# Patient Record
Sex: FEMALE | Race: WHITE | NOT HISPANIC OR LATINO | Employment: OTHER | URBAN - METROPOLITAN AREA
[De-identification: names, ages, dates, MRNs, and addresses within clinical notes are randomized per-mention and may not be internally consistent; named-entity substitution may affect disease eponyms.]

---

## 2017-01-16 RX ORDER — METOPROLOL TARTRATE 50 MG/1
TABLET ORAL
Qty: 90 TABLET | Refills: 12 | Status: SHIPPED | OUTPATIENT
Start: 2017-01-16 | End: 2018-04-02 | Stop reason: SDUPTHER

## 2017-01-27 ENCOUNTER — TELEPHONE (OUTPATIENT)
Dept: DERMATOLOGY | Facility: CLINIC | Age: 62
End: 2017-01-27

## 2017-01-27 NOTE — TELEPHONE ENCOUNTER
Spoke to pt and pt stated that she has a new spot on her R cheek that her dermatologist (Dr Labadie) will bx, but she also sees a new spot on her L cheek where she had Mohs previously and wants to know if Dr Roberto could bx it or if her dermatologist should, which she already has a appt scheduled.   I informed pt to take a photo of the area and send it either via my [facility] of email, so Dr Roberto can see it when she returns on Monday.

## 2017-01-27 NOTE — TELEPHONE ENCOUNTER
----- Message from Maia Goldberg sent at 1/27/2017  9:31 AM CST -----  Contact: don brothers pt-Pt is calling in ref to  her surgery from several months ago. Call pt at  [phone]. Wants to know if she wants the derm doctor she is with right now if you want her to do the biopsy herself or let her outside doctor to do.  Dr. Labadie wants Felisa to know what is goign on.Please call today.

## 2017-01-30 ENCOUNTER — TELEPHONE (OUTPATIENT)
Dept: DERMATOLOGY | Facility: CLINIC | Age: 62
End: 2017-01-30

## 2017-02-13 RX ORDER — LEVOTHYROXINE SODIUM 200 UG/1
TABLET ORAL
Qty: 90 TABLET | Refills: 12 | Status: SHIPPED | OUTPATIENT
Start: 2017-02-13 | End: 2018-05-13 | Stop reason: SDUPTHER

## 2017-02-14 ENCOUNTER — TELEPHONE (OUTPATIENT)
Dept: DERMATOLOGY | Facility: CLINIC | Age: 62
End: 2017-02-14

## 2017-02-14 NOTE — TELEPHONE ENCOUNTER
Established pt with BCC on R nasomalar. Referral from Dr. Labadie. Photo received and pt has been scheduled for mohs surgery on 2/16 at 800 am. Pt confirmed date, time and location. Advised to stop fish oil today.

## 2017-02-16 ENCOUNTER — PROCEDURE VISIT (OUTPATIENT)
Dept: DERMATOLOGY | Facility: CLINIC | Age: 62
End: 2017-02-16
Payer: COMMERCIAL

## 2017-02-16 VITALS
HEIGHT: 66 IN | BODY MASS INDEX: 26.36 KG/M2 | DIASTOLIC BLOOD PRESSURE: 80 MMHG | WEIGHT: 164 LBS | SYSTOLIC BLOOD PRESSURE: 150 MMHG | HEART RATE: 51 BPM

## 2017-02-16 DIAGNOSIS — C44.319 BASAL CELL CARCINOMA OF RIGHT CHEEK: Primary | ICD-10-CM

## 2017-02-16 PROCEDURE — 99499 UNLISTED E&M SERVICE: CPT | Mod: S$GLB,,, | Performed by: DERMATOLOGY

## 2017-02-16 PROCEDURE — 17311 MOHS 1 STAGE H/N/HF/G: CPT | Mod: S$GLB,,, | Performed by: DERMATOLOGY

## 2017-02-16 PROCEDURE — 13131 CMPLX RPR F/C/C/M/N/AX/G/H/F: CPT | Mod: 51,S$GLB,, | Performed by: DERMATOLOGY

## 2017-02-16 PROCEDURE — 17312 MOHS ADDL STAGE: CPT | Mod: S$GLB,,, | Performed by: DERMATOLOGY

## 2017-02-16 RX ORDER — TRAMADOL HYDROCHLORIDE 50 MG/1
50 TABLET ORAL 2 TIMES DAILY PRN
Qty: 30 TABLET | Refills: 0 | Status: SHIPPED | OUTPATIENT
Start: 2017-02-16 | End: 2017-10-02

## 2017-02-16 NOTE — MR AVS SNAPSHOT
[facility] - Dermatology Surgery  [address]  [facility] [address]  Phone: [phone]  Fax: [phone]                  Anushka Napier   2017 8:00 AM   Procedure visit    Description:  Female : 1955   Provider:  Angel Roberto MD   Department:  [facility] - Dermatology Surgery           Reason for Visit     Basal Cell Carcinoma           Diagnoses this Visit        Comments    Basal cell carcinoma of right cheek    -  Primary            To Do List           Future Appointments        Provider Department Dept Phone    2017 9:40 AM SSW NURSE, DERM SURG [facility] Dermatology Surgery [phone]      Goals (5 Years of Data)     None       These Medications        Disp Refills Start End    tramadol (ULTRAM) 50 mg tablet 30 tablet 0 2017     Take 1 tablet (50 mg total) by mouth 2 (two) times daily as needed for Pain. - Oral    [facility] [address] [place] [address] [place] [address] AT [facility] Ph #: [phone]         [facility] On Call     [facility] On Call Nurse Care Line -  Assistance  Registered nurses in the [facility] On Call Center provide clinical advisement, health education, appointment booking, and other advisory services.  Call for this free service at [phone].             Medications           Message regarding Medications     Verify the changes and/or additions to your medication regime listed below are the same as discussed with your clinician today.  If any of these changes or additions are incorrect, please notify your healthcare provider.        START taking these NEW medications        Refills    tramadol (ULTRAM) 50 mg tablet 0    Sig: Take 1 tablet (50 mg total) by mouth 2 (two) times daily as needed for Pain.    Class: Print    Route: Oral           Verify that the below list of medications is an accurate representation of the medications you are currently taking.  If none reported, the list may be blank. If incorrect,  "please contact your healthcare provider. Carry this list with you in case of emergency.           Current Medications     amitriptyline (ELAVIL) 10 MG tablet Take 10 mg by mouth nightly as needed for Insomnia.    aspirin 81 MG chewable tablet Take 1 tablet by mouth Daily. 1 Tablet, Chewable Oral Every day    atorvastatin (LIPITOR) 40 MG tablet Take 1 tablet (40 mg total) by mouth once daily.    blood sugar diagnostic Strp 1 strip by Misc.(Non-Drug; Combo Route) route 2 (two) times daily. Dispense glucometer of choice and lancets as well    estrogens, conjugated, (PREMARIN) 0.625 MG tablet Take 1 tablet (0.625 mg total) by mouth once daily.    fluoxetine (PROZAC) 20 MG capsule Take 1 capsule (20 mg total) by mouth once daily.    fluticasone (FLONASE) 50 mcg/actuation nasal spray 2 sprays by Nasal route Daily. 2 Aerosol, Spray Nasal Every day    levothyroxine (SYNTHROID) 200 MCG tablet TAKE 1 TABLET BY MOUTH ONCE DAILY    levothyroxine (SYNTHROID) 25 MCG tablet TAKE 1 TABLET BY MOUTH DAILY    lorazepam (ATIVAN) 0.5 MG tablet     metoprolol tartrate (LOPRESSOR) 50 MG tablet TAKE 1 TABLET BY MOUTH DAILY    multivitamin capsule Take 1 capsule by mouth once daily.    omega-3 fatty acids-vitamin E (FISH OIL) 1,000 mg Cap Take 1 capsule by mouth Twice daily. 1 Capsule Oral Twice a day     rabeprazole (ACIPHEX) 20 mg tablet TAKE 1 TABLET BY MOUTH TWICE DAILY    VESICARE 5 mg tablet Take 5 mg by mouth once daily.     tramadol (ULTRAM) 50 mg tablet Take 1 tablet (50 mg total) by mouth 2 (two) times daily as needed for Pain.           Clinical Reference Information           Your Vitals Were     BP Pulse Height Weight BMI    150/80 (BP Location: Left arm, Patient Position: Sitting, BP Method: Automatic) 51 5' 5.5" (1.664 m) 74.4 kg (164 lb) 26.88 kg/m2      Blood Pressure          Most Recent Value    BP  (!)  150/80      Allergies as of 2/16/2017     Codeine    Iodinated Contrast Media - Iv Dye    Iodine    Penicillins    " Sulfamethoxazole-trimethoprim    Epinephrine      Immunizations Administered on Date of Encounter - 2/16/2017     None      Language Assistance Services     ATTENTION: Language assistance services are available, free of charge. Please call [phone].      ATENCIÓN: Si radha dukes, tiene a lozano disposición servicios gratuitos de asistencia lingüística. Llame al [phone].     [facility] Ý: N?u b?n nói Ti?ng Vi?t, có các d?ch v? h? tr? ngôn ng? mi?n phí dành cho b?n. G?i s? [phone].         [facility] complies with applicable Federal civil rights laws and does not discriminate on the basis of race, color, national origin, age, disability, or sex.

## 2017-02-16 NOTE — PROGRESS NOTES
PROCEDURE: Mohs' Micrographic Surgery    INDICATION: Location in mask areas of face including central face, nose, eyelids, eyebrows, lips, chin, preauricular, temple, and ear. Biopsy-proven skin cancer of cosmetically and functionally important areas, including head, neck, genital, hand, foot, or areas known for having difficulty in healing, such as the lower anterior legs. Tumor with ill-defined borders.    REFERRING MD: Maria Ibanez-Labadie, M.D.    CASE NUMBER:     ANESTHETIC: 4 cc 0.5% Lidocaine with Epi 1:200,000 mixed 1:1 with 0.5% Bupivacaine    SURGICAL PREP: Hibiclens    SURGEON: Angel Roberto MD    ASSISTANTS: Jeanne Potts PA-C, Chanel Mcdaniels, Surg Tech and Magui Grace, Surg Tech    PREOPERATIVE DIAGNOSIS: basal cell carcinoma    POSTOPERATIVE DIAGNOSIS: basal cell carcinoma    PATHOLOGIC DIAGNOSIS: basal cell carcinoma- superficial    HISTOLOGY OF SPECIMENS IN FIRST STAGE:   Tumor Type: Tumor seen. Superficial basal cell carcinoma: Foci of basaloid cells with peripheral palisading and focal retraction artifact arising along the dermoepidermal junction and extending into the papillary dermis.   Depth of Invasion: epidermis, dermis and subcutaneous tissue  Perineural Invasion: No    HISTOLOGY OF SPECIMENS IN SUBSEQUENT STAGES:  · Tumor Type: No tumor seen.    STAGES OF MOHS' SURGERY PERFORMED: 2    TUMOR-FREE PLANE ACHIEVED: Yes    HEMOSTASIS: electrocoagulation     SPECIMENS: 3 (2 in stage A and 1 in stage B)    LOCATION: right nasomalar    INITIAL LESION SIZE: 0.4 x 0.5 cm    FINAL DEFECT SIZE: 0.8 x 0.9 cm    WOUND REPAIR/DISPOSITION: The patient tolerated Mohs' Micrographic Surgery for a basal cell carcinoma very well. When the tumor was completely removed, a repair of the surgical defect was undertaken.      PROCEDURE: Complex Linear Repair    INDICATION: Status post Mohs' Micrographic Surgery for basal cell carcinoma.    CASE NUMBER:     SURGEON: Angel Roberto MD    ASSISTANTS: Jeanne  "ESTELITA Potts and Chanel Mcdaniels, Surg Tech    ANESTHETIC: 2.5 cc 1% Lidocaine with Epinephrine 1:100,000, buffered    SURGICAL PREP: Hibiclens    LOCATION: right nasomalar    DEFECT SIZE: 0.8 x 0.9 cm    WOUND REPAIR/DISPOSITION:  After the patient's carcinoma had been completely removed with Mohs' Micrographic Surgery, a repair of the surgical defect was undertaken. The patient was returned to the operating suite where the area of right nasomalar cheek was prepped, draped, and anesthetized in the usual sterile fashion. The wound was widely undermined in all directions. Then, electrocoagulation was used to obtain meticulous hemostasis. 5-0 Vicryl buried vertical mattress sutures were placed into the subcutaneous and dermal plane to close the wound and jorge luis the cutaneous wound edge. Bilateral dog ears were identified and were removed by a standard Burow's triangle technique. The cutaneous wound edges were closed using interrupted 6-0 Prolene suture.    The patient tolerated the procedure well.    The area was cleaned and dressed appropriately and the patient was given wound care instructions, as well as appointment for follow-up evaluation. Pt was placed on Tramadol 50 mg BID prn postop pain.    LENGTH OF REPAIR: 2 cm    Vitals:    02/16/17 0752 02/16/17 1044   BP: (!) 159/85 (!) 150/80   BP Location: Right arm Left arm   Patient Position: Sitting Sitting   BP Method: Automatic Automatic   Pulse: (!) 54 (!) 51   Weight: 74.4 kg (164 lb)    Height: 5' 5.5" (1.664 m)          "

## 2017-02-23 ENCOUNTER — CLINICAL SUPPORT (OUTPATIENT)
Dept: DERMATOLOGY | Facility: CLINIC | Age: 62
End: 2017-02-23
Payer: COMMERCIAL

## 2017-02-23 PROCEDURE — 99999 PR PBB SHADOW E&M-EST. PATIENT-LVL III: CPT | Mod: PBBFAC,,,

## 2017-02-23 NOTE — MR AVS SNAPSHOT
[facility] - Dermatology Surgery  [address]  [facility] [address]  Phone: [phone]  Fax: [phone]                  Anushka Napier   2017 9:40 AM   Clinical Support    Description:  Female : 1955   Provider:  KEITH NURSE, DERM SURG   Department:  [facility] - Dermatology Surgery           Reason for Visit     Suture / Staple Removal                To Do List           Goals (5 Years of Data)     None      [facility] On Call     [facility] On Call Nurse Care Line -  Assistance  Registered nurses in the [facility] On Call Center provide clinical advisement, health education, appointment booking, and other advisory services.  Call for this free service at [phone].             Medications           Message regarding Medications     Verify the changes and/or additions to your medication regime listed below are the same as discussed with your clinician today.  If any of these changes or additions are incorrect, please notify your healthcare provider.             Verify that the below list of medications is an accurate representation of the medications you are currently taking.  If none reported, the list may be blank. If incorrect, please contact your healthcare provider. Carry this list with you in case of emergency.           Current Medications     amitriptyline (ELAVIL) 10 MG tablet Take 10 mg by mouth nightly as needed for Insomnia.    aspirin 81 MG chewable tablet Take 1 tablet by mouth Daily. 1 Tablet, Chewable Oral Every day    atorvastatin (LIPITOR) 40 MG tablet Take 1 tablet (40 mg total) by mouth once daily.    blood sugar diagnostic Strp 1 strip by Misc.(Non-Drug; Combo Route) route 2 (two) times daily. Dispense glucometer of choice and lancets as well    estrogens, conjugated, (PREMARIN) 0.625 MG tablet Take 1 tablet (0.625 mg total) by mouth once daily.    fluticasone (FLONASE) 50 mcg/actuation nasal spray 2 sprays by Nasal route Daily. 2 Aerosol, Spray Nasal Every day     levothyroxine (SYNTHROID) 200 MCG tablet TAKE 1 TABLET BY MOUTH ONCE DAILY    levothyroxine (SYNTHROID) 25 MCG tablet TAKE 1 TABLET BY MOUTH DAILY    lorazepam (ATIVAN) 0.5 MG tablet     metoprolol tartrate (LOPRESSOR) 50 MG tablet TAKE 1 TABLET BY MOUTH DAILY    multivitamin capsule Take 1 capsule by mouth once daily.    omega-3 fatty acids-vitamin E (FISH OIL) 1,000 mg Cap Take 1 capsule by mouth Twice daily. 1 Capsule Oral Twice a day     rabeprazole (ACIPHEX) 20 mg tablet TAKE 1 TABLET BY MOUTH TWICE DAILY    tramadol (ULTRAM) 50 mg tablet Take 1 tablet (50 mg total) by mouth 2 (two) times daily as needed for Pain.    VESICARE 5 mg tablet Take 5 mg by mouth once daily.     fluoxetine (PROZAC) 20 MG capsule Take 1 capsule (20 mg total) by mouth once daily.           Clinical Reference Information           Allergies as of 2/23/2017     Codeine    Iodinated Contrast Media - Iv Dye    Iodine    Penicillins    Sulfamethoxazole-trimethoprim    Epinephrine      Immunizations Administered on Date of Encounter - 2/23/2017     None      Language Assistance Services     ATTENTION: Language assistance services are available, free of charge. Please call [phone].      ATENCIÓN: Si markla roseline, tiene a lozano disposición servicios gratuitos de asistencia lingüística. Llame al [phone].     [facility] Ý: N?u b?n nói Ti?ng Vi?t, có các d?ch v? h? tr? ngôn ng? mi?n phí dành cho b?n. G?i s? [phone].         [facility] complies with applicable Federal civil rights laws and does not discriminate on the basis of race, color, national origin, age, disability, or sex.

## 2017-02-23 NOTE — PROGRESS NOTES
61 y.o. female patient is here for suture removal following Mohs' surgery.    Patient reports no problems.    WOUND PE:  The R nasomalar sutures intact. Wound healing well. Good skin edges. No signs or symptoms of infection.    IMPRESSION:  Healing operative site from Mohs' surgery, BCC R nasomalar s/p Mohs with CLC, postop day #7.    PLAN:  Sutures removed today. Steri-strips applied.  Continue wound care.  Keep moist with Aquaphor.    RTC:  In 3-6 months with Maria Ibanez-Labadie, M.D. for skin check or sooner if new concern arises.

## 2017-02-27 ENCOUNTER — TELEPHONE (OUTPATIENT)
Dept: FAMILY MEDICINE | Facility: CLINIC | Age: 62
End: 2017-02-27

## 2017-02-27 DIAGNOSIS — Z00.00 ROUTINE MEDICAL EXAM: Primary | ICD-10-CM

## 2017-02-27 RX ORDER — AMOXICILLIN 500 MG/1
500 CAPSULE ORAL 3 TIMES DAILY
Qty: 30 CAPSULE | Refills: 0 | Status: SHIPPED | OUTPATIENT
Start: 2017-02-27 | End: 2017-03-09

## 2017-02-27 RX ORDER — METHYLPREDNISOLONE 4 MG/1
TABLET ORAL
Qty: 1 PACKAGE | Refills: 0 | Status: SHIPPED | OUTPATIENT
Start: 2017-02-27 | End: 2017-10-02

## 2017-02-27 NOTE — TELEPHONE ENCOUNTER
Patient wants to come in this Thursday morning for labs, please schedule in a.m.        (Recently had some dental issues and dentist put her on Medrol Dosepak and amoxicillin.  She says she's taken penicillin several times without ALLERGIC reaction and so is really not ALLERGIC to it.  He does have diabetes which has not been checked in a year and I'll arrange lab work.  I encouraged her to probably not try to take the steroid pack this time.  She'll be getting in touch with her dentist after the holiday)

## 2017-03-01 ENCOUNTER — PATIENT MESSAGE (OUTPATIENT)
Dept: FAMILY MEDICINE | Facility: CLINIC | Age: 62
End: 2017-03-01

## 2017-03-02 ENCOUNTER — LAB VISIT (OUTPATIENT)
Dept: LAB | Facility: HOSPITAL | Age: 62
End: 2017-03-02
Attending: INTERNAL MEDICINE
Payer: COMMERCIAL

## 2017-03-02 DIAGNOSIS — Z00.00 ROUTINE MEDICAL EXAM: ICD-10-CM

## 2017-03-02 LAB
25(OH)D3+25(OH)D2 SERPL-MCNC: 63 NG/ML
ALBUMIN SERPL BCP-MCNC: 3.5 G/DL
ALP SERPL-CCNC: 74 U/L
ALT SERPL W/O P-5'-P-CCNC: 14 U/L
ANION GAP SERPL CALC-SCNC: 9 MMOL/L
AST SERPL-CCNC: 14 U/L
BILIRUB SERPL-MCNC: 0.3 MG/DL
BUN SERPL-MCNC: 19 MG/DL
CALCIUM SERPL-MCNC: 9.2 MG/DL
CHLORIDE SERPL-SCNC: 108 MMOL/L
CHOLEST/HDLC SERPL: 3.2 {RATIO}
CO2 SERPL-SCNC: 21 MMOL/L
CREAT SERPL-MCNC: 0.9 MG/DL
EST. GFR  (AFRICAN AMERICAN): >60 ML/MIN/1.73 M^2
EST. GFR  (NON AFRICAN AMERICAN): >60 ML/MIN/1.73 M^2
GLUCOSE SERPL-MCNC: 131 MG/DL
HDL/CHOLESTEROL RATIO: 31.5 %
HDLC SERPL-MCNC: 235 MG/DL
HDLC SERPL-MCNC: 74 MG/DL
LDLC SERPL CALC-MCNC: 139.6 MG/DL
NONHDLC SERPL-MCNC: 161 MG/DL
POTASSIUM SERPL-SCNC: 4.3 MMOL/L
PROT SERPL-MCNC: 7.3 G/DL
SODIUM SERPL-SCNC: 138 MMOL/L
TRIGL SERPL-MCNC: 107 MG/DL
TSH SERPL DL<=0.005 MIU/L-ACNC: 1.19 UIU/ML

## 2017-03-02 PROCEDURE — 36415 COLL VENOUS BLD VENIPUNCTURE: CPT | Mod: PO

## 2017-03-02 PROCEDURE — 84443 ASSAY THYROID STIM HORMONE: CPT

## 2017-03-02 PROCEDURE — 80053 COMPREHEN METABOLIC PANEL: CPT

## 2017-03-02 PROCEDURE — 80061 LIPID PANEL: CPT

## 2017-03-02 PROCEDURE — 83036 HEMOGLOBIN GLYCOSYLATED A1C: CPT

## 2017-03-02 PROCEDURE — 82306 VITAMIN D 25 HYDROXY: CPT

## 2017-03-03 ENCOUNTER — TELEPHONE (OUTPATIENT)
Dept: FAMILY MEDICINE | Facility: CLINIC | Age: 62
End: 2017-03-03

## 2017-03-03 LAB
ESTIMATED AVG GLUCOSE: 143 MG/DL
HBA1C MFR BLD HPLC: 6.6 %

## 2017-03-03 NOTE — TELEPHONE ENCOUNTER
----- Message from Jess Hernandez sent at 3/1/2017  3:02 PM CST -----  Contact: Self  Pt calling regarding the labs that is scheduled for her tomorrow. Please call [phone]

## 2017-03-08 ENCOUNTER — PATIENT MESSAGE (OUTPATIENT)
Dept: FAMILY MEDICINE | Facility: CLINIC | Age: 62
End: 2017-03-08

## 2017-03-13 RX ORDER — FLUOXETINE HYDROCHLORIDE 20 MG/1
CAPSULE ORAL
Qty: 90 CAPSULE | Refills: 90 | Status: SHIPPED | OUTPATIENT
Start: 2017-03-13 | End: 2018-06-12 | Stop reason: SDUPTHER

## 2017-04-13 DIAGNOSIS — Z11.59 NEED FOR HEPATITIS C SCREENING TEST: ICD-10-CM

## 2017-07-03 RX ORDER — RABEPRAZOLE SODIUM 20 MG/1
TABLET, DELAYED RELEASE ORAL
Qty: 60 TABLET | Refills: 0 | Status: SHIPPED | OUTPATIENT
Start: 2017-07-03 | End: 2017-10-24 | Stop reason: SDUPTHER

## 2017-08-28 ENCOUNTER — PATIENT OUTREACH (OUTPATIENT)
Dept: ADMINISTRATIVE | Facility: HOSPITAL | Age: 62
End: 2017-08-28

## 2017-08-28 NOTE — PROGRESS NOTES
Sent a letter per mail for patient to call back and schedule appointment for labs, office visit, and update health maintenance.    Called and requested last colonoscopy from [facility].    Received colonoscopy report from [facility].  Updated health maintenance and sent report to scanning.

## 2017-10-02 ENCOUNTER — OFFICE VISIT (OUTPATIENT)
Dept: OBSTETRICS AND GYNECOLOGY | Facility: CLINIC | Age: 62
End: 2017-10-02
Payer: COMMERCIAL

## 2017-10-02 VITALS
SYSTOLIC BLOOD PRESSURE: 130 MMHG | BODY MASS INDEX: 25.3 KG/M2 | DIASTOLIC BLOOD PRESSURE: 72 MMHG | WEIGHT: 157.44 LBS | TEMPERATURE: 99 F | HEIGHT: 66 IN

## 2017-10-02 DIAGNOSIS — Z01.419 WELL WOMAN EXAM WITH ROUTINE GYNECOLOGICAL EXAM: Primary | ICD-10-CM

## 2017-10-02 DIAGNOSIS — Z12.31 VISIT FOR SCREENING MAMMOGRAM: ICD-10-CM

## 2017-10-02 DIAGNOSIS — N95.1 SYMPTOMATIC MENOPAUSAL OR FEMALE CLIMACTERIC STATES: ICD-10-CM

## 2017-10-02 PROCEDURE — 99999 PR PBB SHADOW E&M-EST. PATIENT-LVL III: CPT | Mod: PBBFAC,,, | Performed by: OBSTETRICS & GYNECOLOGY

## 2017-10-02 PROCEDURE — 99396 PREV VISIT EST AGE 40-64: CPT | Mod: S$GLB,,, | Performed by: OBSTETRICS & GYNECOLOGY

## 2017-10-02 NOTE — PROGRESS NOTES
Subjective:       Patient ID: Anushka Napier is a 62 y.o. female.    Chief Complaint:  Gynecologic Exam (Last pap was 14 and last mmg was 16)      History of Present Illness  HPI  Annual Exam-Postmenopausal  Patient presents for annual exam. The patient has no complaints today. The patient is sexually active. GYN screening history: last pap: approximate date 2016 and was normal and last mammogram: approximate date 2016 and was normal. The patient is taking hormone replacement therapy. Patient denies post-menopausal vaginal bleeding. The patient wears seatbelts: yes. The patient participates in regular exercise: yes. Has the patient ever been transfused or tattooed?: no. The patient reports that there is not domestic violence in her life.    Patient is doing well on Premarin.  She would like to continue      GYN & OB HistoryNo LMP recorded. Patient has had a hysterectomy.   Date of Last Pap: 2014    OB History    Para Term  AB Living   6 3 2 1 3 3   SAB TAB Ectopic Multiple Live Births   3       3      # Outcome Date GA Lbr Amos/2nd Weight Sex Delivery Anes PTL Lv   6 Term 90 40w0d  4.309 kg (9 lb 8 oz) F Vag-Spont EPI N ALEX   5 Term 88 38w0d  3.657 kg (8 lb 1 oz) M Vag-Spont EPI N ALEX   4  10/08/82 36w0d  2.58 kg (5 lb 11 oz) M Vag-Spont EPI N ALEX   3 SAB            2 SAB            1 SAB                 Past Medical History:   Diagnosis Date    Allergy     Anxiety     Basal cell carcinoma     Depression     Diabetes mellitus     Diabetes mellitus type II, uncontrolled 2014    GERD (gastroesophageal reflux disease) 2014    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 Dr Correia, long term PPI use    Hyperlipidemia     Hypertension     Osteopenia 2014    Screening for colorectal cancer 2014    Normal     Screening for colorectal cancer     Thyroid disease     Vitamin D deficiency disease 2014       Past Surgical History:   Procedure  Laterality Date    HYSTERECTOMY  1997    complete for prolapse, Abdominal    right shoulder      SINUS SURGERY  2012       Family History   Problem Relation Age of Onset    Breast cancer Cousin 40    Miscarriages / Stillbirths Maternal Grandmother     Miscarriages / Stillbirths Mother     Diabetes Mother     Ovarian cancer Neg Hx        Social History     Social History    Marital status:      Spouse name: N/A    Number of children: N/A    Years of education: N/A     Social History Main Topics    Smoking status: Never Smoker    Smokeless tobacco: Never Used    Alcohol use No    Drug use: No    Sexual activity: Yes     Partners: Male     Birth control/ protection: Surgical     Other Topics Concern    None     Social History Narrative     since 1986    Previous  for 8 years prior    He is a     She is a homemaker       Current Outpatient Prescriptions   Medication Sig Dispense Refill    amitriptyline (ELAVIL) 10 MG tablet Take 10 mg by mouth nightly as needed for Insomnia.      aspirin 81 MG chewable tablet Take 1 tablet by mouth Daily. 1 Tablet, Chewable Oral Every day      atorvastatin (LIPITOR) 40 MG tablet Take 1 tablet (40 mg total) by mouth once daily. 90 tablet 90    blood sugar diagnostic Strp 1 strip by Misc.(Non-Drug; Combo Route) route 2 (two) times daily. Dispense glucometer of choice and lancets as well 100 strip 12    estrogens, conjugated, (PREMARIN) 0.625 MG tablet Take 1 tablet (0.625 mg total) by mouth once daily. 30 tablet 12    fluoxetine (PROZAC) 20 MG capsule TAKE 1 CAPSULE BY MOUTH DAILY 90 capsule 90    fluticasone (FLONASE) 50 mcg/actuation nasal spray 2 sprays by Nasal route Daily. 2 Aerosol, Spray Nasal Every day      levothyroxine (SYNTHROID) 200 MCG tablet TAKE 1 TABLET BY MOUTH ONCE DAILY 90 tablet 12    levothyroxine (SYNTHROID) 25 MCG tablet TAKE 1 TABLET BY MOUTH DAILY 90 tablet 12    lorazepam (ATIVAN) 0.5 MG tablet        metoprolol tartrate (LOPRESSOR) 50 MG tablet TAKE 1 TABLET BY MOUTH DAILY 90 tablet 12    multivitamin capsule Take 1 capsule by mouth once daily.      omega-3 fatty acids-vitamin E (FISH OIL) 1,000 mg Cap Take 1 capsule by mouth Twice daily. 1 Capsule Oral Twice a day       rabeprazole (ACIPHEX) 20 mg tablet TAKE 1 TABLET BY MOUTH TWICE DAILY 60 tablet 0    VESICARE 5 mg tablet Take 5 mg by mouth once daily.   5     No current facility-administered medications for this visit.        Review of patient's allergies indicates:   Allergen Reactions    Codeine      Other reaction(s): Itching  Other reaction(s): Hives    Iodinated contrast- oral and iv dye      Other reaction(s): Hives    Iodine      Other reaction(s): Unknown    Penicillins      Other reaction(s): Hives    Sulfamethoxazole-trimethoprim      Other reaction(s): Itching  Other reaction(s): Hives    Epinephrine Anxiety       Review of Systems  Review of Systems   Constitutional: Negative for activity change, appetite change, chills, fatigue, fever and unexpected weight change.   HENT: Negative for mouth sores.    Respiratory: Negative for cough, shortness of breath and wheezing.    Cardiovascular: Negative for chest pain and palpitations.   Gastrointestinal: Negative for abdominal pain, bloating, blood in stool, constipation, nausea and vomiting.   Endocrine: Negative for diabetes and hot flashes.   Genitourinary: Negative for dyspareunia, dysuria, frequency, hematuria, menorrhagia, menstrual problem, pelvic pain, urgency, vaginal bleeding, vaginal discharge, vaginal pain, dysmenorrhea, urinary incontinence, postcoital bleeding and vaginal odor.   Musculoskeletal: Negative for back pain and myalgias.   Skin:  Negative for rash.   Neurological: Negative for seizures and headaches.   Psychiatric/Behavioral: Negative for depression and sleep disturbance. The patient is not nervous/anxious.    Breast: Negative for breast mass, breast pain and nipple  discharge          Objective:    Physical Exam:   Constitutional: She appears well-developed and well-nourished. No distress.    HENT:   Head: Normocephalic and atraumatic.    Eyes: EOM are normal.    Neck: Normal range of motion.     Pulmonary/Chest: Effort normal. No respiratory distress.   Breasts: Non-tender, no engorgement, no masses, no retraction, no discharge. Negative for lymphadenopathy.         Abdominal: Soft. She exhibits no distension. There is no tenderness. There is no rebound and no guarding.     Genitourinary: Vagina normal. No vaginal discharge found.   Genitourinary Comments: Minimal atrophy.  Vulva without any obvious lesions.  Vaginal vault with good support.  Minimal discharge noted.  No obvious lesion.  Vaginal cuff is well-supported.  Cervix and Uterus are surgically absent.  Adnexa is without any masses or tenderness.           Musculoskeletal: Normal range of motion.       Neurological: She is alert.    Skin: Skin is warm and dry.    Psychiatric: She has a normal mood and affect.          Assessment:        1. Well woman exam with routine gynecological exam    2. Symptomatic menopausal or female climacteric states             Plan:          I have discussed with the patient her condition.  Monthly breast examination was instructed, discussed, and encouraged.  Patient was encouraged to consume a low-calorie, low fat diet, and to increase of physical activity.  Healthy habits encouraged.  A Pap smear was NOT performed; she no longer needs Pap.  Mammogram was ordered for 12/4/2017 because of the combination of her age and risk factors.  Gonorrhea and Chlamydia testing not performed.  HIV test not ordered.  Patient is to continue her medication, Premarin 0.625mg daily.  She will come back to see me in one year for her annual visit.  She can come back to see me sooner as necessary.  All of her questions were answered appropriately to her satisfaction.

## 2017-10-04 ENCOUNTER — LAB VISIT (OUTPATIENT)
Dept: LAB | Facility: HOSPITAL | Age: 62
End: 2017-10-04
Attending: INTERNAL MEDICINE
Payer: COMMERCIAL

## 2017-10-04 ENCOUNTER — TELEPHONE (OUTPATIENT)
Dept: OBSTETRICS AND GYNECOLOGY | Facility: CLINIC | Age: 62
End: 2017-10-04

## 2017-10-04 ENCOUNTER — OFFICE VISIT (OUTPATIENT)
Dept: FAMILY MEDICINE | Facility: CLINIC | Age: 62
End: 2017-10-04
Payer: COMMERCIAL

## 2017-10-04 VITALS
WEIGHT: 157.63 LBS | DIASTOLIC BLOOD PRESSURE: 80 MMHG | SYSTOLIC BLOOD PRESSURE: 132 MMHG | HEIGHT: 65 IN | HEART RATE: 65 BPM | TEMPERATURE: 98 F | BODY MASS INDEX: 26.26 KG/M2 | OXYGEN SATURATION: 98 %

## 2017-10-04 DIAGNOSIS — E03.9 HYPOTHYROIDISM, UNSPECIFIED TYPE: ICD-10-CM

## 2017-10-04 DIAGNOSIS — Z00.00 ROUTINE MEDICAL EXAM: ICD-10-CM

## 2017-10-04 DIAGNOSIS — E55.9 VITAMIN D DEFICIENCY DISEASE: ICD-10-CM

## 2017-10-04 DIAGNOSIS — E78.5 HYPERLIPIDEMIA, UNSPECIFIED HYPERLIPIDEMIA TYPE: ICD-10-CM

## 2017-10-04 DIAGNOSIS — M85.80 OSTEOPENIA, UNSPECIFIED LOCATION: Primary | ICD-10-CM

## 2017-10-04 DIAGNOSIS — N95.1 SYMPTOMATIC MENOPAUSAL OR FEMALE CLIMACTERIC STATES: ICD-10-CM

## 2017-10-04 DIAGNOSIS — Z11.59 NEED FOR HEPATITIS C SCREENING TEST: ICD-10-CM

## 2017-10-04 DIAGNOSIS — K21.9 GASTROESOPHAGEAL REFLUX DISEASE, ESOPHAGITIS PRESENCE NOT SPECIFIED: ICD-10-CM

## 2017-10-04 DIAGNOSIS — F39 MOOD DISORDER: ICD-10-CM

## 2017-10-04 DIAGNOSIS — Z00.00 ROUTINE MEDICAL EXAM: Primary | ICD-10-CM

## 2017-10-04 DIAGNOSIS — I10 ESSENTIAL HYPERTENSION: ICD-10-CM

## 2017-10-04 DIAGNOSIS — M85.80 OSTEOPENIA, UNSPECIFIED LOCATION: ICD-10-CM

## 2017-10-04 LAB
ALBUMIN SERPL BCP-MCNC: 3.4 G/DL
ALP SERPL-CCNC: 98 U/L
ALT SERPL W/O P-5'-P-CCNC: 11 U/L
ANION GAP SERPL CALC-SCNC: 8 MMOL/L
AST SERPL-CCNC: 14 U/L
BASOPHILS # BLD AUTO: 0.04 K/UL
BASOPHILS NFR BLD: 0.6 %
BILIRUB SERPL-MCNC: 0.6 MG/DL
BUN SERPL-MCNC: 16 MG/DL
CALCIUM SERPL-MCNC: 8.8 MG/DL
CHLORIDE SERPL-SCNC: 107 MMOL/L
CHOLEST SERPL-MCNC: 199 MG/DL
CHOLEST/HDLC SERPL: 3.3 {RATIO}
CO2 SERPL-SCNC: 25 MMOL/L
CREAT SERPL-MCNC: 0.8 MG/DL
DIFFERENTIAL METHOD: NORMAL
EOSINOPHIL # BLD AUTO: 0.2 K/UL
EOSINOPHIL NFR BLD: 2.9 %
ERYTHROCYTE [DISTWIDTH] IN BLOOD BY AUTOMATED COUNT: 13.5 %
EST. GFR  (AFRICAN AMERICAN): >60 ML/MIN/1.73 M^2
EST. GFR  (NON AFRICAN AMERICAN): >60 ML/MIN/1.73 M^2
ESTIMATED AVG GLUCOSE: 137 MG/DL
GLUCOSE SERPL-MCNC: 109 MG/DL
HBA1C MFR BLD HPLC: 6.4 %
HCT VFR BLD AUTO: 39.1 %
HCV AB SERPL QL IA: NEGATIVE
HDLC SERPL-MCNC: 61 MG/DL
HDLC SERPL: 30.7 %
HGB BLD-MCNC: 12.9 G/DL
LDLC SERPL CALC-MCNC: 109.2 MG/DL
LYMPHOCYTES # BLD AUTO: 1.8 K/UL
LYMPHOCYTES NFR BLD: 28.9 %
MCH RBC QN AUTO: 28 PG
MCHC RBC AUTO-ENTMCNC: 33 G/DL
MCV RBC AUTO: 85 FL
MONOCYTES # BLD AUTO: 0.6 K/UL
MONOCYTES NFR BLD: 9.4 %
NEUTROPHILS # BLD AUTO: 3.6 K/UL
NEUTROPHILS NFR BLD: 57.9 %
NONHDLC SERPL-MCNC: 138 MG/DL
PLATELET # BLD AUTO: 322 K/UL
PMV BLD AUTO: 9.5 FL
POTASSIUM SERPL-SCNC: 4.3 MMOL/L
PROT SERPL-MCNC: 7.2 G/DL
RBC # BLD AUTO: 4.61 M/UL
SODIUM SERPL-SCNC: 140 MMOL/L
TRIGL SERPL-MCNC: 144 MG/DL
WBC # BLD AUTO: 6.19 K/UL

## 2017-10-04 PROCEDURE — 80053 COMPREHEN METABOLIC PANEL: CPT

## 2017-10-04 PROCEDURE — 36415 COLL VENOUS BLD VENIPUNCTURE: CPT | Mod: PO

## 2017-10-04 PROCEDURE — 99396 PREV VISIT EST AGE 40-64: CPT | Mod: S$GLB,,, | Performed by: INTERNAL MEDICINE

## 2017-10-04 PROCEDURE — 86803 HEPATITIS C AB TEST: CPT

## 2017-10-04 PROCEDURE — 80061 LIPID PANEL: CPT

## 2017-10-04 PROCEDURE — 85025 COMPLETE CBC W/AUTO DIFF WBC: CPT

## 2017-10-04 PROCEDURE — 99999 PR PBB SHADOW E&M-EST. PATIENT-LVL III: CPT | Mod: PBBFAC,,, | Performed by: INTERNAL MEDICINE

## 2017-10-04 PROCEDURE — 83036 HEMOGLOBIN GLYCOSYLATED A1C: CPT

## 2017-10-04 NOTE — TELEPHONE ENCOUNTER
----- Message from Kenya Santos sent at 10/4/2017 10:40 AM CDT -----  Contact: [phone]  Pt is requesting a order for a bone density test added to her order for the mammogram

## 2017-10-04 NOTE — PROGRESS NOTES
CHIEF COMPLAINT:  Physical.                                                                                                                               HISTORY OF PRESENT ILLNESS:  A 62-year-old white female walking 1 mile per   day.  Back in 2012 She had an outside mammogram at [place] done by her GYN.        subsequent spot compression film was all normal as well.  Last mammo 12/16      Prior bone densities were done by prior GYN and had osteopenia, The last being 2012.  She was taken off EVista 5/14 and continues on Premarin.  We discussed doing an updated bone density when she is due for her mammogram later this year                                                                                Her colonoscopy was normal 4/17/08.  She had another upper endoscopy,   which was okay per outside GI.  She does have a prior history of what        sounds like near Carter's esophagus.  She has an appointment tomorrow with her GI and will discuss having the EGD and colonoscopy possibly done at this time.                                                                                                                                     Regarding her diabetes, her last A1c was 6.6.  Vit D normal. .  She reports taking her Lipitor regularly.  We discussed possible dose increased or change to Crestor She is not on any direct treatment for the diabetes but has been relatively stable.  She has continued with her exercise and diet.  She would like to avoid medication will be discussed the benefits and the low threshold to start metformin       having some situational stress with her daughter who recently had a relationship breakup.  All are doing fairly well                                                                                 ROS:   CONST: weight stable. EYES: no vision change. ENT: no sore throat. CV: no chest pain w/ exertion. RESP: no shortness of breath. GI: no nausea, vomiting, diarrhea. No  dysphagia. : no urinary issues. MUSCULOSKELETAL: no new myalgias or arthralgias. SKIN: no new changes. NEURO: no focal deficits. PSYCH: no new issues. ENDOCRINE: no polyuria. HEME: no lymph nodes. ALLERGY: no general pruritis.                                                                                                                    PAST MEDICAL HISTORY:                                                        1. Osteopenia, last bone density in 2012 - per prior Gyn                                  2. DIABETES                          3. Hypothyroidism.                                                           4. Hypertension.                                                             5. Hysterectomy - total.                                                6. Hyperlipidemia.                                                           7. GERD. EGDs with possible Davian's- - Dr Correia.                                                                   8. Anxiety and depression.   9. ENT septum surgery for sinusitis -Dr Sandoval 2012.  10. Colonoscopy 4/17/08 normal - 7-10 yrs.  11. H/O abnormal Mammo                                                                                                                               FAMILY HISTORY: Father is living with hypertension, heart disease and        diabetes.  Mom is living with a history of cervical cancer, hypertension     and diabetes.  One brother and one sister with no stated problems.                                                                                                                                                                                      ALLERGIES:  Penicillin, sulfa, codeine, iodine and shellfish.                                                                                             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  She does not smoke and never did.  She does not drink.                                                                                                             PHYSICAL EXAMINATION:                                                        VITAL SIGNS:  As above                             GENERAL:  Pleasant female.                                                   HEENT:  Conjunctivae clear.  Pupils equal and reactive.  Nose and mouth      clear and moist.  Teeth good.                                                NECK:  Supple.  Thyroid nonpalpable.                                         RESPIRATORY:  Effort is good.                                                LUNGS:  Clear.                                                               HEART:  Regular rate and rhythm without murmurs, gallops or rubs.  No        carotid bruits.  No edema.                                                   ABDOMEN:  Soft, nondistended, nontender.  No hepatosplenomegaly or masses.   SKIN:  No rashes.  Warm to touch.                                            EXTREMITIES:  Gait normal.  Digits without clubbing or cyanosis.                                                                                          Labs and x-ray reports reviewed as well as outside mammogram and breast      ultrasound report.                                                                                                                                          Anushka LAU was seen today for annual exam.    Diagnoses and all orders for this visit:    Routine medical exam, patient plans to keep follow-up with GYN later this year and she will discuss mammogram and updating bone density.  She will discuss with GI about updating the colonoscopy.  -     Hemoglobin A1c; Future  -     Comprehensive metabolic panel; Future  -     Lipid panel; Future  -     CBC auto differential; Future    Osteopenia, unspecified location, discussed all forms of medication if needed, her vitamin D is good and she continues on Premarin.   "We discussed other may be issues with Premarin coverage when she turns 67    Uncontrolled type 2 diabetes mellitus without complication, without long-term current use of insulin    Gastroesophageal reflux disease, esophagitis presence not specified, stable on PPI    Hyperlipidemia, unspecified hyperlipidemia type, reassess and adjust    Essential hypertension, chronic and stable    Hypothyroidism, unspecified type, euthyroid    Vitamin D deficiency disease, controlled    Mood disorder, stable          Based on the need to document sensitive psychiatric symptoms, access to the clinical note will not be appropriate"This note will not be shared with the patient."  "

## 2017-10-04 NOTE — TELEPHONE ENCOUNTER
Spoke to pt and informed her I will send a message to Dr. Tillman to add a bone density order. Pt voiced her understanding. kt

## 2017-10-05 ENCOUNTER — TELEPHONE (OUTPATIENT)
Dept: FAMILY MEDICINE | Facility: CLINIC | Age: 62
End: 2017-10-05

## 2017-10-06 NOTE — TELEPHONE ENCOUNTER
----- Message from Katerina Downey sent at 10/4/2017  3:15 PM CDT -----  Contact: [phone]  Pt is requesting her lab results, labs were done today 10-4-17. Pls call pt [phone]. Thanks.....Allegra

## 2017-10-25 RX ORDER — LEVOTHYROXINE SODIUM 25 UG/1
TABLET ORAL
Qty: 90 TABLET | Refills: 12 | Status: SHIPPED | OUTPATIENT
Start: 2017-10-25 | End: 2018-12-16 | Stop reason: SDUPTHER

## 2017-10-25 RX ORDER — RABEPRAZOLE SODIUM 20 MG/1
TABLET, DELAYED RELEASE ORAL
Qty: 60 TABLET | Refills: 12 | Status: SHIPPED | OUTPATIENT
Start: 2017-10-25 | End: 2018-12-01 | Stop reason: SDUPTHER

## 2017-11-01 DIAGNOSIS — N95.1 SYMPTOMATIC MENOPAUSAL OR FEMALE CLIMACTERIC STATES: ICD-10-CM

## 2017-12-21 ENCOUNTER — HOSPITAL ENCOUNTER (OUTPATIENT)
Dept: RADIOLOGY | Facility: HOSPITAL | Age: 62
Discharge: HOME | End: 2017-12-21
Attending: OBSTETRICS & GYNECOLOGY
Payer: COMMERCIAL

## 2017-12-21 VITALS — HEIGHT: 65 IN | WEIGHT: 157 LBS | BODY MASS INDEX: 26.16 KG/M2

## 2017-12-21 DIAGNOSIS — N95.1 SYMPTOMATIC MENOPAUSAL OR FEMALE CLIMACTERIC STATES: ICD-10-CM

## 2017-12-21 DIAGNOSIS — M85.80 OSTEOPENIA, UNSPECIFIED LOCATION: ICD-10-CM

## 2017-12-21 DIAGNOSIS — Z12.31 VISIT FOR SCREENING MAMMOGRAM: ICD-10-CM

## 2017-12-21 PROCEDURE — 77067 SCR MAMMO BI INCL CAD: CPT | Mod: 26,,, | Performed by: RADIOLOGY

## 2017-12-21 PROCEDURE — 77080 DXA BONE DENSITY AXIAL: CPT | Mod: 26,,, | Performed by: RADIOLOGY

## 2017-12-21 PROCEDURE — 77080 DXA BONE DENSITY AXIAL: CPT | Mod: TC

## 2017-12-21 PROCEDURE — 77063 BREAST TOMOSYNTHESIS BI: CPT | Mod: 26,,, | Performed by: RADIOLOGY

## 2017-12-21 PROCEDURE — 77067 SCR MAMMO BI INCL CAD: CPT | Mod: TC

## 2017-12-29 ENCOUNTER — PATIENT MESSAGE (OUTPATIENT)
Dept: FAMILY MEDICINE | Facility: CLINIC | Age: 62
End: 2017-12-29

## 2018-01-02 ENCOUNTER — OFFICE VISIT (OUTPATIENT)
Dept: FAMILY MEDICINE | Facility: CLINIC | Age: 63
End: 2018-01-02
Payer: COMMERCIAL

## 2018-01-02 VITALS
HEIGHT: 65 IN | SYSTOLIC BLOOD PRESSURE: 126 MMHG | BODY MASS INDEX: 26.23 KG/M2 | WEIGHT: 157.44 LBS | HEART RATE: 57 BPM | DIASTOLIC BLOOD PRESSURE: 60 MMHG | OXYGEN SATURATION: 98 % | TEMPERATURE: 98 F

## 2018-01-02 DIAGNOSIS — G51.0 BELL'S PALSY: Primary | ICD-10-CM

## 2018-01-02 PROCEDURE — 99214 OFFICE O/P EST MOD 30 MIN: CPT | Mod: S$GLB,,, | Performed by: NURSE PRACTITIONER

## 2018-01-02 PROCEDURE — 99999 PR PBB SHADOW E&M-EST. PATIENT-LVL IV: CPT | Mod: PBBFAC,,, | Performed by: NURSE PRACTITIONER

## 2018-01-02 RX ORDER — VALACYCLOVIR HYDROCHLORIDE 1 G/1
1000 TABLET, FILM COATED ORAL EVERY 12 HOURS
Qty: 10 TABLET | Refills: 0 | Status: SHIPPED | OUTPATIENT
Start: 2018-01-02 | End: 2018-07-26

## 2018-01-02 RX ORDER — METHYLPREDNISOLONE 4 MG/1
TABLET ORAL
Qty: 1 PACKAGE | Refills: 0 | Status: SHIPPED | OUTPATIENT
Start: 2018-01-02 | End: 2018-09-18 | Stop reason: ALTCHOICE

## 2018-01-02 NOTE — PROGRESS NOTES
This dictation has been generated using Dragon Dictation some phonetic errors may occur.     Anushka LAU was seen today for neck pain and tingling face and numbness.    Diagnoses and all orders for this visit:    Bell's palsy  -     valACYclovir (VALTREX) 1000 MG tablet; Take 1 tablet (1,000 mg total) by mouth every 12 (twelve) hours.  -     methylPREDNISolone (MEDROL DOSEPACK) 4 mg tablet; use as directed    Uncontrolled type 2 diabetes mellitus without complication, without long-term current use of insulin      Bell's palsy.  Diabetes  Expect blood sugar did increase with use of Medrol Dosepak.  Watch your diet.  Avoid sugar and carbs.    Return if symptoms worsen or fail to improve.      ________________________________________________________________  ________________________________________________________________        Chief Complaint   Patient presents with    Neck Pain    tingling face and numbness     History of present illness  This 62 y.o. presents today for complaint of numbness left side of face and tingling.  Symptoms started within the last 2 weeks.  She has had some neck stiffness.  She indicates the upper neck.  That symptom is improving.  She denies any headache.  She does have some ringing in the right ear.  She saw ENT previously for that issue.  Diabetes reviewed last labs.  A1c has been well controlled.  Patient denies any complications from diabetes.  Review of systems  No speech problems.  No trouble swallowing.  No dry eyes  No unilateral weakness  No polyuria or polydipsia.    Past medical and social history reviewed.  Patient is new to me.  Follows with one of my partners.    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, long term PPI use    Hyperlipidemia      Hypertension     Osteopenia 9/25/2014    Screening for colorectal cancer 9/25/2014    Normal 2009    Screening for colorectal cancer     Thyroid disease     Vitamin D deficiency disease 9/25/2014       Past Surgical History:   Procedure Laterality Date    HYSTERECTOMY  1997    complete for prolapse, Abdominal    OOPHORECTOMY      right shoulder      SINUS SURGERY  2012       Family History   Problem Relation Age of Onset    Breast cancer Cousin 40    Miscarriages / Stillbirths Maternal Grandmother     Miscarriages / Stillbirths Mother     Diabetes Mother     Ovarian cancer Neg Hx        Social History     Social History    Marital status:      Spouse name: N/A    Number of children: N/A    Years of education: N/A     Social History Main Topics    Smoking status: Never Smoker    Smokeless tobacco: Never Used    Alcohol use No    Drug use: No    Sexual activity: Yes     Partners: Male     Birth control/ protection: Surgical     Other Topics Concern    None     Social History Narrative     since 1986    Previous  for 8 years prior    He is a     She is a homemaker       Current Outpatient Prescriptions   Medication Sig Dispense Refill    amitriptyline (ELAVIL) 10 MG tablet Take 10 mg by mouth nightly as needed for Insomnia.      aspirin 81 MG chewable tablet Take 1 tablet by mouth Daily. 1 Tablet, Chewable Oral Every day      atorvastatin (LIPITOR) 40 MG tablet Take 1 tablet (40 mg total) by mouth once daily. 90 tablet 90    blood sugar diagnostic Strp 1 strip by Misc.(Non-Drug; Combo Route) route 2 (two) times daily. Dispense glucometer of choice and lancets as well 100 strip 12    estrogens, conjugated, (PREMARIN) 0.625 MG tablet Take 1 tablet (0.625 mg total) by mouth once daily. 30 tablet 3    fluoxetine (PROZAC) 20 MG capsule TAKE 1 CAPSULE BY MOUTH DAILY 90 capsule 90    fluticasone (FLONASE) 50 mcg/actuation nasal spray 2 sprays by Nasal route  Daily. 2 Aerosol, Spray Nasal Every day      levothyroxine (SYNTHROID) 200 MCG tablet TAKE 1 TABLET BY MOUTH ONCE DAILY 90 tablet 12    levothyroxine (SYNTHROID) 25 MCG tablet TAKE 1 TABLET BY MOUTH EVERY DAY 90 tablet 12    lorazepam (ATIVAN) 0.5 MG tablet       metoprolol tartrate (LOPRESSOR) 50 MG tablet TAKE 1 TABLET BY MOUTH DAILY 90 tablet 12    multivitamin capsule Take 1 capsule by mouth once daily.      omega-3 fatty acids-vitamin E (FISH OIL) 1,000 mg Cap Take 1 capsule by mouth Twice daily. 1 Capsule Oral Twice a day       RABEprazole (ACIPHEX) 20 mg tablet TAKE 1 TABLET BY MOUTH TWICE DAILY 60 tablet 12    VESICARE 5 mg tablet Take 5 mg by mouth once daily.   5    methylPREDNISolone (MEDROL DOSEPACK) 4 mg tablet use as directed 1 Package 0    valACYclovir (VALTREX) 1000 MG tablet Take 1 tablet (1,000 mg total) by mouth every 12 (twelve) hours. 10 tablet 0     No current facility-administered medications for this visit.        Review of patient's allergies indicates:   Allergen Reactions    Codeine      Other reaction(s): Itching  Other reaction(s): Hives    Iodinated contrast- oral and iv dye      Other reaction(s): Hives    Iodine      Other reaction(s): Unknown    Sulfamethoxazole-trimethoprim      Other reaction(s): Itching  Other reaction(s): Hives    Epinephrine Anxiety       Physical examination  Vitals Reviewed  Gen. Well-dressed well-nourished no apparent distress  Skin warm dry and intact.  No rashes noted.  HEENT.  TM intact bilateral with normal light reflex.  No mastoid tenderness during percussion.  Nares patent bilateral.  Pharynx is unremarkable.  No maxillary or frontal sinus tenderness when percussed.    Neck is supple without adenopathy  Chest.  Respirations are even unlabored.  Lungs are clear to auscultation.  Cardiac regular rate and rhythm.  No chest wall adenopathy noted.  Neuro. Awake alert oriented x4.  Normal judgment and cognition noted.  Tiny facial droop on the  left.  Her smile is not symmetrical.  Bilateral  strength is normal.  No slurred speech.    Extremities no clubbing cyanosis or edema noted.     Call or return to clinic prn if these symptoms worsen or fail to improve as anticipated.

## 2018-01-15 ENCOUNTER — OFFICE VISIT (OUTPATIENT)
Dept: URGENT CARE | Facility: CLINIC | Age: 63
End: 2018-01-15
Payer: COMMERCIAL

## 2018-01-15 ENCOUNTER — HOSPITAL ENCOUNTER (EMERGENCY)
Facility: HOSPITAL | Age: 63
Discharge: HOME | End: 2018-01-15
Attending: EMERGENCY MEDICINE
Payer: COMMERCIAL

## 2018-01-15 VITALS
OXYGEN SATURATION: 98 % | BODY MASS INDEX: 26.13 KG/M2 | TEMPERATURE: 98 F | SYSTOLIC BLOOD PRESSURE: 139 MMHG | HEART RATE: 84 BPM | WEIGHT: 157 LBS | DIASTOLIC BLOOD PRESSURE: 79 MMHG

## 2018-01-15 VITALS
HEIGHT: 65 IN | WEIGHT: 157 LBS | OXYGEN SATURATION: 97 % | BODY MASS INDEX: 26.16 KG/M2 | SYSTOLIC BLOOD PRESSURE: 121 MMHG | RESPIRATION RATE: 18 BRPM | HEART RATE: 79 BPM | DIASTOLIC BLOOD PRESSURE: 68 MMHG | TEMPERATURE: 99 F

## 2018-01-15 DIAGNOSIS — R29.810 FACIAL DROOP: ICD-10-CM

## 2018-01-15 DIAGNOSIS — R51.9 ACUTE NONINTRACTABLE HEADACHE, UNSPECIFIED HEADACHE TYPE: ICD-10-CM

## 2018-01-15 DIAGNOSIS — R55 NEAR SYNCOPE: Primary | ICD-10-CM

## 2018-01-15 DIAGNOSIS — R07.9 CHEST PAIN: ICD-10-CM

## 2018-01-15 DIAGNOSIS — R51.9 HEADACHE: ICD-10-CM

## 2018-01-15 DIAGNOSIS — G43.009 MIGRAINE WITHOUT AURA AND WITHOUT STATUS MIGRAINOSUS, NOT INTRACTABLE: Primary | ICD-10-CM

## 2018-01-15 LAB
ALBUMIN SERPL BCP-MCNC: 3.6 G/DL
ALP SERPL-CCNC: 83 U/L
ALT SERPL W/O P-5'-P-CCNC: 13 U/L
ANION GAP SERPL CALC-SCNC: 8 MMOL/L
APTT BLDCRRT: 24.7 SEC
AST SERPL-CCNC: 15 U/L
BASOPHILS # BLD AUTO: 0.03 K/UL
BASOPHILS NFR BLD: 0.2 %
BILIRUB SERPL-MCNC: 0.5 MG/DL
BUN SERPL-MCNC: 20 MG/DL
CALCIUM SERPL-MCNC: 9.4 MG/DL
CHLORIDE SERPL-SCNC: 107 MMOL/L
CO2 SERPL-SCNC: 25 MMOL/L
CREAT SERPL-MCNC: 0.9 MG/DL
DIFFERENTIAL METHOD: ABNORMAL
EOSINOPHIL # BLD AUTO: 0.1 K/UL
EOSINOPHIL NFR BLD: 0.4 %
ERYTHROCYTE [DISTWIDTH] IN BLOOD BY AUTOMATED COUNT: 13.5 %
EST. GFR  (AFRICAN AMERICAN): >60 ML/MIN/1.73 M^2
EST. GFR  (NON AFRICAN AMERICAN): >60 ML/MIN/1.73 M^2
GLUCOSE SERPL-MCNC: 103 MG/DL
GLUCOSE SERPL-MCNC: 127 MG/DL (ref 70–110)
HCT VFR BLD AUTO: 38.8 %
HGB BLD-MCNC: 12.8 G/DL
INR PPP: 1
LYMPHOCYTES # BLD AUTO: 1.2 K/UL
LYMPHOCYTES NFR BLD: 8 %
MCH RBC QN AUTO: 28.4 PG
MCHC RBC AUTO-ENTMCNC: 33 G/DL
MCV RBC AUTO: 86 FL
MONOCYTES # BLD AUTO: 0.9 K/UL
MONOCYTES NFR BLD: 6.3 %
NEUTROPHILS # BLD AUTO: 12.7 K/UL
NEUTROPHILS NFR BLD: 85.1 %
PLATELET # BLD AUTO: 255 K/UL
PMV BLD AUTO: 9.6 FL
POTASSIUM SERPL-SCNC: 4.1 MMOL/L
PROT SERPL-MCNC: 6.8 G/DL
PROTHROMBIN TIME: 10.4 SEC
RBC # BLD AUTO: 4.5 M/UL
SODIUM SERPL-SCNC: 140 MMOL/L
TROPONIN I SERPL DL<=0.01 NG/ML-MCNC: <0.006 NG/ML
WBC # BLD AUTO: 14.93 K/UL

## 2018-01-15 PROCEDURE — 93010 ELECTROCARDIOGRAM REPORT: CPT | Mod: ,,, | Performed by: INTERNAL MEDICINE

## 2018-01-15 PROCEDURE — 96374 THER/PROPH/DIAG INJ IV PUSH: CPT

## 2018-01-15 PROCEDURE — 80053 COMPREHEN METABOLIC PANEL: CPT

## 2018-01-15 PROCEDURE — 93005 ELECTROCARDIOGRAM TRACING: CPT

## 2018-01-15 PROCEDURE — 63600175 PHARM REV CODE 636 W HCPCS: Performed by: EMERGENCY MEDICINE

## 2018-01-15 PROCEDURE — 85730 THROMBOPLASTIN TIME PARTIAL: CPT

## 2018-01-15 PROCEDURE — 85025 COMPLETE CBC W/AUTO DIFF WBC: CPT

## 2018-01-15 PROCEDURE — 99285 EMERGENCY DEPT VISIT HI MDM: CPT | Mod: 25

## 2018-01-15 PROCEDURE — 84484 ASSAY OF TROPONIN QUANT: CPT

## 2018-01-15 PROCEDURE — 99214 OFFICE O/P EST MOD 30 MIN: CPT | Mod: S$GLB,,, | Performed by: NURSE PRACTITIONER

## 2018-01-15 PROCEDURE — 96375 TX/PRO/DX INJ NEW DRUG ADDON: CPT

## 2018-01-15 PROCEDURE — 85610 PROTHROMBIN TIME: CPT

## 2018-01-15 PROCEDURE — 82948 REAGENT STRIP/BLOOD GLUCOSE: CPT | Mod: S$GLB,,, | Performed by: NURSE PRACTITIONER

## 2018-01-15 RX ORDER — ONDANSETRON 4 MG/1
4 TABLET, FILM COATED ORAL EVERY 6 HOURS
Qty: 12 TABLET | Refills: 0 | Status: SHIPPED | OUTPATIENT
Start: 2018-01-15 | End: 2018-01-18

## 2018-01-15 RX ORDER — KETOROLAC TROMETHAMINE 30 MG/ML
30 INJECTION, SOLUTION INTRAMUSCULAR; INTRAVENOUS
Status: DISCONTINUED | OUTPATIENT
Start: 2018-01-15 | End: 2018-01-15 | Stop reason: HOSPADM

## 2018-01-15 RX ORDER — HYDROMORPHONE HYDROCHLORIDE 2 MG/ML
1 INJECTION, SOLUTION INTRAMUSCULAR; INTRAVENOUS; SUBCUTANEOUS
Status: COMPLETED | OUTPATIENT
Start: 2018-01-15 | End: 2018-01-15

## 2018-01-15 RX ORDER — PROCHLORPERAZINE EDISYLATE 5 MG/ML
10 INJECTION INTRAMUSCULAR; INTRAVENOUS ONCE
Status: COMPLETED | OUTPATIENT
Start: 2018-01-15 | End: 2018-01-15

## 2018-01-15 RX ORDER — DIPHENHYDRAMINE HYDROCHLORIDE 50 MG/ML
50 INJECTION INTRAMUSCULAR; INTRAVENOUS
Status: DISCONTINUED | OUTPATIENT
Start: 2018-01-15 | End: 2018-01-15 | Stop reason: HOSPADM

## 2018-01-15 RX ORDER — HYDROCODONE BITARTRATE AND ACETAMINOPHEN 5; 325 MG/1; MG/1
1 TABLET ORAL EVERY 6 HOURS PRN
Qty: 15 TABLET | Refills: 0 | Status: SHIPPED | OUTPATIENT
Start: 2018-01-15 | End: 2018-07-26

## 2018-01-15 RX ORDER — PROCHLORPERAZINE EDISYLATE 5 MG/ML
10 INJECTION INTRAMUSCULAR; INTRAVENOUS ONCE
Status: DISCONTINUED | OUTPATIENT
Start: 2018-01-15 | End: 2018-01-15

## 2018-01-15 RX ADMIN — PROCHLORPERAZINE EDISYLATE 10 MG: 5 INJECTION INTRAMUSCULAR; INTRAVENOUS at 01:01

## 2018-01-15 RX ADMIN — HYDROMORPHONE HYDROCHLORIDE 1 MG: 2 INJECTION INTRAMUSCULAR; INTRAVENOUS; SUBCUTANEOUS at 03:01

## 2018-01-15 NOTE — PROGRESS NOTES
Subjective:       Patient ID: Anushka Napier is a 62 y.o. female.    Vitals:  weight is 71.2 kg (157 lb). Her oral temperature is 98 °F (36.7 °C). Her blood pressure is 139/79 and her pulse is 84. Her oxygen saturation is 98%.     Chief Complaint: Headache    Pt presents from waiting room after near syncopal episode in trendelenburg in room c/o dizziness, weakness, headache, 1 episode of N/V, and back pain since 3 am.  No dysuria, hematuria, or fever.  States that her blood sugar was 140 at home this morning.  Diagnosed last week with Bell's Palsy for tingling to L side of mouth.  Did not notice a facial droop to right side of her face this morning.  No chest pain, diaphoresis, or shortness of breath.        Headache    This is a new problem. The current episode started today (3 am). The problem occurs constantly. The problem has been gradually worsening. The pain is located in the frontal region. The pain does not radiate. The quality of the pain is described as aching. The pain is at a severity of 7/10. The pain is moderate. Associated symptoms include back pain, dizziness, a loss of balance, nausea, neck pain, vomiting and weakness. Pertinent negatives include no abdominal pain, abnormal behavior, blurred vision, coughing, ear pain, eye pain, eye redness, eye watering, fever, numbness, phonophobia, photophobia, rhinorrhea, scalp tenderness, seizures, sinus pressure, sore throat, tingling or visual change. She has tried nothing for the symptoms. There is no history of recent head traumas.     Review of Systems   Constitution: Positive for weakness. Negative for chills and fever.   HENT: Negative for congestion, ear pain, rhinorrhea, sinus pressure and sore throat.    Eyes: Negative for blurred vision, pain, photophobia, redness and visual disturbance.   Cardiovascular: Negative for chest pain.   Respiratory: Negative for cough and shortness of breath.    Skin: Negative for rash.   Musculoskeletal: Positive for  back pain and neck pain. Negative for joint pain.   Gastrointestinal: Positive for nausea and vomiting. Negative for abdominal pain and diarrhea.   Genitourinary: Negative for dysuria, flank pain and hematuria.   Neurological: Positive for dizziness, headaches, light-headedness and loss of balance. Negative for aphonia, disturbances in coordination, numbness, seizures, sensory change and tingling.   Psychiatric/Behavioral: The patient is not nervous/anxious.        Objective:      Physical Exam   Constitutional: She is oriented to person, place, and time. She appears well-developed and well-nourished. She is cooperative.  Non-toxic appearance. She does not have a sickly appearance. She does not appear ill. No distress.   HENT:   Head: Normocephalic and atraumatic.   Right Ear: Hearing, tympanic membrane, external ear and ear canal normal.   Left Ear: Hearing, tympanic membrane, external ear and ear canal normal.   Nose: Nose normal. No mucosal edema, rhinorrhea or nasal deformity. No epistaxis. Right sinus exhibits no maxillary sinus tenderness and no frontal sinus tenderness. Left sinus exhibits no maxillary sinus tenderness and no frontal sinus tenderness.   Mouth/Throat: Uvula is midline, oropharynx is clear and moist and mucous membranes are normal. No trismus in the jaw. Normal dentition. No uvula swelling. No oropharyngeal exudate, posterior oropharyngeal edema or posterior oropharyngeal erythema.   Eyes: Conjunctivae, EOM and lids are normal. Pupils are equal, round, and reactive to light. Right eye exhibits no discharge. Left eye exhibits no discharge. No scleral icterus.   Sclera clear bilat   Neck: Trachea normal, normal range of motion, full passive range of motion without pain and phonation normal. Neck supple.   Cardiovascular: Normal rate, regular rhythm, normal heart sounds, intact distal pulses and normal pulses.    Pulmonary/Chest: Effort normal and breath sounds normal. No respiratory distress. She  has no wheezes.   Abdominal: Soft. Normal appearance and bowel sounds are normal. She exhibits no distension, no pulsatile midline mass and no mass. There is no tenderness. There is no rigidity, no rebound and no guarding.   Musculoskeletal: Normal range of motion. She exhibits no edema or deformity.   Lymphadenopathy:     She has no cervical adenopathy.   Neurological: She is alert and oriented to person, place, and time. She has normal strength. No sensory deficit. She exhibits normal muscle tone. Coordination and gait normal.   Slight droop to right side of mouth   Skin: Skin is warm, dry and intact. She is not diaphoretic. No pallor.   Psychiatric: She has a normal mood and affect. Her speech is normal and behavior is normal. Judgment and thought content normal. Cognition and memory are normal.   Nursing note and vitals reviewed.      EKG: normal EKG, normal sinus rhythm, unchanged from previous tracings, normal sinus rhythm.    Results for orders placed or performed in visit on 01/15/18   POCT glucose   Result Value Ref Range    POC Glucose 127 (A) 70 - 110 mg/dL     Assessment:       1. Near syncope    2. Facial droop    3. Acute nonintractable headache, unspecified headache type        Plan:         Near syncope  -     POCT EKG 12-LEAD (NOT FOR [facility] USE); Future; Expected date: 01/15/2018  -     POCT glucose  -     Refer to Emergency Dept.    Facial droop  -     Refer to Emergency Dept.    Acute nonintractable headache, unspecified headache type  -     Refer to Emergency Dept.    Pt to [facility] by [facility].

## 2018-01-15 NOTE — ED TRIAGE NOTES
Pt reports headache since 3 AM with 1 episode of emesis.  Went to urgent care and was sent here.

## 2018-01-15 NOTE — ED NOTES
Report received from JAGDISH Gannon. Pt resting on stretcher, family at bedside. Pt has lights off, face covered with towel, c/o headache. Meds ordered.

## 2018-01-15 NOTE — ED PROVIDER NOTES
Encounter Date: 1/15/2018    SCRIBE #1 NOTE: I, Jayden Aleman II, am scribing for, and in the presence of,  Salvador Mortensen MD. I have scribed the following portions of the note - Other sections scribed: HPI, ROS, PE.       History     Chief Complaint   Patient presents with    Migraine     Pt reports migraine headache x 1 day. Sent from urgent care for further evaluation.      CC: Migraine     HPI: This 62 y.o. female with HLD, HTN, thyroid disease, NIDDM, GERD, vitamin D deficiency disease, anxiety, basal cell carcinoma, and depression presents to the ED c/o acute onset, migraine with neck pain, photophobia, and nausea x8 hours. Pt reports similar episode in the past but states she has not had a migraine in several years. She reports after her sinus SHx her migraines were alleviated. She denies being placed on migraine medication. Pt reports hx of Bell's palsy on her left side but reports it may also be on her right side. No alleviating factors. Light and noise exacerbates her migraine. Pt denies vomiting, weakness, and numbness.         The history is provided by the patient. No  was used.     Review of patient's allergies indicates:   Allergen Reactions    Codeine      Other reaction(s): Itching  Other reaction(s): Hives    Iodinated contrast- oral and iv dye      Other reaction(s): Hives    Iodine      Other reaction(s): Unknown    Sulfamethoxazole-trimethoprim      Other reaction(s): Itching  Other reaction(s): Hives    Epinephrine Anxiety     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, long term PPI use    Hyperlipidemia     Hypertension     Osteopenia 9/25/2014    Screening for colorectal cancer 9/25/2014    Normal 2009    Screening for  colorectal cancer     Thyroid disease     Vitamin D deficiency disease 9/25/2014     Past Surgical History:   Procedure Laterality Date    HYSTERECTOMY  1997    complete for prolapse, Abdominal    OOPHORECTOMY      right shoulder      SINUS SURGERY  2012     Family History   Problem Relation Age of Onset    Breast cancer Cousin 40    Miscarriages / Stillbirths Maternal Grandmother     Miscarriages / Stillbirths Mother     Diabetes Mother     Ovarian cancer Neg Hx      Social History   Substance Use Topics    Smoking status: Never Smoker    Smokeless tobacco: Never Used    Alcohol use No     Review of Systems   Constitutional: Negative for fever.   HENT: Negative for sore throat.    Eyes: Positive for photophobia.   Respiratory: Negative for shortness of breath.    Cardiovascular: Negative for chest pain.   Gastrointestinal: Positive for nausea.   Genitourinary: Negative for dysuria.   Musculoskeletal: Positive for neck pain. Negative for back pain.   Skin: Negative for rash.   Neurological: Positive for headaches. Negative for weakness.   Hematological: Does not bruise/bleed easily.       Physical Exam     Initial Vitals [01/15/18 1205]   BP Pulse Resp Temp SpO2   (!) 145/70 83 18 97.9 °F (36.6 °C) 98 %      MAP       95         Physical Exam    Nursing note and vitals reviewed.  Constitutional: She appears well-developed and well-nourished. She is cooperative.  Non-toxic appearance. No distress.   HENT:   Head: Normocephalic and atraumatic.   Mouth/Throat: Oropharynx is clear and moist.   Eyes: Conjunctivae and EOM are normal. Pupils are equal, round, and reactive to light.   Neck: Normal range of motion and full passive range of motion without pain. Neck supple. No thyromegaly present. No JVD present.   Cardiovascular: Normal rate, regular rhythm and normal pulses.   Murmur heard.   Systolic (ejection) murmur is present with a grade of 2/6   Pulmonary/Chest: Effort normal and breath sounds normal. No  respiratory distress.   Abdominal: Soft. Normal appearance and bowel sounds are normal. She exhibits no distension and no mass. There is no tenderness.   Musculoskeletal: Normal range of motion.   Neurological: She is alert and oriented to person, place, and time. She has normal strength. No cranial nerve deficit or sensory deficit.   Skin: Skin is warm, dry and intact. No rash noted.   Psychiatric: She has a normal mood and affect. Her speech is normal and behavior is normal. Judgment and thought content normal.         ED Course   Procedures  Labs Reviewed   CBC W/ AUTO DIFFERENTIAL   COMPREHENSIVE METABOLIC PANEL   TROPONIN I   PROTIME-INR   APTT                 Imaging Results          X-Ray Chest 1 View (Final result)  Result time 01/15/18 13:15:10    Final result by Abhi Tyler MD (01/15/18 13:15:10)                 Impression:        Subtle right midlung airspace opacity could indicate pneumonia in the appropriate clinical setting.      Electronically signed by: ABHI TYLER MD  Date:     01/15/18  Time:    13:15              Narrative:    PORTABLE AP CHEST:      Comparison: 5/9/12, report only    Findings:     Subtle right midlung opacity. There is no pleural effusion or pneumothorax. The cardiac silhouette is normal in size.  There are no acute bony abnormalities. Linear metallic densities overlie the abdomen, partially visualized, likely external to the patient.                                      Scribe Attestation:   Scribe #1: I performed the above scribed service and the documentation accurately describes the services I performed. I attest to the accuracy of the note.    Attending Attestation:           Physician Attestation for Scribe:  Physician Attestation Statement for Scribe #1: I, Salvador Mortensen MD, reviewed documentation, as scribed by Jayden Aleman II in my presence, and it is both accurate and complete.                 ED Course      Clinical Impression:   Diagnoses of  Chest pain, Headache, and Chest pain were pertinent to this visit.    Disposition:   Disposition: Discharged  62-year-old white female with a history of migraines diabetes hypertension hypothyroidism presents to the ER with a migraine headache which is classic for her reminds her of all her past migraine headaches although she has not had one in many years.  No fevers chills no nausea vomiting no rash positive photophobia no neck stiffness.  Temp 97.9 blood pressure 145/70 respiratory vitals are normal CBC white count of 14 chemistry normal EKG no ischemic changes troponin negative chest x-ray radiologist read it as possible opacity right middle lung in the right clinical situation consider pneumonia however the patient does not have a fever she is not coughing up anything so I doubt this.  CT scan of the head is negative per radiologist.  The patient was given initially 10 mg of Compazine IV this helped her a little bit however she still had a headache and was asking for some more pain medication so I gave her 1 g of Dilaudid IV she was observed for a period time in the ER her neurological exam upon arrival and at the time of disposition were both completely normal and nonfocal she feels much better at this time no fever still no neck stiffness no rash photophobia is gone tolerating by mouth fluids we'll send her home diagnosis migraine headache given prescription for Vicodin and Zofran.  Asked her to follow up with her primary care doctor and neurologist as soon as possible.  Return to the ER for altered mental status change of vision speech strength gait sensation any fever rash photophobia neck stiffness nausea vomiting.                        Salvador Mortensen MD  01/15/18 1576

## 2018-01-18 ENCOUNTER — OFFICE VISIT (OUTPATIENT)
Dept: FAMILY MEDICINE | Facility: CLINIC | Age: 63
End: 2018-01-18
Payer: COMMERCIAL

## 2018-01-18 VITALS
HEART RATE: 62 BPM | SYSTOLIC BLOOD PRESSURE: 128 MMHG | WEIGHT: 157.44 LBS | HEIGHT: 65 IN | TEMPERATURE: 98 F | BODY MASS INDEX: 26.23 KG/M2 | OXYGEN SATURATION: 97 % | DIASTOLIC BLOOD PRESSURE: 70 MMHG

## 2018-01-18 DIAGNOSIS — R93.89 ABNORMAL CHEST X-RAY: ICD-10-CM

## 2018-01-18 DIAGNOSIS — R29.810 LOWER FACIAL WEAKNESS: Primary | ICD-10-CM

## 2018-01-18 DIAGNOSIS — G43.909 MIGRAINE SYNDROME: ICD-10-CM

## 2018-01-18 PROCEDURE — 99999 PR PBB SHADOW E&M-EST. PATIENT-LVL III: CPT | Mod: PBBFAC,,, | Performed by: INTERNAL MEDICINE

## 2018-01-18 PROCEDURE — 99214 OFFICE O/P EST MOD 30 MIN: CPT | Mod: S$GLB,,, | Performed by: INTERNAL MEDICINE

## 2018-01-18 NOTE — PROGRESS NOTES
CHIEF COMPLAINT:  Left facial weakness                                                                                                                              HISTORY OF PRESENT ILLNESS:   62-year-old white female  well-known to me and  to my first cousin.  For several weeks now she's had some tingling in the corner of the left mouth and cheek.  She did have Mohs surgery was fairly extensive in that left cheek about a year and a half ago but is had no neurological problems or symptoms or numbness since that time.  About 16 days ago she was seen with some subtle weakness in the left lower face and was felt to have a Bell's palsy.  She was treated with Valtrex and steroids.  She has noted no progressive neurological deficits.  She has noted some increased tearing and drooling on the left.  She is seeing a retina specialist for a posterior vitreous detachment issue but no obvious recent or new vision changes.  On 1/15 she awoke in the lower back with a fairly severe headache associated with nausea and vomiting.  She also had some posterior neck pain that has improved with the use of a new pillow.  She has had no recurrence of pain and no associated fevers or chills.  She has a remote history of migraine not associated with any neurological symptoms.  She did feel that her headache is like a migraine.  She was seen in urgent care then sent to the emergency room.  Her CT scan was generally unremarkable for anything acute.  She does not note any problems with her parotid glands are any facial masses noted.                                                                                                                                                             ROS:   CONST: weight stable. EYES: no vision change. ENT: no sore throat. CV: no chest pain w/ exertion. RESP: no shortness of breath. GI: no nausea, vomiting, diarrhea. No dysphagia. : no urinary issues. MUSCULOSKELETAL: no new myalgias or  arthralgias. SKIN: no new changes. NEURO: no focal deficits. PSYCH: no new issues. ENDOCRINE: no polyuria. HEME: no lymph nodes. ALLERGY: no general pruritis.                                                                                                                    PAST MEDICAL HISTORY:                                                        1. Osteopenia, last bone density in 2012 - per prior Gyn                                  2. DIABETES                          3. Hypothyroidism.                                                           4. Hypertension.                                                             5. Hysterectomy - total.                                                6. Hyperlipidemia.                                                           7. GERD. EGDs with possible Davian's- - Dr Correia.                                                                   8. Anxiety and depression.   9. ENT septum surgery for sinusitis -Dr Sandoval 2012.  10. Colonoscopy 4/17/08 normal - 7-10 yrs.  11. H/O abnormal Mammo                                                                                                                               FAMILY HISTORY: Father is living with hypertension, heart disease and        diabetes.  Mom is living with a history of cervical cancer, hypertension     and diabetes.  One brother and one sister with no stated problems.                                                                                                                                                                                      ALLERGIES:  Penicillin, sulfa, codeine, iodine and shellfish.                                                                                             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  She does not smoke and never did.  She does not drink.                                                                                                             PHYSICAL EXAMINATION:                                                        VITAL SIGNS:  As above                              Neurologic exam: She does appear to have some slight left-sided ptosis.  She does not have any weakness in the mouth upon with talking she does appear to have some reduced mobility of the left corner of the mouth.  Her ocular muscles are wrong with closing the eyes and she can prevent opening equally.  Her for it does not appear to be affected and she can raise the eyebrows and so forth normally.  Her tongue protrudes midline.  Her pupils are equal and reactive and extraocular muscles intact.  There is no parotid gland tenderness or masses.  She has a scar in the left cheek.  Her speech is normal, affect normal, gait is normal                                                                                      Interval clinic notes, emergency room records, CT scan chest x-ray and labs reviewed.    She did have a subtle opacification of the right midlung and I reviewed the images myself.  She had no clinical respiratory some at all to suggest pneumonia                                                                   Anushka LAU was seen today for hospital follow up and headache.    Diagnoses and all orders for this visit:    Lower facial weakness, patient does have some neurological symptoms in the left lower face with some weakness around the left eye and the corner of the left mouth sparing the left for head.  This does raise the possibility of a more central lesion or partial paralysis of the left facial nerve.  Consider impingement of the nerve but she has no obvious symptoms of any carotid gland involvement as of yet.  We'll obtain MRI to rule out small space-occupying lesions of the brain, strokes and so for this visit given that she has risk factors for stroke including hypertension hyperlipidemia and diabetes.  She is taking an aspirin.  She will  "report any new neurological deficits.  She has a history of migraines but is residual deficits with atypical to be associated with a migraine.  She is claustrophobic and will obtain an open MRI.  She does have Ativan it can take some prior to the MRI as well.  -     MRI Brain Without Contrast; Future    Abnormal chest x-ray, reviewed, very subtle, no respiratory symptoms, obviously repeat imaging if indeed symptoms arise    Migraine syndrome, resolved, symptoms consistent with migraine           Based on the anxiety provoking differential diagnosis above access would not be therapeutic or appropriate at this point"This note will not be shared with the patient."  "

## 2018-01-22 ENCOUNTER — HOSPITAL ENCOUNTER (OUTPATIENT)
Dept: RADIOLOGY | Facility: HOSPITAL | Age: 63
Discharge: HOME | End: 2018-01-22
Attending: INTERNAL MEDICINE
Payer: COMMERCIAL

## 2018-01-22 DIAGNOSIS — R29.810 LOWER FACIAL WEAKNESS: ICD-10-CM

## 2018-01-22 PROCEDURE — 70551 MRI BRAIN STEM W/O DYE: CPT | Mod: TC

## 2018-01-22 PROCEDURE — 70551 MRI BRAIN STEM W/O DYE: CPT | Mod: 26,,, | Performed by: RADIOLOGY

## 2018-01-23 ENCOUNTER — PATIENT MESSAGE (OUTPATIENT)
Dept: FAMILY MEDICINE | Facility: CLINIC | Age: 63
End: 2018-01-23

## 2018-01-23 DIAGNOSIS — R29.810 FACIAL WEAKNESS: Primary | ICD-10-CM

## 2018-01-24 ENCOUNTER — PATIENT MESSAGE (OUTPATIENT)
Dept: FAMILY MEDICINE | Facility: CLINIC | Age: 63
End: 2018-01-24

## 2018-01-26 RX ORDER — BLOOD SUGAR DIAGNOSTIC
STRIP MISCELLANEOUS
Qty: 100 STRIP | Refills: 12 | Status: SHIPPED | OUTPATIENT
Start: 2018-01-26 | End: 2021-02-03

## 2018-02-26 RX ORDER — ATORVASTATIN CALCIUM 40 MG/1
TABLET, FILM COATED ORAL
Qty: 90 TABLET | Refills: 11 | Status: SHIPPED | OUTPATIENT
Start: 2018-02-26 | End: 2019-04-08 | Stop reason: SDUPTHER

## 2018-03-01 ENCOUNTER — OFFICE VISIT (OUTPATIENT)
Dept: NEUROLOGY | Facility: CLINIC | Age: 63
End: 2018-03-01
Payer: COMMERCIAL

## 2018-03-01 ENCOUNTER — LAB VISIT (OUTPATIENT)
Dept: LAB | Facility: HOSPITAL | Age: 63
End: 2018-03-01
Attending: NEUROLOGICAL SURGERY
Payer: COMMERCIAL

## 2018-03-01 VITALS
BODY MASS INDEX: 26.23 KG/M2 | DIASTOLIC BLOOD PRESSURE: 78 MMHG | HEART RATE: 73 BPM | SYSTOLIC BLOOD PRESSURE: 149 MMHG | HEIGHT: 65 IN | WEIGHT: 157.44 LBS

## 2018-03-01 DIAGNOSIS — R20.0 LEFT FACIAL NUMBNESS: Primary | ICD-10-CM

## 2018-03-01 DIAGNOSIS — G43.009 MIGRAINE WITHOUT AURA AND WITHOUT STATUS MIGRAINOSUS, NOT INTRACTABLE: ICD-10-CM

## 2018-03-01 DIAGNOSIS — R20.0 LEFT FACIAL NUMBNESS: ICD-10-CM

## 2018-03-01 LAB
CRP SERPL-MCNC: 2.8 MG/L
ERYTHROCYTE [SEDIMENTATION RATE] IN BLOOD BY WESTERGREN METHOD: 16 MM/HR

## 2018-03-01 PROCEDURE — 99204 OFFICE O/P NEW MOD 45 MIN: CPT | Mod: S$GLB,,, | Performed by: NEUROLOGICAL SURGERY

## 2018-03-01 PROCEDURE — 99999 PR PBB SHADOW E&M-EST. PATIENT-LVL II: CPT | Mod: PBBFAC,,, | Performed by: NEUROLOGICAL SURGERY

## 2018-03-01 PROCEDURE — 85651 RBC SED RATE NONAUTOMATED: CPT

## 2018-03-01 PROCEDURE — 86140 C-REACTIVE PROTEIN: CPT

## 2018-03-01 PROCEDURE — 3078F DIAST BP <80 MM HG: CPT | Mod: S$GLB,,, | Performed by: NEUROLOGICAL SURGERY

## 2018-03-01 PROCEDURE — 3077F SYST BP >= 140 MM HG: CPT | Mod: S$GLB,,, | Performed by: NEUROLOGICAL SURGERY

## 2018-03-01 PROCEDURE — 36415 COLL VENOUS BLD VENIPUNCTURE: CPT

## 2018-03-01 RX ORDER — CYCLOBENZAPRINE HCL 5 MG
5 TABLET ORAL NIGHTLY
Qty: 30 TABLET | Refills: 3 | Status: SHIPPED | OUTPATIENT
Start: 2018-03-01 | End: 2018-03-11

## 2018-03-01 NOTE — LETTER
March 1, 2018      Gerardo Dobbs MD  [address] [place] [address]           [facility] [address] [facility] [address]  [place] [address]  Phone: [phone]  Fax: [phone]          Patient: Anushka Napier   MR Number: [id number]   YOB: 1955   Date of Visit: 3/1/2018       Dear Dr. Gerardo Dobbs:    Thank you for referring Anushka Napier to me for evaluation. Attached you will find relevant portions of my assessment and plan of care.    If you have questions, please do not hesitate to call me. I look forward to following Anushka Napier along with you.    Sincerely,    Marcell Eddy MD    Enclosure  CC:  No Recipients    If you would like to receive this communication electronically, please contact [email or web address] or [phone] to request more information on [facility] access.    For providers and/or their staff who would like to refer a patient to [facility], please contact us through our one-stop-shop provider referral line, [facility], at [phone].    If you feel you have received this communication in error or would no longer like to receive these types of communications, please e-mail [email or web address]

## 2018-03-01 NOTE — PROGRESS NOTES
"Chief Complaint   Patient presents with    Facial Pain        Anushka Napier is a 62 y.o. female with a history of multiple medical diagnoses as listed below that presents for evaluation of numbness and tingling in the face.  She says that for the last several months she has been having numbness, tingling, and weakness in the muscles on the left side of her face.  She is accompanied by her  who helps to provide some history.  Her  says that several months ago he began to notice that her left eyelid seemed to be drooping.  He went back to look at old photographs to compare and felt like this was a recent change.  She has also noticed that there has been instances where she has felt barbara numbness along the side of her face inner cheek that felt like she had "just come from the dentist."  Her symptoms of been overall about the same since she initially noticed him.  They are episodic in nature and seem to come and go although she tends to have them throughout much of the day.  She has also been having pain in the back of the neck and back pain as well.  She feels that the back of the head is tender to touch but is not necessarily swollen.  She has also had some difficulty with tenderness along the head.  Headaches have occasionally happen along the front of day.  She has had MRI in the past to show chronic microvascular ischemic changes, but did not show any acute strokes that could account for his symptoms.    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, long term PPI use    Hyperlipidemia     Hypertension     Osteopenia 9/25/2014    Screening for colorectal cancer 9/25/2014    Normal 2009    Screening for colorectal cancer     Thyroid disease  "    Vitamin D deficiency disease 9/25/2014       PAST SURGICAL HISTORY:  Past Surgical History:   Procedure Laterality Date    HYSTERECTOMY  1997    complete for prolapse, Abdominal    OOPHORECTOMY      right shoulder      SINUS SURGERY  2012       SOCIAL HISTORY:  Social History     Social History    Marital status:      Spouse name: N/A    Number of children: N/A    Years of education: N/A     Occupational History    Not on file.     Social History Main Topics    Smoking status: Never Smoker    Smokeless tobacco: Never Used    Alcohol use No    Drug use: No    Sexual activity: Yes     Partners: Male     Birth control/ protection: Surgical     Other Topics Concern    Not on file     Social History Narrative     since 1986    Previous  for 8 years prior    He is a     She is a homemaker       FAMILY HISTORY:  Family History   Problem Relation Age of Onset    Breast cancer Cousin 40    Miscarriages / Stillbirths Maternal Grandmother     Miscarriages / Stillbirths Mother     Diabetes Mother     Ovarian cancer Neg Hx        ALLERGIES AND MEDICATIONS: updated and reviewed.  Review of patient's allergies indicates:   Allergen Reactions    Codeine      Other reaction(s): Itching  Other reaction(s): Hives    Iodinated contrast- oral and iv dye      Other reaction(s): Hives    Iodine      Other reaction(s): Unknown    Sulfamethoxazole-trimethoprim      Other reaction(s): Itching  Other reaction(s): Hives    Epinephrine Anxiety     Current Outpatient Prescriptions   Medication Sig Dispense Refill    amitriptyline (ELAVIL) 10 MG tablet Take 10 mg by mouth nightly as needed for Insomnia.      aspirin 81 MG chewable tablet Take 1 tablet by mouth Daily. 1 Tablet, Chewable Oral Every day      atorvastatin (LIPITOR) 40 MG tablet TAKE 1 TABLET(40 MG) BY MOUTH EVERY DAY 90 tablet 11    CONTOUR NEXT STRIPS Strp USE TO TEST ONCE DAILY 100 strip 12    cyclobenzaprine (FLEXERIL)  5 MG tablet Take 1 tablet (5 mg total) by mouth nightly. 30 tablet 3    estrogens, conjugated, (PREMARIN) 0.625 MG tablet Take 1 tablet (0.625 mg total) by mouth once daily. 30 tablet 3    fluoxetine (PROZAC) 20 MG capsule TAKE 1 CAPSULE BY MOUTH DAILY 90 capsule 90    fluticasone (FLONASE) 50 mcg/actuation nasal spray 2 sprays by Nasal route Daily. 2 Aerosol, Spray Nasal Every day      hydrocodone-acetaminophen 5-325mg (NORCO) 5-325 mg per tablet Take 1 tablet by mouth every 6 (six) hours as needed for Pain. 15 tablet 0    levothyroxine (SYNTHROID) 200 MCG tablet TAKE 1 TABLET BY MOUTH ONCE DAILY 90 tablet 12    levothyroxine (SYNTHROID) 25 MCG tablet TAKE 1 TABLET BY MOUTH EVERY DAY 90 tablet 12    lorazepam (ATIVAN) 0.5 MG tablet       methylPREDNISolone (MEDROL DOSEPACK) 4 mg tablet use as directed 1 Package 0    metoprolol tartrate (LOPRESSOR) 50 MG tablet TAKE 1 TABLET BY MOUTH DAILY 90 tablet 12    multivitamin capsule Take 1 capsule by mouth once daily.      omega-3 fatty acids-vitamin E (FISH OIL) 1,000 mg Cap Take 1 capsule by mouth Twice daily. 1 Capsule Oral Twice a day       RABEprazole (ACIPHEX) 20 mg tablet TAKE 1 TABLET BY MOUTH TWICE DAILY 60 tablet 12    valACYclovir (VALTREX) 1000 MG tablet Take 1 tablet (1,000 mg total) by mouth every 12 (twelve) hours. 10 tablet 0    VESICARE 5 mg tablet Take 5 mg by mouth once daily.   5     No current facility-administered medications for this visit.        Review of Systems   Constitutional: Negative for activity change, appetite change, fever and unexpected weight change.   HENT: Negative for trouble swallowing and voice change.    Eyes: Negative for photophobia and visual disturbance.   Respiratory: Negative for apnea and shortness of breath.    Cardiovascular: Negative for chest pain and leg swelling.   Gastrointestinal: Negative for constipation and nausea.   Genitourinary: Negative for difficulty urinating.   Musculoskeletal: Positive for  neck pain. Negative for back pain and gait problem.   Skin: Negative for color change and pallor.   Neurological: Positive for weakness and numbness. Negative for dizziness, seizures and syncope.   Hematological: Negative for adenopathy.   Psychiatric/Behavioral: Negative for agitation, confusion and decreased concentration.       Neurologic Exam     Mental Status   Oriented to person, place, and time.   Registration: recalls 3 of 3 objects.   Attention: normal. Concentration: normal.   Speech: speech is normal   Level of consciousness: alert  Knowledge: good.     Cranial Nerves     CN II   Visual fields full to confrontation.   Right visual field deficit: none  Left visual field deficit: none     CN III, IV, VI   Pupils are equal, round, and reactive to light.  Extraocular motions are normal.   Right pupil: Size: 3 mm. Shape: regular. Accommodation: intact.   Left pupil: Size: 3 mm. Shape: regular. Accommodation: intact.   CN III: no CN III palsy  CN VI: no CN VI palsy  Nystagmus: none   Diplopia: none  Ophthalmoparesis: none  Upgaze: normal  Downgaze: normal  Conjugate gaze: present    CN V   Facial sensation intact.   Right facial sensation deficit: none  Left facial sensation deficit: complete    CN VII   Facial expression full, symmetric.   Right facial weakness: none  Left facial weakness: peripheral    CN VIII   CN VIII normal.     CN IX, X   CN IX normal.   CN X normal.   Palate: symmetric    CN XI   CN XI normal.   Right sternocleidomastoid strength: normal  Left sternocleidomastoid strength: normal  Right trapezius strength: normal  Left trapezius strength: normal    CN XII   CN XII normal.   Tongue deviation: none    Motor Exam   Muscle bulk: normal  Overall muscle tone: normal  Right arm tone: normal  Left arm tone: normal  Right leg tone: normal  Left leg tone: normal    Strength   Strength 5/5 throughout.     Sensory Exam   Right arm light touch: normal  Left arm light touch: normal  Right leg light  touch: normal  Left leg light touch: normal  Right arm vibration: normal  Left arm vibration: normal  Right leg vibration: normal  Left leg vibration: normal  Right arm proprioception: normal  Left arm proprioception: normal  Right leg proprioception: normal  Left leg proprioception: normal  Right arm pinprick: normal  Left arm pinprick: normal  Right leg pinprick: normal  Left leg pinprick: normal    Gait, Coordination, and Reflexes     Gait  Gait: normal    Coordination   Romberg: negative  Finger to nose coordination: normal  Heel to shin coordination: normal  Tandem walking coordination: normal    Tremor   Resting tremor: absent    Reflexes   Right brachioradialis: 2+  Left brachioradialis: 2+  Right biceps: 2+  Left biceps: 2+  Right triceps: 2+  Left triceps: 2+  Right patellar: 2+  Left patellar: 2+  Right achilles: 2+  Left achilles: 2+  Right plantar: normal  Left plantar: normal      Physical Exam   Constitutional: She is oriented to person, place, and time. She appears well-developed and well-nourished.   HENT:   Head: Normocephalic and atraumatic.   Eyes: EOM are normal. Pupils are equal, round, and reactive to light.   Neck: Normal range of motion.   Cardiovascular: Normal rate and intact distal pulses.    Pulmonary/Chest: Effort normal. No apnea. No respiratory distress.   Musculoskeletal: Normal range of motion.   Neurological: She is alert and oriented to person, place, and time. She has normal strength. She has a normal Finger-Nose-Finger Test, a normal Heel to Shin Test, a normal Romberg Test and a normal Tandem Gait Test. Gait normal.   Reflex Scores:       Tricep reflexes are 2+ on the right side and 2+ on the left side.       Bicep reflexes are 2+ on the right side and 2+ on the left side.       Brachioradialis reflexes are 2+ on the right side and 2+ on the left side.       Patellar reflexes are 2+ on the right side and 2+ on the left side.       Achilles reflexes are 2+ on the right side and  "2+ on the left side.  Skin: Skin is warm and dry.   Psychiatric: She has a normal mood and affect. Her speech is normal and behavior is normal. Thought content normal.   Vitals reviewed.      Vitals:    03/01/18 1007   BP: (!) 149/78   BP Location: Left arm   Patient Position: Sitting   BP Method: Large (Automatic)   Pulse: 73   Weight: 71.4 kg (157 lb 6.5 oz)   Height: 5' 5" (1.651 m)     MRI brain personally reviewed: Chronic microvascular ischemic changes.  Assessment & Plan:    Problem List Items Addressed This Visit     Migraine without aura and without status migrainosus, not intractable    Left facial numbness - Primary    Relevant Medications    cyclobenzaprine (FLEXERIL) 5 MG tablet    Other Relevant Orders    Sedimentation rate, manual    C-reactive protein        Facial numbness likely represents irritation within the trigeminal nerve distributions on the left as well as the ocular motor nerve.  Some intermittent involvement of the seventh nerve as well.    Follow-up: Follow-up in about 1 month (around 4/1/2018).   More than 50% of this 45 minute encounter was spent in counseling and coordinating care.        "

## 2018-04-02 DIAGNOSIS — N95.1 SYMPTOMATIC MENOPAUSAL OR FEMALE CLIMACTERIC STATES: ICD-10-CM

## 2018-04-02 RX ORDER — METOPROLOL TARTRATE 50 MG/1
TABLET ORAL
Qty: 90 TABLET | Refills: 4 | Status: SHIPPED | OUTPATIENT
Start: 2018-04-02 | End: 2019-05-21 | Stop reason: SDUPTHER

## 2018-04-02 RX ORDER — ESTROGENS, CONJUGATED 0.62 MG/1
TABLET, FILM COATED ORAL
Qty: 30 TABLET | Refills: 6 | Status: SHIPPED | OUTPATIENT
Start: 2018-04-02 | End: 2019-01-09 | Stop reason: SDUPTHER

## 2018-04-26 DIAGNOSIS — E11.9 TYPE 2 DIABETES MELLITUS WITHOUT COMPLICATION: ICD-10-CM

## 2018-05-14 RX ORDER — LEVOTHYROXINE SODIUM 200 UG/1
TABLET ORAL
Qty: 90 TABLET | Refills: 4 | Status: SHIPPED | OUTPATIENT
Start: 2018-05-14 | End: 2019-07-30 | Stop reason: SDUPTHER

## 2018-05-18 DIAGNOSIS — E11.9 TYPE 2 DIABETES MELLITUS WITHOUT COMPLICATION: ICD-10-CM

## 2018-05-22 ENCOUNTER — OFFICE VISIT (OUTPATIENT)
Dept: NEUROLOGY | Facility: CLINIC | Age: 63
End: 2018-05-22
Payer: COMMERCIAL

## 2018-05-22 VITALS
HEIGHT: 65 IN | SYSTOLIC BLOOD PRESSURE: 134 MMHG | HEART RATE: 72 BPM | WEIGHT: 157 LBS | DIASTOLIC BLOOD PRESSURE: 62 MMHG | BODY MASS INDEX: 26.16 KG/M2

## 2018-05-22 DIAGNOSIS — R20.0 LEFT FACIAL NUMBNESS: Primary | ICD-10-CM

## 2018-05-22 PROCEDURE — 3008F BODY MASS INDEX DOCD: CPT | Mod: CPTII,S$GLB,, | Performed by: NEUROLOGICAL SURGERY

## 2018-05-22 PROCEDURE — 3075F SYST BP GE 130 - 139MM HG: CPT | Mod: CPTII,S$GLB,, | Performed by: NEUROLOGICAL SURGERY

## 2018-05-22 PROCEDURE — 99214 OFFICE O/P EST MOD 30 MIN: CPT | Mod: S$GLB,,, | Performed by: NEUROLOGICAL SURGERY

## 2018-05-22 PROCEDURE — 3078F DIAST BP <80 MM HG: CPT | Mod: CPTII,S$GLB,, | Performed by: NEUROLOGICAL SURGERY

## 2018-05-22 PROCEDURE — 99999 PR PBB SHADOW E&M-EST. PATIENT-LVL III: CPT | Mod: PBBFAC,,, | Performed by: NEUROLOGICAL SURGERY

## 2018-05-22 RX ORDER — CYCLOBENZAPRINE HCL 5 MG
TABLET ORAL
COMMUNITY
Start: 2018-04-19 | End: 2018-07-26

## 2018-05-24 NOTE — PROGRESS NOTES
"Chief Complaint   Patient presents with    Follow-up     pain and stiffness in neck         Anushka Napier is a 63 y.o. female with a history of multiple medical diagnoses as listed below that presents for evaluation of numbness and tingling in the face.  She says that for the last several months she has been having numbness, tingling, and weakness in the muscles on the left side of her face.  She is accompanied by her  who helps to provide some history.  Her  says that several months ago he began to notice that her left eyelid seemed to be drooping.  He went back to look at old photographs to compare and felt like this was a recent change.  She has also noticed that there has been instances where she has felt barbara numbness along the side of her face inner cheek that felt like she had "just come from the dentist."  Her symptoms of been overall about the same since she initially noticed him.  They are episodic in nature and seem to come and go although she tends to have them throughout much of the day.  She has also been having pain in the back of the neck and back pain as well.  She feels that the back of the head is tender to touch but is not necessarily swollen.  She has also had some difficulty with tenderness along the head.  Headaches have occasionally happen along the front of day.  She has had MRI in the past to show chronic microvascular ischemic changes, but did not show any acute strokes that could account for his symptoms.    Interval history  05/22/2018  She says that she is feeling much better compared to when she was previously seen.  Due to changes in her employment her level of stress has dramatically reduced since she feels that this is commensurate with resolution of the numbness and tingling in her face.  She has not had the symptoms in the last 2 months since she changed her job..    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 Dr Correia, long term PPI use    Hyperlipidemia     Hypertension     Osteopenia 9/25/2014    Screening for colorectal cancer 9/25/2014    Normal 2009    Screening for colorectal cancer     Thyroid disease     Vitamin D deficiency disease 9/25/2014       PAST SURGICAL HISTORY:  Past Surgical History:   Procedure Laterality Date    HYSTERECTOMY  1997    complete for prolapse, Abdominal    OOPHORECTOMY      right shoulder      SINUS SURGERY  2012       SOCIAL HISTORY:  Social History     Social History    Marital status:      Spouse name: N/A    Number of children: N/A    Years of education: N/A     Occupational History    Not on file.     Social History Main Topics    Smoking status: Never Smoker    Smokeless tobacco: Never Used    Alcohol use No    Drug use: No    Sexual activity: Yes     Partners: Male     Birth control/ protection: Surgical     Other Topics Concern    Not on file     Social History Narrative     since 1986    Previous  for 8 years prior    He is a     She is a homemaker       FAMILY HISTORY:  Family History   Problem Relation Age of Onset    Breast cancer Cousin 40    Miscarriages / Stillbirths Maternal Grandmother     Miscarriages / Stillbirths Mother     Diabetes Mother     Ovarian cancer Neg Hx        ALLERGIES AND MEDICATIONS: updated and reviewed.  Review of patient's allergies indicates:   Allergen Reactions    Codeine      Other reaction(s): Itching  Other reaction(s): Hives    Iodinated contrast- oral and iv dye      Other reaction(s): Hives    Iodine      Other reaction(s): Unknown    Sulfamethoxazole-trimethoprim      Other reaction(s): Itching  Other reaction(s): Hives    Epinephrine Anxiety     Current Outpatient Prescriptions    Medication Sig Dispense Refill    amitriptyline (ELAVIL) 10 MG tablet Take 10 mg by mouth nightly as needed for Insomnia.      aspirin 81 MG chewable tablet Take 1 tablet by mouth Daily. 1 Tablet, Chewable Oral Every day      atorvastatin (LIPITOR) 40 MG tablet TAKE 1 TABLET(40 MG) BY MOUTH EVERY DAY 90 tablet 11    CONTOUR NEXT STRIPS Strp USE TO TEST ONCE DAILY 100 strip 12    cyclobenzaprine (FLEXERIL) 5 MG tablet       fluoxetine (PROZAC) 20 MG capsule TAKE 1 CAPSULE BY MOUTH DAILY 90 capsule 90    fluticasone (FLONASE) 50 mcg/actuation nasal spray 2 sprays by Nasal route Daily. 2 Aerosol, Spray Nasal Every day      hydrocodone-acetaminophen 5-325mg (NORCO) 5-325 mg per tablet Take 1 tablet by mouth every 6 (six) hours as needed for Pain. 15 tablet 0    levothyroxine (SYNTHROID) 200 MCG tablet TAKE 1 TABLET BY MOUTH EVERY DAY 90 tablet 4    levothyroxine (SYNTHROID) 25 MCG tablet TAKE 1 TABLET BY MOUTH EVERY DAY 90 tablet 12    lorazepam (ATIVAN) 0.5 MG tablet       methylPREDNISolone (MEDROL DOSEPACK) 4 mg tablet use as directed 1 Package 0    metoprolol tartrate (LOPRESSOR) 50 MG tablet TAKE 1 TABLET BY MOUTH DAILY 90 tablet 4    multivitamin capsule Take 1 capsule by mouth once daily.      omega-3 fatty acids-vitamin E (FISH OIL) 1,000 mg Cap Take 1 capsule by mouth Twice daily. 1 Capsule Oral Twice a day       polyethylene glycol (COLYTE) 240-22.72-6.72 -5.84 gram SolR       PREMARIN 0.625 mg tablet TAKE ONE TABLET BY MOUTH ONCE DAILY 30 tablet 6    RABEprazole (ACIPHEX) 20 mg tablet TAKE 1 TABLET BY MOUTH TWICE DAILY 60 tablet 12    VESICARE 5 mg tablet Take 5 mg by mouth once daily.   5    valACYclovir (VALTREX) 1000 MG tablet Take 1 tablet (1,000 mg total) by mouth every 12 (twelve) hours. 10 tablet 0     No current facility-administered medications for this visit.        Review of Systems   Constitutional: Negative for activity change, appetite change, fever and unexpected weight  change.   HENT: Negative for trouble swallowing and voice change.    Eyes: Negative for photophobia and visual disturbance.   Respiratory: Negative for apnea and shortness of breath.    Cardiovascular: Negative for chest pain and leg swelling.   Gastrointestinal: Negative for constipation and nausea.   Genitourinary: Negative for difficulty urinating.   Musculoskeletal: Positive for neck pain. Negative for back pain and gait problem.   Skin: Negative for color change and pallor.   Neurological: Positive for weakness and numbness. Negative for dizziness, seizures and syncope.   Hematological: Negative for adenopathy.   Psychiatric/Behavioral: Negative for agitation, confusion and decreased concentration.       Neurologic Exam     Mental Status   Oriented to person, place, and time.   Registration: recalls 3 of 3 objects.   Attention: normal. Concentration: normal.   Speech: speech is normal   Level of consciousness: alert  Knowledge: good.     Cranial Nerves     CN II   Visual fields full to confrontation.   Right visual field deficit: none  Left visual field deficit: none     CN III, IV, VI   Pupils are equal, round, and reactive to light.  Extraocular motions are normal.   Right pupil: Size: 3 mm. Shape: regular. Accommodation: intact.   Left pupil: Size: 3 mm. Shape: regular. Accommodation: intact.   CN III: no CN III palsy  CN VI: no CN VI palsy  Nystagmus: none   Diplopia: none  Ophthalmoparesis: none  Upgaze: normal  Downgaze: normal  Conjugate gaze: present    CN V   Facial sensation intact.   Right facial sensation deficit: none  Left facial sensation deficit: complete    CN VII   Facial expression full, symmetric.   Right facial weakness: none  Left facial weakness: peripheral    CN VIII   CN VIII normal.     CN IX, X   CN IX normal.   CN X normal.   Palate: symmetric    CN XI   CN XI normal.   Right sternocleidomastoid strength: normal  Left sternocleidomastoid strength: normal  Right trapezius strength:  normal  Left trapezius strength: normal    CN XII   CN XII normal.   Tongue deviation: none    Motor Exam   Muscle bulk: normal  Overall muscle tone: normal  Right arm tone: normal  Left arm tone: normal  Right leg tone: normal  Left leg tone: normal    Strength   Strength 5/5 throughout.     Sensory Exam   Right arm light touch: normal  Left arm light touch: normal  Right leg light touch: normal  Left leg light touch: normal  Right arm vibration: normal  Left arm vibration: normal  Right leg vibration: normal  Left leg vibration: normal  Right arm proprioception: normal  Left arm proprioception: normal  Right leg proprioception: normal  Left leg proprioception: normal  Right arm pinprick: normal  Left arm pinprick: normal  Right leg pinprick: normal  Left leg pinprick: normal    Gait, Coordination, and Reflexes     Gait  Gait: normal    Coordination   Romberg: negative  Finger to nose coordination: normal  Heel to shin coordination: normal  Tandem walking coordination: normal    Tremor   Resting tremor: absent    Reflexes   Right brachioradialis: 2+  Left brachioradialis: 2+  Right biceps: 2+  Left biceps: 2+  Right triceps: 2+  Left triceps: 2+  Right patellar: 2+  Left patellar: 2+  Right achilles: 2+  Left achilles: 2+  Right plantar: normal  Left plantar: normal      Physical Exam   Constitutional: She is oriented to person, place, and time. She appears well-developed and well-nourished.   HENT:   Head: Normocephalic and atraumatic.   Eyes: EOM are normal. Pupils are equal, round, and reactive to light.   Neck: Normal range of motion.   Cardiovascular: Normal rate and intact distal pulses.    Pulmonary/Chest: Effort normal. No apnea. No respiratory distress.   Musculoskeletal: Normal range of motion.   Neurological: She is alert and oriented to person, place, and time. She has normal strength. She has a normal Finger-Nose-Finger Test, a normal Heel to Shin Test, a normal Romberg Test and a normal Tandem Gait  "Test. Gait normal.   Reflex Scores:       Tricep reflexes are 2+ on the right side and 2+ on the left side.       Bicep reflexes are 2+ on the right side and 2+ on the left side.       Brachioradialis reflexes are 2+ on the right side and 2+ on the left side.       Patellar reflexes are 2+ on the right side and 2+ on the left side.       Achilles reflexes are 2+ on the right side and 2+ on the left side.  Skin: Skin is warm and dry.   Psychiatric: She has a normal mood and affect. Her speech is normal and behavior is normal. Thought content normal.   Vitals reviewed.      Vitals:    05/22/18 1428   BP: 134/62   Pulse: 72   Weight: 71.2 kg (157 lb)   Height: 5' 5" (1.651 m)     MRI brain personally reviewed: Chronic microvascular ischemic changes.  Assessment & Plan:    Problem List Items Addressed This Visit     None          Follow-up: No Follow-up on file.   More than 50% of this 25 minute encounter was spent in counseling and coordinating care.        "

## 2018-06-12 RX ORDER — FLUOXETINE HYDROCHLORIDE 20 MG/1
CAPSULE ORAL
Qty: 90 CAPSULE | Refills: 4 | Status: SHIPPED | OUTPATIENT
Start: 2018-06-12 | End: 2019-09-02 | Stop reason: SDUPTHER

## 2018-06-25 ENCOUNTER — TELEPHONE (OUTPATIENT)
Dept: ADMINISTRATIVE | Facility: HOSPITAL | Age: 63
End: 2018-06-25

## 2018-07-26 ENCOUNTER — HOSPITAL ENCOUNTER (EMERGENCY)
Facility: HOSPITAL | Age: 63
Discharge: HOME | End: 2018-07-26
Attending: EMERGENCY MEDICINE
Payer: COMMERCIAL

## 2018-07-26 VITALS
SYSTOLIC BLOOD PRESSURE: 150 MMHG | DIASTOLIC BLOOD PRESSURE: 65 MMHG | BODY MASS INDEX: 26.66 KG/M2 | OXYGEN SATURATION: 94 % | WEIGHT: 160 LBS | TEMPERATURE: 98 F | HEIGHT: 65 IN | RESPIRATION RATE: 16 BRPM | HEART RATE: 54 BPM

## 2018-07-26 DIAGNOSIS — M62.838 MUSCLE SPASM: ICD-10-CM

## 2018-07-26 DIAGNOSIS — M54.50 LUMBAR BACK PAIN: Primary | ICD-10-CM

## 2018-07-26 DIAGNOSIS — R11.0 NAUSEA: ICD-10-CM

## 2018-07-26 LAB
BILIRUB UR QL STRIP: NEGATIVE
CLARITY UR: CLEAR
COLOR UR: YELLOW
GLUCOSE UR QL STRIP: NEGATIVE
HGB UR QL STRIP: NEGATIVE
KETONES UR QL STRIP: NEGATIVE
LEUKOCYTE ESTERASE UR QL STRIP: ABNORMAL
MICROSCOPIC COMMENT: NORMAL
NITRITE UR QL STRIP: NEGATIVE
PH UR STRIP: 7 [PH] (ref 5–8)
POCT GLUCOSE: 128 MG/DL (ref 70–110)
PROT UR QL STRIP: NEGATIVE
SP GR UR STRIP: 1.01 (ref 1–1.03)
URN SPEC COLLECT METH UR: ABNORMAL
UROBILINOGEN UR STRIP-ACNC: NEGATIVE EU/DL
WBC #/AREA URNS HPF: 4 /HPF (ref 0–5)

## 2018-07-26 PROCEDURE — 63600175 PHARM REV CODE 636 W HCPCS: Performed by: PHYSICIAN ASSISTANT

## 2018-07-26 PROCEDURE — 99284 EMERGENCY DEPT VISIT MOD MDM: CPT | Mod: 25

## 2018-07-26 PROCEDURE — 82962 GLUCOSE BLOOD TEST: CPT

## 2018-07-26 PROCEDURE — 81000 URINALYSIS NONAUTO W/SCOPE: CPT

## 2018-07-26 PROCEDURE — 96372 THER/PROPH/DIAG INJ SC/IM: CPT

## 2018-07-26 PROCEDURE — 25000003 PHARM REV CODE 250: Performed by: PHYSICIAN ASSISTANT

## 2018-07-26 RX ORDER — LIDOCAINE 50 MG/G
1 PATCH TOPICAL DAILY
Qty: 15 PATCH | Refills: 0 | Status: SHIPPED | OUTPATIENT
Start: 2018-07-26 | End: 2018-09-18

## 2018-07-26 RX ORDER — LIDOCAINE 50 MG/G
1 PATCH TOPICAL
Status: DISCONTINUED | OUTPATIENT
Start: 2018-07-26 | End: 2018-07-26 | Stop reason: HOSPADM

## 2018-07-26 RX ORDER — ORPHENADRINE CITRATE 30 MG/ML
60 INJECTION INTRAMUSCULAR; INTRAVENOUS
Status: COMPLETED | OUTPATIENT
Start: 2018-07-26 | End: 2018-07-26

## 2018-07-26 RX ORDER — CYCLOBENZAPRINE HCL 10 MG
10 TABLET ORAL 3 TIMES DAILY PRN
Qty: 15 TABLET | Refills: 0 | Status: SHIPPED | OUTPATIENT
Start: 2018-07-26 | End: 2018-07-31

## 2018-07-26 RX ORDER — SULINDAC 200 MG/1
200 TABLET ORAL 2 TIMES DAILY
Qty: 14 TABLET | Refills: 0 | Status: SHIPPED | OUTPATIENT
Start: 2018-07-26 | End: 2018-08-02

## 2018-07-26 RX ORDER — ACETAMINOPHEN 500 MG
5000 TABLET ORAL DAILY
COMMUNITY
End: 2019-01-09 | Stop reason: SDUPTHER

## 2018-07-26 RX ORDER — KETOROLAC TROMETHAMINE 30 MG/ML
30 INJECTION, SOLUTION INTRAMUSCULAR; INTRAVENOUS
Status: COMPLETED | OUTPATIENT
Start: 2018-07-26 | End: 2018-07-26

## 2018-07-26 RX ORDER — ONDANSETRON 4 MG/1
4 TABLET, ORALLY DISINTEGRATING ORAL
Status: COMPLETED | OUTPATIENT
Start: 2018-07-26 | End: 2018-07-26

## 2018-07-26 RX ADMIN — KETOROLAC TROMETHAMINE 30 MG: 30 INJECTION, SOLUTION INTRAMUSCULAR at 07:07

## 2018-07-26 RX ADMIN — ONDANSETRON 4 MG: 4 TABLET, ORALLY DISINTEGRATING ORAL at 07:07

## 2018-07-26 RX ADMIN — LIDOCAINE 1 PATCH: 50 PATCH TOPICAL at 07:07

## 2018-07-26 RX ADMIN — ORPHENADRINE CITRATE 60 MG: 30 INJECTION INTRAMUSCULAR; INTRAVENOUS at 07:07

## 2018-07-26 NOTE — ED TRIAGE NOTES
Pt states her back pain started yesterday. Reports she was diagnosed with two bulging disc and hasn't had any problems for years. She has not tried anything for pain.

## 2018-07-26 NOTE — DISCHARGE INSTRUCTIONS
Please avoid heavy lifting and strenuous exercise for the next several days.    You can apply warm heat to the area of your back pain using a heating pad for relief of muscle tension.    You have been prescribed Clinoril for pain. Do not take additional NSAIDs (ibuprofen, naproxen, etc) while taking this medication.    You have been prescribed Flexeril for muscle pain.  This medication causes drowsiness.  Do not drink alcohol or operate motor vehicles while taking.    You can also apply Lidoderm patches to the region of pain every 12-24 hours.    Please follow-up with your primary care provider if your symptoms continue.    Return to the emergency department for any new or worsening symptoms.

## 2018-07-26 NOTE — ED PROVIDER NOTES
Encounter Date: 7/26/2018       History     Chief Complaint   Patient presents with    Back Pain     Lower back pain that began yesterday.  Pain radiates up back and down leg on right side.  Nausea with one episode of vomiting.     CC: Back Pain    HPI: 63 y.o. Female with PMHx herniated discs L4-S1, DM, HTN, HLD, osteopenia presents for consideration of back pain. Patient reports right lower back pain that radiates down the right leg.  She reports that the pain is severe and worse with movement and sitting.  She denies attempted treatment prior to arrival.  She denies saddle anesthesia, bladder or bowel incontinence, urinary symptoms, abdominal pain or further symptoms.  She denies any fall or trauma.  She reports that the pain is similar to past exacerbation of back pain, which she has not had in several years. She also reports nausea with 1 episode of non-bloody emesis this morning, which she associated with her pain. She denies abdominal pain, diarrhea or further symptoms.           Review of patient's allergies indicates:   Allergen Reactions    Codeine Hives     Other reaction(s): Itching  Other reaction(s): Hives    Iodinated contrast- oral and iv dye Hives     Other reaction(s): Hives    Iodine      Other reaction(s): Unknown    Sulfamethoxazole-trimethoprim Itching     Other reaction(s): Itching  Other reaction(s): Hives    Epinephrine Anxiety and Other (See Comments)     Almost passed out.     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, long term PPI use    Hyperlipidemia     Hypertension     Osteopenia 9/25/2014    Screening for colorectal cancer 9/25/2014    Normal 2009    Screening for colorectal cancer     Thyroid disease     Vitamin D deficiency disease  9/25/2014     Past Surgical History:   Procedure Laterality Date    HYSTERECTOMY  1997    complete for prolapse, Abdominal    OOPHORECTOMY      right shoulder      SINUS SURGERY  2012     Family History   Problem Relation Age of Onset    Breast cancer Cousin 40    Miscarriages / Stillbirths Maternal Grandmother     Miscarriages / Stillbirths Mother     Diabetes Mother     Ovarian cancer Neg Hx      Social History   Substance Use Topics    Smoking status: Never Smoker    Smokeless tobacco: Never Used    Alcohol use No     Review of Systems   Constitutional: Negative for chills and fever.   HENT: Negative for congestion, ear pain and sore throat.    Eyes: Negative for pain.   Respiratory: Negative for shortness of breath.    Cardiovascular: Negative for chest pain.   Gastrointestinal: Negative for abdominal pain, constipation, diarrhea, nausea and vomiting.   Genitourinary: Negative for dysuria, vaginal bleeding and vaginal discharge.   Musculoskeletal: Positive for back pain. Negative for neck pain.   Skin: Negative for rash.   Neurological: Negative for weakness and headaches.   Hematological: Does not bruise/bleed easily.   Psychiatric/Behavioral: Negative for confusion. The patient is not nervous/anxious.        Physical Exam     Initial Vitals [07/26/18 0719]   BP Pulse Resp Temp SpO2   (!) 152/65 72 18 97.9 °F (36.6 °C) 96 %      MAP       --         Physical Exam    Nursing note and vitals reviewed.  Constitutional: Vital signs are normal. She appears well-developed and well-nourished. She is not diaphoretic. She is cooperative.  Non-toxic appearance. She does not have a sickly appearance. She does not appear ill. No distress.   HENT:   Head: Normocephalic and atraumatic.   Right Ear: Tympanic membrane, external ear and ear canal normal.   Left Ear: Tympanic membrane, external ear and ear canal normal.   Nose: Nose normal.   Mouth/Throat: Uvula is midline, oropharynx is clear and moist and mucous  membranes are normal. No oropharyngeal exudate.   Eyes: Conjunctivae, EOM and lids are normal. Pupils are equal, round, and reactive to light.   Neck: Trachea normal, normal range of motion, full passive range of motion without pain and phonation normal. Neck supple.   Cardiovascular: Normal rate, regular rhythm, normal heart sounds and intact distal pulses. Exam reveals no gallop and no friction rub.    No murmur heard.  Pulses:       Dorsalis pedis pulses are 2+ on the right side, and 2+ on the left side.   Capillary refill less than 2 sec in bilateral lower extremities.   Pulmonary/Chest: Effort normal and breath sounds normal. No respiratory distress. She has no decreased breath sounds.   Abdominal: Soft. Normal appearance and bowel sounds are normal. She exhibits no distension and no mass. There is no tenderness. There is no rigidity, no rebound, no guarding and no CVA tenderness.   Musculoskeletal:        Cervical back: Normal.        Thoracic back: Normal.        Lumbar back: She exhibits decreased range of motion, tenderness and spasm. She exhibits no bony tenderness, no swelling, no edema, no deformity, no laceration and no pain.        Back:    There is pain elicited with range of motion of the right paraspinal musculature of the lumbar region that radiates down right buttocks and the posterior right leg. There is a muscle spasm of the right lumbar region. No midline tenderness or step-off to palpation of the C/T/L spine. No obvious deformity or sign of trauma. No laceration. Patient is ambulatory. Peripheral pulses intact. Capillary refill < 2 seconds in BLE.    Neurological: She is alert and oriented to person, place, and time. She has normal strength. No cranial nerve deficit or sensory deficit. She exhibits normal muscle tone. Coordination and gait normal.   Skin: Skin is warm and dry. Capillary refill takes less than 2 seconds. No rash noted.   Psychiatric: She has a normal mood and affect. Her speech  is normal and behavior is normal. Judgment and thought content normal. Cognition and memory are normal.         ED Course   Procedures  Labs Reviewed   URINALYSIS, REFLEX TO URINE CULTURE - Abnormal; Notable for the following:        Result Value    Leukocytes, UA 1+ (*)     All other components within normal limits    Narrative:     Preferred Collection Type->Urine, Clean Catch   POCT GLUCOSE - Abnormal; Notable for the following:     POCT Glucose 128 (*)     All other components within normal limits   URINALYSIS MICROSCOPIC    Narrative:     Preferred Collection Type->Urine, Clean Catch   POCT GLUCOSE MONITORING CONTINUOUS          Imaging Results          X-Ray Lumbar Spine Ap And Lateral (Final result)  Result time 07/26/18 08:34:07   Procedure changed from X-Ray Lumbar Spine Complete 5 View     Final result by Brennon Willis MD (07/26/18 08:34:07)                 Impression:      No fracture or acute abnormality.  Mild degenerative spine changes.      Electronically signed by: Brennon Willis MD  Date:    07/26/2018  Time:    08:34             Narrative:    EXAMINATION:  XR LUMBAR SPINE AP AND LATERAL    CLINICAL HISTORY:  Low back pain, >6wks conservative tx, persistent-progressive sx, surgical candidate;    TECHNIQUE:  AP, lateral and spot images were performed of the lumbar spine.    COMPARISON:  None    FINDINGS:  The lumbar vertebra are intact, perhaps with osteoporosis.  AP view shows mild levoscoliosis.  No compression fracture is seen.  Degenerative changes are present with small osteophytes at many levels.  L4-5 has mild disc space narrowing.  This level also shows a grade 1 anterolisthesis secondary to degenerative changes at lower facet joints.  Other disc spaces are better preserved.    Visualized abdomen shows a lot of stool throughout the colon.                                       APC / Resident Notes:   This is an evaluation of a 63 y.o. female with PMHx herniated discs L4-S1, DM, HTN,  HLD, osteopenia that presents to the Emergency Department forback pain. Patient reports right lower back pain that radiates down the right leg.  She reports that the pain is severe and worse with movement and sitting.  She denies attempted treatment prior to arrival.  She denies saddle anesthesia, bladder or bowel incontinence, urinary symptoms, abdominal pain or further symptoms.  She denies any fall or trauma.  She reports that the pain is similar to past exacerbation of back pain, which she has not had in several years. She also reports nausea with 1 episode of non-bloody emesis this morning, which she associated with her pain. She denies abdominal pain, diarrhea or further symptoms.     Physical Exam shows a non-toxic, afebrile, and well appearing female.There is pain elicited with range of motion of the right paraspinal musculature of the lumbar region that radiates down right buttocks and the posterior right leg. There is a muscle spasm of the right lumbar region. No midline tenderness or step-off to palpation of the C/T/L spine. No obvious deformity or sign of trauma. No laceration. Patient is ambulatory. Peripheral pulses intact. Capillary refill < 2 seconds in BLE. Abdomen is soft and nontender. Abdomen is non-distended. Bowel sounds appreciated. No peritoneal signs. Remainder of exam unremarkable.    Vital Signs Are Reassuring. If available, previous records reviewed.   RESULTS:   UA unrevealing for UTI.   POCT glucose 128.  Xray lumbar spine shows mild degenerative changes; no fracture or acute osseous abnormality.    My overall impression is lumbar back pain and muscle spasm.  DDx: lumbar back pain, muscle strain, UTI, fracture, other  I do not suspect emergent process at this time.    ED Course: Norflex 60 mg IM and Toradol 30 mg IM. Zofran po given. Upon reevaluation, patient reports significant relief of pain. Patient ambulatory and smiling. I feel this patient is stable for discharge. D/C Meds:  Clinoril, Flexeril, Lidoderm. I will recommend avoidance of strenuous physical activity and heating pads on the region of back pain. The diagnosis, treatment plan, instructions for follow-up and reevaluation with spine surgery, as well as ED return precautions were discussed and understanding was verbalized. All questions or concerns have been addressed. Patient was discharged home with an instructional sheet which gave not only information regarding the most likely diagnoses but also information regarding when to return to the emergency department for alarming symptoms and when to seek further care.      This case was discussed with Dr. Shi who is in agreement with my assessment and plan.     Fadia Pinto PA-C           Attending Attestation:     Physician Attestation Statement for NP/PA:   I discussed this assessment and plan of this patient with the NP/PA, but I did not personally examine the patient. The face to face encounter was performed by the NP/PA.                     Clinical Impression:   The primary encounter diagnosis was Lumbar back pain. Diagnoses of Muscle spasm and Nausea were also pertinent to this visit.      Disposition:   Disposition: Discharged  Condition: Stable                            Fadia Pinto PA-C  07/26/18 1035       Bulmaro Shi MD  07/26/18 1045

## 2018-08-16 ENCOUNTER — HOSPITAL ENCOUNTER (EMERGENCY)
Facility: HOSPITAL | Age: 63
Discharge: HOME | End: 2018-08-16
Attending: EMERGENCY MEDICINE
Payer: COMMERCIAL

## 2018-08-16 VITALS
RESPIRATION RATE: 16 BRPM | SYSTOLIC BLOOD PRESSURE: 168 MMHG | DIASTOLIC BLOOD PRESSURE: 70 MMHG | HEIGHT: 65 IN | OXYGEN SATURATION: 96 % | HEART RATE: 76 BPM | WEIGHT: 157 LBS | TEMPERATURE: 98 F | BODY MASS INDEX: 26.16 KG/M2

## 2018-08-16 DIAGNOSIS — L03.115 CELLULITIS OF RIGHT LOWER EXTREMITY: Primary | ICD-10-CM

## 2018-08-16 DIAGNOSIS — S80.212A ABRASION, LEFT KNEE, INITIAL ENCOUNTER: ICD-10-CM

## 2018-08-16 PROCEDURE — 25000003 PHARM REV CODE 250: Performed by: PHYSICIAN ASSISTANT

## 2018-08-16 PROCEDURE — 99283 EMERGENCY DEPT VISIT LOW MDM: CPT

## 2018-08-16 RX ORDER — MUPIROCIN 20 MG/G
1 OINTMENT TOPICAL
Status: COMPLETED | OUTPATIENT
Start: 2018-08-16 | End: 2018-08-16

## 2018-08-16 RX ORDER — CLINDAMYCIN HYDROCHLORIDE 150 MG/1
300 CAPSULE ORAL 3 TIMES DAILY
Qty: 42 CAPSULE | Refills: 0 | Status: SHIPPED | OUTPATIENT
Start: 2018-08-16 | End: 2018-08-23

## 2018-08-16 RX ADMIN — MUPIROCIN 22 G: 20 OINTMENT TOPICAL at 09:08

## 2018-08-17 NOTE — DISCHARGE INSTRUCTIONS
Please begin taking the clindamycin as prescribed.    Apply mupirocin ointment to the wound twice daily and keep covered.    Please return to this emergency department 24 hr for wound check.    If your symptoms worsen or if you develop a fever, return to the emergency department immediately.

## 2018-08-17 NOTE — ED PROVIDER NOTES
Encounter Date: 8/16/2018  63 y.o. female with right leg wound from 1 week ago that became red and swollen today. No fever or chills. Patient will be seen by another provider for further evaluation when an exam room is available. Morteza MEZA, 8:36 PM    SCRIBE #1 NOTE: I, Sebastian Ng, am scribing for, and in the presence of,  Fadia MEZA. I have scribed the following portions of the note - Other sections scribed: HPI, ROS.       History     Chief Complaint   Patient presents with    Abrasion     abrasion to left shin that occurred when hiking about a week ago; reports just this night the area became red, inflamed, and painful; site is green but pt denies drainage at this time     CC: Abrasion     HPI: 64 y/o female with history of basal cell carcinoma, DM, GERD, hypertension, thyroid disease and hysterectomy presents to the ED c/o abrasion on right shin and left knee due to an accidental mechanical fall while hiking 7 days ago onto a gravel/dirt/rock. She reports increased redness and swelling to the abrasions and right sided ankle swelling today. She denies the wound becoming wet after the fall. The pt initially attempted to wash the abrasion with water and EtOH wipes. Today she attempted neosporin with no relief. The pt denies fever, shortness of breath, and back pain. No other symptoms to report.       The history is provided by the patient. No  was used.     Review of patient's allergies indicates:   Allergen Reactions    Codeine Hives     Other reaction(s): Itching  Other reaction(s): Hives    Iodinated contrast- oral and iv dye Hives     Other reaction(s): Hives    Iodine      Other reaction(s): Unknown    Sulfamethoxazole-trimethoprim Itching     Other reaction(s): Itching  Other reaction(s): Hives    Epinephrine Anxiety and Other (See Comments)     Almost passed out.     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 Dr Correia, long term PPI use    Hyperlipidemia     Hypertension     Osteopenia 9/25/2014    Screening for colorectal cancer 9/25/2014    Normal 2009    Screening for colorectal cancer     Thyroid disease     Vitamin D deficiency disease 9/25/2014     Past Surgical History:   Procedure Laterality Date    HYSTERECTOMY  1997    complete for prolapse, Abdominal    OOPHORECTOMY      right shoulder      SINUS SURGERY  2012     Family History   Problem Relation Age of Onset    Breast cancer Cousin 40    Miscarriages / Stillbirths Maternal Grandmother     Miscarriages / Stillbirths Mother     Diabetes Mother     Ovarian cancer Neg Hx      Social History     Tobacco Use    Smoking status: Never Smoker    Smokeless tobacco: Never Used   Substance Use Topics    Alcohol use: No    Drug use: No     Review of Systems   Constitutional: Negative for chills and diaphoresis.   HENT: Negative for congestion, ear pain, rhinorrhea and sinus pain.    Eyes: Negative for redness.   Respiratory: Negative for cough, chest tightness and shortness of breath.    Cardiovascular: Negative for chest pain.   Gastrointestinal: Negative for abdominal pain, diarrhea, nausea and vomiting.   Genitourinary: Negative for difficulty urinating, dysuria and flank pain.   Musculoskeletal: Negative for back pain.        (+) right ankle swelling   Skin: Positive for wound (abrasions to right shin and left knee). Negative for rash.   Neurological: Negative for headaches.   Psychiatric/Behavioral: Negative for confusion.       Physical Exam     Initial Vitals [08/16/18 2036]   BP Pulse Resp Temp SpO2   (!) 171/79 80 16 98.4 °F (36.9 °C) 95 %      MAP       --         Physical Exam    Nursing note and vitals reviewed.  Constitutional: Vital signs are normal. She appears  well-developed and well-nourished. She is not diaphoretic. She is cooperative.  Non-toxic appearance. She does not have a sickly appearance. She does not appear ill. No distress.   HENT:   Head: Normocephalic and atraumatic.   Right Ear: External ear normal.   Left Ear: External ear normal.   Nose: Nose normal.   Mouth/Throat: Uvula is midline, oropharynx is clear and moist and mucous membranes are normal. No oropharyngeal exudate.   Eyes: Conjunctivae, EOM and lids are normal. Pupils are equal, round, and reactive to light.   Neck: Trachea normal, normal range of motion, full passive range of motion without pain and phonation normal. Neck supple.   Cardiovascular: Normal rate, regular rhythm, normal heart sounds and intact distal pulses. Exam reveals no gallop and no friction rub.    No murmur heard.  Pulses:       Dorsalis pedis pulses are 2+ on the right side, and 2+ on the left side.   Capillary refill less than 2 sec in bilateral lower extremities.   Pulmonary/Chest: Effort normal and breath sounds normal. No respiratory distress. She has no decreased breath sounds. She has no wheezes. She has no rhonchi. She has no rales.   Abdominal: Soft. Normal appearance and bowel sounds are normal. She exhibits no distension and no mass. There is no tenderness.   Musculoskeletal: Normal range of motion.   Patient is ambulatory and moving all extremities.   Neurological: She is alert and oriented to person, place, and time. She has normal strength.   Skin: Skin is warm and dry. Capillary refill takes less than 2 seconds. Abrasion noted. No bruising, no burn, no ecchymosis, no laceration, no lesion, no rash and no abscess noted. There is erythema.        Abrasions on the left knee and right shin.  The right chin abrasion has surrounding erythema with localized swelling to the left ankle. No streaking erythema. No abscess. No rash. No desquamation. See picture.    Psychiatric: She has a normal mood and affect. Her speech is  normal and behavior is normal. Judgment and thought content normal. Cognition and memory are normal.             ED Course   Procedures  Labs Reviewed - No data to display       Imaging Results    None                APC / Resident Notes:   This is an evaluation of a 63 y.o. Female with DM, GERD, hypertension, thyroid disease that presents to the Emergency Department for abrasion of left knee and right shin after falling while hiking 7 days ago. Denies further symptoms. Attempted tx with neosporin.     Physical Exam shows a non-toxic, afebrile, and well appearing female. Abrasions on the left knee and right shin.  The right chin abrasion has surrounding erythema with localized swelling to the left ankle. No streaking erythema. No abscess. No rash. No desquamation. See picture.  Peripheral pulses intact.  Patient is ambulatory and moving all extremities.  Sensation and strength are intact. Remainder of exam is unremarkable.    Vital Signs Are Reassuring. If available, previous records reviewed.     My overall impression is cellulitis of right lower extremity and abrasion left knee.  DDx: abrasion, cellulitis, abscess    ED Course: Mupirocin ointment and dressings applied to wounds. I feel this patient is stable for discharge. I will recommend return for wound check in 24 hours.  D/C Meds: Clindamycin, Mupirocin.  The diagnosis, treatment plan, instructions for follow-up and reevaluation with this ED in 24 hours and PCP, as well as ED return precautions were discussed and understanding was verbalized. All questions or concerns have been addressed. Patient was discharged home with an instructional sheet which gave not only information regarding the most likely diagnoses but also information regarding when to return to the emergency department for alarming symptoms and when to seek further care.      This case was discussed with Dr. Shi who is in agreement with my assessment and plan.     Fadia Michel PA-C          Scribe Attestation:   Scribe #1: I performed the above scribed service and the documentation accurately describes the services I performed. I attest to the accuracy of the note.    Attending Attestation:     Physician Attestation Statement for NP/PA:   I discussed this assessment and plan of this patient with the NP/PA, but I did not personally examine the patient. The face to face encounter was performed by the NP/PA.        Physician Attestation for Scribe:  Physician Attestation Statement for Scribe #1: I, Fadia MEZA, reviewed documentation, as scribed by Sebastian Ng in my presence, and it is both accurate and complete.                    Clinical Impression:   The primary encounter diagnosis was Cellulitis of right lower extremity. A diagnosis of Abrasion, left knee, initial encounter was also pertinent to this visit.      Disposition:   Disposition: Discharged  Condition: Stable                        Fadia Pinto PA-C  08/17/18 0146

## 2018-08-26 ENCOUNTER — NURSE TRIAGE (OUTPATIENT)
Dept: ADMINISTRATIVE | Facility: CLINIC | Age: 63
End: 2018-08-26

## 2018-08-26 ENCOUNTER — OFFICE VISIT (OUTPATIENT)
Dept: URGENT CARE | Facility: CLINIC | Age: 63
End: 2018-08-26
Payer: COMMERCIAL

## 2018-08-26 VITALS
OXYGEN SATURATION: 98 % | RESPIRATION RATE: 18 BRPM | BODY MASS INDEX: 26.16 KG/M2 | WEIGHT: 157 LBS | TEMPERATURE: 99 F | DIASTOLIC BLOOD PRESSURE: 74 MMHG | HEART RATE: 68 BPM | HEIGHT: 65 IN | SYSTOLIC BLOOD PRESSURE: 144 MMHG

## 2018-08-26 DIAGNOSIS — T78.40XA ALLERGIC REACTION, INITIAL ENCOUNTER: Primary | ICD-10-CM

## 2018-08-26 DIAGNOSIS — S81.811D LACERATION OF RIGHT LOWER LEG, SUBSEQUENT ENCOUNTER: ICD-10-CM

## 2018-08-26 PROCEDURE — 90471 IMMUNIZATION ADMIN: CPT | Mod: 59,AT,S$GLB, | Performed by: NURSE PRACTITIONER

## 2018-08-26 PROCEDURE — 90715 TDAP VACCINE 7 YRS/> IM: CPT | Mod: AT,S$GLB,, | Performed by: NURSE PRACTITIONER

## 2018-08-26 PROCEDURE — 96372 THER/PROPH/DIAG INJ SC/IM: CPT | Mod: S$GLB,,, | Performed by: NURSE PRACTITIONER

## 2018-08-26 PROCEDURE — 3078F DIAST BP <80 MM HG: CPT | Mod: CPTII,S$GLB,, | Performed by: NURSE PRACTITIONER

## 2018-08-26 PROCEDURE — 3077F SYST BP >= 140 MM HG: CPT | Mod: CPTII,S$GLB,, | Performed by: NURSE PRACTITIONER

## 2018-08-26 PROCEDURE — 3008F BODY MASS INDEX DOCD: CPT | Mod: CPTII,S$GLB,, | Performed by: NURSE PRACTITIONER

## 2018-08-26 PROCEDURE — 99214 OFFICE O/P EST MOD 30 MIN: CPT | Mod: 25,S$GLB,, | Performed by: NURSE PRACTITIONER

## 2018-08-26 RX ORDER — MUPIROCIN 20 MG/G
OINTMENT TOPICAL
Qty: 22 G | Refills: 1 | Status: SHIPPED | OUTPATIENT
Start: 2018-08-26 | End: 2018-09-18

## 2018-08-26 RX ORDER — BETAMETHASONE SODIUM PHOSPHATE AND BETAMETHASONE ACETATE 3; 3 MG/ML; MG/ML
6 INJECTION, SUSPENSION INTRA-ARTICULAR; INTRALESIONAL; INTRAMUSCULAR; SOFT TISSUE
Status: COMPLETED | OUTPATIENT
Start: 2018-08-26 | End: 2018-08-26

## 2018-08-26 RX ORDER — PREDNISONE 20 MG/1
20 TABLET ORAL DAILY
Qty: 3 TABLET | Refills: 0 | Status: SHIPPED | OUTPATIENT
Start: 2018-08-26 | End: 2018-08-29

## 2018-08-26 RX ADMIN — BETAMETHASONE SODIUM PHOSPHATE AND BETAMETHASONE ACETATE 6 MG: 3; 3 INJECTION, SUSPENSION INTRA-ARTICULAR; INTRALESIONAL; INTRAMUSCULAR; SOFT TISSUE at 10:08

## 2018-08-26 NOTE — PATIENT INSTRUCTIONS
General Allergic Reactions  An allergic reaction is a set of symptoms caused by an allergen. An allergen is something that causes a persons immune system to react. When a person comes in contact with an allergen, it causes the body to release chemicals. These include the chemical histamine. Histamine causes swelling and itching. It may affect the entire body. This is called a general allergic reaction. Often symptoms affect only 1 part of the body. This is called a local allergic reaction.  You are having an allergic reaction. Almost anything can cause one. Different people are allergic to different things. It is usually something that you ate or swallowed, came into contact with by getting or putting it on your skin or clothes, or something you breathed in the air. This can be very annoying and sometimes scary.  Most of us think of allergic reactions when we have a rash or itchy skin. Symptoms can include:  · Itching of the eyes, nose, and roof of the mouth  · Runny or stuffy nose  · Watery eyes   · Sneezing or coughing   · A blocked feeling in the ear  · Red, itchy rash called hives  · Red and purple spots  · Rash, redness, welts, blisters  · Itching, burning, stinging, pain  · Dry, flaky, cracking, scaly skin  Severe symptoms include:  · Swelling of the face, lips, or other parts of the body  · Hoarse voice  · Trouble swallowing, feeling like your throat is closing  · Trouble breathing, wheezing  · Nausea, vomiting, diarrhea, stomach cramps  · Feeling faint or lightheaded, rapid heart rate  Sometimes the cause may be obvious. But there are so many things that can cause a reaction that you may not be able to figure out. The most important things to help find your allergen are:  · Remembering when it started  · What you were doing at the time or just before that  · Any activities you were involved in  · Any new products or contacts  Below are some common causes. But remember that almost anything can cause a  reaction. You may not even be aware that you came into contact with one of these things:  · Dust, mold, pollen  · Plants (common ones are poison ivy and poison oak, but there are many others)   · Animals  · Foods such as shrimp, shellfish, peanuts, milk products, gluten, and eggs. Also food colorings, flavorings, and additives.  · Insect bites or stings such as bees, mosquitos, fleas, ticks  · Medicines such as penicillin, sulfa medicines, amoxicillin, aspirin, and ibuprofen. But any medicine can cause a reaction.  · Jewelry such as nickel or gold. This can be new, or something youve worn for a while, including zippers and buttons.  · Latex such as in gloves, clothes, toys, balloons, or some tapes. Some people allergic to latex may also have problems with foods like bananas, avocados, kiwi, papaya, or chestnuts.  · Lotions, perfumes, cosmetics, soaps, shampoos, skincare products, nail products  · Chemicals or dyes in clothing, linen, , hair dyes, soaps, iodine  Many viruses and common colds can cause a rash that is not an allergic reaction. Sometimes it is hard to tell the difference between allergies, sensitivity, or an intolerance to something. This is especially true with food. Many things can cause diarrhea, vomiting, stomach cramps, and skin irritation.  Home care    The goal of treatment is to help relieve the symptoms and get you feeling better. The rash will usually fade over several days. But it can sometimes last a couple of weeks. Over the next couple of days, there may be times when it is gets a little worse, and then better again. Here are some things to do:  · If you know what you are allergic to, stay away from it. Future reactions could be worse than this one.  · Avoid tight clothing and anything that heats up your skin (hot showers or baths, direct sunlight). Heat will make itching worse.  · An ice pack will relieve local areas of intense itching and redness. To make an ice pack, put ice  cubes in a plastic bag that seals at the top. Wrap it in a thin, clean towel. Dont put the ice directly on the skin because it can damage the skin.  · Oral diphenhydramine is an over-the-counter antihistamine sold at pharmacy and grocery stores. Unless a prescription antihistamine was given, diphenhydramine may be used to reduce itching if large areas of the skin are involved. It may make you sleepy. So be careful using it in the daytime or when going to school, working, or driving. Note: Dont use diphenhydramine if you have glaucoma or if you are a man with trouble urinating due to an enlarged prostate. There are other antihistamines that wont make you so sleepy. These are good choices for daytime use. Ask your pharmacist for suggestions.  · Dont use diphenhydramine cream on your skin. It can cause a further reaction in some people.  · To help prevent an infection, don't scratch the affected area. Scratching may worsen the reaction and damage your skin. It can also lead to an infection. Always check the affected for signs of an infection.  · Call your healthcare provider and ask what you can use to help decrease the itching.  · To decrease allergic reactions, try the following:    · Use heat-steam to clean your home  · Use high-efficiency particulate (HEPA) vacuums and filters  · Stay away from food and pet triggers  · Kill any cockroaches  · Clean your house often  Follow-up care  Follow up with your healthcare provider, or as advised. If you had a severe reaction today, or if you have had several mild to medium allergic reactions in the past, ask your provider about allergy testing. This can help you find out what you are allergic to. If your reaction included dizziness, fainting, or trouble breathing or swallowing, ask your provider about carrying auto-injectable epinephrine.  Call 911  Call 911 if any of these occur:  · Trouble breathing or swallowing, wheezing  · Cool, moist, pale skin  · Shortness of  breath  · Hoarse voice or trouble speaking  · Confused   · Very drowsy or trouble awakening  · Fainting or loss of consciousness  · Rapid heart rate  · Feeling of dizziness or weakness or a sudden drop in blood pressure  · Feeling of doom  · Feeling lightheaded  · Severe nausea or vomiting, or diarrhea  · Seizure  · Swelling in the face, eyelids, lips, mouth, throat or tongue  · Drooling  When to seek medical advice  Call your healthcare provider right away if any of these occur:  · Spreading areas of itching, redness or swelling  · Nausea or stomach cramps or abdominal pain  · Continuing or recurring symptoms  · Spreading areas of redness, swelling, or itching  · Signs of infection at the affected site:  ¨ Spreading redness  ¨ Increased pain or swelling  ¨ Fluid or colored drainage from the site  ¨ Fever of 100.4°F (38°C) or above lasting for 24 to 48 hours, or as directed by your provider  Date Last Reviewed: 3/1/2017  © 7396-5675 [employer]. [address] [place] [address], [place] [address]. All rights reserved. This information is not intended as a substitute for professional medical care. Always follow your healthcare professional's instructions.    Please drink plenty of fluids.  Please get plenty of rest.  Please return here or go to the Emergency Department for any concerns or worsening of condition.  If you were prescribed a narcotic medication, do not drive or operate heavy equipment or machinery while taking these medications.  Please take over the counter Pepcid or Zantac as directed for the next 24-72hours as needed.  If you were given a steroid shot in the clinic and have also been given a prescription for a steroid such as Prednisone or a Medrol Dose Pack, please begin taking them tomorrow.  If you have a localized reaction it is ok to apply topical Benadryl and/or Cortaid as directed to the affected area.  Please take over the counter Benadryl as directed for the next 24-72 hours as needed for  itching unless it makes you sleepy, then you can take over the counter Allegra or Claritin or Zyrtec as directed during the daytime hours as these generally do not cause drowsiness.  Please follow up with your primary care doctor or specialist as needed.    If you  smoke, please stop smoking.

## 2018-08-26 NOTE — TELEPHONE ENCOUNTER
Reason for Disposition   General information question, no triage required and triager able to answer question    Protocols used: ST INFORMATION ONLY CALL-A-AH    Pt wanted information concerning nearest [facility].

## 2018-08-26 NOTE — PROGRESS NOTES
"Subjective:       Patient ID: Anushka Napier is a 63 y.o. female.    Vitals:  height is 5' 5" (1.651 m) and weight is 71.2 kg (157 lb). Her temperature is 98.6 °F (37 °C). Her blood pressure is 144/74 (abnormal) and her pulse is 68. Her respiration is 18 and oxygen saturation is 98%.     Chief Complaint: Rash    Rash   This is a new problem. The current episode started yesterday. The problem has been gradually worsening since onset. The rash is diffuse. The rash is characterized by itchiness and redness. It is unknown if there was an exposure to a precipitant. Pertinent negatives include no fever, joint pain, shortness of breath or sore throat. Past treatments include nothing. The treatment provided no relief.     Review of Systems   Constitution: Negative for chills and fever.   HENT: Negative for sore throat.    Respiratory: Negative for shortness of breath.    Skin: Positive for itching and rash.   Musculoskeletal: Negative for joint pain.   All other systems reviewed and are negative.      Objective:      Physical Exam   Constitutional: She is oriented to person, place, and time. She appears well-developed and well-nourished.   HENT:   Head: Normocephalic and atraumatic. Head is without abrasion, without contusion and without laceration.   Right Ear: External ear normal.   Left Ear: External ear normal.   Nose: Nose normal.   Mouth/Throat: Oropharynx is clear and moist.   Eyes: Conjunctivae, EOM and lids are normal. Pupils are equal, round, and reactive to light.   Neck: Trachea normal, full passive range of motion without pain and phonation normal. Neck supple.   Cardiovascular: Normal rate, regular rhythm and normal heart sounds.   Pulmonary/Chest: Effort normal and breath sounds normal. No stridor. No respiratory distress.   Musculoskeletal: Normal range of motion.   Neurological: She is alert and oriented to person, place, and time.   Skin: Skin is warm and dry. Capillary refill takes less than 2 seconds. " Laceration and rash noted. No abrasion, no bruising, no burn, no ecchymosis and no lesion noted. Rash is urticarial. No erythema.        Psychiatric: She has a normal mood and affect. Her speech is normal and behavior is normal. Judgment and thought content normal. Cognition and memory are normal.   Nursing note and vitals reviewed.      Assessment:       1. Allergic reaction, initial encounter    2. Laceration of right lower leg, subsequent encounter        Plan:         Allergic reaction, initial encounter    Laceration of right lower leg, subsequent encounter    Other orders  -     Cancel: (In Office Administered) Tdap Vaccine  -     betamethasone acetate-betamethasone sodium phosphate injection 6 mg; Inject 1 mL (6 mg total) into the muscle one time.  -     predniSONE (DELTASONE) 20 MG tablet; Take 1 tablet (20 mg total) by mouth once daily. for 3 days  Dispense: 3 tablet; Refill: 0  -     mupirocin (BACTROBAN) 2 % ointment; Apply to affected area 3 times daily  Dispense: 22 g; Refill: 1  -     (In Office Administered) Tdap Vaccine        General Allergic Reactions  An allergic reaction is a set of symptoms caused by an allergen. An allergen is something that causes a persons immune system to react. When a person comes in contact with an allergen, it causes the body to release chemicals. These include the chemical histamine. Histamine causes swelling and itching. It may affect the entire body. This is called a general allergic reaction. Often symptoms affect only 1 part of the body. This is called a local allergic reaction.  You are having an allergic reaction. Almost anything can cause one. Different people are allergic to different things. It is usually something that you ate or swallowed, came into contact with by getting or putting it on your skin or clothes, or something you breathed in the air. This can be very annoying and sometimes scary.  Most of us think of allergic reactions when we have a rash or  itchy skin. Symptoms can include:  · Itching of the eyes, nose, and roof of the mouth  · Runny or stuffy nose  · Watery eyes   · Sneezing or coughing   · A blocked feeling in the ear  · Red, itchy rash called hives  · Red and purple spots  · Rash, redness, welts, blisters  · Itching, burning, stinging, pain  · Dry, flaky, cracking, scaly skin  Severe symptoms include:  · Swelling of the face, lips, or other parts of the body  · Hoarse voice  · Trouble swallowing, feeling like your throat is closing  · Trouble breathing, wheezing  · Nausea, vomiting, diarrhea, stomach cramps  · Feeling faint or lightheaded, rapid heart rate  Sometimes the cause may be obvious. But there are so many things that can cause a reaction that you may not be able to figure out. The most important things to help find your allergen are:  · Remembering when it started  · What you were doing at the time or just before that  · Any activities you were involved in  · Any new products or contacts  Below are some common causes. But remember that almost anything can cause a reaction. You may not even be aware that you came into contact with one of these things:  · Dust, mold, pollen  · Plants (common ones are poison ivy and poison oak, but there are many others)   · Animals  · Foods such as shrimp, shellfish, peanuts, milk products, gluten, and eggs. Also food colorings, flavorings, and additives.  · Insect bites or stings such as bees, mosquitos, fleas, ticks  · Medicines such as penicillin, sulfa medicines, amoxicillin, aspirin, and ibuprofen. But any medicine can cause a reaction.  · Jewelry such as nickel or gold. This can be new, or something youve worn for a while, including zippers and buttons.  · Latex such as in gloves, clothes, toys, balloons, or some tapes. Some people allergic to latex may also have problems with foods like bananas, avocados, kiwi, papaya, or chestnuts.  · Lotions, perfumes, cosmetics, soaps, shampoos, skincare products,  nail products  · Chemicals or dyes in clothing, linen, , hair dyes, soaps, iodine  Many viruses and common colds can cause a rash that is not an allergic reaction. Sometimes it is hard to tell the difference between allergies, sensitivity, or an intolerance to something. This is especially true with food. Many things can cause diarrhea, vomiting, stomach cramps, and skin irritation.  Home care    The goal of treatment is to help relieve the symptoms and get you feeling better. The rash will usually fade over several days. But it can sometimes last a couple of weeks. Over the next couple of days, there may be times when it is gets a little worse, and then better again. Here are some things to do:  · If you know what you are allergic to, stay away from it. Future reactions could be worse than this one.  · Avoid tight clothing and anything that heats up your skin (hot showers or baths, direct sunlight). Heat will make itching worse.  · An ice pack will relieve local areas of intense itching and redness. To make an ice pack, put ice cubes in a plastic bag that seals at the top. Wrap it in a thin, clean towel. Dont put the ice directly on the skin because it can damage the skin.  · Oral diphenhydramine is an over-the-counter antihistamine sold at pharmacy and grocery stores. Unless a prescription antihistamine was given, diphenhydramine may be used to reduce itching if large areas of the skin are involved. It may make you sleepy. So be careful using it in the daytime or when going to school, working, or driving. Note: Dont use diphenhydramine if you have glaucoma or if you are a man with trouble urinating due to an enlarged prostate. There are other antihistamines that wont make you so sleepy. These are good choices for daytime use. Ask your pharmacist for suggestions.  · Dont use diphenhydramine cream on your skin. It can cause a further reaction in some people.  · To help prevent an infection, don't scratch  the affected area. Scratching may worsen the reaction and damage your skin. It can also lead to an infection. Always check the affected for signs of an infection.  · Call your healthcare provider and ask what you can use to help decrease the itching.  · To decrease allergic reactions, try the following:    · Use heat-steam to clean your home  · Use high-efficiency particulate (HEPA) vacuums and filters  · Stay away from food and pet triggers  · Kill any cockroaches  · Clean your house often  Follow-up care  Follow up with your healthcare provider, or as advised. If you had a severe reaction today, or if you have had several mild to medium allergic reactions in the past, ask your provider about allergy testing. This can help you find out what you are allergic to. If your reaction included dizziness, fainting, or trouble breathing or swallowing, ask your provider about carrying auto-injectable epinephrine.  Call 911  Call 911 if any of these occur:  · Trouble breathing or swallowing, wheezing  · Cool, moist, pale skin  · Shortness of breath  · Hoarse voice or trouble speaking  · Confused   · Very drowsy or trouble awakening  · Fainting or loss of consciousness  · Rapid heart rate  · Feeling of dizziness or weakness or a sudden drop in blood pressure  · Feeling of doom  · Feeling lightheaded  · Severe nausea or vomiting, or diarrhea  · Seizure  · Swelling in the face, eyelids, lips, mouth, throat or tongue  · Drooling  When to seek medical advice  Call your healthcare provider right away if any of these occur:  · Spreading areas of itching, redness or swelling  · Nausea or stomach cramps or abdominal pain  · Continuing or recurring symptoms  · Spreading areas of redness, swelling, or itching  · Signs of infection at the affected site:  ¨ Spreading redness  ¨ Increased pain or swelling  ¨ Fluid or colored drainage from the site  ¨ Fever of 100.4°F (38°C) or above lasting for 24 to 48 hours, or as directed by your  provider  Date Last Reviewed: 3/1/2017  © 2579-7041 [email or web address]. [address] [place] [address], [place] [address]. All rights reserved. This information is not intended as a substitute for professional medical care. Always follow your healthcare professional's instructions.    Please drink plenty of fluids.  Please get plenty of rest.  Please return here or go to the Emergency Department for any concerns or worsening of condition.  If you were prescribed a narcotic medication, do not drive or operate heavy equipment or machinery while taking these medications.  Please take over the counter Pepcid or Zantac as directed for the next 24-72hours as needed.  If you were given a steroid shot in the clinic and have also been given a prescription for a steroid such as Prednisone or a Medrol Dose Pack, please begin taking them tomorrow.  If you have a localized reaction it is ok to apply topical Benadryl and/or Cortaid as directed to the affected area.  Please take over the counter Benadryl as directed for the next 24-72 hours as needed for itching unless it makes you sleepy, then you can take over the counter Allegra or Claritin or Zyrtec as directed during the daytime hours as these generally do not cause drowsiness.  Please follow up with your primary care doctor or specialist as needed.    If you  smoke, please stop smoking.

## 2018-09-13 RX ORDER — FLUTICASONE PROPIONATE 50 MCG
2 SPRAY, SUSPENSION (ML) NASAL DAILY
Qty: 16 G | Refills: 11 | Status: SHIPPED | OUTPATIENT
Start: 2018-09-13 | End: 2021-02-03

## 2018-09-18 ENCOUNTER — OFFICE VISIT (OUTPATIENT)
Dept: PODIATRY | Facility: CLINIC | Age: 63
End: 2018-09-18
Payer: COMMERCIAL

## 2018-09-18 VITALS
SYSTOLIC BLOOD PRESSURE: 120 MMHG | BODY MASS INDEX: 26.15 KG/M2 | WEIGHT: 156.94 LBS | DIASTOLIC BLOOD PRESSURE: 70 MMHG | HEIGHT: 65 IN

## 2018-09-18 DIAGNOSIS — E11.9 ENCOUNTER FOR COMPREHENSIVE DIABETIC FOOT EXAMINATION, TYPE 2 DIABETES MELLITUS: Primary | ICD-10-CM

## 2018-09-18 DIAGNOSIS — B35.1 ONYCHOMYCOSIS DUE TO DERMATOPHYTE: ICD-10-CM

## 2018-09-18 PROCEDURE — 3008F BODY MASS INDEX DOCD: CPT | Mod: CPTII,S$GLB,, | Performed by: PODIATRIST

## 2018-09-18 PROCEDURE — 3078F DIAST BP <80 MM HG: CPT | Mod: CPTII,S$GLB,, | Performed by: PODIATRIST

## 2018-09-18 PROCEDURE — 99203 OFFICE O/P NEW LOW 30 MIN: CPT | Mod: S$GLB,,, | Performed by: PODIATRIST

## 2018-09-18 PROCEDURE — 99999 PR PBB SHADOW E&M-EST. PATIENT-LVL III: CPT | Mod: PBBFAC,,, | Performed by: PODIATRIST

## 2018-09-18 PROCEDURE — 3044F HG A1C LEVEL LT 7.0%: CPT | Mod: CPTII,S$GLB,, | Performed by: PODIATRIST

## 2018-09-18 PROCEDURE — 3074F SYST BP LT 130 MM HG: CPT | Mod: CPTII,S$GLB,, | Performed by: PODIATRIST

## 2018-09-18 RX ORDER — CICLOPIROX 80 MG/ML
SOLUTION TOPICAL NIGHTLY
Qty: 1 BOTTLE | Refills: 3 | Status: SHIPPED | OUTPATIENT
Start: 2018-09-18 | End: 2019-01-09 | Stop reason: SDUPTHER

## 2018-09-18 NOTE — PATIENT INSTRUCTIONS
If unable to obtain penlac try Kerasal for fungal nails apply daily to all toenails.  Can be found at [facility]

## 2018-09-18 NOTE — PROGRESS NOTES
Subjective:      Patient ID: Anushka Napier is a 63 y.o. female.    Chief Complaint: Nail Problem (R, 2nd toe/ pcp Eh[place] 5-22-18)    Anushka LAU is a 63 y.o. female who presents to the clinic upon referral from Dr. Latia welch. provider found  for evaluation and treatment of diabetic feet. Anushka LAU has a past medical history of Allergy, Anxiety, Basal cell carcinoma, Depression, Diabetes mellitus, Diabetes mellitus type II, uncontrolled (9/17/2014), GERD (gastroesophageal reflux disease) (9/25/2014), Hyperlipidemia, Hypertension, Osteopenia (9/25/2014), Screening for colorectal cancer (9/25/2014), Screening for colorectal cancer, Thyroid disease, and Vitamin D deficiency disease (9/25/2014). Patient relates no major problem with feet. Only complaints today consist of nail discoloration B/L great toe. States she usually wears nail polish and when she removed her nail polish a couple of days ago began to notice nail discoloration.     PCP: Gerardo Dobbs MD    Date Last Seen by PCP: 1/18/18    Current shoe gear: Tennis shoes    Hemoglobin A1C   Date Value Ref Range Status   10/04/2017 6.4 (H) 4.0 - 5.6 % Final     Comment:     According to ADA guidelines, hemoglobin A1c <7.0% represents  optimal control in non-pregnant diabetic patients. Different  metrics may apply to specific patient populations.   Standards of Medical Care in Diabetes-2016.  For the purpose of screening for the presence of diabetes:  <5.7%     Consistent with the absence of diabetes  5.7-6.4%  Consistent with increasing risk for diabetes   (prediabetes)  >or=6.5%  Consistent with diabetes  Currently, no consensus exists for use of hemoglobin A1c  for diagnosis of diabetes for children.  This Hemoglobin A1c assay has significant interference with fetal   hemoglobin   (HbF). The results are invalid for patients with abnormal amounts of   HbF,   including those with known Hereditary Persistence   of Fetal Hemoglobin. Heterozygous hemoglobin variants  (HbAS, HbAC,   HbAD, HbAE, HbA2) do not significantly interfere with this assay;   however, presence of multiple variants in a sample may impact the %   interference.     03/02/2017 6.6 (H) 4.5 - 6.2 % Final     Comment:     According to ADA guidelines, hemoglobin A1C <7.0% represents  optimal control in non-pregnant diabetic patients.  Different  metrics may apply to specific populations.   Standards of Medical Care in Diabetes - 2016.  For the purpose of screening for the presence of diabetes:  <5.7%     Consistent with the absence of diabetes  5.7-6.4%  Consistent with increasing risk for diabetes   (prediabetes)  >or=6.5%  Consistent with diabetes  Currently no consensus exists for use of hemoglobin A1C  for diagnosis of diabetes for children.     02/13/2016 6.4 (H) 4.5 - 6.2 % Final           Review of Systems   Constitution: Negative for chills, diaphoresis, fever and weakness.   Cardiovascular: Negative for claudication, cyanosis, leg swelling and syncope.   Respiratory: Negative for cough and shortness of breath.    Skin: Positive for color change and nail changes. Negative for suspicious lesions.   Musculoskeletal: Negative for falls, joint pain, muscle cramps and muscle weakness.   Gastrointestinal: Negative for diarrhea, nausea and vomiting.   Neurological: Negative for disturbances in coordination, numbness, paresthesias, sensory change and tremors.   Psychiatric/Behavioral: Negative for altered mental status.           Objective:      Physical Exam   Constitutional: She appears well-developed. She is cooperative.   Oriented to time, place, and person.   Cardiovascular:   DP and PT pulses are palpable bilaterally. 3 sec capillary refill time and toes and feet are warm to touch proximally .  There is  hair growth on the feet and toes b/l. There is no edema b/l. No spider veins or varicosities present b/l.      Musculoskeletal:   Equinus noted b/l ankles with < 10 deg DF noted. MMT 5/5 in DF/PF/Inv/Ev  resistance with no reproduction of pain in any direction. Passive range of motion of ankle and pedal joints is painless b/l.     Feet:   Right Foot:   Protective Sensation: 10 sites tested. 10 sites sensed.   Skin Integrity: Positive for dry skin. Negative for callus.   Left Foot:   Protective Sensation: 10 sites tested. 10 sites sensed.   Skin Integrity: Positive for dry skin. Negative for callus.   Lymphadenopathy:   Negative lymphadenopathy bilateral popliteal fossa and tarsal tunnel.   Neurological: She is alert.   Light touch, proprioception, and sharp/dull sensation are all intact bilaterally. Protective threshold with the [place]-Wienstein monofilament is intact bilaterally.    Skin:   No open lesions, lacerations or wounds noted.Interdigital spaces clean, dry and intact b/l. No erythema noted to b/l foot.    Toenails 1-5 bilaterally are discolored/yellowed, dystrophic, brittle with subungual debris.       Psychiatric: She has a normal mood and affect.             Assessment:       Encounter Diagnoses   Name Primary?    Encounter for comprehensive diabetic foot examination, type 2 diabetes mellitus Yes    Onychomycosis due to dermatophyte          Plan:       Anushka LAU was seen today for nail problem.    Diagnoses and all orders for this visit:    Encounter for comprehensive diabetic foot examination, type 2 diabetes mellitus    Onychomycosis due to dermatophyte    Other orders  -     ciclopirox (PENLAC) 8 % Soln; Apply topically nightly.      I counseled the patient on her conditions, their implications and medical management.    - Shoe inspection. Diabetic Foot Education. Patient reminded of the importance of good nutrition and blood sugar control to help prevent podiatric complications of diabetes. Patient instructed on proper foot hygeine. We discussed wearing proper shoe gear, daily foot inspections, never walking without protective shoe gear, caution putting sharp instruments to feet     - Discussed DM  foot care:  Wear comfortable, proper fitting shoes. Wash feet daily. Dry well. After drying, apply moisturizer to feet (no lotion to webspaces). Inspect feet daily for skin breaks, blisters, swelling, or redness. Wear cotton socks (preferably white)  Change socks every day. Do NOT walk barefoot. Do NOT use heating pads or warm/hot water soaks     - With patient's permission right great toenail trimmed. No blood drawn.     At patient's request, I discussed different treatments for toenail fungus. We discussed oral antifungals but I did not recommend them as a first line treatment since the medication is taken internally and can have side effects such as rash, taste disturbances, and liver enzyme elevation. We discussed topical Penlac to be applied daily and removed weekly. Pt. Expresses understanding and would like to try the Penlac. Rx sent to the pharmacy.     F/u one year    Selena Conn DPM

## 2018-09-30 ENCOUNTER — PATIENT MESSAGE (OUTPATIENT)
Dept: FAMILY MEDICINE | Facility: CLINIC | Age: 63
End: 2018-09-30

## 2018-10-19 DIAGNOSIS — E11.9 TYPE 2 DIABETES MELLITUS WITHOUT COMPLICATION: ICD-10-CM

## 2018-10-20 DIAGNOSIS — N95.1 SYMPTOMATIC MENOPAUSAL OR FEMALE CLIMACTERIC STATES: ICD-10-CM

## 2018-10-20 RX ORDER — ESTROGENS, CONJUGATED 0.62 MG/1
TABLET, FILM COATED ORAL
Qty: 30 TABLET | Refills: 2 | Status: SHIPPED | OUTPATIENT
Start: 2018-10-20 | End: 2019-01-09 | Stop reason: SDUPTHER

## 2018-11-04 ENCOUNTER — HOSPITAL ENCOUNTER (EMERGENCY)
Facility: HOSPITAL | Age: 63
Discharge: HOME | End: 2018-11-04
Attending: EMERGENCY MEDICINE
Payer: COMMERCIAL

## 2018-11-04 VITALS
HEART RATE: 71 BPM | RESPIRATION RATE: 20 BRPM | HEIGHT: 65 IN | TEMPERATURE: 98 F | WEIGHT: 158 LBS | BODY MASS INDEX: 26.33 KG/M2 | SYSTOLIC BLOOD PRESSURE: 137 MMHG | OXYGEN SATURATION: 95 % | DIASTOLIC BLOOD PRESSURE: 73 MMHG

## 2018-11-04 DIAGNOSIS — M62.838 NECK MUSCLE SPASM: Primary | ICD-10-CM

## 2018-11-04 PROCEDURE — 99284 EMERGENCY DEPT VISIT MOD MDM: CPT | Mod: 25

## 2018-11-04 PROCEDURE — 96372 THER/PROPH/DIAG INJ SC/IM: CPT

## 2018-11-04 PROCEDURE — 63600175 PHARM REV CODE 636 W HCPCS: Performed by: PHYSICIAN ASSISTANT

## 2018-11-04 PROCEDURE — 25000003 PHARM REV CODE 250: Performed by: PHYSICIAN ASSISTANT

## 2018-11-04 RX ORDER — IBUPROFEN 600 MG/1
600 TABLET ORAL EVERY 6 HOURS PRN
Qty: 20 TABLET | Refills: 0 | Status: SHIPPED | OUTPATIENT
Start: 2018-11-04 | End: 2018-11-09

## 2018-11-04 RX ORDER — KETOROLAC TROMETHAMINE 30 MG/ML
15 INJECTION, SOLUTION INTRAMUSCULAR; INTRAVENOUS
Status: COMPLETED | OUTPATIENT
Start: 2018-11-04 | End: 2018-11-04

## 2018-11-04 RX ORDER — METHOCARBAMOL 500 MG/1
500 TABLET, FILM COATED ORAL
Status: COMPLETED | OUTPATIENT
Start: 2018-11-04 | End: 2018-11-04

## 2018-11-04 RX ORDER — METHOCARBAMOL 500 MG/1
500 TABLET, FILM COATED ORAL 3 TIMES DAILY
Qty: 15 TABLET | Refills: 0 | Status: SHIPPED | OUTPATIENT
Start: 2018-11-04 | End: 2018-11-09

## 2018-11-04 RX ADMIN — KETOROLAC TROMETHAMINE 15 MG: 30 INJECTION, SOLUTION INTRAMUSCULAR at 11:11

## 2018-11-04 RX ADMIN — METHOCARBAMOL TABLETS 500 MG: 500 TABLET, COATED ORAL at 11:11

## 2018-11-04 NOTE — ED TRIAGE NOTES
Pt c/o RIGHT sided neck, shoulder, back spasms that started at 0400 this AM. Pt reports Hx of muscle spasms in lower back and R leg. Pain is 6/10. States it is not spasming anymore but is sore. Denies taking pain meds PTA

## 2018-11-04 NOTE — ED PROVIDER NOTES
Encounter Date: 11/4/2018    This is a SORT/MSE of a 63 y.o. female presenting to the ED with c/o right sided neck pain/spasm that began this morning. Patient appears uncomfortable, holding right side of neck. Care will be transferred to an alternate provider when patient is roomed for a full evaluation and final disposition. LYNDSEY Mensah, SINANP-SIGIFREDO 11/4/2018 10:01 AM       History     Chief Complaint   Patient presents with    Neck Pain     reports muscle spasms to right shoulder and neck area started at 0400     Chief Complaint:  Neck pain  History of  Present Illness: History obtained from patient. This 63 y.o. female who has hyperlipidemia and diabetes presents to the ED complaining of right-sided neck pain that began upon awakening at 4:00 a.m. this morning.  She reports worsening of pain at 10:00 a.m..  Pain is 10/10 and worse with movement.  Patient denies numbness, tingling, weakness, fever or chills. No treatment prior to arrival.  No previous episodes.  She reports no history of trauma or falls.          Review of patient's allergies indicates:   Allergen Reactions    Codeine Hives     Other reaction(s): Itching  Other reaction(s): Hives    Iodinated contrast- oral and iv dye Hives     Other reaction(s): Hives    Iodine      Other reaction(s): Unknown    Sulfamethoxazole-trimethoprim Itching     Other reaction(s): Itching  Other reaction(s): Hives    Epinephrine Anxiety and Other (See Comments)     Almost passed out.     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, long term PPI use    Hyperlipidemia     Hypertension     Osteopenia 9/25/2014    Screening for colorectal cancer 9/25/2014    Normal 2009    Screening for colorectal cancer     Thyroid disease      Vitamin D deficiency disease 9/25/2014     Past Surgical History:   Procedure Laterality Date    HYSTERECTOMY  1997    complete for prolapse, Abdominal    OOPHORECTOMY      right shoulder      SINUS SURGERY  2012     Family History   Problem Relation Age of Onset    Breast cancer Cousin 40    Miscarriages / Stillbirths Maternal Grandmother     Miscarriages / Stillbirths Mother     Diabetes Mother     Ovarian cancer Neg Hx      Social History     Tobacco Use    Smoking status: Never Smoker    Smokeless tobacco: Never Used   Substance Use Topics    Alcohol use: No    Drug use: No     Review of Systems   Constitutional: Negative for chills and fever.   Eyes: Negative for visual disturbance.   Gastrointestinal: Negative for nausea and vomiting.   Musculoskeletal: Positive for neck pain. Negative for back pain.   Skin: Negative for rash.   Neurological: Negative for weakness, numbness and headaches.   All other systems reviewed and are negative.      Physical Exam     Initial Vitals [11/04/18 1001]   BP Pulse Resp Temp SpO2   (!) 148/80 99 20 98 °F (36.7 °C) 99 %      MAP       --         Physical Exam    Nursing note and vitals reviewed.  Constitutional: She appears well-developed and well-nourished. No distress.   The patient appears uncomfortable and is holding the right side of her neck   HENT:   Head: Normocephalic and atraumatic.   Eyes: EOM are normal. Pupils are equal, round, and reactive to light.   Neck: Neck supple. Muscular tenderness present. No spinous process tenderness present. No neck rigidity.   Decreased range of motion of the neck secondary to pain. No midline tenderness. There is diffuse tenderness to the right trapezius muscle.   Cardiovascular: Normal rate, regular rhythm and normal heart sounds.   Pulmonary/Chest: Breath sounds normal. No respiratory distress.   Musculoskeletal:   No C-spine, T-spine or L-spine midline tenderness.   Neurological: She is alert and oriented to person,  place, and time. She has normal strength. No cranial nerve deficit or sensory deficit.   5/5 strength and sensation   Skin: Skin is warm. Capillary refill takes less than 2 seconds.         ED Course   Procedures  Labs Reviewed - No data to display       Imaging Results    None          Medical Decision Making:   ED Management:  This is an evaluation of 63-year-old female presents the ED for atraumatic right-sided neck pain. Vital signs stable. Afebrile.  Patient is nontoxic appearing and appears uncomfortable at the bedside holding the right side of her neck.  There is no cervical midline tenderness.  There is diffuse tenderness to the right trapezius muscle.  There is decreased range of motion secondary to pain. The patient was treated with Toradol and Robaxin the ED.  On re-evaluation, patient had full passive and active range of motion of the neck.  History exam consistent with muscle strain.  Patient discharged home with NSAIDs and Robaxin.  Patient given return precautions and instructed to return the emergency department for new or worsening symptoms.  Patient verbalized understanding and agreed with plan.    I discussed the case with Dr. Medina who is in agreement with my assessment and plan.                        Clinical Impression:   The encounter diagnosis was Neck muscle spasm.                             Nader Potts PA-C  11/04/18 [id number]

## 2018-11-13 ENCOUNTER — TELEPHONE (OUTPATIENT)
Dept: FAMILY MEDICINE | Facility: CLINIC | Age: 63
End: 2018-11-13

## 2018-11-13 NOTE — TELEPHONE ENCOUNTER
----- Message from Phan Ramirez sent at 11/13/2018  1:49 PM CST -----  Contact: Self/[phone]  The patient returned the staff call.      Thank you

## 2018-11-17 ENCOUNTER — LAB VISIT (OUTPATIENT)
Dept: LAB | Facility: HOSPITAL | Age: 63
End: 2018-11-17
Attending: INTERNAL MEDICINE
Payer: COMMERCIAL

## 2018-11-17 DIAGNOSIS — E11.9 TYPE 2 DIABETES MELLITUS WITHOUT COMPLICATION: ICD-10-CM

## 2018-11-17 LAB
CHOLEST SERPL-MCNC: 178 MG/DL
CHOLEST/HDLC SERPL: 2.9 {RATIO}
ESTIMATED AVG GLUCOSE: 140 MG/DL
HBA1C MFR BLD HPLC: 6.5 %
HDLC SERPL-MCNC: 61 MG/DL
HDLC SERPL: 34.3 %
LDLC SERPL CALC-MCNC: 94.4 MG/DL
NONHDLC SERPL-MCNC: 117 MG/DL
TRIGL SERPL-MCNC: 113 MG/DL

## 2018-11-17 PROCEDURE — 83036 HEMOGLOBIN GLYCOSYLATED A1C: CPT

## 2018-11-17 PROCEDURE — 80061 LIPID PANEL: CPT

## 2018-11-17 PROCEDURE — 36415 COLL VENOUS BLD VENIPUNCTURE: CPT | Mod: PO

## 2018-11-18 ENCOUNTER — PATIENT MESSAGE (OUTPATIENT)
Dept: NEUROLOGY | Facility: CLINIC | Age: 63
End: 2018-11-18

## 2018-11-19 ENCOUNTER — PATIENT MESSAGE (OUTPATIENT)
Dept: FAMILY MEDICINE | Facility: CLINIC | Age: 63
End: 2018-11-19

## 2018-11-27 ENCOUNTER — PATIENT MESSAGE (OUTPATIENT)
Dept: FAMILY MEDICINE | Facility: CLINIC | Age: 63
End: 2018-11-27

## 2018-11-29 RX ORDER — LORAZEPAM 0.5 MG/1
0.5 TABLET ORAL EVERY 8 HOURS PRN
Qty: 90 TABLET | Refills: 3 | Status: ON HOLD | OUTPATIENT
Start: 2018-11-29 | End: 2020-04-17 | Stop reason: HOSPADM

## 2018-11-30 ENCOUNTER — PATIENT MESSAGE (OUTPATIENT)
Dept: FAMILY MEDICINE | Facility: CLINIC | Age: 63
End: 2018-11-30

## 2018-12-03 RX ORDER — RABEPRAZOLE SODIUM 20 MG/1
20 TABLET, DELAYED RELEASE ORAL 2 TIMES DAILY
Qty: 60 TABLET | Refills: 12 | Status: SHIPPED | OUTPATIENT
Start: 2018-12-03 | End: 2019-12-21

## 2018-12-03 RX ORDER — RABEPRAZOLE SODIUM 20 MG/1
TABLET, DELAYED RELEASE ORAL
Qty: 60 TABLET | Refills: 0 | Status: SHIPPED | OUTPATIENT
Start: 2018-12-03 | End: 2018-12-03

## 2018-12-12 ENCOUNTER — TELEPHONE (OUTPATIENT)
Dept: OBSTETRICS AND GYNECOLOGY | Facility: CLINIC | Age: 63
End: 2018-12-12

## 2018-12-12 DIAGNOSIS — Z12.31 SCREENING MAMMOGRAM, ENCOUNTER FOR: Primary | ICD-10-CM

## 2018-12-12 NOTE — TELEPHONE ENCOUNTER
----- Message from Katerina Downey sent at 12/12/2018  2:34 PM CST -----  Contact: [phone]  Pt is requesting a mammo order, Pls call pt [phone] to schedule. Thanks.......Allegra    Patient is aware that her mammo orders have been placed

## 2018-12-17 RX ORDER — LEVOTHYROXINE SODIUM 25 UG/1
TABLET ORAL
Qty: 90 TABLET | Refills: 12 | Status: SHIPPED | OUTPATIENT
Start: 2018-12-17 | End: 2020-01-09

## 2018-12-26 ENCOUNTER — HOSPITAL ENCOUNTER (OUTPATIENT)
Dept: RADIOLOGY | Facility: HOSPITAL | Age: 63
Discharge: HOME | End: 2018-12-26
Attending: OBSTETRICS & GYNECOLOGY
Payer: COMMERCIAL

## 2018-12-26 VITALS — BODY MASS INDEX: 26.33 KG/M2 | WEIGHT: 158 LBS | HEIGHT: 65 IN

## 2018-12-26 DIAGNOSIS — Z12.31 SCREENING MAMMOGRAM, ENCOUNTER FOR: ICD-10-CM

## 2018-12-26 PROCEDURE — 77063 BREAST TOMOSYNTHESIS BI: CPT | Mod: 26,,, | Performed by: RADIOLOGY

## 2018-12-26 PROCEDURE — 77063 BREAST TOMOSYNTHESIS BI: CPT | Mod: TC

## 2018-12-26 PROCEDURE — 77067 SCR MAMMO BI INCL CAD: CPT | Mod: 26,,, | Performed by: RADIOLOGY

## 2018-12-26 PROCEDURE — 77067 SCR MAMMO BI INCL CAD: CPT | Mod: TC

## 2019-01-09 ENCOUNTER — OFFICE VISIT (OUTPATIENT)
Dept: OBSTETRICS AND GYNECOLOGY | Facility: CLINIC | Age: 64
End: 2019-01-09
Payer: COMMERCIAL

## 2019-01-09 VITALS
SYSTOLIC BLOOD PRESSURE: 134 MMHG | BODY MASS INDEX: 24.65 KG/M2 | WEIGHT: 147.94 LBS | TEMPERATURE: 99 F | HEIGHT: 65 IN | DIASTOLIC BLOOD PRESSURE: 84 MMHG

## 2019-01-09 DIAGNOSIS — Z01.419 WELL WOMAN EXAM WITH ROUTINE GYNECOLOGICAL EXAM: Primary | ICD-10-CM

## 2019-01-09 DIAGNOSIS — N95.1 SYMPTOMATIC MENOPAUSAL OR FEMALE CLIMACTERIC STATES: ICD-10-CM

## 2019-01-09 PROCEDURE — 99999 PR PBB SHADOW E&M-EST. PATIENT-LVL III: CPT | Mod: PBBFAC,,, | Performed by: OBSTETRICS & GYNECOLOGY

## 2019-01-09 PROCEDURE — 3079F PR MOST RECENT DIASTOLIC BLOOD PRESSURE 80-89 MM HG: ICD-10-PCS | Mod: CPTII,S$GLB,, | Performed by: OBSTETRICS & GYNECOLOGY

## 2019-01-09 PROCEDURE — 3075F SYST BP GE 130 - 139MM HG: CPT | Mod: CPTII,S$GLB,, | Performed by: OBSTETRICS & GYNECOLOGY

## 2019-01-09 PROCEDURE — 99396 PR PREVENTIVE VISIT,EST,40-64: ICD-10-PCS | Mod: S$GLB,,, | Performed by: OBSTETRICS & GYNECOLOGY

## 2019-01-09 PROCEDURE — 99396 PREV VISIT EST AGE 40-64: CPT | Mod: S$GLB,,, | Performed by: OBSTETRICS & GYNECOLOGY

## 2019-01-09 PROCEDURE — 3079F DIAST BP 80-89 MM HG: CPT | Mod: CPTII,S$GLB,, | Performed by: OBSTETRICS & GYNECOLOGY

## 2019-01-09 PROCEDURE — 3075F PR MOST RECENT SYSTOLIC BLOOD PRESS GE 130-139MM HG: ICD-10-PCS | Mod: CPTII,S$GLB,, | Performed by: OBSTETRICS & GYNECOLOGY

## 2019-01-09 PROCEDURE — 99999 PR PBB SHADOW E&M-EST. PATIENT-LVL III: ICD-10-PCS | Mod: PBBFAC,,, | Performed by: OBSTETRICS & GYNECOLOGY

## 2019-01-09 NOTE — PROGRESS NOTES
Subjective:       Patient ID: Anushka Napier is a 63 y.o. female.    Chief Complaint:  Gynecologic Exam (Last pap was 14 and last mmg was 18)      History of Present Illness  HPI  Annual Exam-Postmenopausal  Patient presents for annual exam. The patient has no complaints today. The patient is sexually active. GYN screening history: last pap: approximate date 2014 and was normal and last mammogram: approximate date 2018 and was normal. The patient is taking hormone replacement therapy. Patient denies post-menopausal vaginal bleeding. The patient wears seatbelts: yes. The patient participates in regular exercise: yes. Has the patient ever been transfused or tattooed?: no. The patient reports that there is not domestic violence in her life.    Status post total abdominal hysterectomy for prolapse .   Doing well with oral Premarin.  Would like to continue.      GYN & OB History  No LMP recorded. Patient has had a hysterectomy.   Date of Last Pap: 2014    OB History    Para Term  AB Living   7 4 3 1 3 3   SAB TAB Ectopic Multiple Live Births   3       3      # Outcome Date GA Lbr Amos/2nd Weight Sex Delivery Anes PTL Lv   7 Term 90 40w0d  4.309 kg (9 lb 8 oz) F Vag-Spont EPI N ALEX   6 Term 88 38w0d  3.657 kg (8 lb 1 oz) M Vag-Spont EPI N ALEX   5  10/08/82 36w0d  2.58 kg (5 lb 11 oz) M Vag-Spont EPI N ALEX   4 Term            3 SAB            2 SAB            1 SAB                 Past Medical History:   Diagnosis Date    Allergy     Anxiety     Basal cell carcinoma     Depression     Diabetes mellitus     Diabetes mellitus type II, uncontrolled 2014    GERD (gastroesophageal reflux disease) 2014    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 Dr Correia, long term PPI use    Hyperlipidemia     Hypertension     Osteopenia 2014    Screening for colorectal cancer 2014    Normal     Screening for colorectal cancer     Thyroid disease      Vitamin D deficiency disease 9/25/2014       Past Surgical History:   Procedure Laterality Date    HYSTERECTOMY  1997    complete for prolapse, Abdominal    OOPHORECTOMY      right shoulder      SINUS SURGERY  2012       Family History   Problem Relation Age of Onset    Breast cancer Cousin 40    Miscarriages / Stillbirths Maternal Grandmother     Miscarriages / Stillbirths Mother     Diabetes Mother     Ovarian cancer Neg Hx        Social History     Socioeconomic History    Marital status:      Spouse name: None    Number of children: None    Years of education: None    Highest education level: None   Social Needs    Financial resource strain: None    Food insecurity - worry: None    Food insecurity - inability: None    Transportation needs - medical: None    Transportation needs - non-medical: None   Occupational History    None   Tobacco Use    Smoking status: Never Smoker    Smokeless tobacco: Never Used   Substance and Sexual Activity    Alcohol use: No    Drug use: No    Sexual activity: Yes     Partners: Male     Birth control/protection: Surgical   Other Topics Concern    Are you pregnant or think you may be? Not Asked    Breast-feeding Not Asked   Social History Narrative     since 1986    Previous  for 8 years prior    He is a     She is a homemaker       Current Outpatient Medications   Medication Sig Dispense Refill    AFLURIA QUAD [id number], PF, 60 mcg/0.5 mL vaccine ADM 0.5ML IM UTD  0    aspirin 81 MG chewable tablet Take 1 tablet by mouth Daily. 1 Tablet, Chewable Oral Every day      atorvastatin (LIPITOR) 40 MG tablet TAKE 1 TABLET(40 MG) BY MOUTH EVERY DAY 90 tablet 11    CONTOUR NEXT STRIPS Strp USE TO TEST ONCE DAILY 100 strip 12    estrogens, conjugated, (PREMARIN) 0.625 MG tablet Take 1 tablet (0.625 mg total) by mouth once daily. 30 tablet 12    FLUoxetine (PROZAC) 20 MG capsule TAKE 1 CAPSULE BY MOUTH DAILY 90 capsule 4     fluticasone (FLONASE) 50 mcg/actuation nasal spray 2 sprays (100 mcg total) by Each Nare route once daily. 2 Aerosol, Spray Nasal Every day 16 g 11    levothyroxine (SYNTHROID) 200 MCG tablet TAKE 1 TABLET BY MOUTH EVERY DAY 90 tablet 4    levothyroxine (SYNTHROID) 25 MCG tablet TAKE 1 TABLET BY MOUTH EVERY DAY 90 tablet 12    LORazepam (ATIVAN) 0.5 MG tablet Take 1 tablet (0.5 mg total) by mouth every 8 (eight) hours as needed for Anxiety. 90 tablet 3    metoprolol tartrate (LOPRESSOR) 50 MG tablet TAKE 1 TABLET BY MOUTH DAILY 90 tablet 4    omega-3 fatty acids-vitamin E (FISH OIL) 1,000 mg Cap Take 1 capsule by mouth Twice daily. 1 Capsule Oral Twice a day       RABEprazole (ACIPHEX) 20 mg tablet Take 1 tablet (20 mg total) by mouth 2 (two) times daily. 60 tablet 12    VESICARE 5 mg tablet Take 5 mg by mouth once daily.   5     No current facility-administered medications for this visit.        Review of patient's allergies indicates:   Allergen Reactions    Codeine Hives     Other reaction(s): Itching  Other reaction(s): Hives    Iodinated contrast- oral and iv dye Hives     Other reaction(s): Hives    Iodine      Other reaction(s): Unknown    Sulfamethoxazole-trimethoprim Itching     Other reaction(s): Itching  Other reaction(s): Hives    Epinephrine Anxiety and Other (See Comments)     Almost passed out.         Review of Systems  Review of Systems   Constitutional: Negative for activity change, appetite change, chills, fatigue, fever and unexpected weight change.   HENT: Negative for mouth sores.    Respiratory: Negative for cough, shortness of breath and wheezing.    Cardiovascular: Negative for chest pain and palpitations.   Gastrointestinal: Negative for abdominal pain, bloating, blood in stool, constipation, nausea and vomiting.   Endocrine: Negative for diabetes and hot flashes.   Genitourinary: Negative for dyspareunia, dysuria, frequency, hematuria, menorrhagia, menstrual problem, pelvic  pain, urgency, vaginal bleeding, vaginal discharge, vaginal pain, dysmenorrhea, urinary incontinence, postcoital bleeding and vaginal odor.   Musculoskeletal: Negative for back pain and myalgias.   Neurological: Negative for seizures and headaches.   Psychiatric/Behavioral: Negative for depression and sleep disturbance. The patient is not nervous/anxious.    Breast: Negative for mass, mastodynia and nipple discharge          Objective:    Physical Exam:   Constitutional: She appears well-developed and well-nourished. No distress.    HENT:   Head: Normocephalic and atraumatic.    Eyes: EOM are normal.    Neck: Normal range of motion.     Pulmonary/Chest: Effort normal. No respiratory distress.   Breasts: Non-tender, no engorgement, no masses, no retraction, no discharge. Negative for lymphadenopathy.         Abdominal: Soft. She exhibits no distension. There is no tenderness. There is no rebound and no guarding.   Pfannenstiel scar      Genitourinary: Vagina normal. No vaginal discharge found.   Genitourinary Comments: Moderate atrophy.  Vulva without any obvious lesions.  Vaginal vault with good support.  Minimal white discharge noted.  No obvious lesion.  Vaginal cuff intact and well-supported.  Cervix and Uterus are surgically absent.  Adnexa is without any masses or tenderness.           Musculoskeletal: Normal range of motion.       Neurological: She is alert.    Skin: Skin is warm and dry.    Psychiatric: She has a normal mood and affect.          Assessment:        1. Well woman exam with routine gynecological exam    2.  Menopause         Plan:          I have discussed with the patient her condition.  Monthly breast examination was instructed, discussed, and encouraged.  Patient was encouraged to consume a low-calorie, low fat diet, and to increase of physical activity.  Healthy habits encouraged.  A Pap smear was NOT performed; she no longer needs Pap.  Mammogram was not ordered because it was done  recently.  Gonorrhea and Chlamydia testing not performed.  HIV test not ordered.  Patient is to continue her medications as prescribed.  She will come back to see me in one year for her annual visit.  She can come back to see me sooner as necessary.  All of her questions were answered appropriately to her satisfaction.

## 2019-01-26 ENCOUNTER — OFFICE VISIT (OUTPATIENT)
Dept: URGENT CARE | Facility: CLINIC | Age: 64
End: 2019-01-26
Payer: COMMERCIAL

## 2019-01-26 VITALS
HEART RATE: 81 BPM | TEMPERATURE: 99 F | SYSTOLIC BLOOD PRESSURE: 131 MMHG | BODY MASS INDEX: 24.32 KG/M2 | DIASTOLIC BLOOD PRESSURE: 74 MMHG | OXYGEN SATURATION: 98 % | WEIGHT: 146 LBS | HEIGHT: 65 IN

## 2019-01-26 DIAGNOSIS — M54.41 ACUTE BILATERAL LOW BACK PAIN WITH RIGHT-SIDED SCIATICA: Primary | ICD-10-CM

## 2019-01-26 PROCEDURE — 3078F DIAST BP <80 MM HG: CPT | Mod: CPTII,S$GLB,, | Performed by: FAMILY MEDICINE

## 2019-01-26 PROCEDURE — 99214 PR OFFICE/OUTPT VISIT, EST, LEVL IV, 30-39 MIN: ICD-10-PCS | Mod: 25,S$GLB,, | Performed by: FAMILY MEDICINE

## 2019-01-26 PROCEDURE — 3008F PR BODY MASS INDEX (BMI) DOCUMENTED: ICD-10-PCS | Mod: CPTII,S$GLB,, | Performed by: FAMILY MEDICINE

## 2019-01-26 PROCEDURE — 3075F PR MOST RECENT SYSTOLIC BLOOD PRESS GE 130-139MM HG: ICD-10-PCS | Mod: CPTII,S$GLB,, | Performed by: FAMILY MEDICINE

## 2019-01-26 PROCEDURE — 3008F BODY MASS INDEX DOCD: CPT | Mod: CPTII,S$GLB,, | Performed by: FAMILY MEDICINE

## 2019-01-26 PROCEDURE — 96372 PR INJECTION,THERAP/PROPH/DIAG2ST, IM OR SUBCUT: ICD-10-PCS | Mod: S$GLB,,, | Performed by: FAMILY MEDICINE

## 2019-01-26 PROCEDURE — 99214 OFFICE O/P EST MOD 30 MIN: CPT | Mod: 25,S$GLB,, | Performed by: FAMILY MEDICINE

## 2019-01-26 PROCEDURE — 3075F SYST BP GE 130 - 139MM HG: CPT | Mod: CPTII,S$GLB,, | Performed by: FAMILY MEDICINE

## 2019-01-26 PROCEDURE — 96372 THER/PROPH/DIAG INJ SC/IM: CPT | Mod: S$GLB,,, | Performed by: FAMILY MEDICINE

## 2019-01-26 PROCEDURE — 3078F PR MOST RECENT DIASTOLIC BLOOD PRESSURE < 80 MM HG: ICD-10-PCS | Mod: CPTII,S$GLB,, | Performed by: FAMILY MEDICINE

## 2019-01-26 RX ORDER — TRAMADOL HYDROCHLORIDE 50 MG/1
50 TABLET ORAL EVERY 12 HOURS PRN
Qty: 15 TABLET | Refills: 0 | Status: SHIPPED | OUTPATIENT
Start: 2019-01-26 | End: 2021-02-03

## 2019-01-26 RX ORDER — BETAMETHASONE SODIUM PHOSPHATE AND BETAMETHASONE ACETATE 3; 3 MG/ML; MG/ML
6 INJECTION, SUSPENSION INTRA-ARTICULAR; INTRALESIONAL; INTRAMUSCULAR; SOFT TISSUE
Status: COMPLETED | OUTPATIENT
Start: 2019-01-26 | End: 2019-01-26

## 2019-01-26 RX ORDER — TIZANIDINE 4 MG/1
4 TABLET ORAL 2 TIMES DAILY
Qty: 30 TABLET | Refills: 1 | Status: SHIPPED | OUTPATIENT
Start: 2019-01-26 | End: 2019-01-28

## 2019-01-26 RX ADMIN — BETAMETHASONE SODIUM PHOSPHATE AND BETAMETHASONE ACETATE 6 MG: 3; 3 INJECTION, SUSPENSION INTRA-ARTICULAR; INTRALESIONAL; INTRAMUSCULAR; SOFT TISSUE at 03:01

## 2019-01-26 NOTE — PROGRESS NOTES
"Subjective:       Patient ID: Anushka Napier is a 63 y.o. female.    Vitals:  height is 5' 5" (1.651 m) and weight is 66.2 kg (146 lb). Her temperature is 98.8 °F (37.1 °C). Her blood pressure is 131/74 and her pulse is 81. Her oxygen saturation is 98%.     Chief Complaint: Back Pain    Pt denies any trauma      Back Pain   This is a new problem. The current episode started yesterday. The problem occurs constantly. The problem has been gradually worsening since onset. The pain is present in the lumbar spine. The quality of the pain is described as shooting. The pain radiates to the left thigh. The pain is at a severity of 7/10. The pain is moderate. The pain is the same all the time. The symptoms are aggravated by bending, position, lying down, sitting, standing and twisting. Stiffness is present all day. Pertinent negatives include no abdominal pain, bladder incontinence, bowel incontinence, dysuria or numbness. She has tried heat for the symptoms. The treatment provided no relief.       Constitution: Negative for fatigue.   Gastrointestinal: Negative for abdominal pain and bowel incontinence.   Genitourinary: Negative for dysuria, urgency, bladder incontinence and hematuria.   Musculoskeletal: Positive for back pain and muscle ache. Negative for muscle cramps and history of spine disorder.   Skin: Negative for rash.   Neurological: Negative for coordination disturbances, numbness and tingling.       Objective:      Physical Exam   Constitutional: She is oriented to person, place, and time. Vital signs are normal. She appears well-developed and well-nourished. She is active and cooperative. No distress.   HENT:   Head: Normocephalic and atraumatic.   Nose: Nose normal.   Mouth/Throat: Oropharynx is clear and moist and mucous membranes are normal.   Eyes: Conjunctivae and lids are normal.   Neck: Trachea normal, normal range of motion, full passive range of motion without pain and phonation normal. Neck supple. "   Cardiovascular: Normal rate, regular rhythm, normal heart sounds, intact distal pulses and normal pulses.   Pulmonary/Chest: Effort normal and breath sounds normal.   Abdominal: Soft. Normal appearance and bowel sounds are normal. She exhibits no abdominal bruit, no pulsatile midline mass and no mass.   Musculoskeletal: She exhibits no edema or deformity.        Arms:  Neurological: She is alert and oriented to person, place, and time. She has normal strength and normal reflexes. No sensory deficit.   Skin: Skin is warm, dry and intact. She is not diaphoretic.   Psychiatric: She has a normal mood and affect. Her speech is normal and behavior is normal. Judgment and thought content normal. Cognition and memory are normal.   Nursing note and vitals reviewed.      Assessment:       1. Acute bilateral low back pain with right-sided sciatica        Plan:         Acute bilateral low back pain with right-sided sciatica    Other orders  -     betamethasone acetate-betamethasone sodium phosphate injection 6 mg  -     tiZANidine (ZANAFLEX) 4 MG tablet; Take 1 tablet (4 mg total) by mouth 2 (two) times daily. for 15 days  Dispense: 30 tablet; Refill: 1  -     traMADol (ULTRAM) 50 mg tablet; Take 1 tablet (50 mg total) by mouth every 12 (twelve) hours as needed for Pain.  Dispense: 15 tablet; Refill: 0

## 2019-01-26 NOTE — PATIENT INSTRUCTIONS
Back Pain (Acute or Chronic)    Back pain is one of the most common problems. The good news is that most people feel better in 1 to 2 weeks, and most of the rest in 1 to 2 months. Most people can remain active.  People experience and describe pain differently; not everyone is the same.  · The pain can be sharp, stabbing, shooting, aching, cramping or burning.  · Movement, standing, bending, lifting, sitting, or walking may worsen pain.  · It can be localized to one spot or area, or it can be more generalized.  · It can spread or radiate upwards, to the front, or go down your arms or legs (sciatica).  · It can cause muscle spasm.  Most of the time, mechanical problems with the muscles or spine cause the pain. Mechanical problems are usually caused by an injury to the muscles or ligaments. While illness can cause back pain, it is usually not caused by a serious illness. Mechanical problems include:   · Physical activity such as sports, exercise, work, or normal activity  · Overexertion, lifting, pushing, pulling incorrectly or too aggressively  · Sudden twisting, bending, or stretching from an accident, or accidental movement  · Poor posture  · Stretching or moving wrong, without noticing pain at the time  · Poor coordination, lack of regular exercise (check with your doctor about this)  · Spinal disc disease or arthritis  · Stress  Pain can also be related to pregnancy, or illness like appendicitis, bladder or kidney infections, pelvic infections, and many other things.  Acute back pain usually gets better in 1 to 2 weeks. Back pain related to disk disease, arthritis in the spinal joints or spinal stenosis (narrowing of the spinal canal) can become chronic and last for months or years.  Unless you had a physical injury (for example, a car accident or fall) X-rays are usually not needed for the initial evaluation of back pain. If pain continues and does not respond to medical treatment, X-rays and other tests may be  needed.  Home care  Try these home care recommendations:  · When in bed, try to find a position of comfort. A firm mattress is best. Try lying flat on your back with pillows under your knees. You can also try lying on your side with your knees bent up towards your chest and a pillow between your knees.  · At first, do not try to stretch out the sore spots. If there is a strain, it is not like the good soreness you get after exercising without an injury. In this case, stretching may make it worse.  · Avoid prolong sitting, long car rides, or travel. This puts more stress on the lower back than standing or walking.  · During the first 24 to 72 hours after an acute injury or flare up of chronic back pain, apply an ice pack to the painful area for 20 minutes and then remove it for 20 minutes. Do this over a period of 60 to 90 minutes or several times a day. This will reduce swelling and pain. Wrap the ice pack in a thin towel or plastic to protect your skin.  · You can start with ice, then switch to heat. Heat (hot shower, hot bath, or heating pad) reduces pain and works well for muscle spasms. Heat can be applied to the painful area for 20 minutes then remove it for 20 minutes. Do this over a period of 60 to 90 minutes or several times a day. Do not sleep on a heating pad. It can lead to skin burns or tissue damage.  · You can alternate ice and heat therapy. Talk with your doctor about the best treatment for your back pain.  · Therapeutic massage can help relax the back muscles without stretching them.  · Be aware of safe lifting methods and do not lift anything without stretching first.  Medicines  Talk to your doctor before using medicine, especially if you have other medical problems or are taking other medicines.  · You may use over-the-counter medicine as directed on the bottle to control pain, unless another pain medicine was prescribed. If you have chronic conditions like diabetes, liver or kidney disease,  stomach ulcers, or gastrointestinal bleeding, or are taking blood thinners, talk to your doctor before taking any medicine.  · Be careful if you are given a prescription medicines, narcotics, or medicine for muscle spasms. They can cause drowsiness, affect your coordination, reflexes, and judgement. Do not drive or operate heavy machinery.  Follow-up care  Follow up with your healthcare provider, or as advised.   A radiologist will review any X-rays that were taken. Your provide will notify you of any new findings that may affect your care.  Call 911  Call emergency services if any of the following occur:  · Trouble breathing  · Confusion  · Very drowsy or trouble awakening  · Fainting or loss of consciousness  · Rapid or very slow heart rate  · Loss of bowel or bladder control  When to seek medical advice  Call your healthcare provider right away if any of these occur:   · Pain becomes worse or spreads to your legs  · Weakness or numbness in one or both legs  · Numbness in the groin or genital area  Date Last Reviewed: 7/1/2016  © 0833-1713 [employer]. [address] [place] [address], [place] [address]. All rights reserved. This information is not intended as a substitute for professional medical care. Always follow your healthcare professional's instructions.

## 2019-01-28 ENCOUNTER — TELEPHONE (OUTPATIENT)
Dept: FAMILY MEDICINE | Facility: CLINIC | Age: 64
End: 2019-01-28

## 2019-01-28 ENCOUNTER — TELEPHONE (OUTPATIENT)
Dept: URGENT CARE | Facility: CLINIC | Age: 64
End: 2019-01-28

## 2019-01-28 RX ORDER — METHOCARBAMOL 500 MG/1
500 TABLET, FILM COATED ORAL 3 TIMES DAILY PRN
Qty: 40 TABLET | Refills: 3 | Status: SHIPPED | OUTPATIENT
Start: 2019-01-28 | End: 2019-02-07

## 2019-01-28 NOTE — TELEPHONE ENCOUNTER
Seen at  on Saturday for back spasms and bulging disk.  Would like a referral to [facility]  Muscle relaxer is causing dry mouth on first day and on the second day of taking it (today) her tongue began to swell. Pt immediately took a benadryl. Informed pt to not take medication again. Requesting something else be called in. Allergy added. Please advise.

## 2019-01-28 NOTE — TELEPHONE ENCOUNTER
----- Message from Shruti Peres sent at 1/28/2019 10:27 AM CST -----  Contact: NASRE[facility] [phone]  The patient is requesting a call back from the staff. She is having an allergic reaction to medication that was given to her at the urgent care for her back spasms. She was given tizanidine. She would like something else called in to the pharmacy for her back spasms. Please call at your earliest convenience.

## 2019-01-28 NOTE — TELEPHONE ENCOUNTER
Pt called, states she's having some tingling as a possible reaction to the muscle relaxant prescribed on 1/26.  She was advised to either follow up with the UC/PCP or the ER, depending on the severity of her reaction.  She states the symptoms have decreased since taking a Benadryl.

## 2019-01-29 ENCOUNTER — PATIENT MESSAGE (OUTPATIENT)
Dept: FAMILY MEDICINE | Facility: CLINIC | Age: 64
End: 2019-01-29

## 2019-03-19 ENCOUNTER — HOSPITAL ENCOUNTER (EMERGENCY)
Facility: HOSPITAL | Age: 64
Discharge: HOME | End: 2019-03-19
Attending: EMERGENCY MEDICINE
Payer: COMMERCIAL

## 2019-03-19 VITALS
HEIGHT: 65 IN | OXYGEN SATURATION: 98 % | SYSTOLIC BLOOD PRESSURE: 130 MMHG | HEART RATE: 88 BPM | WEIGHT: 150 LBS | DIASTOLIC BLOOD PRESSURE: 63 MMHG | RESPIRATION RATE: 20 BRPM | TEMPERATURE: 98 F | BODY MASS INDEX: 24.99 KG/M2

## 2019-03-19 DIAGNOSIS — R19.7 DIARRHEA, UNSPECIFIED TYPE: ICD-10-CM

## 2019-03-19 DIAGNOSIS — R10.9 ABDOMINAL PAIN, UNSPECIFIED ABDOMINAL LOCATION: Primary | ICD-10-CM

## 2019-03-19 LAB
ALBUMIN SERPL BCP-MCNC: 3.5 G/DL
ALP SERPL-CCNC: 98 U/L
ALT SERPL W/O P-5'-P-CCNC: 18 U/L
ANION GAP SERPL CALC-SCNC: 11 MMOL/L
AST SERPL-CCNC: 19 U/L
BASOPHILS # BLD AUTO: 0.01 K/UL
BASOPHILS NFR BLD: 0.1 %
BILIRUB SERPL-MCNC: 0.7 MG/DL
BILIRUB UR QL STRIP: NEGATIVE
BUN SERPL-MCNC: 21 MG/DL
CALCIUM SERPL-MCNC: 9 MG/DL
CHLORIDE SERPL-SCNC: 104 MMOL/L
CLARITY UR: CLEAR
CO2 SERPL-SCNC: 23 MMOL/L
COLOR UR: YELLOW
CREAT SERPL-MCNC: 1 MG/DL
DIFFERENTIAL METHOD: ABNORMAL
EOSINOPHIL # BLD AUTO: 0 K/UL
EOSINOPHIL NFR BLD: 0.4 %
ERYTHROCYTE [DISTWIDTH] IN BLOOD BY AUTOMATED COUNT: 13.9 %
EST. GFR  (AFRICAN AMERICAN): >60 ML/MIN/1.73 M^2
EST. GFR  (NON AFRICAN AMERICAN): >60 ML/MIN/1.73 M^2
GLUCOSE SERPL-MCNC: 125 MG/DL
GLUCOSE UR QL STRIP: NEGATIVE
HCT VFR BLD AUTO: 39.6 %
HGB BLD-MCNC: 13 G/DL
HGB UR QL STRIP: NEGATIVE
KETONES UR QL STRIP: NEGATIVE
LACTATE SERPL-SCNC: 1.1 MMOL/L
LACTATE SERPL-SCNC: 2.2 MMOL/L
LEUKOCYTE ESTERASE UR QL STRIP: NEGATIVE
LIPASE SERPL-CCNC: 21 U/L
LIPASE SERPL-CCNC: 21 U/L
LYMPHOCYTES # BLD AUTO: 0.4 K/UL
LYMPHOCYTES NFR BLD: 4.9 %
MCH RBC QN AUTO: 28 PG
MCHC RBC AUTO-ENTMCNC: 32.8 G/DL
MCV RBC AUTO: 85 FL
MONOCYTES # BLD AUTO: 0.4 K/UL
MONOCYTES NFR BLD: 5.2 %
NEUTROPHILS # BLD AUTO: 7.1 K/UL
NEUTROPHILS NFR BLD: 89.4 %
NITRITE UR QL STRIP: NEGATIVE
PH UR STRIP: 7 [PH] (ref 5–8)
PLATELET # BLD AUTO: 283 K/UL
PMV BLD AUTO: 9.2 FL
POTASSIUM SERPL-SCNC: 3.8 MMOL/L
PROT SERPL-MCNC: 7.3 G/DL
PROT UR QL STRIP: NEGATIVE
RBC # BLD AUTO: 4.65 M/UL
SODIUM SERPL-SCNC: 138 MMOL/L
SP GR UR STRIP: 1.03 (ref 1–1.03)
URN SPEC COLLECT METH UR: NORMAL
UROBILINOGEN UR STRIP-ACNC: NEGATIVE EU/DL
WBC # BLD AUTO: 7.9 K/UL

## 2019-03-19 PROCEDURE — 81003 URINALYSIS AUTO W/O SCOPE: CPT

## 2019-03-19 PROCEDURE — 96375 TX/PRO/DX INJ NEW DRUG ADDON: CPT

## 2019-03-19 PROCEDURE — 85025 COMPLETE CBC W/AUTO DIFF WBC: CPT

## 2019-03-19 PROCEDURE — 96374 THER/PROPH/DIAG INJ IV PUSH: CPT

## 2019-03-19 PROCEDURE — 63600175 PHARM REV CODE 636 W HCPCS: Performed by: NURSE PRACTITIONER

## 2019-03-19 PROCEDURE — 83690 ASSAY OF LIPASE: CPT

## 2019-03-19 PROCEDURE — 63600175 PHARM REV CODE 636 W HCPCS: Performed by: EMERGENCY MEDICINE

## 2019-03-19 PROCEDURE — 25000003 PHARM REV CODE 250: Performed by: EMERGENCY MEDICINE

## 2019-03-19 PROCEDURE — 99284 EMERGENCY DEPT VISIT MOD MDM: CPT | Mod: 25

## 2019-03-19 PROCEDURE — 63600175 PHARM REV CODE 636 W HCPCS: Performed by: PHYSICIAN ASSISTANT

## 2019-03-19 PROCEDURE — 80053 COMPREHEN METABOLIC PANEL: CPT

## 2019-03-19 PROCEDURE — 83605 ASSAY OF LACTIC ACID: CPT

## 2019-03-19 PROCEDURE — 96361 HYDRATE IV INFUSION ADD-ON: CPT

## 2019-03-19 RX ORDER — KETOROLAC TROMETHAMINE 30 MG/ML
30 INJECTION, SOLUTION INTRAMUSCULAR; INTRAVENOUS
Status: COMPLETED | OUTPATIENT
Start: 2019-03-19 | End: 2019-03-19

## 2019-03-19 RX ORDER — ADHESIVE BANDAGE
30 BANDAGE TOPICAL
Status: COMPLETED | OUTPATIENT
Start: 2019-03-19 | End: 2019-03-19

## 2019-03-19 RX ORDER — ONDANSETRON 2 MG/ML
4 INJECTION INTRAMUSCULAR; INTRAVENOUS
Status: COMPLETED | OUTPATIENT
Start: 2019-03-19 | End: 2019-03-19

## 2019-03-19 RX ORDER — DICYCLOMINE HYDROCHLORIDE 10 MG/ML
20 INJECTION INTRAMUSCULAR
Status: COMPLETED | OUTPATIENT
Start: 2019-03-19 | End: 2019-03-19

## 2019-03-19 RX ADMIN — MAGNESIUM HYDROXIDE 2400 MG: 400 SUSPENSION ORAL at 06:03

## 2019-03-19 RX ADMIN — SODIUM CHLORIDE 1000 ML: 0.9 INJECTION, SOLUTION INTRAVENOUS at 04:03

## 2019-03-19 RX ADMIN — KETOROLAC TROMETHAMINE 30 MG: 30 INJECTION, SOLUTION INTRAMUSCULAR at 04:03

## 2019-03-19 RX ADMIN — ONDANSETRON 4 MG: 2 INJECTION INTRAMUSCULAR; INTRAVENOUS at 04:03

## 2019-03-19 RX ADMIN — DICYCLOMINE HYDROCHLORIDE 20 MG: 20 INJECTION, SOLUTION INTRAMUSCULAR at 04:03

## 2019-03-19 RX ADMIN — SODIUM CHLORIDE 1000 ML: 0.9 INJECTION, SOLUTION INTRAVENOUS at 05:03

## 2019-03-19 NOTE — ED NOTES
Patient tolerated water PO; MD made aware; pt states she feels less nauseous than when she came in

## 2019-03-19 NOTE — ED TRIAGE NOTES
Patient reports n/v/d that began this morning at approx 0200. Also reports severe leg cramps. States she ate pizza yesterday after being on a keto diet for 5 months and thinks that's what caused it. Reports taking Zofran for the symptoms, last taken at approx 1200 today. Denies fever.

## 2019-03-19 NOTE — ED PROVIDER NOTES
Encounter Date: 3/19/2019  Patient presents to ER with left lower quadrant pain. States she has been vomiting all night.  Patient states she had pizza for the 1st time in 5 months.  Patient is lactose intolerant.  States that the pain to her left lower quadrant is a cramping type pain. Patient has history of GERD.  Patient denies any chest pain, shortness of breath, dizziness.  Patient ambulated into ER.  States she took some Zofran at home that she had not house around 12:00 p.m. today Patient alert, oriented, and in mild distress at this time.  Patient triage by midlevel and awaiting bed assignment  SCRIBE #1 NOTE: I, Cuca More, am scribing for, and in the presence of,  Rodger King MD. I have scribed the following portions of the note - Other sections scribed: ROS, HPI, and PE.       History     Chief Complaint   Patient presents with    Vomiting     Pt reports she ate a couple pieces of pizza after being on a keto diet for 5 months. Pt started vomiting about 3 am and diarrhea started. Pt reports leg cramps as well.     Diarrhea     CC: Emesis    HPI: This 64 y.o. female with a past medical history of Allergy, Anxiety, Basal cell carcinoma, Depression, Diabetes mellitus type II, uncontrolled (9/17/2014), GERD (9/25/2014), Hyperlipidemia, Hypertension, Osteopenia (9/25/2014), Screening for colorectal cancer (9/25/2014), Screening for colorectal cancer, Thyroid disease, and Vitamin D deficiency disease (9/25/2014), presents to the ED complaining of an acute onset of abdominal cramps, nausea, emesis, and diarrhea after eating pizza with red sauce last night. She has been on a keto diet for last 5 months. She was eating pre packaged meat 8 oz daily and vegetables including broccoli, Spinach, and cauliflower. This is her first time eating pizza in 5 months. She has lost 10 lbs in 5 months. Denies hemoptysis and hematochezia. Currently dry heaving. No reported medical intervention. Denies any other complaints.        The history is provided by the patient. No  was used.     Review of patient's allergies indicates:   Allergen Reactions    Tizanidine Swelling     Tongue swelling     Codeine Hives     Other reaction(s): Itching  Other reaction(s): Hives    Iodinated contrast- oral and iv dye Hives     Other reaction(s): Hives    Iodine      Other reaction(s): Unknown    Sulfamethoxazole-trimethoprim Itching     Other reaction(s): Itching  Other reaction(s): Hives    Epinephrine Anxiety and Other (See Comments)     Almost passed out.     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, long term PPI use    Hyperlipidemia     Hypertension     Osteopenia 9/25/2014    Screening for colorectal cancer 9/25/2014    Normal 2009    Screening for colorectal cancer     Thyroid disease     Vitamin D deficiency disease 9/25/2014     Past Surgical History:   Procedure Laterality Date    HYSTERECTOMY  1997    complete for prolapse, Abdominal    OOPHORECTOMY      right shoulder      SINUS SURGERY  2012     Family History   Problem Relation Age of Onset    Breast cancer Cousin 40    Miscarriages / Stillbirths Maternal Grandmother     Miscarriages / Stillbirths Mother     Diabetes Mother     Ovarian cancer Neg Hx      Social History     Tobacco Use    Smoking status: Never Smoker    Smokeless tobacco: Never Used   Substance Use Topics    Alcohol use: No    Drug use: No     Review of Systems   Constitutional: Negative for chills and fever.   HENT: Negative for congestion, ear pain, rhinorrhea and sore throat.    Eyes: Negative for pain and visual disturbance.   Respiratory: Negative for cough and shortness of breath.    Cardiovascular: Negative for chest pain.    Gastrointestinal: Positive for abdominal pain (cramps), diarrhea, nausea and vomiting. Negative for blood in stool.        (-) hemoptysis    Genitourinary: Negative for dysuria.   Musculoskeletal: Negative for back pain and neck pain.   Skin: Negative for rash.   Neurological: Negative for headaches.   All other systems reviewed and are negative.      Physical Exam     Initial Vitals [03/19/19 1543]   BP Pulse Resp Temp SpO2   119/78 96 20 97.6 °F (36.4 °C) 97 %      MAP       --         Physical Exam    Nursing note and vitals reviewed.  Constitutional: She appears well-developed and well-nourished. She is not diaphoretic. No distress.   HENT:   Head: Normocephalic and atraumatic.   Eyes: EOM are normal.   Neck: Neck supple.   Cardiovascular: Normal rate and regular rhythm.   Pulmonary/Chest: Breath sounds normal. No respiratory distress. She has no wheezes. She has no rhonchi. She has no rales. She exhibits no tenderness.   Abdominal: Soft. Normal appearance and bowel sounds are normal. She exhibits no distension. There is no tenderness. There is no rebound and no guarding.   Musculoskeletal: Normal range of motion. She exhibits no edema.   Neurological: She is alert and oriented to person, place, and time.   Skin: Skin is warm and dry.   Psychiatric: She has a normal mood and affect.         ED Course   Procedures  Labs Reviewed   CBC W/ AUTO DIFFERENTIAL - Abnormal; Notable for the following components:       Result Value    Lymph # 0.4 (*)     Gran% 89.4 (*)     Lymph% 4.9 (*)     All other components within normal limits   COMPREHENSIVE METABOLIC PANEL - Abnormal; Notable for the following components:    Glucose 125 (*)     All other components within normal limits   LIPASE   URINALYSIS   LIPASE   LACTIC ACID, PLASMA   LACTIC ACID, PLASMA    Narrative:     Draw after second liter in...          Imaging Results          X-Ray Abdomen Flat And Erect (Final result)  Result time 03/19/19 17:24:02    Final result  by Kaitlyn Sanchez MD (03/19/19 17:24:02)                 Impression:      No dilated bowel loops to suggest obstruction.  Copious stool throughout the colon.      Electronically signed by: Kaitlyn Sanchez MD  Date:    03/19/2019  Time:    17:24             Narrative:    EXAMINATION:  XR ABDOMEN FLAT AND ERECT    CLINICAL HISTORY:  Abdominal Pain;    TECHNIQUE:  Flat and erect AP views of the abdomen were performed.    COMPARISON:  None    FINDINGS:  There are no dilated bowel loops to indicate obstruction.  There is copious stool throughout the colon.  There is no free subdiaphragmatic air on upright view. No abnormal calcifications are seen.    No osseous destructive lesion is seen.                                            Scribe Attestation:   Scribe #1: I performed the above scribed service and the documentation accurately describes the services I performed. I attest to the accuracy of the note.    Attending Attestation:           Physician Attestation for Scribe:  Physician Attestation Statement for Scribe #1: I, Rodger King MD, reviewed documentation, as scribed by Cuca More in my presence, and it is both accurate and complete.          Medical decision making:  Patient with dehydration and elevated lactate resolved with fluid normal labs abdominal x-ray shows constipation and she has been on a strict ketogenic diet for the last several months  And last night she ate carbohydrates lwhich precipitated her symptoms, she has no evidence of an acute abdominal , no infectious etiology is discharged home in stable improved in resolved condition.           Clinical Impression:       ICD-10-CM ICD-9-CM   1. Abdominal pain, unspecified abdominal location R10.9 789.00   2. Diarrhea, unspecified type R19.7 787.91             I, Dr. Rodger King, personally performed the services described in this documentation.   All medical record entries made by the scribe were at my direction and in my presence.   I  have reviewed the chart and agree that the record is accurate and complete.   Rodger King MD.  8:36 PM 03/19/2019         .bb           Rodger King MD  03/19/19 2037

## 2019-04-08 RX ORDER — ATORVASTATIN CALCIUM 40 MG/1
TABLET, FILM COATED ORAL
Qty: 90 TABLET | Refills: 12 | Status: SHIPPED | OUTPATIENT
Start: 2019-04-08 | End: 2020-05-25

## 2019-05-22 RX ORDER — METOPROLOL TARTRATE 50 MG/1
TABLET ORAL
Qty: 60 TABLET | Refills: 0 | Status: SHIPPED | OUTPATIENT
Start: 2019-05-22 | End: 2019-07-30 | Stop reason: SDUPTHER

## 2019-07-29 ENCOUNTER — PATIENT MESSAGE (OUTPATIENT)
Dept: FAMILY MEDICINE | Facility: CLINIC | Age: 64
End: 2019-07-29

## 2019-07-29 DIAGNOSIS — M25.529 ELBOW PAIN, UNSPECIFIED LATERALITY: Primary | ICD-10-CM

## 2019-07-30 ENCOUNTER — PATIENT OUTREACH (OUTPATIENT)
Dept: ADMINISTRATIVE | Facility: OTHER | Age: 64
End: 2019-07-30

## 2019-07-30 DIAGNOSIS — E11.9 TYPE 2 DIABETES MELLITUS WITHOUT COMPLICATION, UNSPECIFIED WHETHER LONG TERM INSULIN USE: Primary | ICD-10-CM

## 2019-07-31 RX ORDER — LEVOTHYROXINE SODIUM 200 UG/1
TABLET ORAL
Qty: 90 TABLET | Refills: 12 | Status: SHIPPED | OUTPATIENT
Start: 2019-07-31 | End: 2020-09-23

## 2019-07-31 RX ORDER — METOPROLOL TARTRATE 50 MG/1
TABLET ORAL
Qty: 60 TABLET | Refills: 0 | Status: SHIPPED | OUTPATIENT
Start: 2019-07-31 | End: 2019-10-10 | Stop reason: SDUPTHER

## 2019-08-01 ENCOUNTER — OFFICE VISIT (OUTPATIENT)
Dept: ORTHOPEDICS | Facility: CLINIC | Age: 64
End: 2019-08-01
Payer: COMMERCIAL

## 2019-08-01 VITALS
WEIGHT: 152.56 LBS | DIASTOLIC BLOOD PRESSURE: 79 MMHG | RESPIRATION RATE: 17 BRPM | OXYGEN SATURATION: 97 % | SYSTOLIC BLOOD PRESSURE: 126 MMHG | HEART RATE: 83 BPM | BODY MASS INDEX: 25.42 KG/M2 | HEIGHT: 65 IN | TEMPERATURE: 98 F

## 2019-08-01 DIAGNOSIS — M77.11 RIGHT LATERAL EPICONDYLITIS: ICD-10-CM

## 2019-08-01 PROCEDURE — 3074F SYST BP LT 130 MM HG: CPT | Mod: CPTII,S$GLB,, | Performed by: ORTHOPAEDIC SURGERY

## 2019-08-01 PROCEDURE — 3074F PR MOST RECENT SYSTOLIC BLOOD PRESSURE < 130 MM HG: ICD-10-PCS | Mod: CPTII,S$GLB,, | Performed by: ORTHOPAEDIC SURGERY

## 2019-08-01 PROCEDURE — 3008F PR BODY MASS INDEX (BMI) DOCUMENTED: ICD-10-PCS | Mod: CPTII,S$GLB,, | Performed by: ORTHOPAEDIC SURGERY

## 2019-08-01 PROCEDURE — 3078F PR MOST RECENT DIASTOLIC BLOOD PRESSURE < 80 MM HG: ICD-10-PCS | Mod: CPTII,S$GLB,, | Performed by: ORTHOPAEDIC SURGERY

## 2019-08-01 PROCEDURE — 99204 PR OFFICE/OUTPT VISIT, NEW, LEVL IV, 45-59 MIN: ICD-10-PCS | Mod: S$GLB,,, | Performed by: ORTHOPAEDIC SURGERY

## 2019-08-01 PROCEDURE — 3008F BODY MASS INDEX DOCD: CPT | Mod: CPTII,S$GLB,, | Performed by: ORTHOPAEDIC SURGERY

## 2019-08-01 PROCEDURE — 99999 PR PBB SHADOW E&M-EST. PATIENT-LVL III: CPT | Mod: PBBFAC,,, | Performed by: ORTHOPAEDIC SURGERY

## 2019-08-01 PROCEDURE — 99999 PR PBB SHADOW E&M-EST. PATIENT-LVL III: ICD-10-PCS | Mod: PBBFAC,,, | Performed by: ORTHOPAEDIC SURGERY

## 2019-08-01 PROCEDURE — 99204 OFFICE O/P NEW MOD 45 MIN: CPT | Mod: S$GLB,,, | Performed by: ORTHOPAEDIC SURGERY

## 2019-08-01 PROCEDURE — 3078F DIAST BP <80 MM HG: CPT | Mod: CPTII,S$GLB,, | Performed by: ORTHOPAEDIC SURGERY

## 2019-08-01 RX ORDER — MELOXICAM 7.5 MG/1
7.5 TABLET ORAL DAILY
Qty: 30 TABLET | Refills: 0 | Status: ON HOLD | OUTPATIENT
Start: 2019-08-01 | End: 2020-04-17 | Stop reason: HOSPADM

## 2019-08-01 NOTE — PROGRESS NOTES
Chief Complaint   Patient presents with    Right Elbow - Swelling, Pain      HPI: Anushka Napier is a 64 y.o. female who presents today complaining of right elbow pain      Duration of symptoms:  6 days  Trauma or new activity: no  Pain is constant  Has had similar pain in the past which has resolved with use of a counter force brace   Aggravating factors: lifting, certain motions   Relieving factors: rest   Radicular symptoms: no neck pain, numbness, paresthesias   Associated symptoms:  swelling.    Prior treatment:  OTC NSAIDs  with some improvement in pain.   No relief with counterforce brace   Pain does interfere with activities of daily living .    This is the extent of the patient's complaints at this time.     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    HENT: Negative.    Eyes: Negative.    Respiratory: Negative.    Cardiovascular: Negative.    Gastrointestinal: Negative.    Genitourinary: Negative.    Skin: Negative.    Neurological: Negative.    Endo/Heme/Allergies: Negative.    Psychiatric/Behavioral: Negative.          Review of patient's allergies indicates:   Allergen Reactions    Tizanidine Swelling     Tongue swelling     Codeine Hives     Other reaction(s): Itching  Other reaction(s): Hives    Iodinated contrast- oral and iv dye Hives     Other reaction(s): Hives    Iodine      Other reaction(s): Unknown    Sulfamethoxazole-trimethoprim Itching     Other reaction(s): Itching  Other reaction(s): Hives    Epinephrine Anxiety and Other (See Comments)     Almost passed out.         Current Outpatient Medications:     AFLURIA QUAD [id number], PF, 60 mcg/0.5 mL vaccine, ADM 0.5ML IM UTD, Disp: , Rfl: 0    aspirin 81 MG chewable tablet, Take 1 tablet by mouth Daily. 1 Tablet, Chewable Oral Every day, Disp: , Rfl:     atorvastatin (LIPITOR) 40 MG tablet, TAKE 1 TABLET(40 MG) BY MOUTH EVERY DAY, Disp: 90 tablet, Rfl: 12    CONTOUR  NEXT STRIPS Strp, USE TO TEST ONCE DAILY, Disp: 100 strip, Rfl: 12    estrogens, conjugated, (PREMARIN) 0.625 MG tablet, Take 1 tablet (0.625 mg total) by mouth once daily., Disp: 30 tablet, Rfl: 12    FLUoxetine (PROZAC) 20 MG capsule, TAKE 1 CAPSULE BY MOUTH DAILY, Disp: 90 capsule, Rfl: 4    fluticasone (FLONASE) 50 mcg/actuation nasal spray, 2 sprays (100 mcg total) by Each Nare route once daily. 2 Aerosol, Spray Nasal Every day, Disp: 16 g, Rfl: 11    levothyroxine (SYNTHROID) 200 MCG tablet, TAKE 1 TABLET BY MOUTH EVERY DAY, Disp: 90 tablet, Rfl: 12    levothyroxine (SYNTHROID) 25 MCG tablet, TAKE 1 TABLET BY MOUTH EVERY DAY, Disp: 90 tablet, Rfl: 12    LORazepam (ATIVAN) 0.5 MG tablet, Take 1 tablet (0.5 mg total) by mouth every 8 (eight) hours as needed for Anxiety., Disp: 90 tablet, Rfl: 3    metoprolol tartrate (LOPRESSOR) 50 MG tablet, TAKE 1 TABLET BY MOUTH DAILY, Disp: 60 tablet, Rfl: 0    omega-3 fatty acids-vitamin E (FISH OIL) 1,000 mg Cap, Take 1 capsule by mouth Twice daily. 1 Capsule Oral Twice a day , Disp: , Rfl:     RABEprazole (ACIPHEX) 20 mg tablet, Take 1 tablet (20 mg total) by mouth 2 (two) times daily., Disp: 60 tablet, Rfl: 12    VESICARE 5 mg tablet, Take 5 mg by mouth once daily. 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, long term PPI use    Hyperlipidemia     Hypertension     Osteopenia 9/25/2014    Screening for colorectal cancer 9/25/2014    Normal 2009    Screening for colorectal cancer     Thyroid disease     Vitamin D deficiency disease 9/25/2014       Patient  Active Problem List   Diagnosis    Thyroid disease    Menopause    Hypothyroidism    Diabetes mellitus type II, uncontrolled    Hyperlipidemia    Hypertension    GERD (gastroesophageal reflux disease)    Screening for colorectal cancer    Vitamin D deficiency disease    Osteopenia    Migraine without aura and without status migrainosus, not intractable    Left facial numbness       Past Surgical History:   Procedure Laterality Date    HYSTERECTOMY  1997    complete for prolapse, Abdominal    OOPHORECTOMY      right shoulder      SINUS SURGERY  2012       Social History     Tobacco Use    Smoking status: Never Smoker    Smokeless tobacco: Never Used   Substance Use Topics    Alcohol use: No    Drug use: No       Family History   Problem Relation Age of Onset    Breast cancer Cousin 40    Miscarriages / Stillbirths Maternal Grandmother     Miscarriages / Stillbirths Mother     Diabetes Mother     Ovarian cancer Neg Hx        Physical Exam:     Vitals:    08/01/19 1421   BP: 126/79   Pulse: 83   Resp: 17   Temp: 97.6 °F (36.4 °C)           General: Weight: 69.2 kg (152 lb 8.9 oz) Body mass index is 25.39 kg/m².  Patient is alert, awake and oriented to time, place and person. Mood and affect are appropriate.  Patient does not appear to be in any distress, denies any constitutional symptoms and appears stated age.   HEENT: Pupils are equal and round, sclera are not injected. External examination of ears and nose reveals no abnormalities. Cranial nerves II-X are grossly intact  Skin: no rashes, abrasions or open wounds on the affected extremity   Resp: No respiratory distress or audible wheezing   CV: 2+  pulses, all extremities warm and well perfused   Right Elbow   Tender over ECRB insertion on the lateral up  Mild associated swelling over the lateral epicondyle  Pain with resisted wrist and finger extension  Full range of motion of the elbow without pain  LTSI m/u/r  2+ RP  + EPL, IO, FDS,  FDP       Imaging:  Three views of the left elbow were negative for degenerative change or fracture     Assessment: 64 y.o. female with right lateral epicondylitis    I explained my diagnostic impression and the reasoning behind it in detail, using layman's terms.  Models and/or pictures were used to help in the explanation.    Plan:   - she was given a wrist brace today in clinic  - Mobic 7.5 mg PO QD x 2 weeks then PRN. The patient was advised that NSAID-type medications have important potential side effects: gastrointestinal irritation, GI bleeding, cardiac effects and renal injuries. Take the medication with food and to stop and call the office for any GI upset, vomiting, abdominal pain or black/bloody stools. The patient expresses understanding of these issues and questions were answered.  - ortho info handout for lateral epicondylitis exercises  - Return to clinic in 8 weeks. Return sooner if symptoms worsen or fail to improve.    All questions were answered in detail. The patient is in full agreement with the treatment plan and will proceed accordingly.    This note was created by combination of typed  and M-Modal dictation. Transcription and phonetic errors may be present.  If there are any questions, please contact me.

## 2019-08-02 ENCOUNTER — TELEPHONE (OUTPATIENT)
Dept: FAMILY MEDICINE | Facility: CLINIC | Age: 64
End: 2019-08-02

## 2019-09-03 RX ORDER — FLUOXETINE HYDROCHLORIDE 20 MG/1
CAPSULE ORAL
Qty: 90 CAPSULE | Refills: 12 | Status: SHIPPED | OUTPATIENT
Start: 2019-09-03 | End: 2020-11-17 | Stop reason: SDUPTHER

## 2019-09-13 ENCOUNTER — INITIAL CONSULT (OUTPATIENT)
Dept: DERMATOLOGY | Facility: CLINIC | Age: 64
End: 2019-09-13
Payer: COMMERCIAL

## 2019-09-13 VITALS — WEIGHT: 152 LBS | BODY MASS INDEX: 25.29 KG/M2

## 2019-09-13 DIAGNOSIS — Z85.828 HISTORY OF SKIN CANCER: ICD-10-CM

## 2019-09-13 DIAGNOSIS — D48.5 NEOPLASM OF UNCERTAIN BEHAVIOR OF SKIN: Primary | ICD-10-CM

## 2019-09-13 DIAGNOSIS — L81.4 LENTIGINES: ICD-10-CM

## 2019-09-13 PROCEDURE — 11102 TANGNTL BX SKIN SINGLE LES: CPT | Mod: S$GLB,,, | Performed by: DERMATOLOGY

## 2019-09-13 PROCEDURE — 88305 TISSUE SPECIMEN TO PATHOLOGY, DERMATOLOGY: ICD-10-PCS | Mod: 26,,, | Performed by: PATHOLOGY

## 2019-09-13 PROCEDURE — 99999 PR PBB SHADOW E&M-EST. PATIENT-LVL II: CPT | Mod: PBBFAC,,, | Performed by: DERMATOLOGY

## 2019-09-13 PROCEDURE — 88305 TISSUE EXAM BY PATHOLOGIST: CPT | Performed by: PATHOLOGY

## 2019-09-13 PROCEDURE — 3008F PR BODY MASS INDEX (BMI) DOCUMENTED: ICD-10-PCS | Mod: CPTII,S$GLB,, | Performed by: DERMATOLOGY

## 2019-09-13 PROCEDURE — 99999 PR PBB SHADOW E&M-EST. PATIENT-LVL II: ICD-10-PCS | Mod: PBBFAC,,, | Performed by: DERMATOLOGY

## 2019-09-13 PROCEDURE — 11102 PR TANGENTIAL BIOPSY, SKIN, SINGLE LESION: ICD-10-PCS | Mod: S$GLB,,, | Performed by: DERMATOLOGY

## 2019-09-13 PROCEDURE — 88342 TISSUE SPECIMEN TO PATHOLOGY, DERMATOLOGY: ICD-10-PCS | Mod: 26,,, | Performed by: PATHOLOGY

## 2019-09-13 PROCEDURE — 3008F BODY MASS INDEX DOCD: CPT | Mod: CPTII,S$GLB,, | Performed by: DERMATOLOGY

## 2019-09-13 PROCEDURE — 88342 IMHCHEM/IMCYTCHM 1ST ANTB: CPT | Mod: 26,,, | Performed by: PATHOLOGY

## 2019-09-13 PROCEDURE — 99214 OFFICE O/P EST MOD 30 MIN: CPT | Mod: 25,S$GLB,, | Performed by: DERMATOLOGY

## 2019-09-13 PROCEDURE — 99214 PR OFFICE/OUTPT VISIT, EST, LEVL IV, 30-39 MIN: ICD-10-PCS | Mod: 25,S$GLB,, | Performed by: DERMATOLOGY

## 2019-09-13 NOTE — PROGRESS NOTES
Subjective:       Patient ID:  Anushka Napier is a 64 y.o. female who presents for   Chief Complaint   Patient presents with    Lesion     nose     This is a high risk patient here to check for the development of new lesions.      Lesion  - Initial  Affected locations: face  Signs / symptoms: asymptomatic  Aggravated by: nothing  Relieving factors/Treatments tried: nothing        Review of Systems   Constitutional: Negative for fever, chills, weight loss, weight gain, fatigue, night sweats and malaise.   Skin: Positive for daily sunscreen use, activity-related sunscreen use and wears hat.   Hematologic/Lymphatic: Bruises/bleeds easily.        Objective:    Physical Exam   Constitutional: She appears well-developed and well-nourished. No distress.   Neurological: She is alert and oriented to person, place, and time. She is not disoriented.   Psychiatric: She has a normal mood and affect.   Skin:   Areas Examined (abnormalities noted in diagram):   Head / Face Inspection Performed  Neck Inspection Performed  Chest / Axilla Inspection Performed  Back Inspection Performed  RUE Inspected  LUE Inspection Performed                   Diagram Legend     Erythematous scaling macule/papule c/w actinic keratosis       Vascular papule c/w angioma      Pigmented verrucoid papule/plaque c/w seborrheic keratosis      Yellow umbilicated papule c/w sebaceous hyperplasia      Irregularly shaped tan macule c/w lentigo     1-2 mm smooth white papules consistent with Milia      Movable subcutaneous cyst with punctum c/w epidermal inclusion cyst      Subcutaneous movable cyst c/w pilar cyst      Firm pink to brown papule c/w dermatofibroma      Pedunculated fleshy papule(s) c/w skin tag(s)      Evenly pigmented macule c/w junctional nevus     Mildly variegated pigmented, slightly irregular-bordered macule c/w mildly atypical nevus      Flesh colored to evenly pigmented papule c/w intradermal nevus       Pink pearly papule/plaque c/w  "basal cell carcinoma      Erythematous hyperkeratotic cursted plaque c/w SCC      Surgical scar with no sign of skin cancer recurrence      Open and closed comedones      Inflammatory papules and pustules      Verrucoid papule consistent consistent with wart     Erythematous eczematous patches and plaques     Dystrophic onycholytic nail with subungual debris c/w onychomycosis     Umbilicated papule    Erythematous-base heme-crusted tan verrucoid plaque consistent with inflamed seborrheic keratosis     Erythematous Silvery Scaling Plaque c/w Psoriasis     See annotation      Assessment / Plan:      Pathology Orders:     Normal Orders This Visit    Tissue Specimen To Pathology, Dermatology     Questions:    Directional Terms:  Other(comment)    Clinical Information:  r/o bcc    Specific Site:  nasal bridge        Neoplasm of uncertain behavior of skin  -     Tissue Specimen To Pathology, Dermatology  Shave biopsy performed after verbal consent including risk of infection, scar, recurrence, need for additional treatment of site. Area prepped with alcohol, anesthetized with 1% lidocaine with epinephrine. . Hemostasis achieved with monsels. No complications. Dressing applied. Wound care explained. Will need further rx if + ca        History of skin cancer  Comments:  bcc both cheeks removed mohs surgery    Lentigines  The "ABCD" rules to observe pigmented lesions were reviewed.               Follow up in about 6 months (around 3/13/2020).  "

## 2019-09-19 ENCOUNTER — TELEPHONE (OUTPATIENT)
Dept: DERMATOLOGY | Facility: CLINIC | Age: 64
End: 2019-09-19

## 2019-09-19 NOTE — TELEPHONE ENCOUNTER
----- Message from Estephanie Salazar sent at 9/19/2019  2:17 PM CDT -----  Contact: Anushka  tel:     [phone]  Caller is looking for her biopsy report.     Pls call w/ this.

## 2019-09-22 ENCOUNTER — PATIENT MESSAGE (OUTPATIENT)
Dept: DERMATOLOGY | Facility: CLINIC | Age: 64
End: 2019-09-22

## 2019-09-23 ENCOUNTER — TELEPHONE (OUTPATIENT)
Dept: DERMATOLOGY | Facility: CLINIC | Age: 64
End: 2019-09-23

## 2019-10-08 ENCOUNTER — PATIENT OUTREACH (OUTPATIENT)
Dept: ADMINISTRATIVE | Facility: OTHER | Age: 64
End: 2019-10-08

## 2019-10-10 ENCOUNTER — TELEPHONE (OUTPATIENT)
Dept: DERMATOLOGY | Facility: CLINIC | Age: 64
End: 2019-10-10

## 2019-10-10 ENCOUNTER — PROCEDURE VISIT (OUTPATIENT)
Dept: DERMATOLOGY | Facility: CLINIC | Age: 64
End: 2019-10-10
Payer: COMMERCIAL

## 2019-10-10 VITALS
HEIGHT: 65 IN | HEART RATE: 50 BPM | WEIGHT: 152 LBS | SYSTOLIC BLOOD PRESSURE: 161 MMHG | DIASTOLIC BLOOD PRESSURE: 81 MMHG | BODY MASS INDEX: 25.33 KG/M2

## 2019-10-10 DIAGNOSIS — D04.39 SQUAMOUS CELL CARCINOMA IN SITU OF SKIN OF NOSE: Primary | ICD-10-CM

## 2019-10-10 PROCEDURE — 99499 UNLISTED E&M SERVICE: CPT | Mod: S$GLB,,, | Performed by: DERMATOLOGY

## 2019-10-10 PROCEDURE — 99499 NO LOS: ICD-10-PCS | Mod: S$GLB,,, | Performed by: DERMATOLOGY

## 2019-10-10 PROCEDURE — 13151 PR RECMPL WND LID,NOS,EAR 1.1-2.5 CM: ICD-10-PCS | Mod: 59,S$GLB,, | Performed by: DERMATOLOGY

## 2019-10-10 PROCEDURE — 17311: ICD-10-PCS | Mod: S$GLB,,, | Performed by: DERMATOLOGY

## 2019-10-10 PROCEDURE — 13151 CMPLX RPR E/N/E/L 1.1-2.5 CM: CPT | Mod: 59,S$GLB,, | Performed by: DERMATOLOGY

## 2019-10-10 PROCEDURE — 17311 MOHS 1 STAGE H/N/HF/G: CPT | Mod: S$GLB,,, | Performed by: DERMATOLOGY

## 2019-10-10 NOTE — TELEPHONE ENCOUNTER
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 Pt called to scheduled a different appt time for suture removal on 10/17/19. Pt was given different times but decided to keep her original appt time slot at 3:50. Pt appreciated the call back.

## 2019-10-10 NOTE — PROGRESS NOTES
PROCEDURE: Mohs' Micrographic Surgery    INDICATION: Location in mask areas of face including central face, nose, eyelids, eyebrows, lips, chin, preauricular, temple, and ear. Biopsy-proven skin cancer of cosmetically and functionally important areas, including head, neck, genital, hand, foot, or areas known for having difficulty in healing, such as the lower anterior legs. Tumor with ill-defined borders.    REFERRING MD: Sue Argueta M.D.    CASE NUMBER:     ANESTHETIC: 1 cc 1% Lidocaine, plain and 1.5 cc 0.5% Lidocaine with Epi 1:200,000 mixed 1:1 with 0.5% Bupivacaine    SURGICAL PREP: Hibiclens    SURGEON: Angel Roberto MD    ASSISTANTS: Chanel Mcdaniels, Surg Tech    PREOPERATIVE DIAGNOSIS: squamous cell carcinoma in situ    POSTOPERATIVE DIAGNOSIS: squamous cell carcinoma in situ    PATHOLOGIC DIAGNOSIS: squamous cell carcinoma in situ    HISTOLOGY OF SPECIMENS IN FIRST STAGE:   Tumor Type: No tumor seen.    STAGES OF MOHS' SURGERY PERFORMED: 1    TUMOR-FREE PLANE ACHIEVED: Yes    HEMOSTASIS: electrocoagulation     SPECIMENS: 2    LOCATION: nasal bridge. Patient verified location with hand held mirror and verified with photo patient took herself.     INITIAL LESION SIZE: 0.4 x 0.4 cm    FINAL DEFECT SIZE: 0.6 x 0.7 cm    WOUND REPAIR/DISPOSITION: The patient tolerated Mohs' Micrographic Surgery for a squamous cell carcinoma in situ very well. When the tumor was completely removed, a repair of the surgical defect was undertaken.      PROCEDURE: Complex Linear Repair    INDICATION: Status post Mohs' Micrographic Surgery for squamous cell carcinoma in situ.    CASE NUMBER:     SURGEON: Angel Roberto MD    ASSISTANTS: Jeanne Potts PA-C    ANESTHETIC: 1 cc 1% Lidocaine with Epinephrine 1:100,000    SURGICAL PREP: Hibiclens, prepped by Kati Meyer MA    LOCATION: nasal bridge    DEFECT SIZE: 0.6 x 0.7 cm    WOUND REPAIR/DISPOSITION:  After the patient's carcinoma had been completely removed with  "Mohs' Micrographic Surgery, a repair of the surgical defect was undertaken. The patient was returned to the operating suite where the area of nasal bridge was prepped, draped, and anesthetized in the usual sterile fashion. The wound was widely undermined in all directions. Then, electrocoagulation was used to obtain meticulous hemostasis. 5-0 Vicryl buried vertical mattress sutures were placed into the subcutaneous and dermal plane to close the wound and jorge luis the cutaneous wound edge. Bilateral dog ears were identified and were removed by a standard Burow's triangle technique. The cutaneous wound edges were closed using interrupted 6-0 Prolene suture.    The patient tolerated the procedure well.    The area was cleaned and dressed appropriately and the patient was given wound care instructions, as well as appointment for follow-up evaluation. Patient already has pain medication at home.    LENGTH OF REPAIR: 1.4 cm    Vitals:    10/10/19 0718 10/10/19 1007   BP: (!) 143/83 (!) 161/81   BP Location: Right arm Left arm   Patient Position: Sitting Sitting   BP Method: Small (Automatic) Small (Automatic)   Pulse: (!) 53 (!) 50   Weight: 68.9 kg (152 lb)    Height: 5' 5" (1.651 m)                "

## 2019-10-11 RX ORDER — METOPROLOL TARTRATE 50 MG/1
TABLET ORAL
Qty: 60 TABLET | Refills: 0 | Status: SHIPPED | OUTPATIENT
Start: 2019-10-11 | End: 2019-12-21

## 2019-10-17 ENCOUNTER — OFFICE VISIT (OUTPATIENT)
Dept: DERMATOLOGY | Facility: CLINIC | Age: 64
End: 2019-10-17
Payer: COMMERCIAL

## 2019-10-17 DIAGNOSIS — Z09 POSTOP CHECK: Primary | ICD-10-CM

## 2019-10-17 PROCEDURE — 99024 POSTOP FOLLOW-UP VISIT: CPT | Mod: S$GLB,,, | Performed by: DERMATOLOGY

## 2019-10-17 PROCEDURE — 99999 PR PBB SHADOW E&M-EST. PATIENT-LVL III: CPT | Mod: PBBFAC,,, | Performed by: DERMATOLOGY

## 2019-10-17 PROCEDURE — 99999 PR PBB SHADOW E&M-EST. PATIENT-LVL III: ICD-10-PCS | Mod: PBBFAC,,, | Performed by: DERMATOLOGY

## 2019-10-17 PROCEDURE — 99024 PR POST-OP FOLLOW-UP VISIT: ICD-10-PCS | Mod: S$GLB,,, | Performed by: DERMATOLOGY

## 2019-10-17 NOTE — PROGRESS NOTES
64 y.o. female patient is here for suture removal following Mohs' surgery.    Patient reports no problems.    WOUND PE:  The nasal bridge sutures intact. Wound healing well. Good skin edges. No signs or symptoms of infection.    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    PLAN:  Sutures removed today. Steri-strips applied.  Continue wound care.  Keep moist with Aquaphor.  Offered 1 month check - pt prefers to call if needed.     RTC:  In 3-6 months with Sue Argueta M.D. for skin check or sooner if new concern arises.

## 2019-10-22 ENCOUNTER — TELEPHONE (OUTPATIENT)
Dept: DERMATOLOGY | Facility: CLINIC | Age: 64
End: 2019-10-22

## 2019-10-22 NOTE — TELEPHONE ENCOUNTER
Contacted MsPatricia Quyen let her know that her sweater was still here she stated that she works near by and would pick it up when possible. Sweater is in drawer at nurses station.

## 2019-10-22 NOTE — TELEPHONE ENCOUNTER
----- Message from Shaina Stanley sent at 10/22/2019  8:32 AM CDT -----  Contact: patient  [phone]  Please call above patient at number in message,said she had surgery on 10/10/2019,said she left a Black sweater lite weight,waiting on a call from the nurse thanks.

## 2019-10-30 ENCOUNTER — TELEPHONE (OUTPATIENT)
Dept: OBSTETRICS AND GYNECOLOGY | Facility: CLINIC | Age: 64
End: 2019-10-30

## 2019-10-30 ENCOUNTER — PATIENT MESSAGE (OUTPATIENT)
Dept: CARDIOLOGY | Facility: CLINIC | Age: 64
End: 2019-10-30

## 2019-10-30 DIAGNOSIS — Z12.31 SCREENING MAMMOGRAM, ENCOUNTER FOR: Primary | ICD-10-CM

## 2019-10-30 NOTE — TELEPHONE ENCOUNTER
Called Patient and no answer, left message and will portal message letting her know that her orders are in for her Mammogram.

## 2019-10-30 NOTE — TELEPHONE ENCOUNTER
----- Message from Lydia Noriega sent at 10/30/2019  8:50 AM CDT -----  Contact: pt  Type: Patient Call Back    Who called:pt    What is the request in detail:pt is requesting to get mammo orders. Call pt    Can the clinic reply by [facility]?    Would the patient rather a call back or a response via [facility]? Call    Best call back number:[phone]    Additional Information:

## 2019-11-19 ENCOUNTER — TELEPHONE (OUTPATIENT)
Dept: FAMILY MEDICINE | Facility: CLINIC | Age: 64
End: 2019-11-19

## 2019-11-19 DIAGNOSIS — Z00.00 ROUTINE MEDICAL EXAM: Primary | ICD-10-CM

## 2019-11-19 NOTE — TELEPHONE ENCOUNTER
----- Message from Lisy Wei sent at 11/19/2019  7:44 AM CST -----  Contact: NASREEN JONES [[id number]]  Name of Who is Calling : NASREEN JONES [[id number]]    What is the request in detail :    Patient is requesting a call from staff in regards to getting orders for blood work she would like to complete her labs before her scheduled appointment .....Please contact to further discuss and advise.    Can the clinic reply by [facility] :   Yes     What Number to Call Back : [phone]

## 2019-11-23 ENCOUNTER — PATIENT MESSAGE (OUTPATIENT)
Dept: FAMILY MEDICINE | Facility: CLINIC | Age: 64
End: 2019-11-23

## 2019-12-21 RX ORDER — METOPROLOL TARTRATE 50 MG/1
TABLET ORAL
Qty: 60 TABLET | Refills: 12 | Status: SHIPPED | OUTPATIENT
Start: 2019-12-21 | End: 2021-01-18

## 2019-12-21 RX ORDER — RABEPRAZOLE SODIUM 20 MG/1
TABLET, DELAYED RELEASE ORAL
Qty: 60 TABLET | Refills: 12 | Status: SHIPPED | OUTPATIENT
Start: 2019-12-21 | End: 2021-02-18

## 2020-01-03 ENCOUNTER — HOSPITAL ENCOUNTER (OUTPATIENT)
Dept: RADIOLOGY | Facility: HOSPITAL | Age: 65
Discharge: HOME | End: 2020-01-03
Attending: OBSTETRICS & GYNECOLOGY
Payer: COMMERCIAL

## 2020-01-03 VITALS — HEIGHT: 65 IN | BODY MASS INDEX: 25.33 KG/M2 | WEIGHT: 152 LBS

## 2020-01-03 DIAGNOSIS — Z12.31 SCREENING MAMMOGRAM, ENCOUNTER FOR: ICD-10-CM

## 2020-01-03 PROCEDURE — 77063 MAMMO DIGITAL SCREENING BILAT WITH TOMOSYNTHESIS_CAD: ICD-10-PCS | Mod: 26,,, | Performed by: RADIOLOGY

## 2020-01-03 PROCEDURE — 77067 MAMMO DIGITAL SCREENING BILAT WITH TOMOSYNTHESIS_CAD: ICD-10-PCS | Mod: 26,,, | Performed by: RADIOLOGY

## 2020-01-03 PROCEDURE — 77067 SCR MAMMO BI INCL CAD: CPT | Mod: 26,,, | Performed by: RADIOLOGY

## 2020-01-03 PROCEDURE — 77063 BREAST TOMOSYNTHESIS BI: CPT | Mod: 26,,, | Performed by: RADIOLOGY

## 2020-01-03 PROCEDURE — 77067 SCR MAMMO BI INCL CAD: CPT | Mod: TC

## 2020-01-09 RX ORDER — LEVOTHYROXINE SODIUM 25 UG/1
TABLET ORAL
Qty: 90 TABLET | Refills: 12 | Status: SHIPPED | OUTPATIENT
Start: 2020-01-09 | End: 2020-09-23

## 2020-01-14 ENCOUNTER — OFFICE VISIT (OUTPATIENT)
Dept: URGENT CARE | Facility: CLINIC | Age: 65
End: 2020-01-14
Payer: COMMERCIAL

## 2020-01-14 VITALS
SYSTOLIC BLOOD PRESSURE: 140 MMHG | DIASTOLIC BLOOD PRESSURE: 86 MMHG | WEIGHT: 152 LBS | OXYGEN SATURATION: 96 % | TEMPERATURE: 98 F | RESPIRATION RATE: 18 BRPM | HEIGHT: 65 IN | BODY MASS INDEX: 25.33 KG/M2 | HEART RATE: 75 BPM

## 2020-01-14 DIAGNOSIS — J06.9 UPPER RESPIRATORY VIRUS: Primary | ICD-10-CM

## 2020-01-14 DIAGNOSIS — J02.9 SORE THROAT: ICD-10-CM

## 2020-01-14 DIAGNOSIS — J04.0 LARYNGITIS: ICD-10-CM

## 2020-01-14 LAB
CTP QC/QA: YES
LEFT EYE DM RETINOPATHY: POSITIVE
RIGHT EYE DM RETINOPATHY: POSITIVE
S PYO RRNA THROAT QL PROBE: NEGATIVE

## 2020-01-14 PROCEDURE — 99213 OFFICE O/P EST LOW 20 MIN: CPT | Mod: 25,S$GLB,, | Performed by: PHYSICIAN ASSISTANT

## 2020-01-14 PROCEDURE — 87880 STREP A ASSAY W/OPTIC: CPT | Mod: QW,S$GLB,, | Performed by: PHYSICIAN ASSISTANT

## 2020-01-14 PROCEDURE — 87880 POCT RAPID STREP A: ICD-10-PCS | Mod: QW,S$GLB,, | Performed by: PHYSICIAN ASSISTANT

## 2020-01-14 PROCEDURE — 99213 PR OFFICE/OUTPT VISIT, EST, LEVL III, 20-29 MIN: ICD-10-PCS | Mod: 25,S$GLB,, | Performed by: PHYSICIAN ASSISTANT

## 2020-01-14 RX ORDER — PREDNISONE 20 MG/1
20 TABLET ORAL DAILY
Qty: 3 TABLET | Refills: 0 | Status: SHIPPED | OUTPATIENT
Start: 2020-01-14 | End: 2020-01-17

## 2020-01-14 RX ORDER — BENZONATATE 200 MG/1
200 CAPSULE ORAL 3 TIMES DAILY PRN
Qty: 30 CAPSULE | Refills: 0 | Status: SHIPPED | OUTPATIENT
Start: 2020-01-14 | End: 2020-01-24

## 2020-01-15 ENCOUNTER — PATIENT OUTREACH (OUTPATIENT)
Dept: ADMINISTRATIVE | Facility: HOSPITAL | Age: 65
End: 2020-01-15

## 2020-01-15 RX ORDER — SOLIFENACIN SUCCINATE 5 MG/1
5 TABLET, FILM COATED ORAL
COMMUNITY
Start: 2019-03-11 | End: 2024-02-28

## 2020-01-15 NOTE — PROGRESS NOTES
"Subjective:       Patient ID: Anushka Napier is a 64 y.o. female.    Vitals:  height is 5' 5" (1.651 m) and weight is 68.9 kg (152 lb). Her temperature is 97.8 °F (36.6 °C). Her blood pressure is 140/86 (abnormal) and her pulse is 75. Her respiration is 18 and oxygen saturation is 96%.     Chief Complaint: Sore Throat    Ms. Napier presents for evaluation sore throat, postnasal drip, rhinorrhea, cough.  She has had the symptoms since yesterday.  She denies any body aches, fever, chills, nausea or vomiting.  She has not taken anything for symptoms.    Sore Throat    This is a new problem. The current episode started today. The problem has been gradually worsening. There has been no fever. The pain is at a severity of 7/10. The pain is moderate. Associated symptoms include coughing. Pertinent negatives include no congestion, ear pain, shortness of breath, stridor, trouble swallowing or vomiting. She has tried nothing for the symptoms. The treatment provided no relief.       Constitution: Negative for chills, sweating, fatigue and fever.   HENT: Positive for postnasal drip and sore throat. Negative for ear pain, congestion, sinus pain, sinus pressure, trouble swallowing and voice change.    Neck: Negative for painful lymph nodes.   Eyes: Negative for eye redness.   Respiratory: Positive for cough. Negative for chest tightness, sputum production, bloody sputum, COPD, shortness of breath, stridor, wheezing and asthma.    Gastrointestinal: Negative for nausea and vomiting.   Musculoskeletal: Negative for muscle ache.   Skin: Negative for rash.   Allergic/Immunologic: Negative for seasonal allergies and asthma.   Hematologic/Lymphatic: Negative for swollen lymph nodes.       Objective:      Physical Exam   Constitutional: She is oriented to person, place, and time. She appears well-developed and well-nourished. She is cooperative.  Non-toxic appearance. She does not have a sickly appearance. She does not appear ill. No " distress.   HENT:   Head: Normocephalic and atraumatic.   Right Ear: Hearing, tympanic membrane, external ear and ear canal normal.   Left Ear: Hearing, tympanic membrane, external ear and ear canal normal.   Nose: Rhinorrhea present. No mucosal edema or nasal deformity. No epistaxis. Right sinus exhibits no maxillary sinus tenderness and no frontal sinus tenderness. Left sinus exhibits no maxillary sinus tenderness and no frontal sinus tenderness.   Mouth/Throat: Uvula is midline and mucous membranes are normal. No trismus in the jaw. Normal dentition. No uvula swelling. Posterior oropharyngeal erythema present. No oropharyngeal exudate or posterior oropharyngeal edema. Tonsils are 1+ on the right. Tonsils are 1+ on the left. No tonsillar exudate.   Eyes: Conjunctivae and lids are normal. No scleral icterus.   Neck: Trachea normal, full passive range of motion without pain and phonation normal. Neck supple. No neck rigidity. No edema and no erythema present.   Cardiovascular: Normal rate, regular rhythm, normal heart sounds, intact distal pulses and normal pulses.   Pulmonary/Chest: Effort normal and breath sounds normal. No stridor. No respiratory distress. She has no decreased breath sounds. She has no wheezes. She has no rhonchi. She has no rales.   Abdominal: Normal appearance.   Musculoskeletal: Normal range of motion. She exhibits no edema or deformity.   Lymphadenopathy:     She has no cervical adenopathy.   Neurological: She is alert and oriented to person, place, and time. She exhibits normal muscle tone. Coordination normal.   Skin: Skin is warm, dry, intact, not diaphoretic and not pale.   Psychiatric: She has a normal mood and affect. Her speech is normal and behavior is normal. Judgment and thought content normal. Cognition and memory are normal.   Nursing note and vitals reviewed.        Results for orders placed or performed in visit on 01/14/20   POCT rapid strep A   Result Value Ref Range    Rapid  Strep A Screen Negative Negative     Acceptable Yes        Assessment:       1. Upper respiratory virus    2. Sore throat    3. Laryngitis        Plan:         Upper respiratory virus    Sore throat  -     POCT rapid strep A    Laryngitis    Other orders  -     (Magic mouthwash) 1:1:1 Benadryl 12.5mg/5ml liq, aluminum & magnesium hydroxide-simehticone (Maalox), lidocaine viscous 2%; Swish and spit 10 mLs every 4 (four) hours as needed. for mouth sores  Dispense: 360 mL; Refill: 0  -     predniSONE (DELTASONE) 20 MG tablet; Take 1 tablet (20 mg total) by mouth once daily. for 3 days  Dispense: 3 tablet; Refill: 0  -     benzonatate (TESSALON) 200 MG capsule; Take 1 capsule (200 mg total) by mouth 3 (three) times daily as needed for Cough (cough).  Dispense: 30 capsule; Refill: 0     Discussed risk of steroids with patient, understanding was verbalized.    Patient Instructions   PLEASE READ YOUR DISCHARGE INSTRUCTIONS ENTIRELY AS IT CONTAINS IMPORTANT INFORMATION.  - Rest.    - Drink plenty of fluids.    - Tylenol or Ibuprofen as directed as needed for fever/pain.    - If you were prescribed antibiotics, please take them to completion.  - If you are female and on birth control pills - please use additional methods of contraception to prevent pregnancy while on antibiotics and for one cycle after.   - If you were prescribed a narcotic medication or muscle relaxer, do not drive or operate heavy equipment or machinery while taking these medications, as they can cause drowsiness.   - If you smoke, please stop smoking.  -You must understand that you've received an Urgent Care treatment only and that you may be released before all your medical problems are known or treated. You, the patient, will    arrange for follow up care as instructed. Please arrange follow up with your primary medical clinic as soon as possible.   - Follow up with your PCP or specialty clinic as directed in the next 1-2 weeks if not  improved or as needed.  You can call [phone] to schedule an appointment with the appropriate provider.    - Please return to Urgent Care or to the Emergency Department if your symptoms worsen.    Patient aware and verbalized understanding.    Viral Upper Respiratory Illness (Adult)  You have a viral upper respiratory illness (URI), which is another term for the common cold. This illness is contagious during the first few days. It is spread through the air by coughing and sneezing. It may also be spread by direct contact (touching the sick person and then touching your own eyes, nose, or mouth). Frequent handwashing will decrease risk of spread. Most viral illnesses go away within 7 to 10 days with rest and simple home remedies. Sometimes the illness may last for several weeks. Antibiotics will not kill a virus, and they are generally not prescribed for this condition.    Home care  · If symptoms are severe, rest at home for the first 2 to 3 days. When you resume activity, don't let yourself get too tired.  · Avoid being exposed to cigarette smoke (yours or others).  · You may use acetaminophen or ibuprofen to control pain and fever, unless another medicine was prescribed. (Note: If you have chronic liver or kidney disease, have ever had a stomach ulcer or gastrointestinal bleeding, or are taking blood-thinning medicines, talk with your healthcare provider before using these medicines.) Aspirin should never be given to anyone under 18 years of age who is ill with a viral infection or fever. It may cause severe liver or brain damage.  · Your appetite may be poor, so a light diet is fine. Avoid dehydration by drinking 6 to 8 glasses of fluids per day (water, soft drinks, juices, tea, or soup). Extra fluids will help loosen secretions in the nose and lungs.  · Over-the-counter cold medicines will not shorten the length of time youre sick, but they may be helpful for the following symptoms: cough, sore throat,  and nasal and sinus congestion. (Note: Do not use decongestants if you have high blood pressure.)  Follow-up care  Follow up with your healthcare provider, or as advised.  When to seek medical advice  Call your healthcare provider right away if any of these occur:  · Cough with lots of colored sputum (mucus)  · Severe headache; face, neck, or ear pain  · Difficulty swallowing due to throat pain  · Fever of 100.4°F (38°C)  Call 911, or get immediate medical care  Call emergency services right away if any of these occur:  · Chest pain, shortness of breath, wheezing, or difficulty breathing  · Coughing up blood  · Inability to swallow due to throat pain  Date Last Reviewed: 9/13/2015 © 2000-2017 [employer]. [address] [place] [address], [place] [address]. All rights reserved. This information is not intended as a substitute for professional medical care. Always follow your healthcare professional's instructions.        Laryngitis    Laryngitis is a swelling of the tissues around the vocal cords. Symptoms include a hoarse (scratchy) voice. The voice may be lost completely. It may be caused by a viral illness, such as a head or chest cold. It may also be due to overuse and strain of the voice. Smoking, drinking alcohol, acid reflux, allergies, or inhaling harsh chemicals may also lead to symptoms. This condition will usually resolve in 1-2 weeks.  Home care  · Rest your voice until it recovers. Talk as little as possible. If your symptoms are severe, rest at home for a day or so.  · Breathing cool steam from a humidifier/vaporizer or in a steamy shower may be helpful.  · Drink plenty of fluids to stay well hydrated.  · Do not smoke  Follow-up care  Follow up with your healthcare provider or this facility if you have not improved after one week.  When to seek medical advice  Contact your healthcare provider for any of the following:  · Severe pain with swallowing  · Trouble opening mouth  · Neck swelling,  neck pain, or trouble moving neck  · Noisy breathing or trouble breathing  · Fever of 100.4°F (38.ºC) or higher, or as directed by your healthcare provider  · Drooling  · Symptoms do not resolve in 2 weeks  Date Last Reviewed: 4/26/2015  © 3576-3465 [employer]. [address] [place] [address], [place] [address]. All rights reserved. This information is not intended as a substitute for professional medical care. Always follow your healthcare professional's instructions.

## 2020-01-15 NOTE — PATIENT INSTRUCTIONS
PLEASE READ YOUR DISCHARGE INSTRUCTIONS ENTIRELY AS IT CONTAINS IMPORTANT INFORMATION.  - Rest.    - Drink plenty of fluids.    - Tylenol or Ibuprofen as directed as needed for fever/pain.    - If you were prescribed antibiotics, please take them to completion.  - If you are female and on birth control pills - please use additional methods of contraception to prevent pregnancy while on antibiotics and for one cycle after.   - If you were prescribed a narcotic medication or muscle relaxer, do not drive or operate heavy equipment or machinery while taking these medications, as they can cause drowsiness.   - If you smoke, please stop smoking.  -You must understand that you've received an Urgent Care treatment only and that you may be released before all your medical problems are known or treated. You, the patient, will    arrange for follow up care as instructed. Please arrange follow up with your primary medical clinic as soon as possible.   - Follow up with your PCP or specialty clinic as directed in the next 1-2 weeks if not improved or as needed.  You can call [phone] to schedule an appointment with the appropriate provider.    - Please return to Urgent Care or to the Emergency Department if your symptoms worsen.    Patient aware and verbalized understanding.    Viral Upper Respiratory Illness (Adult)  You have a viral upper respiratory illness (URI), which is another term for the common cold. This illness is contagious during the first few days. It is spread through the air by coughing and sneezing. It may also be spread by direct contact (touching the sick person and then touching your own eyes, nose, or mouth). Frequent handwashing will decrease risk of spread. Most viral illnesses go away within 7 to 10 days with rest and simple home remedies. Sometimes the illness may last for several weeks. Antibiotics will not kill a virus, and they are generally not prescribed for this condition.    Home care  · If  symptoms are severe, rest at home for the first 2 to 3 days. When you resume activity, don't let yourself get too tired.  · Avoid being exposed to cigarette smoke (yours or others).  · You may use acetaminophen or ibuprofen to control pain and fever, unless another medicine was prescribed. (Note: If you have chronic liver or kidney disease, have ever had a stomach ulcer or gastrointestinal bleeding, or are taking blood-thinning medicines, talk with your healthcare provider before using these medicines.) Aspirin should never be given to anyone under 18 years of age who is ill with a viral infection or fever. It may cause severe liver or brain damage.  · Your appetite may be poor, so a light diet is fine. Avoid dehydration by drinking 6 to 8 glasses of fluids per day (water, soft drinks, juices, tea, or soup). Extra fluids will help loosen secretions in the nose and lungs.  · Over-the-counter cold medicines will not shorten the length of time youre sick, but they may be helpful for the following symptoms: cough, sore throat, and nasal and sinus congestion. (Note: Do not use decongestants if you have high blood pressure.)  Follow-up care  Follow up with your healthcare provider, or as advised.  When to seek medical advice  Call your healthcare provider right away if any of these occur:  · Cough with lots of colored sputum (mucus)  · Severe headache; face, neck, or ear pain  · Difficulty swallowing due to throat pain  · Fever of 100.4°F (38°C)  Call 911, or get immediate medical care  Call emergency services right away if any of these occur:  · Chest pain, shortness of breath, wheezing, or difficulty breathing  · Coughing up blood  · Inability to swallow due to throat pain  Date Last Reviewed: 9/13/2015  © 1306-1972 [employer]. [address] [place] [address], [place] [address]. All rights reserved. This information is not intended as a substitute for professional medical care. Always follow your healthcare  professional's instructions.        Laryngitis    Laryngitis is a swelling of the tissues around the vocal cords. Symptoms include a hoarse (scratchy) voice. The voice may be lost completely. It may be caused by a viral illness, such as a head or chest cold. It may also be due to overuse and strain of the voice. Smoking, drinking alcohol, acid reflux, allergies, or inhaling harsh chemicals may also lead to symptoms. This condition will usually resolve in 1-2 weeks.  Home care  · Rest your voice until it recovers. Talk as little as possible. If your symptoms are severe, rest at home for a day or so.  · Breathing cool steam from a humidifier/vaporizer or in a steamy shower may be helpful.  · Drink plenty of fluids to stay well hydrated.  · Do not smoke  Follow-up care  Follow up with your healthcare provider or this facility if you have not improved after one week.  When to seek medical advice  Contact your healthcare provider for any of the following:  · Severe pain with swallowing  · Trouble opening mouth  · Neck swelling, neck pain, or trouble moving neck  · Noisy breathing or trouble breathing  · Fever of 100.4°F (38.ºC) or higher, or as directed by your healthcare provider  · Drooling  · Symptoms do not resolve in 2 weeks  Date Last Reviewed: 4/26/2015  © 6766-6911 [employer]. [address] [place] [address], [place] [address]. All rights reserved. This information is not intended as a substitute for professional medical care. Always follow your healthcare professional's instructions.

## 2020-01-18 ENCOUNTER — LAB VISIT (OUTPATIENT)
Dept: LAB | Facility: HOSPITAL | Age: 65
End: 2020-01-18
Attending: INTERNAL MEDICINE
Payer: COMMERCIAL

## 2020-01-18 DIAGNOSIS — Z00.00 ROUTINE MEDICAL EXAM: ICD-10-CM

## 2020-01-18 LAB
25(OH)D3+25(OH)D2 SERPL-MCNC: 68 NG/ML (ref 30–96)
ALBUMIN SERPL BCP-MCNC: 3.3 G/DL (ref 3.5–5.2)
ALP SERPL-CCNC: 125 U/L (ref 55–135)
ALT SERPL W/O P-5'-P-CCNC: 10 U/L (ref 10–44)
ANION GAP SERPL CALC-SCNC: 10 MMOL/L (ref 8–16)
AST SERPL-CCNC: 15 U/L (ref 10–40)
BILIRUB SERPL-MCNC: 0.3 MG/DL (ref 0.1–1)
BUN SERPL-MCNC: 15 MG/DL (ref 8–23)
CALCIUM SERPL-MCNC: 8.9 MG/DL (ref 8.7–10.5)
CHLORIDE SERPL-SCNC: 104 MMOL/L (ref 95–110)
CHOLEST SERPL-MCNC: 191 MG/DL (ref 120–199)
CHOLEST/HDLC SERPL: 2.7 {RATIO} (ref 2–5)
CO2 SERPL-SCNC: 25 MMOL/L (ref 23–29)
CREAT SERPL-MCNC: 1 MG/DL (ref 0.5–1.4)
EST. GFR  (AFRICAN AMERICAN): >60 ML/MIN/1.73 M^2
EST. GFR  (NON AFRICAN AMERICAN): >60 ML/MIN/1.73 M^2
ESTIMATED AVG GLUCOSE: 148 MG/DL (ref 68–131)
GLUCOSE SERPL-MCNC: 112 MG/DL (ref 70–110)
HBA1C MFR BLD HPLC: 6.8 % (ref 4–5.6)
HDLC SERPL-MCNC: 71 MG/DL (ref 40–75)
HDLC SERPL: 37.2 % (ref 20–50)
LDLC SERPL CALC-MCNC: 97.4 MG/DL (ref 63–159)
NONHDLC SERPL-MCNC: 120 MG/DL
POTASSIUM SERPL-SCNC: 4.3 MMOL/L (ref 3.5–5.1)
PROT SERPL-MCNC: 7.2 G/DL (ref 6–8.4)
SODIUM SERPL-SCNC: 139 MMOL/L (ref 136–145)
TRIGL SERPL-MCNC: 113 MG/DL (ref 30–150)
TSH SERPL DL<=0.005 MIU/L-ACNC: 3.64 UIU/ML (ref 0.4–4)

## 2020-01-18 PROCEDURE — 36415 COLL VENOUS BLD VENIPUNCTURE: CPT | Mod: PO

## 2020-01-18 PROCEDURE — 80061 LIPID PANEL: CPT

## 2020-01-18 PROCEDURE — 82306 VITAMIN D 25 HYDROXY: CPT

## 2020-01-18 PROCEDURE — 84443 ASSAY THYROID STIM HORMONE: CPT

## 2020-01-18 PROCEDURE — 83036 HEMOGLOBIN GLYCOSYLATED A1C: CPT

## 2020-01-18 PROCEDURE — 80053 COMPREHEN METABOLIC PANEL: CPT

## 2020-01-19 ENCOUNTER — PATIENT MESSAGE (OUTPATIENT)
Dept: FAMILY MEDICINE | Facility: CLINIC | Age: 65
End: 2020-01-19

## 2020-01-24 ENCOUNTER — OFFICE VISIT (OUTPATIENT)
Dept: FAMILY MEDICINE | Facility: CLINIC | Age: 65
End: 2020-01-24
Payer: COMMERCIAL

## 2020-01-24 VITALS
DIASTOLIC BLOOD PRESSURE: 74 MMHG | OXYGEN SATURATION: 96 % | BODY MASS INDEX: 26.74 KG/M2 | WEIGHT: 160.5 LBS | HEART RATE: 70 BPM | SYSTOLIC BLOOD PRESSURE: 126 MMHG | TEMPERATURE: 98 F | HEIGHT: 65 IN

## 2020-01-24 DIAGNOSIS — Z00.00 ROUTINE MEDICAL EXAM: Primary | ICD-10-CM

## 2020-01-24 DIAGNOSIS — Z86.010 HISTORY OF COLONIC POLYPS: ICD-10-CM

## 2020-01-24 DIAGNOSIS — K21.9 GASTROESOPHAGEAL REFLUX DISEASE, ESOPHAGITIS PRESENCE NOT SPECIFIED: ICD-10-CM

## 2020-01-24 DIAGNOSIS — E78.5 HYPERLIPIDEMIA, UNSPECIFIED HYPERLIPIDEMIA TYPE: ICD-10-CM

## 2020-01-24 DIAGNOSIS — M85.80 OSTEOPENIA, UNSPECIFIED LOCATION: ICD-10-CM

## 2020-01-24 DIAGNOSIS — E55.9 VITAMIN D DEFICIENCY DISEASE: ICD-10-CM

## 2020-01-24 DIAGNOSIS — E03.9 HYPOTHYROIDISM, UNSPECIFIED TYPE: ICD-10-CM

## 2020-01-24 DIAGNOSIS — I10 ESSENTIAL HYPERTENSION: ICD-10-CM

## 2020-01-24 DIAGNOSIS — E11.65 UNCONTROLLED TYPE 2 DIABETES MELLITUS WITH HYPERGLYCEMIA: ICD-10-CM

## 2020-01-24 PROBLEM — Z86.0100 HISTORY OF COLONIC POLYPS: Status: ACTIVE | Noted: 2020-01-24

## 2020-01-24 PROCEDURE — 3078F DIAST BP <80 MM HG: CPT | Mod: CPTII,S$GLB,, | Performed by: INTERNAL MEDICINE

## 2020-01-24 PROCEDURE — 3074F SYST BP LT 130 MM HG: CPT | Mod: CPTII,S$GLB,, | Performed by: INTERNAL MEDICINE

## 2020-01-24 PROCEDURE — 3044F HG A1C LEVEL LT 7.0%: CPT | Mod: CPTII,S$GLB,, | Performed by: INTERNAL MEDICINE

## 2020-01-24 PROCEDURE — 99999 PR PBB SHADOW E&M-EST. PATIENT-LVL IV: CPT | Mod: PBBFAC,,, | Performed by: INTERNAL MEDICINE

## 2020-01-24 PROCEDURE — 99999 PR PBB SHADOW E&M-EST. PATIENT-LVL IV: ICD-10-PCS | Mod: PBBFAC,,, | Performed by: INTERNAL MEDICINE

## 2020-01-24 PROCEDURE — 3044F PR MOST RECENT HEMOGLOBIN A1C LEVEL <7.0%: ICD-10-PCS | Mod: CPTII,S$GLB,, | Performed by: INTERNAL MEDICINE

## 2020-01-24 PROCEDURE — 3074F PR MOST RECENT SYSTOLIC BLOOD PRESSURE < 130 MM HG: ICD-10-PCS | Mod: CPTII,S$GLB,, | Performed by: INTERNAL MEDICINE

## 2020-01-24 PROCEDURE — 99396 PREV VISIT EST AGE 40-64: CPT | Mod: S$GLB,,, | Performed by: INTERNAL MEDICINE

## 2020-01-24 PROCEDURE — 99396 PR PREVENTIVE VISIT,EST,40-64: ICD-10-PCS | Mod: S$GLB,,, | Performed by: INTERNAL MEDICINE

## 2020-01-24 PROCEDURE — 3078F PR MOST RECENT DIASTOLIC BLOOD PRESSURE < 80 MM HG: ICD-10-PCS | Mod: CPTII,S$GLB,, | Performed by: INTERNAL MEDICINE

## 2020-01-24 RX ORDER — METFORMIN HYDROCHLORIDE 500 MG/1
500 TABLET, EXTENDED RELEASE ORAL DAILY
Qty: 90 TABLET | Refills: 3 | Status: SHIPPED | OUTPATIENT
Start: 2020-01-24 | End: 2021-02-08

## 2020-01-24 RX ORDER — CHOLECALCIFEROL (VITAMIN D3) 25 MCG
1000 TABLET ORAL DAILY
COMMUNITY

## 2020-01-24 RX ORDER — BUTYROSPERMUM PARKII(SHEA BUTTER), SIMMONDSIA CHINENSIS (JOJOBA) SEED OIL, ALOE BARBADENSIS LEAF EXTRACT .01; 1; 3.5 G/100G; G/100G; G/100G
250 LIQUID TOPICAL 2 TIMES DAILY
COMMUNITY
End: 2021-02-03

## 2020-01-24 NOTE — PROGRESS NOTES
CHIEF COMPLAINT:  Physical.                                                                                                                               HISTORY OF PRESENT ILLNESS:  A 64-year-old white female who is a 1st cousin of mine by marriage.  We reviewed all her labs.  Her A1c is slightly up at 6.8 and it was this high about 10 years ago.  We discussed benefits of adding just one metformin pill and she is willing.  She will probably get her bone density updated on the same machine by her gyn and that appointment is upcoming.      Back in 2012 She had an outside mammogram at [facility] done by her GYN.        subsequent spot compression film was all normal as well.  Last mammo 1/20      Prior bone densities were done by prior GYN and had osteopenia, that being 2012.  She was taken off EVista 5/14 and continues on Premarin.    11/17 BMD  Impression       Normal mineralization of the lumbar spine and right hip.  Osteopenia of the left hip                                                                                     Her colonoscopy was normal 4/17/08. 1 polyp 3/18 -5 yrs  She had another upper endoscopy,   which was okay per outside GI.  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  She has an appointment tomorrow with her GI and will discuss having the EGD and colonoscopy possibly done at this time.                                                                                                                                     Lipids well controlled, vitamin-D level excellent       having some situational stress .  All are doing fairly well                                                                                 ROS:   CONST: weight stable. EYES: no vision change. ENT: no sore throat. CV: no chest pain w/ exertion. RESP: no shortness of breath. GI: no nausea, vomiting, diarrhea. No dysphagia. : no urinary issues. MUSCULOSKELETAL: no new myalgias or  arthralgias. SKIN: no new changes. NEURO: no focal deficits. PSYCH: no new issues. ENDOCRINE: no polyuria. HEME: no lymph nodes. ALLERGY: no general pruritis.                                                                                                                    PAST MEDICAL HISTORY:                                                        1. Osteopenia, last bone density in 11/17                                2. DIABETES                          3. Hypothyroidism.                                                           4. Hypertension.                                                             5. Hysterectomy - total.                                                6. Hyperlipidemia.                                                           7. GERD. EGDs with possible Davian's- - Dr Correia.                                                                   8. Anxiety and depression.   9. ENT septum surgery for sinusitis -Dr Sandoval 2012.  10. Colonoscopy 4/17/08 normal - 1 polyp 3/18 -5 yrs   11. H/O abnormal Mammo                                                                                                                               FAMILY HISTORY: Father is living with hypertension, heart disease and        diabetes.  Mom is living with a history of cervical cancer, hypertension     and diabetes.  One brother and one sister with no stated problems.                                                                                                                                                                                      ALLERGIES:  Penicillin, sulfa, codeine, iodine and shellfish.                                                                                             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  She does not smoke and never did.  She does not drink.                                                                                                             PHYSICAL EXAMINATION:                                                        VITAL SIGNS:  As above                             GENERAL:  Pleasant female.                                                   HEENT:  Conjunctivae clear.  Pupils equal and reactive.  Nose and mouth      clear and moist.  Teeth good.                                                NECK:  Supple.  Thyroid nonpalpable.                                         RESPIRATORY:  Effort is good.                                                LUNGS:  Clear.                                                               HEART:  Regular rate and rhythm without murmurs, gallops or rubs.  No        carotid bruits.  No edema.                                                   ABDOMEN:  Soft, nondistended, nontender.  No hepatosplenomegaly or masses.   SKIN:  No rashes.  Warm to touch.                                            EXTREMITIES:  Gait normal.  Digits without clubbing or cyanosis.                                                                                          Labs and x-ray reports reviewed as well as outside mammogram and breast      ultrasound report.                                                                                                                                        Anushka was seen today for annual exam.    Diagnoses and all orders for this visit:    Routine medical exam, up-to-date on age-appropriate cancer screening    Essential hypertension, chronic and stable    Hyperlipidemia, unspecified hyperlipidemia type, excellent control    Uncontrolled type 2 diabetes mellitus with hyperglycemia, add metformin 500 with the discussion of potential side effects    Gastroesophageal reflux disease, esophagitis presence not specified, continues on PPI, vitamin-D level normal    Hypothyroidism, unspecified type, euthyroid    Vitamin D deficiency disease, continue supplement    Osteopenia, unspecified  "location, due for two year bone density per gyn    History of colonic polyps    Other orders  -     Cancel: Influenza - Quadrivalent (PF)  -     metFORMIN (GLUCOPHAGE-XR) 500 MG 24 hr tablet; Take 1 tablet (500 mg total) by mouth once daily.         Based on the need to document sensitive psychiatric symptoms, access to the clinical note will not be appropriate"------"This note will not be shared with the patient."  "

## 2020-01-30 ENCOUNTER — OFFICE VISIT (OUTPATIENT)
Dept: OBSTETRICS AND GYNECOLOGY | Facility: CLINIC | Age: 65
End: 2020-01-30
Payer: COMMERCIAL

## 2020-01-30 VITALS
DIASTOLIC BLOOD PRESSURE: 84 MMHG | SYSTOLIC BLOOD PRESSURE: 124 MMHG | BODY MASS INDEX: 26.82 KG/M2 | WEIGHT: 161.19 LBS

## 2020-01-30 DIAGNOSIS — Z01.419 WELL WOMAN EXAM WITH ROUTINE GYNECOLOGICAL EXAM: Primary | ICD-10-CM

## 2020-01-30 DIAGNOSIS — N95.1 SYMPTOMATIC MENOPAUSAL OR FEMALE CLIMACTERIC STATES: ICD-10-CM

## 2020-01-30 PROCEDURE — 3079F DIAST BP 80-89 MM HG: CPT | Mod: CPTII,S$GLB,, | Performed by: OBSTETRICS & GYNECOLOGY

## 2020-01-30 PROCEDURE — 3074F SYST BP LT 130 MM HG: CPT | Mod: CPTII,S$GLB,, | Performed by: OBSTETRICS & GYNECOLOGY

## 2020-01-30 PROCEDURE — 3079F PR MOST RECENT DIASTOLIC BLOOD PRESSURE 80-89 MM HG: ICD-10-PCS | Mod: CPTII,S$GLB,, | Performed by: OBSTETRICS & GYNECOLOGY

## 2020-01-30 PROCEDURE — 99999 PR PBB SHADOW E&M-EST. PATIENT-LVL II: CPT | Mod: PBBFAC,,, | Performed by: OBSTETRICS & GYNECOLOGY

## 2020-01-30 PROCEDURE — 99396 PR PREVENTIVE VISIT,EST,40-64: ICD-10-PCS | Mod: S$GLB,,, | Performed by: OBSTETRICS & GYNECOLOGY

## 2020-01-30 PROCEDURE — 99999 PR PBB SHADOW E&M-EST. PATIENT-LVL II: ICD-10-PCS | Mod: PBBFAC,,, | Performed by: OBSTETRICS & GYNECOLOGY

## 2020-01-30 PROCEDURE — 99396 PREV VISIT EST AGE 40-64: CPT | Mod: S$GLB,,, | Performed by: OBSTETRICS & GYNECOLOGY

## 2020-01-30 PROCEDURE — 3074F PR MOST RECENT SYSTOLIC BLOOD PRESSURE < 130 MM HG: ICD-10-PCS | Mod: CPTII,S$GLB,, | Performed by: OBSTETRICS & GYNECOLOGY

## 2020-01-30 NOTE — PROGRESS NOTES
Subjective:       Patient ID: Anushka Napier is a 64 y.o. female.    Chief Complaint:  Annual Exam (Last mmg was 2020. SP: Brian)      History of Present Illness  HPI  Annual Exam-Postmenopausal  Patient presents for annual exam. The patient has no complaints today. The patient is sexually active. GYN screening history: last pap: approximate date 2014 and was normal and last mammogram: approximate date 1/3/2020 and was normal. The patient is taking hormone replacement therapy. Patient denies post-menopausal vaginal bleeding. The patient wears seatbelts: yes. The patient participates in regular exercise: yes. Has the patient ever been transfused or tattooed?: no. The patient reports that there is not domestic violence in her life.    Status post  Total abdominal hysterectomy for prolapse.      GYN & OB History  No LMP recorded. Patient has had a hysterectomy.   Date of Last Pap: 2014    OB History    Para Term  AB Living   7 4 3 1 3 3   SAB TAB Ectopic Multiple Live Births   3       3      # Outcome Date GA Lbr Amos/2nd Weight Sex Delivery Anes PTL Lv   7 Term 90 40w0d  4.309 kg (9 lb 8 oz) F Vag-Spont EPI N ALEX   6 Term 88 38w0d  3.657 kg (8 lb 1 oz) M Vag-Spont EPI N ALEX   5  10/08/82 36w0d  2.58 kg (5 lb 11 oz) M Vag-Spont EPI N ALEX   4 Term            3 SAB            2 SAB            1 SAB              Past Medical History:   Diagnosis Date    Allergy     Anxiety     Basal cell carcinoma     to face    Depression     Diabetes mellitus     Diabetes mellitus type II, uncontrolled 2014    GERD (gastroesophageal reflux disease) 2014    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 Dr Correia, long term PPI use    History of colonic polyps 2020    Hyperlipidemia     Hypertension     Osteopenia 2014    Screening for colorectal cancer 2014    Normal     Screening for colorectal cancer     Thyroid disease     Vitamin D deficiency disease 2014        Past Surgical History:   Procedure Laterality Date    HYSTERECTOMY  1997    complete for prolapse, Abdominal    OOPHORECTOMY      right shoulder      SINUS SURGERY  2012       Family History   Problem Relation Age of Onset    Breast cancer Cousin 40    Miscarriages / Stillbirths Maternal Grandmother     Miscarriages / Stillbirths Mother     Diabetes Mother     Ovarian cancer Neg Hx        Social History     Socioeconomic History    Marital status:      Spouse name: Not on file    Number of children: Not on file    Years of education: Not on file    Highest education level: Not on file   Occupational History    Not on file   Social Needs    Financial resource strain: Not on file    Food insecurity:     Worry: Not on file     Inability: Not on file    Transportation needs:     Medical: Not on file     Non-medical: Not on file   Tobacco Use    Smoking status: Never Smoker    Smokeless tobacco: Never Used   Substance and Sexual Activity    Alcohol use: No    Drug use: No    Sexual activity: Yes     Partners: Male     Birth control/protection: Surgical   Lifestyle    Physical activity:     Days per week: Not on file     Minutes per session: Not on file    Stress: Not on file   Relationships    Social connections:     Talks on phone: Not on file     Gets together: Not on file     Attends Rastafari service: Not on file     Active member of club or organization: Not on file     Attends meetings of clubs or organizations: Not on file     Relationship status: Not on file   Other Topics Concern    Are you pregnant or think you may be? Not Asked    Breast-feeding Not Asked   Social History Narrative     since 1986    Previous  for 8 years prior    He is a     She is a homemaker       Current Outpatient Medications   Medication Sig Dispense Refill    aspirin 81 MG chewable tablet Take 1 tablet by mouth Daily. 1 Tablet, Chewable Oral Every day      atorvastatin  (LIPITOR) 40 MG tablet TAKE 1 TABLET(40 MG) BY MOUTH EVERY DAY 90 tablet 12    CONTOUR NEXT STRIPS Strp USE TO TEST ONCE DAILY 100 strip 12    estrogens, conjugated, (PREMARIN) 0.625 MG tablet Take 1 tablet (0.625 mg total) by mouth once daily. 30 tablet 12    FLUoxetine 20 MG capsule TAKE 1 CAPSULE BY MOUTH DAILY 90 capsule 12    fluticasone (FLONASE) 50 mcg/actuation nasal spray 2 sprays (100 mcg total) by Each Nare route once daily. 2 Aerosol, Spray Nasal Every day 16 g 11    levothyroxine (SYNTHROID) 200 MCG tablet TAKE 1 TABLET BY MOUTH EVERY DAY 90 tablet 12    levothyroxine (SYNTHROID) 25 MCG tablet TAKE 1 TABLET BY MOUTH EVERY DAY 90 tablet 12    LORazepam (ATIVAN) 0.5 MG tablet Take 1 tablet (0.5 mg total) by mouth every 8 (eight) hours as needed for Anxiety. 90 tablet 3    meloxicam (MOBIC) 7.5 MG tablet Take 1 tablet (7.5 mg total) by mouth once daily. Take once a day for two weeks then as needed. Take with food 30 tablet 0    metFORMIN (GLUCOPHAGE-XR) 500 MG 24 hr tablet Take 1 tablet (500 mg total) by mouth once daily. 90 tablet 3    metoprolol tartrate (LOPRESSOR) 50 MG tablet TAKE 1 TABLET BY MOUTH DAILY 60 tablet 12    omega-3 fatty acids-vitamin E (FISH OIL) 1,000 mg Cap Take 1 capsule by mouth Twice daily. 1 Capsule Oral Twice a day       RABEprazole (ACIPHEX) 20 mg tablet TAKE 1 TABLET(20 MG) BY MOUTH TWICE DAILY 60 tablet 12    Saccharomyces boulardii (FLORASTOR) 250 mg capsule Take 250 mg by mouth 2 (two) times daily.      solifenacin (VESICARE) 5 MG tablet Take 5 mg by mouth.      traMADol (ULTRAM) 50 mg tablet Take 1 tablet (50 mg total) by mouth every 12 (twelve) hours as needed for Pain. 15 tablet 0    VESICARE 5 mg tablet Take 5 mg by mouth once daily.   5    vitamin D (VITAMIN D3) 1000 units Tab Take 1,000 Units by mouth once daily. 5 tabs daily       No current facility-administered medications for this visit.        Review of patient's allergies indicates:   Allergen  Reactions    Tizanidine Swelling     Tongue swelling     Codeine Hives     Other reaction(s): Itching  Other reaction(s): Hives    Iodinated contrast media Hives     Other reaction(s): Hives    Iodine      Other reaction(s): Unknown    Sulfamethoxazole-trimethoprim Itching     Other reaction(s): Itching  Other reaction(s): Hives    Epinephrine Anxiety and Other (See Comments)     Almost passed out.         Review of Systems  Review of Systems   Constitutional: Negative for activity change, appetite change, chills, fatigue, fever and unexpected weight change.   HENT: Negative for mouth sores.    Respiratory: Negative for cough, shortness of breath and wheezing.    Cardiovascular: Negative for chest pain and palpitations.   Gastrointestinal: Negative for abdominal pain, bloating, blood in stool, constipation, nausea and vomiting.   Endocrine: Negative for diabetes and hot flashes.   Genitourinary: Negative for dysmenorrhea, dyspareunia, dysuria, frequency, hematuria, menorrhagia, menstrual problem, pelvic pain, urgency, vaginal bleeding, vaginal discharge, vaginal pain, urinary incontinence, postcoital bleeding and vaginal odor.   Musculoskeletal: Negative for back pain and myalgias.   Integumentary:  Negative for rash, breast mass and nipple discharge.   Neurological: Negative for seizures and headaches.   Psychiatric/Behavioral: Negative for depression and sleep disturbance. The patient is not nervous/anxious.    Breast: Negative for mass, mastodynia and nipple discharge          Objective:    Physical Exam:   Constitutional: She appears well-developed and well-nourished. No distress.    HENT:   Head: Normocephalic and atraumatic.    Eyes: EOM are normal.    Neck: Normal range of motion.     Pulmonary/Chest: Effort normal. No respiratory distress.   Breasts: Non-tender, no engorgement, no masses, no retraction, no discharge. Negative for lymphadenopathy.         Abdominal: Soft. She exhibits no distension.  There is no tenderness. There is no rebound and no guarding.   Pfannenstiel scar      Genitourinary: Vagina normal. No vaginal discharge found.   Genitourinary Comments: Moderate atrophy.  Vulva without any obvious lesions.  Urethral meatus normal size and location without any lesion.  Urethra is non-tender without stricture or discharge.  Bladder is non-tender.  Vaginal vault with good support.  Minimal white discharge noted.  No obvious lesion.  Normal rugation.  Cervix and Uterus are surgically absent.  Adnexa is without any masses or tenderness.  Perineum without obvious lesion.             Musculoskeletal: Normal range of motion.       Neurological: She is alert.    Skin: Skin is warm and dry.    Psychiatric: She has a normal mood and affect.          Assessment:        1. Well woman exam with routine gynecological exam    2.  Menopause         Plan:          I have discussed with the patient her condition.  Monthly breast examination was instructed, discussed, and encouraged.  Patient was encouraged to consume a low-calorie, low fat diet, and to increase of physical activity.  Healthy habits encouraged.  A Pap smear was NOT performed according to the USPSTF recommendations; she no longer needs Pap.  Mammogram was not ordered because she has one earlier this month.  Gonorrhea and Chlamydia testing not performed;  HIV test not ordered, again according to guidelines.  Colonoscopy discussed according to ACS guideline.  We also discussed her obstetric history and CV risks.   Patient is to continue her medications as prescribed.  She will come back to see me in one year for her annual visit.  She can come back to see me sooner as necessary.  All of her questions were answered appropriately to her satisfaction.

## 2020-02-03 ENCOUNTER — PATIENT MESSAGE (OUTPATIENT)
Dept: OBSTETRICS AND GYNECOLOGY | Facility: CLINIC | Age: 65
End: 2020-02-03

## 2020-02-03 DIAGNOSIS — N95.1 SYMPTOMATIC MENOPAUSAL OR FEMALE CLIMACTERIC STATES: Primary | ICD-10-CM

## 2020-02-05 ENCOUNTER — HOSPITAL ENCOUNTER (OUTPATIENT)
Dept: RADIOLOGY | Facility: HOSPITAL | Age: 65
Discharge: HOME | End: 2020-02-05
Attending: OBSTETRICS & GYNECOLOGY
Payer: COMMERCIAL

## 2020-02-05 ENCOUNTER — PATIENT MESSAGE (OUTPATIENT)
Dept: FAMILY MEDICINE | Facility: CLINIC | Age: 65
End: 2020-02-05

## 2020-02-05 DIAGNOSIS — N95.1 SYMPTOMATIC MENOPAUSAL OR FEMALE CLIMACTERIC STATES: ICD-10-CM

## 2020-02-05 PROCEDURE — 77080 DXA BONE DENSITY AXIAL: CPT | Mod: 26,,, | Performed by: RADIOLOGY

## 2020-02-05 PROCEDURE — 77080 DEXA BONE DENSITY SPINE HIP: ICD-10-PCS | Mod: 26,,, | Performed by: RADIOLOGY

## 2020-02-05 PROCEDURE — 77080 DXA BONE DENSITY AXIAL: CPT | Mod: TC

## 2020-02-12 DIAGNOSIS — N95.1 SYMPTOMATIC MENOPAUSAL OR FEMALE CLIMACTERIC STATES: ICD-10-CM

## 2020-02-12 RX ORDER — ESTROGENS, CONJUGATED 0.62 MG/1
TABLET, FILM COATED ORAL
Qty: 30 TABLET | Refills: 12 | Status: SHIPPED | OUTPATIENT
Start: 2020-02-12 | End: 2021-02-23 | Stop reason: SDUPTHER

## 2020-04-15 ENCOUNTER — HOSPITAL ENCOUNTER (INPATIENT)
Facility: HOSPITAL | Age: 65
LOS: 2 days | Discharge: HOME | DRG: 872 | End: 2020-04-17
Attending: EMERGENCY MEDICINE | Admitting: HOSPITALIST
Payer: COMMERCIAL

## 2020-04-15 ENCOUNTER — NURSE TRIAGE (OUTPATIENT)
Dept: ADMINISTRATIVE | Facility: CLINIC | Age: 65
End: 2020-04-15

## 2020-04-15 DIAGNOSIS — A41.9 SEVERE SEPSIS: Primary | ICD-10-CM

## 2020-04-15 DIAGNOSIS — E11.65 UNCONTROLLED TYPE 2 DIABETES MELLITUS WITH HYPERGLYCEMIA: ICD-10-CM

## 2020-04-15 DIAGNOSIS — R65.20 SEVERE SEPSIS: Primary | ICD-10-CM

## 2020-04-15 DIAGNOSIS — R06.02 SOB (SHORTNESS OF BREATH): ICD-10-CM

## 2020-04-15 DIAGNOSIS — R42 DIZZINESS: ICD-10-CM

## 2020-04-15 LAB
ALBUMIN SERPL-MCNC: 3.6 G/DL (ref 3.3–5.5)
ALLENS TEST: ABNORMAL
ALP SERPL-CCNC: 88 U/L (ref 42–141)
BILIRUB SERPL-MCNC: 0.6 MG/DL (ref 0.2–1.6)
BILIRUBIN, POC UA: NEGATIVE
BLOOD, POC UA: NEGATIVE
BUN SERPL-MCNC: 18 MG/DL (ref 7–22)
CALCIUM SERPL-MCNC: 8.9 MG/DL (ref 8–10.3)
CHLORIDE SERPL-SCNC: 108 MMOL/L (ref 98–108)
CK SERPL-CCNC: 85 U/L (ref 20–180)
CLARITY, POC UA: CLEAR
COLOR, POC UA: YELLOW
CREAT SERPL-MCNC: 0.9 MG/DL (ref 0.6–1.2)
CRP SERPL-MCNC: 4.3 MG/L (ref 0–8.2)
FERRITIN SERPL-MCNC: 12 NG/ML (ref 20–300)
GLUCOSE SERPL-MCNC: 119 MG/DL (ref 73–118)
GLUCOSE, POC UA: NEGATIVE
HCO3 UR-SCNC: 20.7 MMOL/L (ref 24–28)
KETONES, POC UA: NEGATIVE
LDH SERPL L TO P-CCNC: 153 U/L (ref 110–260)
LDH SERPL L TO P-CCNC: 2.22 MMOL/L (ref 0.5–2.2)
LEUKOCYTE EST, POC UA: NEGATIVE
NITRITE, POC UA: NEGATIVE
PCO2 BLDA: 30.6 MMHG (ref 35–45)
PH SMN: 7.44 [PH] (ref 7.35–7.45)
PH UR STRIP: 5.5 [PH]
PO2 BLDA: 54 MMHG (ref 40–60)
POC ALT (SGPT): 17 U/L (ref 10–47)
POC AST (SGOT): 24 U/L (ref 11–38)
POC B-TYPE NATRIURETIC PEPTIDE: 138 PG/ML (ref 0–100)
POC BE: -3 MMOL/L
POC CARDIAC TROPONIN I: 0 NG/ML
POC SATURATED O2: 89 % (ref 95–100)
POC TCO2: 22 MMOL/L (ref 24–29)
POC TCO2: 24 MMOL/L (ref 18–33)
POCT GLUCOSE: 108 MG/DL (ref 70–110)
POCT GLUCOSE: 143 MG/DL (ref 70–110)
POTASSIUM BLD-SCNC: 4.1 MMOL/L (ref 3.6–5.1)
PROTEIN, POC UA: NEGATIVE
PROTEIN, POC: 7.1 G/DL (ref 6.4–8.1)
SAMPLE: ABNORMAL
SAMPLE: NORMAL
SARS-COV-2 RDRP RESP QL NAA+PROBE: NEGATIVE
SITE: ABNORMAL
SODIUM BLD-SCNC: 137 MMOL/L (ref 128–145)
SPECIFIC GRAVITY, POC UA: 1.02
UROBILINOGEN, POC UA: 0.2 E.U./DL

## 2020-04-15 PROCEDURE — 84484 ASSAY OF TROPONIN QUANT: CPT | Mod: ER

## 2020-04-15 PROCEDURE — 83615 LACTATE (LD) (LDH) ENZYME: CPT

## 2020-04-15 PROCEDURE — 81003 URINALYSIS AUTO W/O SCOPE: CPT | Mod: ER

## 2020-04-15 PROCEDURE — 96374 THER/PROPH/DIAG INJ IV PUSH: CPT | Mod: ER

## 2020-04-15 PROCEDURE — 83880 ASSAY OF NATRIURETIC PEPTIDE: CPT | Mod: ER

## 2020-04-15 PROCEDURE — 63600175 PHARM REV CODE 636 W HCPCS: Performed by: NURSE PRACTITIONER

## 2020-04-15 PROCEDURE — 86140 C-REACTIVE PROTEIN: CPT

## 2020-04-15 PROCEDURE — 25000003 PHARM REV CODE 250: Performed by: NURSE PRACTITIONER

## 2020-04-15 PROCEDURE — 85025 COMPLETE CBC W/AUTO DIFF WBC: CPT | Mod: ER

## 2020-04-15 PROCEDURE — 93010 ELECTROCARDIOGRAM REPORT: CPT | Mod: ,,, | Performed by: INTERNAL MEDICINE

## 2020-04-15 PROCEDURE — 82728 ASSAY OF FERRITIN: CPT

## 2020-04-15 PROCEDURE — 25000003 PHARM REV CODE 250: Mod: ER | Performed by: EMERGENCY MEDICINE

## 2020-04-15 PROCEDURE — 63600175 PHARM REV CODE 636 W HCPCS: Mod: ER | Performed by: EMERGENCY MEDICINE

## 2020-04-15 PROCEDURE — 93010 EKG 12-LEAD: ICD-10-PCS | Mod: ,,, | Performed by: INTERNAL MEDICINE

## 2020-04-15 PROCEDURE — 93005 ELECTROCARDIOGRAM TRACING: CPT | Mod: ER

## 2020-04-15 PROCEDURE — U0002 COVID-19 LAB TEST NON-CDC: HCPCS

## 2020-04-15 PROCEDURE — 80053 COMPREHEN METABOLIC PANEL: CPT | Mod: ER

## 2020-04-15 PROCEDURE — 87502 INFLUENZA DNA AMP PROBE: CPT

## 2020-04-15 PROCEDURE — 99291 CRITICAL CARE FIRST HOUR: CPT | Mod: 25,ER

## 2020-04-15 PROCEDURE — 82550 ASSAY OF CK (CPK): CPT

## 2020-04-15 PROCEDURE — 87040 BLOOD CULTURE FOR BACTERIA: CPT

## 2020-04-15 PROCEDURE — 82803 BLOOD GASES ANY COMBINATION: CPT | Mod: ER

## 2020-04-15 PROCEDURE — 21400001 HC TELEMETRY ROOM

## 2020-04-15 RX ORDER — LEVOTHYROXINE SODIUM 25 UG/1
25 TABLET ORAL DAILY
Status: DISCONTINUED | OUTPATIENT
Start: 2020-04-16 | End: 2020-04-17 | Stop reason: HOSPADM

## 2020-04-15 RX ORDER — NAPROXEN SODIUM 220 MG/1
81 TABLET, FILM COATED ORAL DAILY
Status: DISCONTINUED | OUTPATIENT
Start: 2020-04-16 | End: 2020-04-17 | Stop reason: HOSPADM

## 2020-04-15 RX ORDER — IBUPROFEN 200 MG
24 TABLET ORAL
Status: DISCONTINUED | OUTPATIENT
Start: 2020-04-15 | End: 2020-04-17 | Stop reason: HOSPADM

## 2020-04-15 RX ORDER — SODIUM CHLORIDE 9 MG/ML
500 INJECTION, SOLUTION INTRAVENOUS
Status: COMPLETED | OUTPATIENT
Start: 2020-04-15 | End: 2020-04-15

## 2020-04-15 RX ORDER — TALC
6 POWDER (GRAM) TOPICAL NIGHTLY PRN
Status: DISCONTINUED | OUTPATIENT
Start: 2020-04-15 | End: 2020-04-17 | Stop reason: HOSPADM

## 2020-04-15 RX ORDER — ONDANSETRON 2 MG/ML
4 INJECTION INTRAMUSCULAR; INTRAVENOUS EVERY 8 HOURS PRN
Status: DISCONTINUED | OUTPATIENT
Start: 2020-04-15 | End: 2020-04-17 | Stop reason: HOSPADM

## 2020-04-15 RX ORDER — SODIUM CHLORIDE 9 MG/ML
1000 INJECTION, SOLUTION INTRAVENOUS
Status: DISCONTINUED | OUTPATIENT
Start: 2020-04-15 | End: 2020-04-15

## 2020-04-15 RX ORDER — LEVOTHYROXINE SODIUM 100 UG/1
200 TABLET ORAL DAILY
Status: DISCONTINUED | OUTPATIENT
Start: 2020-04-16 | End: 2020-04-17 | Stop reason: HOSPADM

## 2020-04-15 RX ORDER — INSULIN ASPART 100 [IU]/ML
1-10 INJECTION, SOLUTION INTRAVENOUS; SUBCUTANEOUS
Status: DISCONTINUED | OUTPATIENT
Start: 2020-04-15 | End: 2020-04-17 | Stop reason: HOSPADM

## 2020-04-15 RX ORDER — ATORVASTATIN CALCIUM 40 MG/1
40 TABLET, FILM COATED ORAL NIGHTLY
Status: DISCONTINUED | OUTPATIENT
Start: 2020-04-15 | End: 2020-04-17 | Stop reason: HOSPADM

## 2020-04-15 RX ORDER — PANTOPRAZOLE SODIUM 40 MG/1
40 TABLET, DELAYED RELEASE ORAL DAILY
Status: DISCONTINUED | OUTPATIENT
Start: 2020-04-16 | End: 2020-04-17 | Stop reason: HOSPADM

## 2020-04-15 RX ORDER — ACETAMINOPHEN 325 MG/1
650 TABLET ORAL EVERY 6 HOURS PRN
Status: DISCONTINUED | OUTPATIENT
Start: 2020-04-15 | End: 2020-04-17 | Stop reason: HOSPADM

## 2020-04-15 RX ORDER — CEFTRIAXONE 1 G/1
1 INJECTION, POWDER, FOR SOLUTION INTRAMUSCULAR; INTRAVENOUS
Status: COMPLETED | OUTPATIENT
Start: 2020-04-15 | End: 2020-04-15

## 2020-04-15 RX ORDER — GLUCAGON 1 MG
1 KIT INJECTION
Status: DISCONTINUED | OUTPATIENT
Start: 2020-04-15 | End: 2020-04-17 | Stop reason: HOSPADM

## 2020-04-15 RX ORDER — IBUPROFEN 200 MG
16 TABLET ORAL
Status: DISCONTINUED | OUTPATIENT
Start: 2020-04-15 | End: 2020-04-17 | Stop reason: HOSPADM

## 2020-04-15 RX ORDER — ACETAMINOPHEN 500 MG
1000 TABLET ORAL
Status: COMPLETED | OUTPATIENT
Start: 2020-04-15 | End: 2020-04-15

## 2020-04-15 RX ORDER — AMOXICILLIN 250 MG
1 CAPSULE ORAL DAILY PRN
Status: DISCONTINUED | OUTPATIENT
Start: 2020-04-15 | End: 2020-04-17 | Stop reason: HOSPADM

## 2020-04-15 RX ORDER — SODIUM CHLORIDE 0.9 % (FLUSH) 0.9 %
10 SYRINGE (ML) INJECTION
Status: DISCONTINUED | OUTPATIENT
Start: 2020-04-15 | End: 2020-04-17 | Stop reason: HOSPADM

## 2020-04-15 RX ORDER — PROCHLORPERAZINE EDISYLATE 5 MG/ML
5 INJECTION INTRAMUSCULAR; INTRAVENOUS
Status: COMPLETED | OUTPATIENT
Start: 2020-04-15 | End: 2020-04-15

## 2020-04-15 RX ADMIN — ACETAMINOPHEN 1000 MG: 500 TABLET ORAL at 07:04

## 2020-04-15 RX ADMIN — ACETAMINOPHEN 650 MG: 325 TABLET ORAL at 07:04

## 2020-04-15 RX ADMIN — CEFTRIAXONE 1 G: 1 INJECTION, POWDER, FOR SOLUTION INTRAMUSCULAR; INTRAVENOUS at 09:04

## 2020-04-15 RX ADMIN — ATORVASTATIN CALCIUM 40 MG: 40 TABLET, FILM COATED ORAL at 08:04

## 2020-04-15 RX ADMIN — PIPERACILLIN AND TAZOBACTAM 4.5 G: 4; .5 INJECTION, POWDER, LYOPHILIZED, FOR SOLUTION INTRAVENOUS; PARENTERAL at 10:04

## 2020-04-15 RX ADMIN — SODIUM CHLORIDE 500 ML: 0.9 INJECTION, SOLUTION INTRAVENOUS at 09:04

## 2020-04-15 RX ADMIN — PROCHLORPERAZINE EDISYLATE 5 MG: 5 INJECTION INTRAMUSCULAR; INTRAVENOUS at 10:04

## 2020-04-15 RX ADMIN — PIPERACILLIN AND TAZOBACTAM 4.5 G: 4; .5 INJECTION, POWDER, LYOPHILIZED, FOR SOLUTION INTRAVENOUS; PARENTERAL at 06:04

## 2020-04-15 NOTE — PLAN OF CARE
"Discharge planning assessment completed with assistance from patient's spouse via telephone.  Patient from home and independent.  NO DME and no outpatient services.  Patient does have assigned POA however it my not be on file.  Spouse can produce document if needed. Case management will continue to assess and assist with discharge planning.       04/15/20 1500   Discharge Assessment   Assessment Type Discharge Planning Assessment   Confirmed/corrected address and phone number on facesheet? Yes   Assessment information obtained from? Caregiver  (Patient's spouse.)   Expected Length of Stay (days) 2   Communicated expected length of stay with patient/caregiver no   Prior to hospitilization cognitive status: Alert/Oriented   Prior to hospitalization functional status: Independent   Current cognitive status: Alert/Oriented   Current Functional Status: Independent   Facility Arrived From: home   Lives With spouse   Able to Return to Prior Arrangements yes   Is patient able to care for self after discharge? Yes   Who are your caregiver(s) and their phone number(s)? Emergency Contact:  Jose Coppola" Johndle/spouse/[phone]   Patient's perception of discharge disposition home or selfcare   Readmission Within the Last 30 Days no previous admission in last 30 days   Patient currently being followed by outpatient case management? No   Patient currently receives any other outside agency services? No   Equipment Currently Used at Home none   Part D Coverage n/a   Do you have any problems affording any of your prescribed medications? No   Is the patient taking medications as prescribed? yes   Does the patient have transportation home? Yes   Transportation Anticipated family or friend will provide   Dialysis Name and Scheduled days n/a   Does the patient receive services at the [facility]? No   Discharge Plan A Home   Discharge Plan B Home   DME Needed Upon Discharge  none   Patient/Family in Agreement with Plan yes "   Tiki Rosario [facility], [facility]  4/15/20

## 2020-04-15 NOTE — ED NOTES
Pt tolerating tx well.  Pt was medicated for a Ha and is resting on her right side at this time.  IV site reinforced with coban d/t moist skin from fever breaking.

## 2020-04-15 NOTE — HPI
Anushka Napier is a 65 y.o female with hypertension, hyperlipidemia, hypothyroidism, diabetes mellitus, and GERD who presents to the hospital with complaints of dizziness, chill, nausea, and severe cramping bilateral lower extremities since waking up this morning. Associated symptom is headache. Patient states she has been feeling fine until this morning. Patient reports bilateral LE intermittent cramping 9/10. No exacerbating or alleviating factors. Did not take anything to relieve symptoms. Denies any chest pain, neck pain, SOB, cough, congestion, fever, abdominal pain, dysuria, vomit/diarrhea, edema or weakness. No sick contact or recent travel. Lives with  and he doesn't have any symptoms.    In ED, patient was found with fever 103, mild tachycardia, tachypnea and hypotension. Chest xray reveals no acute changes from prior exam on 1/15/2018 with small amount of opacity in R lung suggesting scarring. Covid 19 negative. EKG no ischemic.     Labs: CBC with WBC 12, elevated lactate; elevated ; unremarkable CMP; negative troponin; normal CPK, LD, CRP; Low Ferritin 12; BC pending. UA no infection    During interview, patient exam is normal. Patient reports all symptoms resolved in ED after IVF and antibiotics.    Patient is admitted with hospital medicine for further evaluation and treatment for sepsis.

## 2020-04-15 NOTE — TELEPHONE ENCOUNTER
Chills, nausea, headache, leg cramping and very dry mouth. Pt stated dry mouth is causing her to not to be able to talk. She has been trying to drink but it makes her nauseated.  Pt instructed to go to er as per care advice. Pt verbalized understanding.     Reason for Disposition   Patient sounds very sick or weak to the triager    Additional Information   Negative: Looks like a broken bone or dislocated joint (e.g., crooked or deformed)   Negative: Sounds like a life-threatening emergency to the triager   Negative: Chest pain   Negative: Difficulty breathing   Negative: Entire foot is cool or blue in comparison to other side   Negative: Unable to walk   Negative: [1] Red area or streak AND [2] fever   Negative: [1] Swollen joint AND [2] fever   Negative: [1] Cast on leg or ankle AND [2] now increased pain    Protocols used: LEG PAIN-A-AH

## 2020-04-15 NOTE — PROGRESS NOTES
Attempt to contact patient's spouse via telephone for assistance with discharge planning assessment unsuccessful.  Call [phone]  went to voice messaging but no message was left as it was a business phone.  Tiki Rosario LMSW, CCM  4/15/20

## 2020-04-15 NOTE — ED PROVIDER NOTES
Encounter Date: 4/15/2020       History     Chief Complaint   Patient presents with    Dizziness     pt c/o chills, dizziness and nausea starting this morning. Denies fever and vomiting. Denies any SOB or CP at present. RR unlabored Temp 103.4 in ED      This is a 65-year-old female with history of hypertension, diabetes, hypothyroidism who comes in complaining of several hours of dizziness, fevers, chills, nausea.  Patient reports that she has been feeling fine until this morning.  She woke up this morning and felt dizzy with severe body aches and cramping in her lower extremities.  She feels dizzy and lightheaded.  She denies any chest pain or shortness of breath.  She denies any cough.  No abdominal pain.  She denies any vomiting but reports mild nausea.  No constipation or diarrhea.  She denies any lower extremity edema.  She denies any known sick contacts or exposure to COVID-19.  She denies any exacerbating or alleviating factors.  She has not taken any medications.        Review of patient's allergies indicates:   Allergen Reactions    Tizanidine Swelling     Tongue swelling     Codeine Hives     Other reaction(s): Itching  Other reaction(s): Hives    Iodinated contrast media Hives     Other reaction(s): Hives    Iodine      Other reaction(s): Unknown    Sulfamethoxazole-trimethoprim Itching     Other reaction(s): Itching  Other reaction(s): Hives    Epinephrine Anxiety and Other (See Comments)     Almost passed out.     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, long term PPI use    History of colonic polyps 1/24/2020    Hyperlipidemia     Hypertension     Osteopenia 9/25/2014    Screening for colorectal cancer 9/25/2014    Normal 2009    Screening for  colorectal cancer     Thyroid disease     Vitamin D deficiency disease 9/25/2014     Past Surgical History:   Procedure Laterality Date    HYSTERECTOMY  1997    complete for prolapse, Abdominal    OOPHORECTOMY      right shoulder      SINUS SURGERY  2012     Family History   Problem Relation Age of Onset    Breast cancer Cousin 40    Miscarriages / Stillbirths Maternal Grandmother     Miscarriages / Stillbirths Mother     Diabetes Mother     Ovarian cancer Neg Hx      Social History     Tobacco Use    Smoking status: Never Smoker    Smokeless tobacco: Never Used   Substance Use Topics    Alcohol use: No    Drug use: No     Review of Systems   Constitutional: Positive for chills and fever.   HENT: Negative for congestion, sore throat and trouble swallowing.    Respiratory: Negative for cough and shortness of breath.    Cardiovascular: Negative for chest pain and palpitations.   Gastrointestinal: Negative for abdominal pain, diarrhea, nausea and vomiting.   Genitourinary: Negative for dysuria and flank pain.   Musculoskeletal: Positive for myalgias. Negative for back pain and neck pain.   Neurological: Positive for dizziness. Negative for weakness, numbness and headaches.   All other systems reviewed and are negative.      Physical Exam     Initial Vitals [04/15/20 0732]   BP Pulse Resp Temp SpO2   (!) 86/63 97 18 (!) 103.4 °F (39.7 °C) 95 %      MAP       --         Physical Exam    Nursing note and vitals reviewed.  Constitutional: Vital signs are normal. She appears well-developed and well-nourished.  Non-toxic appearance. No distress.   HENT:   Head: Normocephalic and atraumatic.   Mouth/Throat: Oropharynx is clear and moist.   Eyes: Conjunctivae and EOM are normal. Pupils are equal, round, and reactive to light.   Neck: Normal range of motion. Neck supple. No muscular tenderness present. No neck rigidity.   Cardiovascular: Normal rate, regular rhythm and intact distal pulses.   Pulmonary/Chest:  Breath sounds normal. She has no wheezes.   Abdominal: Soft. Normal appearance and bowel sounds are normal. There is no tenderness.   Musculoskeletal: Normal range of motion. She exhibits no edema or tenderness.   Lymphadenopathy:     She has no cervical adenopathy.     She has no axillary adenopathy.   Neurological: She is alert and oriented to person, place, and time. She has normal strength. No cranial nerve deficit or sensory deficit. Gait normal.   Skin: Skin is warm, dry and intact.   Psychiatric: She has a normal mood and affect. Her behavior is normal.         ED Course   Critical Care  Date/Time: 4/15/2020 9:37 AM  Performed by: Shana Ireland MD  Authorized by: Shana Ireland MD   Direct patient critical care time: 15 minutes  Additional history critical care time: 10 minutes  Ordering / reviewing critical care time: 10 minutes  Documentation critical care time: 5 minutes  Consulting other physicians critical care time: 5 minutes  Total critical care time (exclusive of procedural time) : 45 minutes  Critical care was necessary to treat or prevent imminent or life-threatening deterioration of the following conditions: sepsis and shock.  Critical care was time spent personally by me on the following activities: development of treatment plan with patient or surrogate, discussions with consultants, interpretation of cardiac output measurements, evaluation of patient's response to treatment, examination of patient, obtaining history from patient or surrogate, ordering and performing treatments and interventions, ordering and review of laboratory studies, ordering and review of radiographic studies, re-evaluation of patient's condition and review of old charts.  Subsequent provider of critical care: I assumed direction of critical care for this patient from another provider of my specialty.        Labs Reviewed   CULTURE, BLOOD   CULTURE, BLOOD   C-REACTIVE PROTEIN   FERRITIN   LACTATE DEHYDROGENASE   CK    SARS-COV-2 RNA AMPLIFICATION, QUAL   POCT CBC   POCT CMP   POCT TROPONIN   POCT B-TYPE NATRIURETIC PEPTIDE (BNP)     EKG Readings: (Independently Interpreted)   Time: 4/15/2020 8:14  Rate: 100 bpm  Normal sinus rhythm. No ST or T wave abnormalities.   Unchanged from prior.       Imaging Results          X-Ray Chest AP Portable (In process)                  Medical Decision Making:   Initial Assessment:   This is a 65-year-old female with history of diabetes, hypertension, hypothyroidism who comes in complaining of body aches, fevers and chills.  Patient on evaluation is febrile and tachycardic.  She was initially hypotensive in triage.  She is orthostatic.  On physical exam lungs are clear.  She has a regular rate and rhythm.  Her exam is normal otherwise.  Orders included EKG, CBC, CMP, troponin, BNP, lactic acid, blood cultures, rapid COVID-19 testing was sent, CK, ferritin, LDH were also ordered.  Chest x-ray was ordered.  UA was ordered.  She was given a 500 cc normal saline bolus.  She was given Tylenol.  Differential Diagnosis:   Sepsis, septic shock, COVID-19 infection, viral syndrome, dehydration, rhabdo, ERINN, electrolyte abnormality.  Independently Interpreted Test(s):   I have ordered and independently interpreted X-rays - see summary below.       <> Summary of X-Ray Reading(s): Chest x-ray was independently reviewed by me and appeared unchanged from prior.  Clinical Tests:   Lab Tests: Ordered and Reviewed  The following lab test(s) were unremarkable: CBC, CMP, Troponin, BNP, Urinalysis and Lactate       <> Summary of Lab: Labs were reviewed were significant for mild white count elevation.  Lactic acid was elevated at 2.2.  His CO2 was 20.  UA was unremarkable.  LDH was mildly elevated.  Rapid COVID-19 testing was negative.  ED Management:  Patient's workup is concerning for severe sepsis.  She was initially hypotensive.  She was a given a fluid bolus with normalization of her blood pressure.  She was  also given Tylenol with improvement in her fever.  She was given broad-spectrum IV antibiotics in light of concern for severe sepsis in the setting of SIRS criteria with a lactic acid greater than 2.  Patient's source is unclear.  COVID-19 remains in the differential in light of the sensitivity of 65-70% of rapid testing especially early in the course of the disease.  Any further fluids were held (please see comment below).  On repeated re-evaluation her blood pressure was stable.  Vitals were improved.  In light of concern for severe sepsis despite improvement in vitals after resuscitation patient will be admitted for further inpatient workup and management.  Case was discussed with admitting physician and Dr. Kumar at [facility].  Patient was consented for transfer and was hemodynamically stable at the time of transfer.  Patient presented during the COVID-19 virus pandemic and has a confusing clinical picture that could be consistent with COVID-19 source for sepsis. Thus patient was treated for suspected viral sepsis rather than bacterial sepsis.  Given the potential for ARDS and present suggestions of early data from COVID treatment in other areas, fluids were given judiciously and the bacterial sepsis guidelines were deviated from.  For consideration of other sources, blood cultures, antibiotics and lactic acid were ordered.  Patient's blood pressure was closely monitored and fluid resuscitation will be adjusted accordingly.                                 Clinical Impression:   1. Severe Sepsis      Disposition:   Disposition: Discharged  Condition: Stable                        Shana Ireland MD  04/15/20 0936

## 2020-04-15 NOTE — ED NOTES
Pt reports, leg cramps that toes up to my back and dizzy this morning when I woke up.  I've been having chills and my mouth is dry.'  Pt with temp 103.2.  Provider at bedside.

## 2020-04-15 NOTE — SUBJECTIVE & OBJECTIVE
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, long term PPI use    History of colonic polyps 1/24/2020    Hyperlipidemia     Hypertension     Osteopenia 9/25/2014    Screening for colorectal cancer 9/25/2014    Normal 2009    Screening for colorectal cancer     Thyroid disease     Vitamin D deficiency disease 9/25/2014       Past Surgical History:   Procedure Laterality Date    HYSTERECTOMY  1997    complete for prolapse, Abdominal    OOPHORECTOMY      right shoulder      SINUS SURGERY  2012       Review of patient's allergies indicates:   Allergen Reactions    Tizanidine Swelling     Tongue swelling     Codeine Hives     Other reaction(s): Itching  Other reaction(s): Hives    Iodinated contrast media Hives     Other reaction(s): Hives    Iodine      Other reaction(s): Unknown    Sulfamethoxazole-trimethoprim Itching     Other reaction(s): Itching  Other reaction(s): Hives    Epinephrine Anxiety and Other (See Comments)     Almost passed out.       No current facility-administered medications on file prior to encounter.      Current Outpatient Medications on File Prior to Encounter   Medication Sig    aspirin 81 MG chewable tablet Take 1 tablet by mouth Daily. 1 Tablet, Chewable Oral Every day    atorvastatin (LIPITOR) 40 MG tablet TAKE 1 TABLET(40 MG) BY MOUTH EVERY DAY    FLUoxetine 20 MG capsule TAKE 1 CAPSULE BY MOUTH DAILY    levothyroxine (SYNTHROID) 200 MCG tablet TAKE 1 TABLET BY MOUTH EVERY DAY    levothyroxine (SYNTHROID) 25 MCG tablet TAKE 1 TABLET BY MOUTH EVERY DAY    metoprolol tartrate (LOPRESSOR) 50 MG tablet TAKE 1 TABLET BY MOUTH DAILY    PREMARIN 0.625 mg tablet TAKE 1 TABLET BY MOUTH ONCE DAILY    RABEprazole (ACIPHEX) 20 mg  tablet TAKE 1 TABLET(20 MG) BY MOUTH TWICE DAILY    vitamin D (VITAMIN D3) 1000 units Tab Take 1,000 Units by mouth once daily. 5 tabs daily    CONTOUR NEXT STRIPS Strp USE TO TEST ONCE DAILY    fluticasone (FLONASE) 50 mcg/actuation nasal spray 2 sprays (100 mcg total) by Each Nare route once daily. 2 Aerosol, Spray Nasal Every day    LORazepam (ATIVAN) 0.5 MG tablet Take 1 tablet (0.5 mg total) by mouth every 8 (eight) hours as needed for Anxiety.    meloxicam (MOBIC) 7.5 MG tablet Take 1 tablet (7.5 mg total) by mouth once daily. Take once a day for two weeks then as needed. Take with food    metFORMIN (GLUCOPHAGE-XR) 500 MG 24 hr tablet Take 1 tablet (500 mg total) by mouth once daily.    omega-3 fatty acids-vitamin E (FISH OIL) 1,000 mg Cap Take 1 capsule by mouth Twice daily. 1 Capsule Oral Twice a day     Saccharomyces boulardii (FLORASTOR) 250 mg capsule Take 250 mg by mouth 2 (two) times daily.    solifenacin (VESICARE) 5 MG tablet Take 5 mg by mouth.    traMADol (ULTRAM) 50 mg tablet Take 1 tablet (50 mg total) by mouth every 12 (twelve) hours as needed for Pain.    VESICARE 5 mg tablet Take 5 mg by mouth once daily.      Family History     Problem Relation (Age of Onset)    Breast cancer Cousin (40)    Cancer Mother    Diabetes Mother    Heart disease Father    Hypertension Mother, Father    Miscarriages / Stillbirths Maternal Grandmother, Mother        Tobacco Use    Smoking status: Never Smoker    Smokeless tobacco: Never Used   Substance and Sexual Activity    Alcohol use: No    Drug use: No    Sexual activity: Yes     Partners: Male     Birth control/protection: Surgical     Review of Systems   Constitutional: Positive for chills. Negative for activity change, appetite change, diaphoresis, fatigue and fever.   HENT: Negative for congestion, rhinorrhea and sore throat.    Eyes: Negative for photophobia and visual disturbance.   Respiratory: Negative for apnea, cough, chest tightness,  shortness of breath and wheezing.    Cardiovascular: Negative for chest pain, palpitations and leg swelling.   Gastrointestinal: Positive for nausea. Negative for abdominal distention, abdominal pain, blood in stool, diarrhea and vomiting.   Genitourinary: Negative for dysuria, flank pain and hematuria.   Musculoskeletal: Positive for myalgias (Bilateral LE cramping. Resolved). Negative for arthralgias, joint swelling, neck pain and neck stiffness.   Skin: Negative.    Neurological: Positive for dizziness and headaches. Negative for tremors, syncope, weakness, light-headedness and numbness.   Hematological: Does not bruise/bleed easily.     Objective:     Vital Signs (Most Recent):  Temp: 98.5 °F (36.9 °C) (04/15/20 1545)  Pulse: 78 (04/15/20 1545)  Resp: (!) 22 (04/15/20 1545)  BP: 124/61 (04/15/20 1545)  SpO2: 98 % (04/15/20 1545) Vital Signs (24h Range):  Temp:  [98.5 °F (36.9 °C)-103.4 °F (39.7 °C)] 98.5 °F (36.9 °C)  Pulse:  [] 78  Resp:  [18-31] 22  SpO2:  [94 %-98 %] 98 %  BP: ()/(54-83) 124/61     Weight: 71.7 kg (158 lb)  Body mass index is 26.29 kg/m².    Physical Exam   Constitutional: She is oriented to person, place, and time. She appears well-developed and well-nourished. No distress.   HENT:   Head: Normocephalic and atraumatic.   Eyes: Pupils are equal, round, and reactive to light. Conjunctivae and EOM are normal.   Neck: Normal range of motion. Neck supple. No JVD present.   Cardiovascular: Normal rate, regular rhythm and normal heart sounds.   Pulmonary/Chest: Effort normal and breath sounds normal. No respiratory distress. She has no wheezes. She has no rales. She exhibits no tenderness.   Abdominal: Soft. Bowel sounds are normal. She exhibits no distension. There is no tenderness. There is no guarding.   Musculoskeletal: Normal range of motion. She exhibits no edema or tenderness.   Neurological: She is alert and oriented to person, place, and time. She exhibits normal muscle tone.  Coordination normal.   Skin: Skin is warm and dry. Capillary refill takes less than 2 seconds. No rash noted. She is not diaphoretic. No erythema.   Psychiatric: She has a normal mood and affect.         CRANIAL NERVES     CN III, IV, VI   Pupils are equal, round, and reactive to light.  Extraocular motions are normal.        Significant Labs: CBC: No results for input(s): WBC, HGB, HCT, PLT in the last 48 hours.  CMP: No results for input(s): NA, K, CL, CO2, GLU, BUN, CREATININE, CALCIUM, PROT, ALBUMIN, BILITOT, ALKPHOS, AST, ALT, ANIONGAP, EGFRNONAA in the last 48 hours.    Invalid input(s): ESTGFAFRICA  Cardiac Markers: No results for input(s): CKMB, MYOGLOBIN, BNP, TROPISTAT in the last 48 hours.  Lactic Acid: No results for input(s): LACTATE in the last 48 hours.  Troponin: No results for input(s): TROPONINI in the last 48 hours.    Significant Imaging: I have reviewed and interpreted all pertinent imaging results/findings within the past 24 hours.

## 2020-04-16 LAB
BASOPHILS # BLD AUTO: 0.04 K/UL (ref 0–0.2)
BASOPHILS NFR BLD: 0.3 % (ref 0–1.9)
DIFFERENTIAL METHOD: ABNORMAL
EOSINOPHIL # BLD AUTO: 0.1 K/UL (ref 0–0.5)
EOSINOPHIL NFR BLD: 0.8 % (ref 0–8)
ERYTHROCYTE [DISTWIDTH] IN BLOOD BY AUTOMATED COUNT: 14.3 % (ref 11.5–14.5)
HCT VFR BLD AUTO: 33.3 % (ref 37–48.5)
HGB BLD-MCNC: 10.3 G/DL (ref 12–16)
IMM GRANULOCYTES # BLD AUTO: 0.06 K/UL (ref 0–0.04)
IMM GRANULOCYTES NFR BLD AUTO: 0.5 % (ref 0–0.5)
INFLUENZA A, MOLECULAR: NEGATIVE
INFLUENZA B, MOLECULAR: NEGATIVE
LACTATE SERPL-SCNC: 2 MMOL/L (ref 0.5–2.2)
LYMPHOCYTES # BLD AUTO: 1.2 K/UL (ref 1–4.8)
LYMPHOCYTES NFR BLD: 9.1 % (ref 18–48)
MCH RBC QN AUTO: 25.9 PG (ref 27–31)
MCHC RBC AUTO-ENTMCNC: 30.9 G/DL (ref 32–36)
MCV RBC AUTO: 84 FL (ref 82–98)
MONOCYTES # BLD AUTO: 0.8 K/UL (ref 0.3–1)
MONOCYTES NFR BLD: 6.4 % (ref 4–15)
NEUTROPHILS # BLD AUTO: 10.9 K/UL (ref 1.8–7.7)
NEUTROPHILS NFR BLD: 82.9 % (ref 38–73)
NRBC BLD-RTO: 0 /100 WBC
PLATELET # BLD AUTO: 257 K/UL (ref 150–350)
PMV BLD AUTO: 9.3 FL (ref 9.2–12.9)
POCT GLUCOSE: 111 MG/DL (ref 70–110)
POCT GLUCOSE: 116 MG/DL (ref 70–110)
POCT GLUCOSE: 121 MG/DL (ref 70–110)
POCT GLUCOSE: 129 MG/DL (ref 70–110)
POCT GLUCOSE: 130 MG/DL (ref 70–110)
RBC # BLD AUTO: 3.97 M/UL (ref 4–5.4)
SPECIMEN SOURCE: NORMAL
WBC # BLD AUTO: 13.11 K/UL (ref 3.9–12.7)

## 2020-04-16 PROCEDURE — 83605 ASSAY OF LACTIC ACID: CPT

## 2020-04-16 PROCEDURE — 85025 COMPLETE CBC W/AUTO DIFF WBC: CPT

## 2020-04-16 PROCEDURE — 25000003 PHARM REV CODE 250: Performed by: NURSE PRACTITIONER

## 2020-04-16 PROCEDURE — 36415 COLL VENOUS BLD VENIPUNCTURE: CPT

## 2020-04-16 PROCEDURE — U0002 COVID-19 LAB TEST NON-CDC: HCPCS

## 2020-04-16 PROCEDURE — 63600175 PHARM REV CODE 636 W HCPCS: Performed by: NURSE PRACTITIONER

## 2020-04-16 PROCEDURE — 21400001 HC TELEMETRY ROOM

## 2020-04-16 RX ADMIN — LEVOTHYROXINE SODIUM 25 MCG: 25 TABLET ORAL at 09:04

## 2020-04-16 RX ADMIN — PANTOPRAZOLE SODIUM 40 MG: 40 TABLET, DELAYED RELEASE ORAL at 08:04

## 2020-04-16 RX ADMIN — ACETAMINOPHEN 650 MG: 325 TABLET ORAL at 03:04

## 2020-04-16 RX ADMIN — ATORVASTATIN CALCIUM 40 MG: 40 TABLET, FILM COATED ORAL at 08:04

## 2020-04-16 RX ADMIN — LEVOTHYROXINE SODIUM 200 MCG: 0.1 TABLET ORAL at 08:04

## 2020-04-16 RX ADMIN — ASPIRIN 81 MG 81 MG: 81 TABLET ORAL at 08:04

## 2020-04-16 RX ADMIN — ACETAMINOPHEN 650 MG: 325 TABLET ORAL at 04:04

## 2020-04-16 RX ADMIN — PIPERACILLIN AND TAZOBACTAM 4.5 G: 4; .5 INJECTION, POWDER, LYOPHILIZED, FOR SOLUTION INTRAVENOUS; PARENTERAL at 03:04

## 2020-04-16 NOTE — ASSESSMENT & PLAN NOTE
At presentation with fever 103.4 F, tachypnea, tachycardia, hypotension, elevated lactate. All symptoms resolved in ED after IVF and antibiotics. Unknown infection source. COVID 19 negative. UA no infection. CXR without obvious PNA.   Improving. No further issues since ED. Mild HA but otherwise no symptoms.  Received Ceftriaxone and Zosyn.  Hold further abx for now and f/u blood cultures. Very non-specific hx so will go ahead and recheck COVID in case false negative.

## 2020-04-16 NOTE — ASSESSMENT & PLAN NOTE
At presentation with fever 103.4, tachypnea, tachycardia, hypotension, elevated lactate. All symptoms resolved in ED after IVF and antibiotics. Unknown infection source. Covid19 negative. UA no infection. BC pending.  - 500ml NaCl given in ED in setting of sepsis.  - Ceftriaxone and Zosyn x1 given in ED  - Stop IVF in anticipation of possible Covid19 related sepsis.  - Continue Zosyn  - Follow Blood culture

## 2020-04-16 NOTE — H&P
[facility] Hospital Medicine  History & Physical    Patient Name: Anushka Napier  MRN: [id number]  Admission Date: 4/15/2020  Attending Physician: Sonia Gaitan MD   Primary Care Provider: Gerardo Dobbs MD         Patient information was obtained from patient and ER records.     Subjective:     Principal Problem:Severe sepsis    Chief Complaint:   Chief Complaint   Patient presents with    Dizziness     pt c/o chills, dizziness and nausea starting this morning. Denies fever and vomiting. Denies any SOB or CP at present. RR unlabored Temp 103.4 in ED         HPI: Anushka Napier is a 65 y.o female with hypertension, hyperlipidemia, hypothyroidism, diabetes mellitus, and GERD who presents to the hospital with complaints of dizziness, chill, nausea, and severe cramping bilateral lower extremities since waking up this morning. Associated symptom is headache. Patient states she has been feeling fine until this morning. Patient reports bilateral LE intermittent cramping 9/10. No exacerbating or alleviating factors. Did not take anything to relieve symptoms. Denies any chest pain, neck pain, SOB, cough, congestion, fever, abdominal pain, dysuria, vomit/diarrhea, edema or weakness. No sick contact or recent travel. Lives with  and he doesn't have any symptoms.    In ED, patient was found with fever 103, mild tachycardia, tachypnea and hypotension. Chest xray reveals no acute changes from prior exam on 1/15/2018 with small amount of opacity in R lung suggesting scarring. Covid 19 negative. EKG no ischemic.     Labs: CBC with WBC 12, elevated lactate; elevated ; unremarkable CMP; negative troponin; normal CPK, LD, CRP; Low Ferritin 12; BC pending. UA no infection    During interview, patient exam is normal. Patient reports all symptoms resolved in ED after IVF and antibiotics.    Patient is admitted with hospital medicine for further evaluation and treatment for sepsis.      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 Dr Correia, long term PPI use    History of colonic polyps 1/24/2020    Hyperlipidemia     Hypertension     Osteopenia 9/25/2014    Screening for colorectal cancer 9/25/2014    Normal 2009    Screening for colorectal cancer     Thyroid disease     Vitamin D deficiency disease 9/25/2014       Past Surgical History:   Procedure Laterality Date    HYSTERECTOMY  1997    complete for prolapse, Abdominal    OOPHORECTOMY      right shoulder      SINUS SURGERY  2012       Review of patient's allergies indicates:   Allergen Reactions    Tizanidine Swelling     Tongue swelling     Codeine Hives     Other reaction(s): Itching  Other reaction(s): Hives    Iodinated contrast media Hives     Other reaction(s): Hives    Iodine      Other reaction(s): Unknown    Sulfamethoxazole-trimethoprim Itching     Other reaction(s): Itching  Other reaction(s): Hives    Epinephrine Anxiety and Other (See Comments)     Almost passed out.       No current facility-administered medications on file prior to encounter.      Current Outpatient Medications on File Prior to Encounter   Medication Sig    aspirin 81 MG chewable tablet Take 1 tablet by mouth Daily. 1 Tablet, Chewable Oral Every day    atorvastatin (LIPITOR) 40 MG tablet TAKE 1 TABLET(40 MG) BY MOUTH EVERY DAY    FLUoxetine 20 MG capsule TAKE 1 CAPSULE BY MOUTH DAILY    levothyroxine (SYNTHROID) 200 MCG tablet TAKE 1 TABLET BY MOUTH EVERY DAY    levothyroxine (SYNTHROID) 25 MCG tablet TAKE 1 TABLET BY MOUTH EVERY DAY    metoprolol tartrate (LOPRESSOR) 50 MG tablet TAKE 1 TABLET BY MOUTH DAILY    PREMARIN 0.625 mg tablet TAKE 1 TABLET BY MOUTH ONCE DAILY    RABEprazole (ACIPHEX) 20 mg tablet TAKE 1  TABLET(20 MG) BY MOUTH TWICE DAILY    vitamin D (VITAMIN D3) 1000 units Tab Take 1,000 Units by mouth once daily. 5 tabs daily    CONTOUR NEXT STRIPS Strp USE TO TEST ONCE DAILY    fluticasone (FLONASE) 50 mcg/actuation nasal spray 2 sprays (100 mcg total) by Each Nare route once daily. 2 Aerosol, Spray Nasal Every day    LORazepam (ATIVAN) 0.5 MG tablet Take 1 tablet (0.5 mg total) by mouth every 8 (eight) hours as needed for Anxiety.    meloxicam (MOBIC) 7.5 MG tablet Take 1 tablet (7.5 mg total) by mouth once daily. Take once a day for two weeks then as needed. Take with food    metFORMIN (GLUCOPHAGE-XR) 500 MG 24 hr tablet Take 1 tablet (500 mg total) by mouth once daily.    omega-3 fatty acids-vitamin E (FISH OIL) 1,000 mg Cap Take 1 capsule by mouth Twice daily. 1 Capsule Oral Twice a day     Saccharomyces boulardii (FLORASTOR) 250 mg capsule Take 250 mg by mouth 2 (two) times daily.    solifenacin (VESICARE) 5 MG tablet Take 5 mg by mouth.    traMADol (ULTRAM) 50 mg tablet Take 1 tablet (50 mg total) by mouth every 12 (twelve) hours as needed for Pain.    VESICARE 5 mg tablet Take 5 mg by mouth once daily.      Family History     Problem Relation (Age of Onset)    Breast cancer Cousin (40)    Cancer Mother    Diabetes Mother    Heart disease Father    Hypertension Mother, Father    Miscarriages / Stillbirths Maternal Grandmother, Mother        Tobacco Use    Smoking status: Never Smoker    Smokeless tobacco: Never Used   Substance and Sexual Activity    Alcohol use: No    Drug use: No    Sexual activity: Yes     Partners: Male     Birth control/protection: Surgical     Review of Systems   Constitutional: Positive for chills. Negative for activity change, appetite change, diaphoresis, fatigue and fever.   HENT: Negative for congestion, rhinorrhea and sore throat.    Eyes: Negative for photophobia and visual disturbance.   Respiratory: Negative for apnea, cough, chest tightness, shortness of  breath and wheezing.    Cardiovascular: Negative for chest pain, palpitations and leg swelling.   Gastrointestinal: Positive for nausea. Negative for abdominal distention, abdominal pain, blood in stool, diarrhea and vomiting.   Genitourinary: Negative for dysuria, flank pain and hematuria.   Musculoskeletal: Positive for myalgias (Bilateral LE cramping. Resolved). Negative for arthralgias, joint swelling, neck pain and neck stiffness.   Skin: Negative.    Neurological: Positive for dizziness and headaches. Negative for tremors, syncope, weakness, light-headedness and numbness.   Hematological: Does not bruise/bleed easily.     Objective:     Vital Signs (Most Recent):  Temp: 98.5 °F (36.9 °C) (04/15/20 1545)  Pulse: 78 (04/15/20 1545)  Resp: (!) 22 (04/15/20 1545)  BP: 124/61 (04/15/20 1545)  SpO2: 98 % (04/15/20 1545) Vital Signs (24h Range):  Temp:  [98.5 °F (36.9 °C)-103.4 °F (39.7 °C)] 98.5 °F (36.9 °C)  Pulse:  [] 78  Resp:  [18-31] 22  SpO2:  [94 %-98 %] 98 %  BP: ()/(54-83) 124/61     Weight: 71.7 kg (158 lb)  Body mass index is 26.29 kg/m².    Physical Exam   Constitutional: She is oriented to person, place, and time. She appears well-developed and well-nourished. No distress.   HENT:   Head: Normocephalic and atraumatic.   Eyes: Pupils are equal, round, and reactive to light. Conjunctivae and EOM are normal.   Neck: Normal range of motion. Neck supple. No JVD present.   Cardiovascular: Normal rate, regular rhythm and normal heart sounds.   Pulmonary/Chest: Effort normal and breath sounds normal. No respiratory distress. She has no wheezes. She has no rales. She exhibits no tenderness.   Abdominal: Soft. Bowel sounds are normal. She exhibits no distension. There is no tenderness. There is no guarding.   Musculoskeletal: Normal range of motion. She exhibits no edema or tenderness.   Neurological: She is alert and oriented to person, place, and time. She exhibits normal muscle tone. Coordination  normal.   Skin: Skin is warm and dry. Capillary refill takes less than 2 seconds. No rash noted. She is not diaphoretic. No erythema.   Psychiatric: She has a normal mood and affect.         CRANIAL NERVES     CN III, IV, VI   Pupils are equal, round, and reactive to light.  Extraocular motions are normal.        Significant Labs: CBC: No results for input(s): WBC, HGB, HCT, PLT in the last 48 hours.  CMP: No results for input(s): NA, K, CL, CO2, GLU, BUN, CREATININE, CALCIUM, PROT, ALBUMIN, BILITOT, ALKPHOS, AST, ALT, ANIONGAP, EGFRNONAA in the last 48 hours.    Invalid input(s): ESTGFAFRICA  Cardiac Markers: No results for input(s): CKMB, MYOGLOBIN, BNP, TROPISTAT in the last 48 hours.  Lactic Acid: No results for input(s): LACTATE in the last 48 hours.  Troponin: No results for input(s): TROPONINI in the last 48 hours.    Significant Imaging: I have reviewed and interpreted all pertinent imaging results/findings within the past 24 hours.    Assessment/Plan:     * Severe sepsis  At presentation with fever 103.4, tachypnea, tachycardia, hypotension, elevated lactate. All symptoms resolved in ED after IVF and antibiotics. Unknown infection source. Covid19 negative. UA no infection. BC pending.  - 500ml NaCl given in ED in setting of sepsis.  - Ceftriaxone and Zosyn x1 given in ED  - Stop IVF in anticipation of possible Covid19 related sepsis.  - Continue Zosyn  - Follow Blood culture      Hypertension  Hypotensive at the presentation. Resolved after IVF  - Hold home Lopressor for now due to Sepsis.  - Continue to monitor and adjust as needed      Hypothyroidism  TSH 3.639 (1/18/2020).  - Continue home regimen with Synthroid 225 mcg QD      GERD (gastroesophageal reflux disease)  On Rabeprazole at home.   - Protonix while inpatient      Hyperlipidemia  Lab Results   Component Value Date    LDLCALC 97.4 01/18/2020       Continue home Atorvastatin    Diabetes mellitus type II, uncontrolled  Lab Results   Component  Value Date    HGBA1C 6.8 (H) 01/18/2020       Hold home Metformin while inpatient  - PRN sliding scale insulin with meals  - ACHS glucose monitoring  - ADA diet      VTE Risk Mitigation (From admission, onward)         Ordered     IP VTE LOW RISK PATIENT  Once      04/15/20 1555     Place DEVON hose  Until discontinued      04/15/20 1555                   Eri Potts NP  Department of Hospital Medicine   [facility]

## 2020-04-16 NOTE — ASSESSMENT & PLAN NOTE
Continue home atorvastatin.   Dr. Mohsin Khan at bedside. Discussed risks of refusing needed diagnostic procedures to determine cause of patient's chest pain. Patient verbalizes her understanding. MD educated patient on appropriate actions to take in the event patient experiences episodes of chest pain after she leaves inpatient unit. Patient verbalizes understanding. Patient provided signature to AMA documentation. Dr. Fitch notified of events by previous RNZULAY. Will continue to monitor.

## 2020-04-16 NOTE — ASSESSMENT & PLAN NOTE
Lab Results   Component Value Date    HGBA1C 6.8 (H) 01/18/2020       Hold home Metformin while inpatient  - PRN sliding scale insulin with meals  - ACHS glucose monitoring  - ADA diet

## 2020-04-16 NOTE — HOSPITAL COURSE
65/F admitted 4/15/20 for fever of 103 F with bp 86/63. Non-specific 1 day hx of prominently HA with nausea. Associated leg cramping. Otherwise had felt no symptoms prior and has no sick contacts. CXR, UA and labs unrevealing with negative COVID screen on admission. No further symptoms or issues throughout admission prior to d/c on 4/17/20. Repeat COVID screen pending at that time, which can be followed up outpatient. Blood cultures negative x2 days.

## 2020-04-16 NOTE — SUBJECTIVE & OBJECTIVE
Interval History:   Patient without significant issues since initial admission. No fever. Never had cough or sob. Mild HA now but improved. No further nausea and eating well this am. Blood cultures negative thus far. No obvious source of infection to cause 103 fever on presentation.    Review of Systems   Constitutional: Negative for appetite change, chills, diaphoresis, fatigue and fever.   Respiratory: Negative for cough, chest tightness and shortness of breath.    Cardiovascular: Negative for chest pain, palpitations and leg swelling.   Gastrointestinal: Negative for abdominal distention, abdominal pain, constipation, diarrhea, nausea and vomiting.   Genitourinary: Negative for difficulty urinating and dysuria.   Musculoskeletal: Negative for arthralgias.   Skin: Negative for rash.   Neurological: Negative for dizziness.   Psychiatric/Behavioral: The patient is not nervous/anxious.      Objective:     Vital Signs (Most Recent):  Temp: 98.4 °F (36.9 °C) (04/16/20 0730)  Pulse: 86 (04/16/20 0730)  Resp: 18 (04/16/20 0730)  BP: 138/65 (04/16/20 0730)  SpO2: 96 % (04/16/20 0730) Vital Signs (24h Range):  Temp:  [97 °F (36.1 °C)-99 °F (37.2 °C)] 98.4 °F (36.9 °C)  Pulse:  [71-96] 86  Resp:  [18-31] 18  SpO2:  [94 %-98 %] 96 %  BP: (106-138)/(54-75) 138/65     Weight: 72.9 kg (160 lb 11.5 oz)  Body mass index is 26.74 kg/m².    Intake/Output Summary (Last 24 hours) at 4/16/2020 1019  Last data filed at 4/16/2020 0400  Gross per 24 hour   Intake 600 ml   Output --   Net 600 ml      Physical Exam   Constitutional: She is oriented to person, place, and time. She appears well-developed and well-nourished. No distress.   HENT:   Head: Atraumatic.   Cardiovascular: Normal rate, regular rhythm and normal heart sounds.   Pulmonary/Chest: Effort normal and breath sounds normal. No respiratory distress.   Abdominal: Soft. She exhibits no distension. There is no tenderness.   Musculoskeletal: Normal range of motion. She exhibits  no edema or tenderness.   Neurological: She is alert and oriented to person, place, and time. Coordination normal.   Skin: No rash noted.   Psychiatric: She has a normal mood and affect. Her behavior is normal. Judgment and thought content normal.       Significant Labs:   Recent Results (from the past 24 hour(s))   POCT glucose    Collection Time: 04/15/20  4:05 PM   Result Value Ref Range    POCT Glucose 108 70 - 110 mg/dL   POCT glucose    Collection Time: 04/15/20  8:45 PM   Result Value Ref Range    POCT Glucose 143 (H) 70 - 110 mg/dL   Influenza A & B by Molecular    Collection Time: 04/15/20 11:45 PM   Result Value Ref Range    Influenza A, Molecular Negative Negative    Influenza B, Molecular Negative Negative    Flu A & B Source NP    Lactic acid, plasma    Collection Time: 04/16/20  4:54 AM   Result Value Ref Range    Lactate (Lactic Acid) 2.0 0.5 - 2.2 mmol/L   CBC auto differential    Collection Time: 04/16/20  4:54 AM   Result Value Ref Range    WBC 13.11 (H) 3.90 - 12.70 K/uL    RBC 3.97 (L) 4.00 - 5.40 M/uL    Hemoglobin 10.3 (L) 12.0 - 16.0 g/dL    Hematocrit 33.3 (L) 37.0 - 48.5 %    Mean Corpuscular Volume 84 82 - 98 fL    Mean Corpuscular Hemoglobin 25.9 (L) 27.0 - 31.0 pg    Mean Corpuscular Hemoglobin Conc 30.9 (L) 32.0 - 36.0 g/dL    RDW 14.3 11.5 - 14.5 %    Platelets 257 150 - 350 K/uL    MPV 9.3 9.2 - 12.9 fL    Immature Granulocytes 0.5 0.0 - 0.5 %    Gran # (ANC) 10.9 (H) 1.8 - 7.7 K/uL    Immature Grans (Abs) 0.06 (H) 0.00 - 0.04 K/uL    Lymph # 1.2 1.0 - 4.8 K/uL    Mono # 0.8 0.3 - 1.0 K/uL    Eos # 0.1 0.0 - 0.5 K/uL    Baso # 0.04 0.00 - 0.20 K/uL    nRBC 0 0 /100 WBC    Gran% 82.9 (H) 38.0 - 73.0 %    Lymph% 9.1 (L) 18.0 - 48.0 %    Mono% 6.4 4.0 - 15.0 %    Eosinophil% 0.8 0.0 - 8.0 %    Basophil% 0.3 0.0 - 1.9 %    Differential Method Automated    POCT glucose    Collection Time: 04/16/20  7:41 AM   Result Value Ref Range    POCT Glucose 121 (H) 70 - 110 mg/dL       Significant  Imaging:  Imaging Results          X-Ray Chest AP Portable (Final result)  Result time 04/15/20 07:59:28    Final result by Ac Santos MD (04/15/20 07:59:28)                 Impression:      Small amount of opacity in the right middle and right lower lung zone similar to prior exam from 01/15/2018 suggesting scarring.  Pneumonia is considered less likely.      Electronically signed by: Ac Santos MD  Date:    04/15/2020  Time:    07:59             Narrative:    EXAMINATION:  XR CHEST AP PORTABLE    CLINICAL HISTORY:  Shortness of breath    TECHNIQUE:  Single frontal view of the chest was performed.    COMPARISON:  Chest radiograph from 01/15/2018    FINDINGS:  Mild cardiomegaly.  Atherosclerosis of the aortic arch.  Normal pulmonary vasculature.  Small amount of opacity in the right middle and right lower lung zone, similar to prior exam which may represent scarring.  Less likely consideration includes pneumonia.  Remaining portion of the lungs are clear.  No pneumothorax.  No pleural effusion.  Osseous structures are unremarkable.

## 2020-04-16 NOTE — PROGRESS NOTES
[facility] Hospital Medicine  Progress Note    Patient Name: Anushka Napier  MRN: [id number]  Patient Class: IP- Inpatient   Admission Date: 4/15/2020  Length of Stay: 1 days  Attending Physician: Sonia Gaitan MD  Primary Care Provider: Gerardo Dobbs MD      Subjective:     Principal Problem:Severe sepsis    HPI:  Anushka Napier is a 65 y.o female with hypertension, hyperlipidemia, hypothyroidism, diabetes mellitus, and GERD who presents to the hospital with complaints of dizziness, chill, nausea, and severe cramping bilateral lower extremities since waking up this morning. Associated symptom is headache. Patient states she has been feeling fine until this morning. Patient reports bilateral LE intermittent cramping 9/10. No exacerbating or alleviating factors. Did not take anything to relieve symptoms. Denies any chest pain, neck pain, SOB, cough, congestion, fever, abdominal pain, dysuria, vomit/diarrhea, edema or weakness. No sick contact or recent travel. Lives with  and he doesn't have any symptoms.    In ED, patient was found with fever 103, mild tachycardia, tachypnea and hypotension. Chest xray reveals no acute changes from prior exam on 1/15/2018 with small amount of opacity in R lung suggesting scarring. Covid 19 negative. EKG no ischemic.     Labs: CBC with WBC 12, elevated lactate; elevated ; unremarkable CMP; negative troponin; normal CPK, LD, CRP; Low Ferritin 12; BC pending. UA no infection    During interview, patient exam is normal. Patient reports all symptoms resolved in ED after IVF and antibiotics.    Patient is admitted with hospital medicine for further evaluation and treatment for sepsis.      Overview/Hospital Course:  65/F admitted 4/15/20 for fever of 103 F with bp 86/63. Non-specific 1 day hx of prominently HA with nausea. Associated leg cramping. Otherwise had felt no symptoms prior and has no sick contacts. CXR, UA and labs unrevealing with negative  COVID screen on admission.    Interval History:   Patient without significant issues since initial admission. No fever. Never had cough or sob. Mild HA now but improved. No further nausea and eating well this am. Blood cultures negative thus far. No obvious source of infection to cause 103 fever on presentation.    Review of Systems   Constitutional: Negative for appetite change, chills, diaphoresis, fatigue and fever.   Respiratory: Negative for cough, chest tightness and shortness of breath.    Cardiovascular: Negative for chest pain, palpitations and leg swelling.   Gastrointestinal: Negative for abdominal distention, abdominal pain, constipation, diarrhea, nausea and vomiting.   Genitourinary: Negative for difficulty urinating and dysuria.   Musculoskeletal: Negative for arthralgias.   Skin: Negative for rash.   Neurological: Negative for dizziness.   Psychiatric/Behavioral: The patient is not nervous/anxious.      Objective:     Vital Signs (Most Recent):  Temp: 98.4 °F (36.9 °C) (04/16/20 0730)  Pulse: 86 (04/16/20 0730)  Resp: 18 (04/16/20 0730)  BP: 138/65 (04/16/20 0730)  SpO2: 96 % (04/16/20 0730) Vital Signs (24h Range):  Temp:  [97 °F (36.1 °C)-99 °F (37.2 °C)] 98.4 °F (36.9 °C)  Pulse:  [71-96] 86  Resp:  [18-31] 18  SpO2:  [94 %-98 %] 96 %  BP: (106-138)/(54-75) 138/65     Weight: 72.9 kg (160 lb 11.5 oz)  Body mass index is 26.74 kg/m².    Intake/Output Summary (Last 24 hours) at 4/16/2020 1019  Last data filed at 4/16/2020 0400  Gross per 24 hour   Intake 600 ml   Output --   Net 600 ml      Physical Exam   Constitutional: She is oriented to person, place, and time. She appears well-developed and well-nourished. No distress.   HENT:   Head: Atraumatic.   Cardiovascular: Normal rate, regular rhythm and normal heart sounds.   Pulmonary/Chest: Effort normal and breath sounds normal. No respiratory distress.   Abdominal: Soft. She exhibits no distension. There is no tenderness.   Musculoskeletal: Normal  range of motion. She exhibits no edema or tenderness.   Neurological: She is alert and oriented to person, place, and time. Coordination normal.   Skin: No rash noted.   Psychiatric: She has a normal mood and affect. Her behavior is normal. Judgment and thought content normal.       Significant Labs:   Recent Results (from the past 24 hour(s))   POCT glucose    Collection Time: 04/15/20  4:05 PM   Result Value Ref Range    POCT Glucose 108 70 - 110 mg/dL   POCT glucose    Collection Time: 04/15/20  8:45 PM   Result Value Ref Range    POCT Glucose 143 (H) 70 - 110 mg/dL   Influenza A & B by Molecular    Collection Time: 04/15/20 11:45 PM   Result Value Ref Range    Influenza A, Molecular Negative Negative    Influenza B, Molecular Negative Negative    Flu A & B Source NP    Lactic acid, plasma    Collection Time: 04/16/20  4:54 AM   Result Value Ref Range    Lactate (Lactic Acid) 2.0 0.5 - 2.2 mmol/L   CBC auto differential    Collection Time: 04/16/20  4:54 AM   Result Value Ref Range    WBC 13.11 (H) 3.90 - 12.70 K/uL    RBC 3.97 (L) 4.00 - 5.40 M/uL    Hemoglobin 10.3 (L) 12.0 - 16.0 g/dL    Hematocrit 33.3 (L) 37.0 - 48.5 %    Mean Corpuscular Volume 84 82 - 98 fL    Mean Corpuscular Hemoglobin 25.9 (L) 27.0 - 31.0 pg    Mean Corpuscular Hemoglobin Conc 30.9 (L) 32.0 - 36.0 g/dL    RDW 14.3 11.5 - 14.5 %    Platelets 257 150 - 350 K/uL    MPV 9.3 9.2 - 12.9 fL    Immature Granulocytes 0.5 0.0 - 0.5 %    Gran # (ANC) 10.9 (H) 1.8 - 7.7 K/uL    Immature Grans (Abs) 0.06 (H) 0.00 - 0.04 K/uL    Lymph # 1.2 1.0 - 4.8 K/uL    Mono # 0.8 0.3 - 1.0 K/uL    Eos # 0.1 0.0 - 0.5 K/uL    Baso # 0.04 0.00 - 0.20 K/uL    nRBC 0 0 /100 WBC    Gran% 82.9 (H) 38.0 - 73.0 %    Lymph% 9.1 (L) 18.0 - 48.0 %    Mono% 6.4 4.0 - 15.0 %    Eosinophil% 0.8 0.0 - 8.0 %    Basophil% 0.3 0.0 - 1.9 %    Differential Method Automated    POCT glucose    Collection Time: 04/16/20  7:41 AM   Result Value Ref Range    POCT Glucose 121 (H) 70 -  110 mg/dL       Significant Imaging:  Imaging Results          X-Ray Chest AP Portable (Final result)  Result time 04/15/20 07:59:28    Final result by Ac Santos MD (04/15/20 07:59:28)                 Impression:      Small amount of opacity in the right middle and right lower lung zone similar to prior exam from 01/15/2018 suggesting scarring.  Pneumonia is considered less likely.      Electronically signed by: Ac Santos MD  Date:    04/15/2020  Time:    07:59             Narrative:    EXAMINATION:  XR CHEST AP PORTABLE    CLINICAL HISTORY:  Shortness of breath    TECHNIQUE:  Single frontal view of the chest was performed.    COMPARISON:  Chest radiograph from 01/15/2018    FINDINGS:  Mild cardiomegaly.  Atherosclerosis of the aortic arch.  Normal pulmonary vasculature.  Small amount of opacity in the right middle and right lower lung zone, similar to prior exam which may represent scarring.  Less likely consideration includes pneumonia.  Remaining portion of the lungs are clear.  No pneumothorax.  No pleural effusion.  Osseous structures are unremarkable.                                Assessment/Plan:      * Severe sepsis  At presentation with fever 103.4 F, tachypnea, tachycardia, hypotension, elevated lactate. All symptoms resolved in ED after IVF and antibiotics. Unknown infection source. COVID 19 negative. UA no infection. CXR without obvious PNA.   Improving. No further issues since ED. Mild HA but otherwise no symptoms.  Received Ceftriaxone and Zosyn.  Hold further abx for now and f/u blood cultures. Very non-specific hx so will go ahead and recheck COVID in case false negative.    GERD (gastroesophageal reflux disease)  Continue daily PPI.    Hypertension  Hypotensive at the presentation. Resolved after IVF.  Continue to monitor and adjust mgmt as needed.    Hyperlipidemia  Continue home atorvastatin.    Diabetes mellitus type II, uncontrolled  Controlled. Hold home metformin. Correction  insulin for now and adjust regimen as needed.    Hypothyroidism  Continue home levothyroxine.        VTE Risk Mitigation (From admission, onward)         Ordered     IP VTE LOW RISK PATIENT  Once      04/15/20 1555     Place DEOVN hose  Until discontinued      04/15/20 1555                      Giorgi Hobbs MD  Department of Hospital Medicine   [facility]

## 2020-04-16 NOTE — ASSESSMENT & PLAN NOTE
Hypotensive at the presentation. Resolved after IVF  - Hold home Lopressor for now due to Sepsis.  - Continue to monitor and adjust as needed

## 2020-04-16 NOTE — PLAN OF CARE
Problem: Fall Injury Risk  Goal: Absence of Fall and Fall-Related Injury  Outcome: Ongoing, Progressing     Problem: Fall Injury Risk  Goal: Absence of Fall and Fall-Related Injury  Intervention: Identify and Manage Contributors to Fall Injury Risk  Flowsheets (Taken 4/16/2020 1806)  Self-Care Promotion: BADL personal objects within reach  Medication Review/Management: medications reviewed; high risk medications identified     Problem: Fall Injury Risk  Goal: Absence of Fall and Fall-Related Injury  Intervention: Promote Injury-Free Environment  Flowsheets (Taken 4/16/2020 1806)  Safety Promotion/Fall Prevention: room near unit station; lighting adjusted; medications reviewed; nonskid shoes/socks when out of bed  Environmental Safety Modification: clutter free environment maintained; assistive device/personal items within reach

## 2020-04-16 NOTE — ASSESSMENT & PLAN NOTE
Hypotensive at the presentation. Resolved after IVF.  Continue to monitor and adjust mgmt as needed.

## 2020-04-17 VITALS
DIASTOLIC BLOOD PRESSURE: 72 MMHG | HEIGHT: 65 IN | SYSTOLIC BLOOD PRESSURE: 141 MMHG | BODY MASS INDEX: 26.77 KG/M2 | HEART RATE: 92 BPM | TEMPERATURE: 98 F | RESPIRATION RATE: 16 BRPM | OXYGEN SATURATION: 95 % | WEIGHT: 160.69 LBS

## 2020-04-17 PROBLEM — A41.9 SEVERE SEPSIS: Status: RESOLVED | Noted: 2020-04-15 | Resolved: 2020-04-17

## 2020-04-17 PROBLEM — R65.20 SEVERE SEPSIS: Status: RESOLVED | Noted: 2020-04-15 | Resolved: 2020-04-17

## 2020-04-17 LAB
ANION GAP SERPL CALC-SCNC: 7 MMOL/L (ref 8–16)
BASOPHILS # BLD AUTO: 0.04 K/UL (ref 0–0.2)
BASOPHILS NFR BLD: 0.4 % (ref 0–1.9)
BUN SERPL-MCNC: 11 MG/DL (ref 8–23)
CALCIUM SERPL-MCNC: 8.5 MG/DL (ref 8.7–10.5)
CHLORIDE SERPL-SCNC: 109 MMOL/L (ref 95–110)
CO2 SERPL-SCNC: 24 MMOL/L (ref 23–29)
CREAT SERPL-MCNC: 0.8 MG/DL (ref 0.5–1.4)
DIFFERENTIAL METHOD: ABNORMAL
EOSINOPHIL # BLD AUTO: 0.2 K/UL (ref 0–0.5)
EOSINOPHIL NFR BLD: 1.7 % (ref 0–8)
ERYTHROCYTE [DISTWIDTH] IN BLOOD BY AUTOMATED COUNT: 14.6 % (ref 11.5–14.5)
EST. GFR  (AFRICAN AMERICAN): >60 ML/MIN/1.73 M^2
EST. GFR  (NON AFRICAN AMERICAN): >60 ML/MIN/1.73 M^2
GLUCOSE SERPL-MCNC: 109 MG/DL (ref 70–110)
HCT VFR BLD AUTO: 35.3 % (ref 37–48.5)
HGB BLD-MCNC: 10.7 G/DL (ref 12–16)
IMM GRANULOCYTES # BLD AUTO: 0.03 K/UL (ref 0–0.04)
IMM GRANULOCYTES NFR BLD AUTO: 0.3 % (ref 0–0.5)
LYMPHOCYTES # BLD AUTO: 1.7 K/UL (ref 1–4.8)
LYMPHOCYTES NFR BLD: 18.5 % (ref 18–48)
MCH RBC QN AUTO: 25.7 PG (ref 27–31)
MCHC RBC AUTO-ENTMCNC: 30.3 G/DL (ref 32–36)
MCV RBC AUTO: 85 FL (ref 82–98)
MONOCYTES # BLD AUTO: 0.8 K/UL (ref 0.3–1)
MONOCYTES NFR BLD: 9 % (ref 4–15)
NEUTROPHILS # BLD AUTO: 6.6 K/UL (ref 1.8–7.7)
NEUTROPHILS NFR BLD: 70.1 % (ref 38–73)
NRBC BLD-RTO: 0 /100 WBC
PLATELET # BLD AUTO: 294 K/UL (ref 150–350)
PMV BLD AUTO: 9.3 FL (ref 9.2–12.9)
POCT GLUCOSE: 107 MG/DL (ref 70–110)
POTASSIUM SERPL-SCNC: 4 MMOL/L (ref 3.5–5.1)
RBC # BLD AUTO: 4.17 M/UL (ref 4–5.4)
SARS-COV-2 RNA RESP QL NAA+PROBE: NOT DETECTED
SODIUM SERPL-SCNC: 140 MMOL/L (ref 136–145)
WBC # BLD AUTO: 9.35 K/UL (ref 3.9–12.7)

## 2020-04-17 PROCEDURE — 36415 COLL VENOUS BLD VENIPUNCTURE: CPT

## 2020-04-17 PROCEDURE — 80048 BASIC METABOLIC PNL TOTAL CA: CPT

## 2020-04-17 PROCEDURE — 25000003 PHARM REV CODE 250: Performed by: NURSE PRACTITIONER

## 2020-04-17 PROCEDURE — 85025 COMPLETE CBC W/AUTO DIFF WBC: CPT

## 2020-04-17 RX ADMIN — LEVOTHYROXINE SODIUM 200 MCG: 0.1 TABLET ORAL at 10:04

## 2020-04-17 RX ADMIN — ASPIRIN 81 MG 81 MG: 81 TABLET ORAL at 10:04

## 2020-04-17 RX ADMIN — PANTOPRAZOLE SODIUM 40 MG: 40 TABLET, DELAYED RELEASE ORAL at 10:04

## 2020-04-17 RX ADMIN — LEVOTHYROXINE SODIUM 25 MCG: 25 TABLET ORAL at 10:04

## 2020-04-17 NOTE — PLAN OF CARE
EDUCATION:  Things You are responsible For To Manage Your Care At Home:  1.    Getting your prescriptions filled   2.    Taking your medications as directed, DO NOT MISS ANY DOSES!  3.    Going to your follow-up doctor appointment. This is important because it  allow the doctor to monitor your progress and determine if  any changes need to made to your treatment plan.  Call [facility] number for new or repeated problems / symptoms   Call 911 for CP and / or SOB    Kathie ROCHA notified that all CM needs are met     04/17/20 1019   Final Note   Assessment Type Final Discharge Note   Anticipated Discharge Disposition Home   Hospital Follow Up  Appt(s) scheduled? Yes   Discharge plans and expectations educations in teach back method with documentation complete? Yes   Right Care Referral Info   Post Acute Recommendation No Care   Post-Acute Status   Post-Acute Authorization Other   Other Status No Post-Acute Service Needs   Discharge Delays None known at this time

## 2020-04-17 NOTE — DISCHARGE SUMMARY
[facility] Hospital Medicine  Discharge Summary      Patient Name: Anushka Napier  MRN: [id number]  Admission Date: 4/15/2020  Hospital Length of Stay: 2 days  Discharge Date and Time:  04/17/2020 9:54 AM  Attending Physician: Sonia Gaitan MD   Discharging Provider: Giorgi Hobbs MD  Primary Care Provider: Gerardo Dobbs MD      HPI:   Anushka Napier is a 65 y.o female with hypertension, hyperlipidemia, hypothyroidism, diabetes mellitus, and GERD who presents to the hospital with complaints of dizziness, chill, nausea, and severe cramping bilateral lower extremities since waking up this morning. Associated symptom is headache. Patient states she has been feeling fine until this morning. Patient reports bilateral LE intermittent cramping 9/10. No exacerbating or alleviating factors. Did not take anything to relieve symptoms. Denies any chest pain, neck pain, SOB, cough, congestion, fever, abdominal pain, dysuria, vomit/diarrhea, edema or weakness. No sick contact or recent travel. Lives with  and he doesn't have any symptoms.    In ED, patient was found with fever 103, mild tachycardia, tachypnea and hypotension. Chest xray reveals no acute changes from prior exam on 1/15/2018 with small amount of opacity in R lung suggesting scarring. Covid 19 negative. EKG no ischemic.     Labs: CBC with WBC 12, elevated lactate; elevated ; unremarkable CMP; negative troponin; normal CPK, LD, CRP; Low Ferritin 12; BC pending. UA no infection    During interview, patient exam is normal. Patient reports all symptoms resolved in ED after IVF and antibiotics.    Patient is admitted with hospital medicine for further evaluation and treatment for sepsis.       Hospital Course:   65/F admitted 4/15/20 for fever of 103 F with bp 86/63. Non-specific 1 day hx of prominently HA with nausea. Associated leg cramping. Otherwise had felt no symptoms prior and has no sick contacts. CXR, UA and labs  unrevealing with negative COVID screen on admission. No further symptoms or issues throughout admission prior to d/c on 4/17/20. Repeat COVID screen pending at that time, which can be followed up outpatient. Blood cultures negative x2 days.       Severe sepsis  Admitted on 4/15/20 for fever 103.4 F, tachypnea, tachycardia, hypotension, elevated lactate. All symptoms resolved in ED after IVF and antibiotics. Unknown infection source. COVID 19 negative, UA unremarkable, CXR without obvious PNA, blood cultures negative x2 days.  Resolved. Very non-specific hx, potentially viral etiology. Repeat COVID testing is pending. Clinically no indication for inpatient at this time. Okay for d/c. Results of COVID testing and blood cultures can be followed up outpatient.    GERD (gastroesophageal reflux disease)  Continue daily PPI.    Hypertension  Hypotensive at the presentation. Resolved after IVF.  Continue home antihypertensive regimen.    Hyperlipidemia  Continue home atorvastatin.    Diabetes mellitus type II, uncontrolled  Controlled. Restart home metformin.    Hypothyroidism  Continue home levothyroxine.        Final Active Diagnoses:    Diagnosis Date Noted POA    Hyperlipidemia [E78.5] 09/25/2014 Yes    Hypertension [I10] 09/25/2014 Yes    GERD (gastroesophageal reflux disease) [K21.9] 09/25/2014 Yes    Hypothyroidism [E03.9] 09/17/2014 Yes    Diabetes mellitus type II, uncontrolled [E11.65] 09/17/2014 Yes      Problems Resolved During this Admission:    Diagnosis Date Noted Date Resolved POA    PRINCIPAL PROBLEM:  Severe sepsis [A41.9, R65.20] 04/15/2020 04/17/2020 Yes       Discharged Condition: good    Disposition: Home or Self Care    Follow Up:  Follow-up Information     Gerardo Dobbs MD.    Specialty:  Internal Medicine  Contact information:  [address] [place] [address]  Adriano CURRAN [id number] [phone]                 Patient Instructions:      Diet diabetic     Notify your health care provider if you experience  any of the following:  temperature >100.4     Notify your health care provider if you experience any of the following:  persistent nausea and vomiting or diarrhea     Notify your health care provider if you experience any of the following:  severe uncontrolled pain     Notify your health care provider if you experience any of the following:  difficulty breathing or increased cough     Notify your health care provider if you experience any of the following:  increased confusion or weakness     Activity as tolerated       Significant Diagnostic Studies:   Recent Results (from the past 48 hour(s))   POCT glucose    Collection Time: 04/15/20  4:05 PM   Result Value Ref Range    POCT Glucose 108 70 - 110 mg/dL   POCT glucose    Collection Time: 04/15/20  8:45 PM   Result Value Ref Range    POCT Glucose 143 (H) 70 - 110 mg/dL   Influenza A & B by Molecular    Collection Time: 04/15/20 11:45 PM   Result Value Ref Range    Influenza A, Molecular Negative Negative    Influenza B, Molecular Negative Negative    Flu A & B Source NP    Lactic acid, plasma    Collection Time: 04/16/20  4:54 AM   Result Value Ref Range    Lactate (Lactic Acid) 2.0 0.5 - 2.2 mmol/L   CBC auto differential    Collection Time: 04/16/20  4:54 AM   Result Value Ref Range    WBC 13.11 (H) 3.90 - 12.70 K/uL    RBC 3.97 (L) 4.00 - 5.40 M/uL    Hemoglobin 10.3 (L) 12.0 - 16.0 g/dL    Hematocrit 33.3 (L) 37.0 - 48.5 %    Mean Corpuscular Volume 84 82 - 98 fL    Mean Corpuscular Hemoglobin 25.9 (L) 27.0 - 31.0 pg    Mean Corpuscular Hemoglobin Conc 30.9 (L) 32.0 - 36.0 g/dL    RDW 14.3 11.5 - 14.5 %    Platelets 257 150 - 350 K/uL    MPV 9.3 9.2 - 12.9 fL    Immature Granulocytes 0.5 0.0 - 0.5 %    Gran # (ANC) 10.9 (H) 1.8 - 7.7 K/uL    Immature Grans (Abs) 0.06 (H) 0.00 - 0.04 K/uL    Lymph # 1.2 1.0 - 4.8 K/uL    Mono # 0.8 0.3 - 1.0 K/uL    Eos # 0.1 0.0 - 0.5 K/uL    Baso # 0.04 0.00 - 0.20 K/uL    nRBC 0 0 /100 WBC    Gran% 82.9 (H) 38.0 - 73.0 %     Lymph% 9.1 (L) 18.0 - 48.0 %    Mono% 6.4 4.0 - 15.0 %    Eosinophil% 0.8 0.0 - 8.0 %    Basophil% 0.3 0.0 - 1.9 %    Differential Method Automated    POCT glucose    Collection Time: 04/16/20  7:41 AM   Result Value Ref Range    POCT Glucose 121 (H) 70 - 110 mg/dL   POCT glucose    Collection Time: 04/16/20 11:40 AM   Result Value Ref Range    POCT Glucose 116 (H) 70 - 110 mg/dL   POCT glucose    Collection Time: 04/16/20  4:02 PM   Result Value Ref Range    POCT Glucose 130 (H) 70 - 110 mg/dL   POCT glucose    Collection Time: 04/16/20  8:00 PM   Result Value Ref Range    POCT Glucose 129 (H) 70 - 110 mg/dL   POCT glucose    Collection Time: 04/16/20 10:59 PM   Result Value Ref Range    POCT Glucose 111 (H) 70 - 110 mg/dL   CBC auto differential    Collection Time: 04/17/20  4:00 AM   Result Value Ref Range    WBC 9.35 3.90 - 12.70 K/uL    RBC 4.17 4.00 - 5.40 M/uL    Hemoglobin 10.7 (L) 12.0 - 16.0 g/dL    Hematocrit 35.3 (L) 37.0 - 48.5 %    Mean Corpuscular Volume 85 82 - 98 fL    Mean Corpuscular Hemoglobin 25.7 (L) 27.0 - 31.0 pg    Mean Corpuscular Hemoglobin Conc 30.3 (L) 32.0 - 36.0 g/dL    RDW 14.6 (H) 11.5 - 14.5 %    Platelets 294 150 - 350 K/uL    MPV 9.3 9.2 - 12.9 fL    Immature Granulocytes 0.3 0.0 - 0.5 %    Gran # (ANC) 6.6 1.8 - 7.7 K/uL    Immature Grans (Abs) 0.03 0.00 - 0.04 K/uL    Lymph # 1.7 1.0 - 4.8 K/uL    Mono # 0.8 0.3 - 1.0 K/uL    Eos # 0.2 0.0 - 0.5 K/uL    Baso # 0.04 0.00 - 0.20 K/uL    nRBC 0 0 /100 WBC    Gran% 70.1 38.0 - 73.0 %    Lymph% 18.5 18.0 - 48.0 %    Mono% 9.0 4.0 - 15.0 %    Eosinophil% 1.7 0.0 - 8.0 %    Basophil% 0.4 0.0 - 1.9 %    Differential Method Automated    Basic metabolic panel    Collection Time: 04/17/20  4:00 AM   Result Value Ref Range    Sodium 140 136 - 145 mmol/L    Potassium 4.0 3.5 - 5.1 mmol/L    Chloride 109 95 - 110 mmol/L    CO2 24 23 - 29 mmol/L    Glucose 109 70 - 110 mg/dL    BUN, Bld 11 8 - 23 mg/dL    Creatinine 0.8 0.5 - 1.4 mg/dL     Calcium 8.5 (L) 8.7 - 10.5 mg/dL    Anion Gap 7 (L) 8 - 16 mmol/L    eGFR if African American >60 >60 mL/min/1.73 m^2    eGFR if non African American >60 >60 mL/min/1.73 m^2   POCT glucose    Collection Time: 04/17/20  4:41 AM   Result Value Ref Range    POCT Glucose 107 70 - 110 mg/dL       Pending Diagnostic Studies:     Procedure Component Value Units Date/Time    COVID-19 Routine Screening [[id number]] Collected:  04/16/20 1421    Order Status:  Sent Lab Status:  In process Updated:  04/17/20 0732    Specimen:  Nasopharyngeal          Imaging Results          X-Ray Chest AP Portable (Final result)  Result time 04/15/20 07:59:28    Final result by Ac Santos MD (04/15/20 07:59:28)                 Impression:      Small amount of opacity in the right middle and right lower lung zone similar to prior exam from 01/15/2018 suggesting scarring.  Pneumonia is considered less likely.      Electronically signed by: Ac Santos MD  Date:    04/15/2020  Time:    07:59             Narrative:    EXAMINATION:  XR CHEST AP PORTABLE    CLINICAL HISTORY:  Shortness of breath    TECHNIQUE:  Single frontal view of the chest was performed.    COMPARISON:  Chest radiograph from 01/15/2018    FINDINGS:  Mild cardiomegaly.  Atherosclerosis of the aortic arch.  Normal pulmonary vasculature.  Small amount of opacity in the right middle and right lower lung zone, similar to prior exam which may represent scarring.  Less likely consideration includes pneumonia.  Remaining portion of the lungs are clear.  No pneumothorax.  No pleural effusion.  Osseous structures are unremarkable.                              Medications:  Reconciled Home Medications:      Medication List      CHANGE how you take these medications    VESICARE 5 MG tablet  Generic drug:  solifenacin  Take 5 mg by mouth.  What changed:  Another medication with the same name was removed. Continue taking this medication, and follow the directions you see here.         CONTINUE taking these medications    aspirin 81 MG Chew  Take 1 tablet by mouth Daily. 1 Tablet, Chewable Oral Every day     atorvastatin 40 MG tablet  Commonly known as:  LIPITOR  TAKE 1 TABLET(40 MG) BY MOUTH EVERY DAY     FISH OIL 1,000 mg Cap  Generic drug:  omega-3 fatty acids-vitamin E  Take 1 capsule by mouth Twice daily. 1 Capsule Oral Twice a day     * levothyroxine 200 MCG tablet  Commonly known as:  SYNTHROID  TAKE 1 TABLET BY MOUTH EVERY DAY     * levothyroxine 25 MCG tablet  Commonly known as:  SYNTHROID  TAKE 1 TABLET BY MOUTH EVERY DAY     metFORMIN 500 MG XR 24hr tablet  Commonly known as:  GLUCOPHAGE-XR  Take 1 tablet (500 mg total) by mouth once daily.     metoprolol tartrate 50 MG tablet  Commonly known as:  LOPRESSOR  TAKE 1 TABLET BY MOUTH DAILY     PREMARIN 0.625 MG tablet  Generic drug:  estrogens (conjugated)  TAKE 1 TABLET BY MOUTH ONCE DAILY     RABEprazole 20 mg tablet  Commonly known as:  ACIPHEX  TAKE 1 TABLET(20 MG) BY MOUTH TWICE DAILY     Saccharomyces boulardii 250 mg capsule  Commonly known as:  FLORASTOR  Take 250 mg by mouth 2 (two) times daily.     traMADoL 50 mg tablet  Commonly known as:  ULTRAM  Take 1 tablet (50 mg total) by mouth every 12 (twelve) hours as needed for Pain.     vitamin D 1000 units Tab  Commonly known as:  VITAMIN D3  Take 1,000 Units by mouth once daily. 5 tabs daily         * This list has 2 medication(s) that are the same as other medications prescribed for you. Read the directions carefully, and ask your doctor or other care provider to review them with you.            STOP taking these medications    LORazepam 0.5 MG tablet  Commonly known as:  ATIVAN     meloxicam 7.5 MG tablet  Commonly known as:  MOBIC        ASK your doctor about these medications    CONTOUR NEXT TEST STRIPS Strp  Generic drug:  blood sugar diagnostic  USE TO TEST ONCE DAILY     FLUoxetine 20 MG capsule  TAKE 1 CAPSULE BY MOUTH DAILY     fluticasone propionate 50 mcg/actuation  nasal spray  Commonly known as:  FLONASE  2 sprays (100 mcg total) by Each Nare route once daily. 2 Aerosol, Spray Nasal Every day            Indwelling Lines/Drains at time of discharge:   Lines/Drains/Airways     None                 Time spent on the discharge of patient: 35 minutes  Patient was seen and examined on the date of discharge and determined to be suitable for discharge.         Giorgi Hobbs MD  Department of Hospital Medicine  [facility]

## 2020-04-17 NOTE — ASSESSMENT & PLAN NOTE
Admitted on 4/15/20 for fever 103.4 F, tachypnea, tachycardia, hypotension, elevated lactate. All symptoms resolved in ED after IVF and antibiotics. Unknown infection source. COVID 19 negative, UA unremarkable, CXR without obvious PNA, blood cultures negative x2 days.  Resolved. Very non-specific hx so repeat COVID testing is pending. Clinically no indication for inpatient at this time. Okay for d/c. Results of COVID testing and blood cultures can be followed up outpatient.

## 2020-04-18 ENCOUNTER — TELEPHONE (OUTPATIENT)
Dept: EMERGENCY MEDICINE | Facility: HOSPITAL | Age: 65
End: 2020-04-18

## 2020-04-18 ENCOUNTER — TELEPHONE (OUTPATIENT)
Dept: HEPATOLOGY | Facility: HOSPITAL | Age: 65
End: 2020-04-18

## 2020-04-18 NOTE — TELEPHONE ENCOUNTER
Called to inform patient that repeat COVID test was negative. No answer on home or cell number. Left message on cell letting patient know that she is COVID negative x2.     Sonia Gaiatn MD  04/18/2020 11:54 AM

## 2020-04-19 LAB
BACTERIA BLD CULT: NORMAL
BACTERIA BLD CULT: NORMAL

## 2020-04-19 NOTE — TELEPHONE ENCOUNTER
This patient was called to notify of the following:    Your test was NEGATIVE for COVID-19 (coronavirus).  If you still have symptoms, treat with rest, fluids, and over-the-counter medications.  Continue to stay home and practice proper handwashing.     If your symptoms worsen or if you have any other concerns, please contact [facility] at [phone].     Sincerely,    Toussaint Battley III, FNP    There was no answer, therefore, certified letter was mailed to the patient.

## 2020-04-20 ENCOUNTER — OFFICE VISIT (OUTPATIENT)
Dept: FAMILY MEDICINE | Facility: CLINIC | Age: 65
End: 2020-04-20
Payer: COMMERCIAL

## 2020-04-20 ENCOUNTER — TELEPHONE (OUTPATIENT)
Dept: FAMILY MEDICINE | Facility: CLINIC | Age: 65
End: 2020-04-20

## 2020-04-20 DIAGNOSIS — R50.9 FEVER, UNSPECIFIED FEVER CAUSE: Primary | ICD-10-CM

## 2020-04-20 DIAGNOSIS — R93.89 ABNORMAL CHEST X-RAY: ICD-10-CM

## 2020-04-20 DIAGNOSIS — I70.0 ATHEROSCLEROSIS OF AORTA: ICD-10-CM

## 2020-04-20 PROCEDURE — 99214 PR OFFICE/OUTPT VISIT, EST, LEVL IV, 30-39 MIN: ICD-10-PCS | Mod: 95,,, | Performed by: INTERNAL MEDICINE

## 2020-04-20 PROCEDURE — 99214 OFFICE O/P EST MOD 30 MIN: CPT | Mod: 95,,, | Performed by: INTERNAL MEDICINE

## 2020-04-20 NOTE — PHYSICIAN QUERY
PT Name: Anushka Napier  MR #: [id number]     Physician Query Form - Documentation Clarification      CDS: Elvie Escobar RN, CCDS         Contact information :ext [phone] [email or web address]       This form is a permanent document in the medical record.     Query Date: April 20, 2020    By submitting this query, we are merely seeking further clarification of documentation. Please utilize your independent clinical judgment when addressing the question(s) below.    The Medical Record reflects the following:    Clinical Findings Location in Medical Record     Principal Problem:Severe sepsis  presents to the hospital with complaints of dizziness, chill, nausea, and severe cramping bilateral lower extremities since waking up this morning. Associated symptom is headache.    T 103.4, HR 97, RR 27  WBC 13.11  Lactate 2.0    Admitted on 4/15/20 for fever 103.4 F, tachypnea, tachycardia, hypotension, elevated lactate. All symptoms resolved in ED after IVF and antibiotics. Unknown infection source. COVID 19 negative, UA unremarkable, CXR without obvious PNA, blood cultures negative x2 days.  Resolved. Very non-specific hx, potentially viral etiology.     Hypotensive at the presentation. Resolved after IVF  - Hold home Lopressor for now due to Sepsis.  - Continue to monitor and adjust as needed   H&P 4/15/20            VS 4/15/20  Lab 4/16/20      Discharge summary 4/17/20                                                                                  Please clarify the diagnosis of Sepsis          Provider Use Only  [  x  ] Sepsis ruled in and additional clinical support/ decision making indicators for the diagnosis include :            [    ] With Organ dysfunction, please specify, _______               [    ] Without Organ dysfunction             Source:         [  x  ] Unspecified viral infection         [    ] Other, _____         [    ] Unknown  Additional clinical support/ decision making indicators  (specify):_________________________________           [     ]  Sepsis has been ruled out      [    ] Sepsis has been ruled out, other diagnosis ruled in (specify):___________      [    ] Other clarification (specify): ______________      [  ] Clinically undetermined

## 2020-04-20 NOTE — TELEPHONE ENCOUNTER
----- Message from Gerardo Dobbs MD sent at 4/20/2020  8:09 AM CDT -----  Change to audio if having log in issues and I will call her-- she is my cousin by marriage

## 2020-04-20 NOTE — PROGRESS NOTES
CHIEF COMPLAINT:    Follow-up from the hospital   - seen using AUDIO AND VIDEO -face time                                                                                                                          HISTORY OF PRESENT ILLNESS:  A 65-year-old white female who is a 1st cousin of mine by marriage.  Reviewed hospital records and have been in touch with patient during her admission on the phone.  She had a fever and some hypotension that responded to fluids.  She received some antibiotics.  Fever resolved and all symptoms have resolved.  She has been doing well.  Her corona virus testing was negative two days in a row.  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  She will self I sleep for 14 days and then were mask thereafter.  She had an abnormal chest x-ray but it was the same scarring on the right as compared to about two years ago she was reassured about that.  She also had noted some atherosclerosis of the aorta which I explained will be treated by continuing her statin, treating her lipids and diabetes.  She is a nonsmoker.  She did have some anemia we will eventually do some lab work to follow-up on that.  She is up-to-date on her colon screening.  Counseled today on the phone.  She is well-known to me.  She knows the limitations of the virtual appointments.Total time over 25 minutes with over 50% counseling.        HPI:   Anushka Napier is a 65 y.o female with hypertension, hyperlipidemia, hypothyroidism, diabetes mellitus, and GERD who presents to the hospital with complaints of dizziness, chill, nausea, and severe cramping bilateral lower extremities since waking up this morning. Associated symptom is headache. Patient states she has been feeling fine until this morning. Patient reports bilateral LE intermittent cramping 9/10. No exacerbating or alleviating factors. Did not take anything to relieve symptoms. Denies any chest pain,  neck pain, SOB, cough, congestion, fever, abdominal pain, dysuria, vomit/diarrhea, edema or weakness. No sick contact or recent travel. Lives with  and he doesn't have any symptoms.     In ED, patient was found with fever 103, mild tachycardia, tachypnea and hypotension. Chest xray reveals no acute changes from prior exam on 1/15/2018 with small amount of opacity in R lung suggesting scarring. Covid 19 negative. EKG no ischemic.      Labs: CBC with WBC 12, elevated lactate; elevated ; unremarkable CMP; negative troponin; normal CPK, LD, CRP; Low Ferritin 12; BC pending. UA no infection     During interview, patient exam is normal. Patient reports all symptoms resolved in ED after IVF and antibiotics.     Patient is admitted with hospital medicine for further evaluation and treatment for sepsis.        Hospital Course:   65/F admitted 4/15/20 for fever of 103 F with bp 86/63. Non-specific 1 day hx of prominently HA with nausea. Associated leg cramping. Otherwise had felt no symptoms prior and has no sick contacts. CXR, UA and labs unrevealing with negative COVID screen on admission. No further symptoms or issues throughout admission prior to d/c on 4/17/20. Repeat COVID screen pending at that time, which can be followed up outpatient. Blood cultures negative x2 days.         Severe sepsis  Admitted on 4/15/20 for fever 103.4 F, tachypnea, tachycardia, hypotension, elevated lactate. All symptoms resolved in ED after IVF and antibiotics. Unknown infection source. COVID 19 negative, UA unremarkable, CXR without obvious PNA, blood cultures negative x2 days.  Resolved. Very non-specific hx, potentially viral etiology. Repeat COVID testing is pending. Clinically no indication for inpatient at this time. Okay for d/c. Results of COVID testing and blood cultures can be followed up outpatient.    COMPARISON:  Chest radiograph from 01/15/2018    FINDINGS:  Mild cardiomegaly.  Atherosclerosis of the aortic arch.   Normal pulmonary vasculature.  Small amount of opacity in the right middle and right lower lung zone, similar to prior exam which may represent scarring.  Less likely consideration includes pneumonia.  Remaining portion of the lungs are clear.  No pneumothorax.  No pleural effusion.  Osseous structures are unremarkable.      Impression       Small amount of opacity in the right middle and right lower lung zone similar to prior exam from 01/15/2018 suggesting scarring.  Pneumonia is considered less likely.           Back in 2012 She had an outside mammogram at [facility] done by her GYN.        subsequent spot compression film was all normal as well.  Last mammo 1/20      Prior bone densities were done by prior GYN and had osteopenia, that being 2012.  She was taken off EVista 5/14 and continues on Premarin.    11/17 BMD  Impression       Normal mineralization of the lumbar spine and right hip.  Osteopenia of the left hip                                                                                     Her colonoscopy was normal 4/17/08. 1 polyp 3/18 -5 yrs  She had another upper endoscopy,   which was okay per outside GI.  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  She has an appointment tomorrow with her GI and will discuss having the EGD and colonoscopy possibly done at this time.                                                                                                                                     Lipids well controlled, vitamin-D level excellent       having some situational stress .  All are doing fairly well                                                                                 ROS:   CONST: weight stable. EYES: no vision change. ENT: no sore throat. CV: no chest pain w/ exertion. RESP: no shortness of breath. GI: no nausea, vomiting, diarrhea. No dysphagia. : no urinary issues. MUSCULOSKELETAL: no new myalgias or arthralgias. SKIN: no new  changes. NEURO: no focal deficits. PSYCH: no new issues. ENDOCRINE: no polyuria. HEME: no lymph nodes. ALLERGY: no general pruritis.                                                                                                                    PAST MEDICAL HISTORY:                                                        1. Osteopenia, last bone density in 11/17                                2. DIABETES                          3. Hypothyroidism.                                                           4. Hypertension.                                                             5. Hysterectomy - total.                                                6. Hyperlipidemia.                                                           7. GERD. EGDs with possible Davian's- - Dr Correia.                                                                   8. Anxiety and depression.   9. ENT septum surgery for sinusitis -Dr Sandoval 2012.  10. Colonoscopy 4/17/08 normal - 1 polyp 3/18 -5 yrs   11. H/O abnormal Mammo                                                                                                                               FAMILY HISTORY: Father is living with hypertension, heart disease and        diabetes.  Mom is living with a history of cervical cancer, hypertension     and diabetes.  One brother and one sister with no stated problems.                                                                                                                                                                                      ALLERGIES:  Penicillin, sulfa, codeine, iodine and shellfish.                                                                                             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  She does not smoke and never did.  She does not drink.                                                                                                         "                                                                                             Labs and x-ray reports reviewed       Diagnoses and all orders for this visit:    Fever, unspecified fever cause, possibly viral since it resolved, workup negative for any particular cause.  Always consider corona virus even with 2-test since there is a 30% false negative rate.  She will self isolate for 14 days and then wear mask thereafter since it is impossible to know how long continued presence of the virus occur.    Abnormal chest x-ray, discussed    Atherosclerosis of aorta, discussed              Based on the need to document sensitive psychiatric symptoms, access to the clinical note will not be appropriate"=="This note will not be shared with the patient."  "

## 2020-05-25 RX ORDER — ATORVASTATIN CALCIUM 40 MG/1
TABLET, FILM COATED ORAL
Qty: 90 TABLET | Refills: 12 | Status: SHIPPED | OUTPATIENT
Start: 2020-05-25 | End: 2021-06-21

## 2020-06-27 NOTE — TELEPHONE ENCOUNTER
----- Message from Ruchi Pawan sent at 11/1/2017  8:10 AM CDT -----  Contact: self  Refill request for--- estrogens, conjugated, (PREMARIN) 0.625 MG tablet--- [facility] in [place] [facility].    Pt call back   [phone].  --------------------------------------------------------------  11/1/17 @ 1038 ([facility])  SPOKE WITH MS JONES, INFORMED HER THAT WE WILL SEND DR CASIANO A MESSAGE TO REFILL HER PREMARIN FOR HER , PT STATED HER UNDERSTANDING , SHE WAS LAST SEEN BY DR CASIANO ON 10/4/17     Data: Blood pressures within parameters after single dose of IV Apresoline for sustained severe range BP's, other VSS. Magnesium Sulfate infusing at 2gm/hr. Patient denies HA, visual disturbances, or RUQ pain.  Reflexes +1 with absent clonus bilaterally. Fetal assessment Appropriate for Gestational Age.  Interventions: Continuous uterine/fetal assessment. Patient transferred to room 479 for IOL, ambulatory, with all belongings sent to receiving unit. Accompanied by Registered Nurse. Oriented patient to surroundings. Call light within reach. Report given to Shelia Bedolla RN.  Response: Patient tolerated transfer and is stable.  Plan: IOL, continue expectant management.  Observe for and notify care provider of indicators of worsening pre-eclampsia or signs of fetal/maternal compromise.

## 2020-07-03 ENCOUNTER — HOSPITAL ENCOUNTER (EMERGENCY)
Facility: HOSPITAL | Age: 65
Discharge: HOME | End: 2020-07-03
Attending: EMERGENCY MEDICINE
Payer: COMMERCIAL

## 2020-07-03 VITALS
OXYGEN SATURATION: 98 % | RESPIRATION RATE: 14 BRPM | HEIGHT: 65 IN | WEIGHT: 155 LBS | TEMPERATURE: 98 F | DIASTOLIC BLOOD PRESSURE: 78 MMHG | SYSTOLIC BLOOD PRESSURE: 179 MMHG | HEART RATE: 70 BPM | BODY MASS INDEX: 25.83 KG/M2

## 2020-07-03 DIAGNOSIS — R00.2 PALPITATIONS: ICD-10-CM

## 2020-07-03 DIAGNOSIS — R07.9 CHEST PAIN: ICD-10-CM

## 2020-07-03 LAB
ALBUMIN SERPL-MCNC: 3.4 G/DL (ref 3.3–5.5)
ALP SERPL-CCNC: 96 U/L (ref 42–141)
BILIRUB SERPL-MCNC: 0.4 MG/DL (ref 0.2–1.6)
BUN SERPL-MCNC: 20 MG/DL (ref 7–22)
CALCIUM SERPL-MCNC: 8.9 MG/DL (ref 8–10.3)
CHLORIDE SERPL-SCNC: 101 MMOL/L (ref 98–108)
CREAT SERPL-MCNC: 0.9 MG/DL (ref 0.6–1.2)
GLUCOSE SERPL-MCNC: 111 MG/DL (ref 73–118)
POC ALT (SGPT): 17 U/L (ref 10–47)
POC AST (SGOT): 24 U/L (ref 11–38)
POC CARDIAC TROPONIN I: 0 NG/ML
POC CARDIAC TROPONIN I: 0.01 NG/ML
POC PTINR: 1 (ref 0.9–1.2)
POC PTWBT: 12 SEC (ref 9.7–14.3)
POC TCO2: 27 MMOL/L (ref 18–33)
POCT GLUCOSE: 125 MG/DL (ref 70–110)
POTASSIUM BLD-SCNC: 4 MMOL/L (ref 3.6–5.1)
PROTEIN, POC: 7.1 G/DL (ref 6.4–8.1)
SAMPLE: NORMAL
SODIUM BLD-SCNC: 140 MMOL/L (ref 128–145)

## 2020-07-03 PROCEDURE — 93005 ELECTROCARDIOGRAM TRACING: CPT | Mod: ER

## 2020-07-03 PROCEDURE — 93010 ELECTROCARDIOGRAM REPORT: CPT | Mod: ,,, | Performed by: INTERNAL MEDICINE

## 2020-07-03 PROCEDURE — 85610 PROTHROMBIN TIME: CPT | Mod: ER

## 2020-07-03 PROCEDURE — 82962 GLUCOSE BLOOD TEST: CPT | Mod: ER

## 2020-07-03 PROCEDURE — 99285 EMERGENCY DEPT VISIT HI MDM: CPT | Mod: 25,ER

## 2020-07-03 PROCEDURE — 93010 EKG 12-LEAD: ICD-10-PCS | Mod: ,,, | Performed by: INTERNAL MEDICINE

## 2020-07-03 PROCEDURE — 85025 COMPLETE CBC W/AUTO DIFF WBC: CPT | Mod: ER

## 2020-07-03 PROCEDURE — 25000003 PHARM REV CODE 250: Mod: ER | Performed by: EMERGENCY MEDICINE

## 2020-07-03 PROCEDURE — 80053 COMPREHEN METABOLIC PANEL: CPT | Mod: ER

## 2020-07-03 PROCEDURE — 84484 ASSAY OF TROPONIN QUANT: CPT | Mod: ER

## 2020-07-03 RX ORDER — ASPIRIN 325 MG
325 TABLET ORAL
Status: COMPLETED | OUTPATIENT
Start: 2020-07-03 | End: 2020-07-03

## 2020-07-03 RX ADMIN — ASPIRIN 325 MG ORAL TABLET 325 MG: 325 PILL ORAL at 06:07

## 2020-07-03 NOTE — PROVIDER PROGRESS NOTES - EMERGENCY DEPT.
Emergency Department TeleTRIAGE Encounter Note      CHIEF COMPLAINT    Chief Complaint   Patient presents with    Palpitations     Intermittent palpitations x 2 weeks, increased today, Denies CP/SOB       VITAL SIGNS   Initial Vitals   BP Pulse Resp Temp SpO2   07/03/20 1559 07/03/20 1558 07/03/20 1558 07/03/20 1558 07/03/20 1558   (!) 153/66 64 17 98 °F (36.7 °C) 96 %      MAP       --                   ALLERGIES    Review of patient's allergies indicates:   Allergen Reactions    Tizanidine Swelling     Tongue swelling     Codeine Hives     Other reaction(s): Itching  Other reaction(s): Hives    Iodinated contrast media Hives     Other reaction(s): Hives    Iodine      Other reaction(s): Unknown    Sulfamethoxazole-trimethoprim Itching     Other reaction(s): Itching  Other reaction(s): Hives    Epinephrine Anxiety and Other (See Comments)     Almost passed out.       PROVIDER TRIAGE NOTE  This is a teletriage evaluation of a 65 y.o. female presenting to the ED with c/o intermittent palpitations x2 weeks.  She also reports recent indigestion and right sided back pain.  In addition to this she, she also explains 4 days ago she had hazy vision out of her right eye, and a brief episode of double vision that resolved the same day.  She currently denies headache or focal weakness.  Slightly hypertensive, otherwise vital signs are within normal limits.. Initial orders will be placed and care will be transferred to an alternate provider when patient is roomed for a full evaluation. Any additional orders and the final disposition will be determined by that provider.         ORDERS  Labs Reviewed   POCT GLUCOSE, HAND-HELD DEVICE   POCT CBC   POCT CMP   POCT PROTIME-INR   POCT TROPONIN       ED Orders (720h ago, onward)    Start Ordered     Status Ordering Provider    07/03/20 1611 07/03/20 1612  Cardiac Monitoring - Adult  Continuous     Comments: Notify Physician If:    Ordered SAM SEARS    07/03/20 1611  07/03/20 1612  Pulse Oximetry Continuous  Continuous      Ordered SEARSSAM LAU    07/03/20 1611 07/03/20 1612  Diet NPO  Diet effective now      Ordered SEARSSAM LAU    07/03/20 1611 07/03/20 1612  Saline lock IV  Once      Ordered SEARSSAM LAU    07/03/20 1611 07/03/20 1612  EKG 12-lead  Once     Comments: Do not perform if previously done during this visit/ triage.    Ordered SEARSSAM LAU    07/03/20 1611 07/03/20 1612  POCT CBC  Once      Ordered SEARSSAM LAU    07/03/20 1611 07/03/20 1612  POCT CMP  Once      Ordered SEARSSAM LAU    07/03/20 1611 07/03/20 1612  POCT Protime-INR  Once      Ordered SAM SEARS    07/03/20 1611 07/03/20 1612  POCT Troponin  Once      Ordered SAM SEARS    07/03/20 1611 07/03/20 1612  X-Ray Chest PA And Lateral  1 time imaging      Ordered SAM SEARS    07/03/20 1601 07/03/20 1600  POCT Glucose, Hand-Held Device  Once      Ordered JENNIFER KIMBALL    07/03/20 1601 07/03/20 1600  EKG 12-lead  Once  Completed    Completed by TRACEY DOS SANTOS on 7/3/2020 at  4:07 PM JENNIFER KIMBALL            Virtual Visit Note: The provider triage portion of this emergency department evaluation and documentation was performed via [employer], a HIPAA-compliant telemedicine application, in concert with a tele-presenter in the room. A face to face patient evaluation with one of my colleagues will occur once the patient is placed in an emergency department room.      DISCLAIMER: This note was prepared with [employer] voice recognition transcription software. Garbled syntax, mangled pronouns, and other bizarre constructions may be attributed to that software system.

## 2020-07-03 NOTE — ED PROVIDER NOTES
Encounter Date: 7/3/2020    SCRIBE #1 NOTE: I, Pamella Blount, am scribing for, and in the presence of,  Dr. Silva. I have scribed the following portions of the note - the EKG reading. Other sections scribed: HPI, ROS, PE.       History     Chief Complaint   Patient presents with    Palpitations     Intermittent palpitations x 2 weeks, increased today, Denies CP/SOB     Anushka Napier is a 65 y.o. female who presents to the ED complaining of intermittent palpitations x 2 weeks. Describes as a fluttering in chest. Reports she felt an indigestion pain in center of her chest that radiated to right side of back last night. States she took her GERD medications with no relief. Patient thinks her pain resolved around midnight and now only has chest discomfort. Rates current pain 0/10. Rates pain last night 7/10. Reports occasional leg cramping, but nothing right now. Doesn't smoke, drink alcohol or use drugs. PCP is Dr. Dobbs. Has seen cardiologist for mitral valve prolapse.     The history is provided by the patient. No  was used.     Review of patient's allergies indicates:   Allergen Reactions    Tizanidine Swelling     Tongue swelling     Codeine Hives     Other reaction(s): Itching  Other reaction(s): Hives    Iodinated contrast media Hives     Other reaction(s): Hives    Iodine      Other reaction(s): Unknown    Sulfamethoxazole-trimethoprim Itching     Other reaction(s): Itching  Other reaction(s): Hives    Epinephrine Anxiety and Other (See Comments)     Almost passed out.     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, long term PPI use    History of colonic polyps 1/24/2020    Hyperlipidemia     Hypertension     Osteopenia 9/25/2014     Screening for colorectal cancer 9/25/2014    Normal 2009    Screening for colorectal cancer     Thyroid disease     Vitamin D deficiency disease 9/25/2014     Past Surgical History:   Procedure Laterality Date    HYSTERECTOMY  1997    complete for prolapse, Abdominal    OOPHORECTOMY      right shoulder      SINUS SURGERY  2012     Family History   Problem Relation Age of Onset    Breast cancer Cousin 40    Miscarriages / Stillbirths Maternal Grandmother     Miscarriages / Stillbirths Mother     Diabetes Mother     Hypertension Mother     Cancer Mother         cervical    Heart disease Father     Hypertension Father     Ovarian cancer Neg Hx      Social History     Tobacco Use    Smoking status: Never Smoker    Smokeless tobacco: Never Used   Substance Use Topics    Alcohol use: No    Drug use: No     Review of Systems   Constitutional: Negative for chills and fever.   HENT: Negative for congestion and sore throat.    Respiratory: Negative for cough and shortness of breath.    Cardiovascular: Positive for chest pain (discomfort) and palpitations. Negative for leg swelling.   Gastrointestinal: Negative for diarrhea, nausea and vomiting.   Genitourinary: Negative for dysuria.   Skin: Negative for rash.   Neurological: Negative for weakness and headaches.   All other systems reviewed and are negative.      Physical Exam     Initial Vitals   BP Pulse Resp Temp SpO2   07/03/20 1559 07/03/20 1558 07/03/20 1558 07/03/20 1558 07/03/20 1558   (!) 153/66 64 17 98 °F (36.7 °C) 96 %      MAP       --                Patient gave consent to have physical exam performed.    Physical Exam    Nursing note and vitals reviewed.  Constitutional: She appears well-developed and well-nourished.   HENT:   Head: Normocephalic and atraumatic.   Right Ear: External ear normal.   Left Ear: External ear normal.   Nose: Nose normal.   Mouth/Throat: Oropharynx is clear and moist.   Eyes: Conjunctivae and EOM are normal.  Pupils are equal, round, and reactive to light.   Neck: Normal range of motion and phonation normal. Neck supple.   Cardiovascular: Normal rate, regular rhythm, normal heart sounds and intact distal pulses. Exam reveals no gallop and no friction rub.    No murmur heard.  Pulmonary/Chest: Effort normal and breath sounds normal. No stridor. No respiratory distress. She has no wheezes. She has no rhonchi. She has no rales. She exhibits no tenderness.   Abdominal: Soft. Bowel sounds are normal. She exhibits no distension. There is no abdominal tenderness. There is no rigidity, no rebound and no guarding.   Musculoskeletal: Normal range of motion. No tenderness or edema.   Neurological: She is alert and oriented to person, place, and time. She has normal strength. No cranial nerve deficit or sensory deficit. GCS score is 15. GCS eye subscore is 4. GCS verbal subscore is 5. GCS motor subscore is 6.   Skin: Skin is warm and dry. Capillary refill takes less than 2 seconds. No rash noted.   Psychiatric: She has a normal mood and affect. Her behavior is normal.         ED Course   Procedures  Labs Reviewed   POCT GLUCOSE - Abnormal; Notable for the following components:       Result Value    POCT Glucose 125 (*)     All other components within normal limits   TROPONIN ISTAT   TROPONIN ISTAT   POCT CBC   POCT CMP   POCT PROTIME-INR   POCT TROPONIN   ISTAT PROCEDURE   POCT CMP   POCT TROPONIN     EKG Readings: (Independently Interpreted)   Initial Reading: No STEMI. Rhythm: Normal Sinus Rhythm. Heart Rate: 66. Axis: Normal.   Normal EKG. QTc normal at 423. When compared to prior EKG dated 4/15/2020 rate decreased by 34 bpm.        Imaging Results          X-Ray Chest PA And Lateral (Final result)  Result time 07/03/20 16:55:21    Final result by Mary Ross MD (07/03/20 16:55:21)                 Impression:      No significant abnormality seen      Electronically signed by: Mary Ross  MD  Date:    07/03/2020  Time:    16:55             Narrative:    EXAMINATION:  XR CHEST PA AND LATERAL    CLINICAL HISTORY:  Chest Pain;    TECHNIQUE:  PA and lateral views of the chest were performed.    COMPARISON:  05/14/2020    FINDINGS:  The cardiac silhouette is normal in size, midline.  The pulmonary vascularity is normal.  The lungs are normally inflated and clear.  No pleural effusion.  No pneumothorax.  The osseous structures appear within normal limits.                                 Medical Decision Making:   History:   Old Medical Records: I decided to obtain old medical records.  Initial Assessment:   Anushka Napier is a 65 y.o. female who presents to the ED complaining of intermittent palpitations x 2 weeks. Describes as a fluttering in chest. Reports she felt an indigestion pain in center of her chest that radiated to right side of back last night. States she took her GERD medications with no relief. Patient thinks her pain resolved around midnight and now only has chest discomfort. Rates current pain 0/10. Rates pain last night 7/10. Reports occasional leg cramping, but nothing right now. Doesn't smoke, drink alcohol or use drugs. PCP is Dr. Dobbs. Has seen cardiologist for mitral valve prolapse.     Independently Interpreted Test(s):   I have ordered and independently interpreted EKG Reading(s) - see prior notes  Clinical Tests:   Lab Tests: Ordered and Reviewed  Radiological Study: Ordered and Reviewed  Medical Tests: Ordered and Reviewed  ED Management:  Care was transferred from Dr. ortega to myself (Dr. Ruano) at 7:00 p.m. with 2 hr troponin.  Troponin was negative and patient was advised to follow up with her primary care physician within the next week for re-evaluation/return to the emergency department if condition worsens.  Instructions for noncardiac chest pain/palpitations were given prior to discharge.  Chief complaint: palpitations  Differential diagnosis: STEMI, NSTEMI, cardiac  arrhythmia, hyperglycemia, hypoglycemia, acid reflux    Treatment in the ED: PE, aspirin  Patient reports feeling better after treatment in the ER.      Discussed treatment, prescriptions, labs, and imaging results.    Fill and take prescriptions as directed.  Return to the ED if symptoms worsen or do not resolve.   Answered questions and discussed discharge plan.    Patient feels better and is ready for discharge.  Follow up with PCP/specialist in 1 day.          Scribe Attestation:   Scribe #1: I performed the above scribed service and the documentation accurately describes the services I performed. I attest to the accuracy of the note.    This document was produced by a scribe under my direction and in my presence. I agree with the content of the note and have made any necessary edits.     Dr. Ruano    07/04/2020 3:23 AM                        Clinical Impression:     1. Palpitations    2. Chest pain            Disposition:   Disposition: Discharged  Condition: Stable     ED Disposition Condition    Discharge Stable        ED Prescriptions     None        Follow-up Information     Follow up With Specialties Details Why Contact Info    Gerardo Dobbs MD Internal Medicine Schedule an appointment as soon as possible for a visit in 1 week For reevaluation [address] [facility] [place] [address]  [phone]                                       Joni Ruano MD  07/04/20 0325

## 2020-07-06 ENCOUNTER — OFFICE VISIT (OUTPATIENT)
Dept: CARDIOLOGY | Facility: CLINIC | Age: 65
End: 2020-07-06
Payer: COMMERCIAL

## 2020-07-06 VITALS
TEMPERATURE: 96 F | OXYGEN SATURATION: 98 % | SYSTOLIC BLOOD PRESSURE: 122 MMHG | HEIGHT: 65 IN | BODY MASS INDEX: 26.77 KG/M2 | DIASTOLIC BLOOD PRESSURE: 78 MMHG | WEIGHT: 160.69 LBS | HEART RATE: 77 BPM

## 2020-07-06 DIAGNOSIS — R00.2 PALPITATIONS: Primary | ICD-10-CM

## 2020-07-06 DIAGNOSIS — I10 ESSENTIAL HYPERTENSION: ICD-10-CM

## 2020-07-06 DIAGNOSIS — E11.65 UNCONTROLLED TYPE 2 DIABETES MELLITUS WITH HYPERGLYCEMIA: ICD-10-CM

## 2020-07-06 DIAGNOSIS — R07.9 CHEST PAIN, UNSPECIFIED TYPE: ICD-10-CM

## 2020-07-06 DIAGNOSIS — E07.9 THYROID DISEASE: ICD-10-CM

## 2020-07-06 DIAGNOSIS — E78.5 HYPERLIPIDEMIA LDL GOAL <100: ICD-10-CM

## 2020-07-06 DIAGNOSIS — E55.9 VITAMIN D DEFICIENCY DISEASE: ICD-10-CM

## 2020-07-06 PROCEDURE — 99999 PR PBB SHADOW E&M-EST. PATIENT-LVL V: ICD-10-PCS | Mod: PBBFAC,,, | Performed by: INTERNAL MEDICINE

## 2020-07-06 PROCEDURE — 99999 PR PBB SHADOW E&M-EST. PATIENT-LVL V: CPT | Mod: PBBFAC,,, | Performed by: INTERNAL MEDICINE

## 2020-07-06 PROCEDURE — 99203 OFFICE O/P NEW LOW 30 MIN: CPT | Mod: S$GLB,,, | Performed by: INTERNAL MEDICINE

## 2020-07-06 PROCEDURE — 99203 PR OFFICE/OUTPT VISIT, NEW, LEVL III, 30-44 MIN: ICD-10-PCS | Mod: S$GLB,,, | Performed by: INTERNAL MEDICINE

## 2020-07-06 NOTE — PROGRESS NOTES
CARDIOLOGY CONSULTATION    REASON FOR CONSULT:   Anushka Napier is a 65 y.o. female who presents for evaluation of palpitations.      HISTORY OF PRESENT ILLNESS:     Anushka Napier presents for evaluation of palpitations.  Last week she had noted some double vision in her right eye.  Vision was blurry.  Spontaneous resolution.  A day or two after she noted palpitations.  She states that she has had a history what she calls her heart fluttering.  However in this episode was more intense. Lasted a few hours.  Afterwards she had some chest discomfort, describes it as if she had worked out. Pressure sensation across her chest.  No history of coronary artery disease. CV RF HTN, HLP, DM. Nl tsh from 1/20.  EKG from 7/3/20 - NSR.       CARDIOVASCULAR HISTORY:     None    PAST MEDICAL HISTORY:     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, long term PPI use    History of colonic polyps 1/24/2020    Hyperlipidemia     Hypertension     Osteopenia 9/25/2014    Screening for colorectal cancer 9/25/2014    Normal 2009    Screening for colorectal cancer     Thyroid disease     Vitamin D deficiency disease 9/25/2014       PAST SURGICAL HISTORY:     Past Surgical History:   Procedure Laterality Date    HYSTERECTOMY  1997    complete for prolapse, Abdominal    OOPHORECTOMY      right shoulder      SINUS SURGERY  2012       ALLERGIES AND MEDICATION:     Review of patient's allergies indicates:   Allergen Reactions    Tizanidine Swelling     Tongue swelling     Codeine Hives     Other reaction(s): Itching  Other reaction(s): Hives    Iodinated contrast media Hives     Other reaction(s): Hives    Iodine      Other reaction(s): Unknown    Sulfamethoxazole-trimethoprim Itching      Other reaction(s): Itching  Other reaction(s): Hives    Epinephrine Anxiety and Other (See Comments)     Almost passed out.        Medication List          Accurate as of July 6, 2020  4:44 PM. If you have any questions, ask your nurse or doctor.            CONTINUE taking these medications    aspirin 81 MG Chew     atorvastatin 40 MG tablet  Commonly known as: LIPITOR  TAKE 1 TABLET(40 MG) BY MOUTH EVERY DAY     CONTOUR NEXT TEST STRIPS Strp  Generic drug: blood sugar diagnostic  USE TO TEST ONCE DAILY     FISH OIL 1,000 mg Cap  Generic drug: omega-3 fatty acids-vitamin E     FLUoxetine 20 MG capsule  TAKE 1 CAPSULE BY MOUTH DAILY     fluticasone propionate 50 mcg/actuation nasal spray  Commonly known as: FLONASE  2 sprays (100 mcg total) by Each Nare route once daily. 2 Aerosol, Spray Nasal Every day     * levothyroxine 200 MCG tablet  Commonly known as: SYNTHROID  TAKE 1 TABLET BY MOUTH EVERY DAY     * levothyroxine 25 MCG tablet  Commonly known as: SYNTHROID  TAKE 1 TABLET BY MOUTH EVERY DAY     metFORMIN 500 MG XR 24hr tablet  Commonly known as: GLUCOPHAGE-XR  Take 1 tablet (500 mg total) by mouth once daily.     metoprolol tartrate 50 MG tablet  Commonly known as: LOPRESSOR  TAKE 1 TABLET BY MOUTH DAILY     PREMARIN 0.625 MG tablet  Generic drug: estrogens (conjugated)  TAKE 1 TABLET BY MOUTH ONCE DAILY     RABEprazole 20 mg tablet  Commonly known as: ACIPHEX  TAKE 1 TABLET(20 MG) BY MOUTH TWICE DAILY     Saccharomyces boulardii 250 mg capsule  Commonly known as: FLORASTOR     traMADoL 50 mg tablet  Commonly known as: ULTRAM  Take 1 tablet (50 mg total) by mouth every 12 (twelve) hours as needed for Pain.     VESICARE 5 MG tablet  Generic drug: solifenacin     vitamin D 1000 units Tab  Commonly known as: VITAMIN D3         * This list has 2 medication(s) that are the same as other medications prescribed for you. Read the directions carefully, and ask your doctor or other care provider to review them with  you.                SOCIAL HISTORY:     Social History     Socioeconomic History    Marital status:      Spouse name: Not on file    Number of children: Not on file    Years of education: Not on file    Highest education level: Not on file   Occupational History    Not on file   Social Needs    Financial resource strain: Not on file    Food insecurity     Worry: Not on file     Inability: Not on file    Transportation needs     Medical: Not on file     Non-medical: Not on file   Tobacco Use    Smoking status: Never Smoker    Smokeless tobacco: Never Used   Substance and Sexual Activity    Alcohol use: No    Drug use: No    Sexual activity: Yes     Partners: Male     Birth control/protection: Surgical   Lifestyle    Physical activity     Days per week: Not on file     Minutes per session: Not on file    Stress: Not on file   Relationships    Social connections     Talks on phone: Not on file     Gets together: Not on file     Attends Christian service: Not on file     Active member of club or organization: Not on file     Attends meetings of clubs or organizations: Not on file     Relationship status: Not on file   Other Topics Concern    Are you pregnant or think you may be? Not Asked    Breast-feeding Not Asked   Social History Narrative     since 1986    Previous  for 8 years prior    He is a     She is a homemaker       FAMILY HISTORY:     Family History   Problem Relation Age of Onset    Breast cancer Cousin 40    Miscarriages / Stillbirths Maternal Grandmother     Miscarriages / Stillbirths Mother     Diabetes Mother     Hypertension Mother     Cancer Mother         cervical    Heart disease Father     Hypertension Father     Ovarian cancer Neg Hx        REVIEW OF SYSTEMS:   Review of Systems   Eyes: Positive for blurred vision and double vision. Negative for photophobia, pain and discharge.   Respiratory: Negative for cough, hemoptysis, sputum  "production, shortness of breath and wheezing.    Cardiovascular: Positive for chest pain and palpitations. Negative for orthopnea, claudication, leg swelling and PND.   Gastrointestinal: Negative for abdominal pain, blood in stool, heartburn, melena, nausea and vomiting.   Musculoskeletal: Positive for back pain.   Neurological: Negative for dizziness, tingling, tremors, sensory change, speech change, focal weakness, seizures, loss of consciousness, weakness and headaches.       PHYSICAL EXAM:     Vitals:    07/06/20 1630   BP: 122/78   Pulse: 77   Temp: 96 °F (35.6 °C)    Body mass index is 26.74 kg/m².  Weight: 72.9 kg (160 lb 11.5 oz)   Height: 5' 5" (165.1 cm)     Physical Exam  Vitals signs reviewed.   Constitutional:       General: She is not in acute distress.     Appearance: She is well-developed. She is not diaphoretic.   HENT:      Head: Normocephalic.   Neck:      Vascular: No carotid bruit or JVD.   Cardiovascular:      Rate and Rhythm: Normal rate and regular rhythm.      Pulses: Normal pulses.      Heart sounds: Murmur present. Systolic murmur present with a grade of 3/6.   Pulmonary:      Effort: Pulmonary effort is normal.      Breath sounds: Normal breath sounds.   Abdominal:      General: Bowel sounds are normal.      Palpations: Abdomen is soft.      Tenderness: There is no abdominal tenderness.   Skin:     General: Skin is warm and dry.   Neurological:      Mental Status: She is alert and oriented to person, place, and time.   Psychiatric:         Speech: Speech normal.         Behavior: Behavior normal.         Thought Content: Thought content normal.         DATA:   EKG: (personally reviewed tracing)  7/3/20 - NSR  Laboratory:  CBC:  Recent Labs   Lab 03/19/19  1612 04/16/20  0454 04/17/20  0400   WBC 7.90 13.11 H 9.35   Hemoglobin 13.0 10.3 L 10.7 L   Hematocrit 39.6 33.3 L 35.3 L   Platelets 283 257 294       CHEMISTRIES:  Recent Labs   Lab 03/19/19  1612 01/18/20  0803 04/17/20  0400 "   Glucose 125 H 112 H 109   Sodium 138 139 140   Potassium 3.8 4.3 4.0   BUN, Bld 21 15 11   Creatinine 1.0 1.0 0.8   eGFR if African American >60 >60.0 >60   eGFR if non African American >60 >60.0 >60   Calcium 9.0 8.9 8.5 L       CARDIAC BIOMARKERS:  Recent Labs   Lab 01/15/18  1320 04/15/20  0823   CPK  --  85   Troponin I <0.006  --        COAGS:  Recent Labs   Lab 01/15/18  1320   INR 1.0       LIPIDS/LFTS:  Recent Labs   Lab 10/04/17  0851 01/15/18  1320 11/17/18  0849 03/19/19  1612 01/18/20  0803   Cholesterol 199  --  178  --  191   Triglycerides 144  --  113  --  113   HDL 61  --  61  --  71   LDL Cholesterol 109.2  --  94.4  --  97.4   Non-HDL Cholesterol 138  --  117  --  120   AST 14 15  --  19 15   ALT 11 13  --  18 10           ASSESSMENT:     1. Palpitations  2. Chest pain  3. HTN  4. HLP: LDL 97  5. DM  6. Anemia: ferritin 12    PLAN:     1. VIJAY  2. 48 hr holter  3. RTC one month.        Sreedhar Richmond MD, MPH, FACC, [facility]

## 2020-07-13 ENCOUNTER — PATIENT MESSAGE (OUTPATIENT)
Dept: FAMILY MEDICINE | Facility: CLINIC | Age: 65
End: 2020-07-13

## 2020-07-13 ENCOUNTER — HOSPITAL ENCOUNTER (OUTPATIENT)
Dept: CARDIOLOGY | Facility: HOSPITAL | Age: 65
Discharge: HOME | End: 2020-07-13
Attending: INTERNAL MEDICINE
Payer: COMMERCIAL

## 2020-07-13 DIAGNOSIS — R00.2 PALPITATIONS: ICD-10-CM

## 2020-07-13 DIAGNOSIS — R07.9 CHEST PAIN, UNSPECIFIED TYPE: ICD-10-CM

## 2020-07-13 DIAGNOSIS — E11.65 UNCONTROLLED TYPE 2 DIABETES MELLITUS WITH HYPERGLYCEMIA: Primary | ICD-10-CM

## 2020-07-13 LAB
AORTIC ROOT ANNULUS: 2.69 CM
AORTIC VALVE CUSP SEPERATION: 1.99 CM
ASCENDING AORTA: 2.61 CM
AV INDEX (PROSTH): 0.78
AV MEAN GRADIENT: 7 MMHG
AV PEAK GRADIENT: 15 MMHG
AV VALVE AREA: 2.87 CM2
AV VELOCITY RATIO: 0.74
CV ECHO LV RWT: 0.6 CM
CV STRESS BASE HR: 83 BPM
DIASTOLIC BLOOD PRESSURE: 82 MMHG
DOP CALC AO PEAK VEL: 1.91 M/S
DOP CALC AO VTI: 37.53 CM
DOP CALC LVOT AREA: 3.7 CM2
DOP CALC LVOT DIAMETER: 2.17 CM
DOP CALC LVOT PEAK VEL: 1.42 M/S
DOP CALC LVOT STROKE VOLUME: 107.6 CM3
DOP CALCLVOT PEAK VEL VTI: 29.11 CM
E WAVE DECELERATION TIME: 269.1 MSEC
E/A RATIO: 1
ECHO LV POSTERIOR WALL: 0.96 CM (ref 0.6–1.1)
FRACTIONAL SHORTENING: 25 % (ref 28–44)
INTERVENTRICULAR SEPTUM: 0.96 CM (ref 0.6–1.1)
IVRT: 96.89 MSEC
LA MAJOR: 4.81 CM
LA MINOR: 4.73 CM
LA WIDTH: 3.09 CM
LEFT ATRIUM SIZE: 3.71 CM
LEFT ATRIUM VOLUME: 46.48 CM3
LEFT INTERNAL DIMENSION IN SYSTOLE: 2.4 CM (ref 2.1–4)
LEFT VENTRICLE DIASTOLIC VOLUME: 41.22 ML
LEFT VENTRICLE SYSTOLIC VOLUME: 20.14 ML
LEFT VENTRICULAR INTERNAL DIMENSION IN DIASTOLE: 3.21 CM (ref 3.5–6)
LEFT VENTRICULAR MASS: 85.41 G
MV PEAK A VEL: 0.73 M/S
MV PEAK E VEL: 0.73 M/S
MV STENOSIS PRESSURE HALF TIME: 77.82 MS
MV VALVE AREA P 1/2 METHOD: 2.83 CM2
OHS CV CPX 1 MINUTE RECOVERY HEART RATE: 137 BPM
OHS CV CPX 85 PERCENT MAX PREDICTED HEART RATE MALE: 128
OHS CV CPX ESTIMATED METS: 10
OHS CV CPX MAX PREDICTED HEART RATE: 151
OHS CV CPX PATIENT IS FEMALE: 1
OHS CV CPX PATIENT IS MALE: 0
OHS CV CPX PEAK DIASTOLIC BLOOD PRESSURE: 84 MMHG
OHS CV CPX PEAK HEAR RATE: 150 BPM
OHS CV CPX PEAK RATE PRESSURE PRODUCT: ABNORMAL
OHS CV CPX PEAK SYSTOLIC BLOOD PRESSURE: 199 MMHG
OHS CV CPX PERCENT MAX PREDICTED HEART RATE ACHIEVED: 100
OHS CV CPX RATE PRESSURE PRODUCT PRESENTING: ABNORMAL
PISA TR MAX VEL: 2.35 M/S
PULM VEIN S/D RATIO: 1.5
PV PEAK D VEL: 0.38 M/S
PV PEAK S VEL: 0.57 M/S
PV PEAK VELOCITY: 1.1 CM/S
RA MAJOR: 4.06 CM
RA PRESSURE: 3 MMHG
RA WIDTH: 3.53 CM
RIGHT VENTRICULAR END-DIASTOLIC DIMENSION: 2.93 CM
RV TISSUE DOPPLER FREE WALL SYSTOLIC VELOCITY 1 (APICAL 4 CHAMBER VIEW): 11.15 CM/S
SINUS: 2.75 CM
STJ: 2.59 CM
STRESS ANGINA INDEX: 0
STRESS ECHO POST EXERCISE DUR MIN: 9 MINUTES
STRESS ECHO POST EXERCISE DUR SEC: 4 SECONDS
SYSTOLIC BLOOD PRESSURE: 151 MMHG
TR MAX PG: 22 MMHG
TRICUSPID ANNULAR PLANE SYSTOLIC EXCURSION: 1.8 CM
TV REST PULMONARY ARTERY PRESSURE: 25 MMHG

## 2020-07-13 PROCEDURE — 93351 STRESS ECHO (CUPID ONLY): ICD-10-PCS | Mod: 26,,, | Performed by: INTERNAL MEDICINE

## 2020-07-13 PROCEDURE — 93227 HOLTER MONITOR - 48 HOUR (CUPID ONLY): ICD-10-PCS | Mod: ,,, | Performed by: INTERNAL MEDICINE

## 2020-07-13 PROCEDURE — 93325 DOPPLER ECHO COLOR FLOW MAPG: CPT

## 2020-07-13 PROCEDURE — 93320 STRESS ECHO (CUPID ONLY): ICD-10-PCS | Mod: 26,,, | Performed by: INTERNAL MEDICINE

## 2020-07-13 PROCEDURE — 93325 STRESS ECHO (CUPID ONLY): ICD-10-PCS | Mod: 26,,, | Performed by: INTERNAL MEDICINE

## 2020-07-13 PROCEDURE — 93320 DOPPLER ECHO COMPLETE: CPT | Mod: 26,,, | Performed by: INTERNAL MEDICINE

## 2020-07-13 PROCEDURE — 93325 DOPPLER ECHO COLOR FLOW MAPG: CPT | Mod: 26,,, | Performed by: INTERNAL MEDICINE

## 2020-07-13 PROCEDURE — 93226 XTRNL ECG REC<48 HR SCAN A/R: CPT

## 2020-07-13 PROCEDURE — 93351 STRESS TTE COMPLETE: CPT | Mod: 26,,, | Performed by: INTERNAL MEDICINE

## 2020-07-13 PROCEDURE — 93227 XTRNL ECG REC<48 HR R&I: CPT | Mod: ,,, | Performed by: INTERNAL MEDICINE

## 2020-07-14 ENCOUNTER — PATIENT MESSAGE (OUTPATIENT)
Dept: FAMILY MEDICINE | Facility: CLINIC | Age: 65
End: 2020-07-14

## 2020-07-16 LAB
OHS CV EVENT MONITOR DAY: 0
OHS CV HOLTER LENGTH DECIMAL HOURS: 47.98
OHS CV HOLTER LENGTH HOURS: 47
OHS CV HOLTER LENGTH MINUTES: 59

## 2020-07-18 ENCOUNTER — LAB VISIT (OUTPATIENT)
Dept: LAB | Facility: HOSPITAL | Age: 65
End: 2020-07-18
Attending: INTERNAL MEDICINE
Payer: COMMERCIAL

## 2020-07-18 DIAGNOSIS — E11.65 UNCONTROLLED TYPE 2 DIABETES MELLITUS WITH HYPERGLYCEMIA: ICD-10-CM

## 2020-07-18 LAB
BASOPHILS # BLD AUTO: 0.05 K/UL (ref 0–0.2)
BASOPHILS NFR BLD: 0.8 % (ref 0–1.9)
DIFFERENTIAL METHOD: ABNORMAL
EOSINOPHIL # BLD AUTO: 0.2 K/UL (ref 0–0.5)
EOSINOPHIL NFR BLD: 3 % (ref 0–8)
ERYTHROCYTE [DISTWIDTH] IN BLOOD BY AUTOMATED COUNT: 14.6 % (ref 11.5–14.5)
ESTIMATED AVG GLUCOSE: 146 MG/DL (ref 68–131)
FERRITIN SERPL-MCNC: 11 NG/ML (ref 20–300)
HBA1C MFR BLD HPLC: 6.7 % (ref 4–5.6)
HCT VFR BLD AUTO: 35.9 % (ref 37–48.5)
HGB BLD-MCNC: 10.6 G/DL (ref 12–16)
IMM GRANULOCYTES # BLD AUTO: 0.01 K/UL (ref 0–0.04)
IMM GRANULOCYTES NFR BLD AUTO: 0.2 % (ref 0–0.5)
IRON SERPL-MCNC: 44 UG/DL (ref 30–160)
LYMPHOCYTES # BLD AUTO: 1.9 K/UL (ref 1–4.8)
LYMPHOCYTES NFR BLD: 30.2 % (ref 18–48)
MCH RBC QN AUTO: 25.5 PG (ref 27–31)
MCHC RBC AUTO-ENTMCNC: 29.5 G/DL (ref 32–36)
MCV RBC AUTO: 87 FL (ref 82–98)
MONOCYTES # BLD AUTO: 0.6 K/UL (ref 0.3–1)
MONOCYTES NFR BLD: 9.2 % (ref 4–15)
NEUTROPHILS # BLD AUTO: 3.6 K/UL (ref 1.8–7.7)
NEUTROPHILS NFR BLD: 56.6 % (ref 38–73)
NRBC BLD-RTO: 0 /100 WBC
PLATELET # BLD AUTO: 352 K/UL (ref 150–350)
PMV BLD AUTO: 10.2 FL (ref 9.2–12.9)
RBC # BLD AUTO: 4.15 M/UL (ref 4–5.4)
SATURATED IRON: 9 % (ref 20–50)
T4 FREE SERPL-MCNC: 1.14 NG/DL (ref 0.71–1.51)
TOTAL IRON BINDING CAPACITY: 491 UG/DL (ref 250–450)
TRANSFERRIN SERPL-MCNC: 332 MG/DL (ref 200–375)
TSH SERPL DL<=0.005 MIU/L-ACNC: 0.24 UIU/ML (ref 0.4–4)
WBC # BLD AUTO: 6.32 K/UL (ref 3.9–12.7)

## 2020-07-18 PROCEDURE — 82728 ASSAY OF FERRITIN: CPT

## 2020-07-18 PROCEDURE — 84439 ASSAY OF FREE THYROXINE: CPT

## 2020-07-18 PROCEDURE — 36415 COLL VENOUS BLD VENIPUNCTURE: CPT | Mod: PO

## 2020-07-18 PROCEDURE — 84443 ASSAY THYROID STIM HORMONE: CPT

## 2020-07-18 PROCEDURE — 83540 ASSAY OF IRON: CPT

## 2020-07-18 PROCEDURE — 85025 COMPLETE CBC W/AUTO DIFF WBC: CPT

## 2020-07-18 PROCEDURE — 83036 HEMOGLOBIN GLYCOSYLATED A1C: CPT

## 2020-07-22 ENCOUNTER — PATIENT MESSAGE (OUTPATIENT)
Dept: FAMILY MEDICINE | Facility: CLINIC | Age: 65
End: 2020-07-22

## 2020-07-22 DIAGNOSIS — E03.9 HYPOTHYROIDISM, UNSPECIFIED TYPE: ICD-10-CM

## 2020-07-22 DIAGNOSIS — D64.9 ANEMIA, UNSPECIFIED TYPE: ICD-10-CM

## 2020-07-22 DIAGNOSIS — E11.65 UNCONTROLLED TYPE 2 DIABETES MELLITUS WITH HYPERGLYCEMIA: Primary | ICD-10-CM

## 2020-08-20 LAB
LEFT EYE DM RETINOPATHY: POSITIVE
RIGHT EYE DM RETINOPATHY: POSITIVE

## 2020-09-19 ENCOUNTER — LAB VISIT (OUTPATIENT)
Dept: LAB | Facility: HOSPITAL | Age: 65
End: 2020-09-19
Attending: INTERNAL MEDICINE
Payer: COMMERCIAL

## 2020-09-19 DIAGNOSIS — E03.9 HYPOTHYROIDISM, UNSPECIFIED TYPE: ICD-10-CM

## 2020-09-19 DIAGNOSIS — E11.65 UNCONTROLLED TYPE 2 DIABETES MELLITUS WITH HYPERGLYCEMIA: ICD-10-CM

## 2020-09-19 DIAGNOSIS — D64.9 ANEMIA, UNSPECIFIED TYPE: ICD-10-CM

## 2020-09-19 LAB
BASOPHILS # BLD AUTO: 0.06 K/UL (ref 0–0.2)
BASOPHILS NFR BLD: 1 % (ref 0–1.9)
DIFFERENTIAL METHOD: ABNORMAL
EOSINOPHIL # BLD AUTO: 0.2 K/UL (ref 0–0.5)
EOSINOPHIL NFR BLD: 3.6 % (ref 0–8)
ERYTHROCYTE [DISTWIDTH] IN BLOOD BY AUTOMATED COUNT: 16.2 % (ref 11.5–14.5)
FERRITIN SERPL-MCNC: 23 NG/ML (ref 20–300)
HCT VFR BLD AUTO: 40.2 % (ref 37–48.5)
HGB BLD-MCNC: 12.3 G/DL (ref 12–16)
IMM GRANULOCYTES # BLD AUTO: 0.01 K/UL (ref 0–0.04)
IMM GRANULOCYTES NFR BLD AUTO: 0.2 % (ref 0–0.5)
IRON SERPL-MCNC: 78 UG/DL (ref 30–160)
LYMPHOCYTES # BLD AUTO: 1.9 K/UL (ref 1–4.8)
LYMPHOCYTES NFR BLD: 32.1 % (ref 18–48)
MCH RBC QN AUTO: 26.3 PG (ref 27–31)
MCHC RBC AUTO-ENTMCNC: 30.6 G/DL (ref 32–36)
MCV RBC AUTO: 86 FL (ref 82–98)
MONOCYTES # BLD AUTO: 0.6 K/UL (ref 0.3–1)
MONOCYTES NFR BLD: 9.5 % (ref 4–15)
NEUTROPHILS # BLD AUTO: 3.1 K/UL (ref 1.8–7.7)
NEUTROPHILS NFR BLD: 53.6 % (ref 38–73)
NRBC BLD-RTO: 0 /100 WBC
PLATELET # BLD AUTO: 320 K/UL (ref 150–350)
PMV BLD AUTO: 10.1 FL (ref 9.2–12.9)
RBC # BLD AUTO: 4.67 M/UL (ref 4–5.4)
SATURATED IRON: 17 % (ref 20–50)
T4 FREE SERPL-MCNC: 1.4 NG/DL (ref 0.71–1.51)
TOTAL IRON BINDING CAPACITY: 460 UG/DL (ref 250–450)
TRANSFERRIN SERPL-MCNC: 311 MG/DL (ref 200–375)
TSH SERPL DL<=0.005 MIU/L-ACNC: 0.34 UIU/ML (ref 0.4–4)
WBC # BLD AUTO: 5.79 K/UL (ref 3.9–12.7)

## 2020-09-19 PROCEDURE — 85025 COMPLETE CBC W/AUTO DIFF WBC: CPT

## 2020-09-19 PROCEDURE — 84439 ASSAY OF FREE THYROXINE: CPT

## 2020-09-19 PROCEDURE — 84443 ASSAY THYROID STIM HORMONE: CPT

## 2020-09-19 PROCEDURE — 82728 ASSAY OF FERRITIN: CPT

## 2020-09-19 PROCEDURE — 36415 COLL VENOUS BLD VENIPUNCTURE: CPT | Mod: PO

## 2020-09-19 PROCEDURE — 83540 ASSAY OF IRON: CPT

## 2020-09-22 ENCOUNTER — PATIENT MESSAGE (OUTPATIENT)
Dept: FAMILY MEDICINE | Facility: CLINIC | Age: 65
End: 2020-09-22

## 2020-09-23 ENCOUNTER — PATIENT MESSAGE (OUTPATIENT)
Dept: FAMILY MEDICINE | Facility: CLINIC | Age: 65
End: 2020-09-23

## 2020-09-23 RX ORDER — LEVOTHYROXINE SODIUM 175 UG/1
175 TABLET ORAL
Qty: 30 TABLET | Refills: 11 | Status: SHIPPED | OUTPATIENT
Start: 2020-09-23 | End: 2021-09-06

## 2020-10-19 ENCOUNTER — TELEPHONE (OUTPATIENT)
Dept: FAMILY MEDICINE | Facility: CLINIC | Age: 65
End: 2020-10-19

## 2020-10-19 NOTE — TELEPHONE ENCOUNTER
----- Message from Meg Lugo sent at 10/19/2020  1:17 PM CDT -----  Contact: Patient [phone]  Type: Patient Call Back    Who called:Patient    What is the request in detail: Would like to speak to nurse in regards to outside lab orders. Please call.     Would the patient rather a call back or a response via [facility]? call back    Best call back number: [phone]

## 2020-11-16 ENCOUNTER — PATIENT MESSAGE (OUTPATIENT)
Dept: FAMILY MEDICINE | Facility: CLINIC | Age: 65
End: 2020-11-16

## 2020-11-16 DIAGNOSIS — E11.65 UNCONTROLLED TYPE 2 DIABETES MELLITUS WITH HYPERGLYCEMIA: Primary | ICD-10-CM

## 2020-11-16 DIAGNOSIS — D64.9 ANEMIA, UNSPECIFIED TYPE: ICD-10-CM

## 2020-11-16 DIAGNOSIS — E03.9 HYPOTHYROIDISM, UNSPECIFIED TYPE: ICD-10-CM

## 2020-11-17 RX ORDER — FLUOXETINE HYDROCHLORIDE 20 MG/1
20 CAPSULE ORAL DAILY
Qty: 90 CAPSULE | Refills: 12 | Status: SHIPPED | OUTPATIENT
Start: 2020-11-17 | End: 2021-12-27 | Stop reason: SDUPTHER

## 2020-11-21 ENCOUNTER — LAB VISIT (OUTPATIENT)
Dept: LAB | Facility: HOSPITAL | Age: 65
End: 2020-11-21
Attending: INTERNAL MEDICINE
Payer: COMMERCIAL

## 2020-11-21 DIAGNOSIS — E03.9 HYPOTHYROIDISM, UNSPECIFIED TYPE: ICD-10-CM

## 2020-11-21 DIAGNOSIS — D64.9 ANEMIA, UNSPECIFIED TYPE: ICD-10-CM

## 2020-11-21 DIAGNOSIS — E11.65 UNCONTROLLED TYPE 2 DIABETES MELLITUS WITH HYPERGLYCEMIA: ICD-10-CM

## 2020-11-21 LAB
BASOPHILS # BLD AUTO: 0.06 K/UL (ref 0–0.2)
BASOPHILS NFR BLD: 0.9 % (ref 0–1.9)
DIFFERENTIAL METHOD: ABNORMAL
EOSINOPHIL # BLD AUTO: 0.1 K/UL (ref 0–0.5)
EOSINOPHIL NFR BLD: 2.1 % (ref 0–8)
ERYTHROCYTE [DISTWIDTH] IN BLOOD BY AUTOMATED COUNT: 14.6 % (ref 11.5–14.5)
ESTIMATED AVG GLUCOSE: 140 MG/DL (ref 68–131)
FERRITIN SERPL-MCNC: 29 NG/ML (ref 20–300)
HBA1C MFR BLD HPLC: 6.5 % (ref 4–5.6)
HCT VFR BLD AUTO: 41.5 % (ref 37–48.5)
HGB BLD-MCNC: 13 G/DL (ref 12–16)
IMM GRANULOCYTES # BLD AUTO: 0.02 K/UL (ref 0–0.04)
IMM GRANULOCYTES NFR BLD AUTO: 0.3 % (ref 0–0.5)
IRON SERPL-MCNC: 58 UG/DL (ref 30–160)
LYMPHOCYTES # BLD AUTO: 2.3 K/UL (ref 1–4.8)
LYMPHOCYTES NFR BLD: 33.9 % (ref 18–48)
MCH RBC QN AUTO: 28 PG (ref 27–31)
MCHC RBC AUTO-ENTMCNC: 31.3 G/DL (ref 32–36)
MCV RBC AUTO: 89 FL (ref 82–98)
MONOCYTES # BLD AUTO: 0.6 K/UL (ref 0.3–1)
MONOCYTES NFR BLD: 9.2 % (ref 4–15)
NEUTROPHILS # BLD AUTO: 3.6 K/UL (ref 1.8–7.7)
NEUTROPHILS NFR BLD: 53.6 % (ref 38–73)
NRBC BLD-RTO: 0 /100 WBC
PLATELET # BLD AUTO: 301 K/UL (ref 150–350)
PMV BLD AUTO: 9.9 FL (ref 9.2–12.9)
RBC # BLD AUTO: 4.64 M/UL (ref 4–5.4)
SATURATED IRON: 13 % (ref 20–50)
T4 FREE SERPL-MCNC: 1.14 NG/DL (ref 0.71–1.51)
TOTAL IRON BINDING CAPACITY: 448 UG/DL (ref 250–450)
TRANSFERRIN SERPL-MCNC: 303 MG/DL (ref 200–375)
TSH SERPL DL<=0.005 MIU/L-ACNC: 1.86 UIU/ML (ref 0.4–4)
WBC # BLD AUTO: 6.64 K/UL (ref 3.9–12.7)

## 2020-11-21 PROCEDURE — 84439 ASSAY OF FREE THYROXINE: CPT

## 2020-11-21 PROCEDURE — 83036 HEMOGLOBIN GLYCOSYLATED A1C: CPT

## 2020-11-21 PROCEDURE — 36415 COLL VENOUS BLD VENIPUNCTURE: CPT | Mod: PO

## 2020-11-21 PROCEDURE — 82728 ASSAY OF FERRITIN: CPT

## 2020-11-21 PROCEDURE — 85025 COMPLETE CBC W/AUTO DIFF WBC: CPT

## 2020-11-21 PROCEDURE — 83540 ASSAY OF IRON: CPT

## 2020-11-21 PROCEDURE — 84443 ASSAY THYROID STIM HORMONE: CPT

## 2020-12-26 ENCOUNTER — OFFICE VISIT (OUTPATIENT)
Dept: URGENT CARE | Facility: CLINIC | Age: 65
End: 2020-12-26
Payer: COMMERCIAL

## 2020-12-26 VITALS
DIASTOLIC BLOOD PRESSURE: 88 MMHG | HEART RATE: 74 BPM | TEMPERATURE: 97 F | SYSTOLIC BLOOD PRESSURE: 150 MMHG | RESPIRATION RATE: 16 BRPM | OXYGEN SATURATION: 98 %

## 2020-12-26 DIAGNOSIS — J02.9 SORE THROAT: Primary | ICD-10-CM

## 2020-12-26 DIAGNOSIS — B96.89 BACTERIAL SINUSITIS: ICD-10-CM

## 2020-12-26 DIAGNOSIS — J06.9 UPPER RESPIRATORY TRACT INFECTION, UNSPECIFIED TYPE: ICD-10-CM

## 2020-12-26 DIAGNOSIS — J32.9 BACTERIAL SINUSITIS: ICD-10-CM

## 2020-12-26 LAB
CTP QC/QA: YES
CTP QC/QA: YES
MOLECULAR STREP A: NEGATIVE
SARS-COV-2 RDRP RESP QL NAA+PROBE: NEGATIVE

## 2020-12-26 PROCEDURE — U0002 COVID-19 LAB TEST NON-CDC: HCPCS | Mod: QW,S$GLB,, | Performed by: NURSE PRACTITIONER

## 2020-12-26 PROCEDURE — U0002: ICD-10-PCS | Mod: QW,S$GLB,, | Performed by: NURSE PRACTITIONER

## 2020-12-26 PROCEDURE — 87651 STREP A DNA AMP PROBE: CPT | Mod: QW,S$GLB,, | Performed by: NURSE PRACTITIONER

## 2020-12-26 PROCEDURE — 99213 OFFICE O/P EST LOW 20 MIN: CPT | Mod: S$GLB,,, | Performed by: NURSE PRACTITIONER

## 2020-12-26 PROCEDURE — 99213 PR OFFICE/OUTPT VISIT, EST, LEVL III, 20-29 MIN: ICD-10-PCS | Mod: S$GLB,,, | Performed by: NURSE PRACTITIONER

## 2020-12-26 PROCEDURE — 87651 POCT STREP A MOLECULAR: ICD-10-PCS | Mod: QW,S$GLB,, | Performed by: NURSE PRACTITIONER

## 2020-12-26 RX ORDER — AMOXICILLIN 500 MG/1
500 TABLET, FILM COATED ORAL EVERY 12 HOURS
Qty: 20 TABLET | Refills: 0 | Status: SHIPPED | OUTPATIENT
Start: 2020-12-26 | End: 2021-01-05

## 2020-12-29 ENCOUNTER — PATIENT MESSAGE (OUTPATIENT)
Dept: FAMILY MEDICINE | Facility: CLINIC | Age: 65
End: 2020-12-29

## 2020-12-29 DIAGNOSIS — J32.9 SINUSITIS, UNSPECIFIED CHRONICITY, UNSPECIFIED LOCATION: Primary | ICD-10-CM

## 2020-12-29 RX ORDER — METHYLPREDNISOLONE 4 MG/1
TABLET ORAL
Qty: 1 PACKAGE | Refills: 0 | Status: SHIPPED | OUTPATIENT
Start: 2020-12-29 | End: 2021-02-03

## 2020-12-31 ENCOUNTER — TELEPHONE (OUTPATIENT)
Dept: OBSTETRICS AND GYNECOLOGY | Facility: CLINIC | Age: 65
End: 2020-12-31

## 2020-12-31 DIAGNOSIS — Z12.31 SCREENING MAMMOGRAM, ENCOUNTER FOR: Primary | ICD-10-CM

## 2020-12-31 NOTE — TELEPHONE ENCOUNTER
----- Message from Sandra Deleon MA sent at 12/31/2020 11:35 AM CST -----  Please submit mammo order  ----- Message -----  From: Teresa Ervin  Sent: 12/31/2020   8:55 AM CST  To: [facility] Staff    Type: Patient Call Back    Who called: Self     What is the request in detail: calling in regards to getting mammo orders placed before 2/1/21 appt. So results can be in by appt. Would like a call back to [facility].     Can the clinic reply by [facility]? Call back     Would the patient rather a call back or a response via [facility]? Call back     Best call back number: [phone]

## 2021-01-15 ENCOUNTER — HOSPITAL ENCOUNTER (OUTPATIENT)
Dept: RADIOLOGY | Facility: HOSPITAL | Age: 66
Discharge: HOME | End: 2021-01-15
Attending: OBSTETRICS & GYNECOLOGY
Payer: COMMERCIAL

## 2021-01-15 VITALS — BODY MASS INDEX: 26.63 KG/M2 | WEIGHT: 160 LBS

## 2021-01-15 DIAGNOSIS — Z12.31 SCREENING MAMMOGRAM, ENCOUNTER FOR: ICD-10-CM

## 2021-01-15 PROCEDURE — 77063 MAMMO DIGITAL SCREENING BILAT WITH TOMO: ICD-10-PCS | Mod: 26,,, | Performed by: RADIOLOGY

## 2021-01-15 PROCEDURE — 77067 SCR MAMMO BI INCL CAD: CPT | Mod: TC

## 2021-01-15 PROCEDURE — 77067 SCR MAMMO BI INCL CAD: CPT | Mod: 26,,, | Performed by: RADIOLOGY

## 2021-01-15 PROCEDURE — 77063 BREAST TOMOSYNTHESIS BI: CPT | Mod: 26,,, | Performed by: RADIOLOGY

## 2021-01-15 PROCEDURE — 77067 MAMMO DIGITAL SCREENING BILAT WITH TOMO: ICD-10-PCS | Mod: 26,,, | Performed by: RADIOLOGY

## 2021-01-18 RX ORDER — METOPROLOL TARTRATE 50 MG/1
TABLET ORAL
Qty: 60 TABLET | Refills: 12 | Status: SHIPPED | OUTPATIENT
Start: 2021-01-18 | End: 2022-01-23

## 2021-02-01 ENCOUNTER — PATIENT OUTREACH (OUTPATIENT)
Dept: ADMINISTRATIVE | Facility: OTHER | Age: 66
End: 2021-02-01

## 2021-02-03 ENCOUNTER — OFFICE VISIT (OUTPATIENT)
Dept: OBSTETRICS AND GYNECOLOGY | Facility: CLINIC | Age: 66
End: 2021-02-03
Payer: COMMERCIAL

## 2021-02-03 VITALS
WEIGHT: 159.81 LBS | SYSTOLIC BLOOD PRESSURE: 142 MMHG | BODY MASS INDEX: 26.63 KG/M2 | HEIGHT: 65 IN | DIASTOLIC BLOOD PRESSURE: 84 MMHG

## 2021-02-03 DIAGNOSIS — N95.1 SYMPTOMATIC MENOPAUSAL OR FEMALE CLIMACTERIC STATES: ICD-10-CM

## 2021-02-03 DIAGNOSIS — Z01.419 WELL WOMAN EXAM WITH ROUTINE GYNECOLOGICAL EXAM: Primary | ICD-10-CM

## 2021-02-03 PROCEDURE — 99999 PR PBB SHADOW E&M-EST. PATIENT-LVL III: CPT | Mod: PBBFAC,,, | Performed by: OBSTETRICS & GYNECOLOGY

## 2021-02-03 PROCEDURE — 99999 PR PBB SHADOW E&M-EST. PATIENT-LVL III: ICD-10-PCS | Mod: PBBFAC,,, | Performed by: OBSTETRICS & GYNECOLOGY

## 2021-02-03 PROCEDURE — 3079F DIAST BP 80-89 MM HG: CPT | Mod: CPTII,S$GLB,, | Performed by: OBSTETRICS & GYNECOLOGY

## 2021-02-03 PROCEDURE — 1126F PR PAIN SEVERITY QUANTIFIED, NO PAIN PRESENT: ICD-10-PCS | Mod: S$GLB,,, | Performed by: OBSTETRICS & GYNECOLOGY

## 2021-02-03 PROCEDURE — 3008F PR BODY MASS INDEX (BMI) DOCUMENTED: ICD-10-PCS | Mod: CPTII,S$GLB,, | Performed by: OBSTETRICS & GYNECOLOGY

## 2021-02-03 PROCEDURE — 3008F BODY MASS INDEX DOCD: CPT | Mod: CPTII,S$GLB,, | Performed by: OBSTETRICS & GYNECOLOGY

## 2021-02-03 PROCEDURE — 1126F AMNT PAIN NOTED NONE PRSNT: CPT | Mod: S$GLB,,, | Performed by: OBSTETRICS & GYNECOLOGY

## 2021-02-03 PROCEDURE — 3077F PR MOST RECENT SYSTOLIC BLOOD PRESSURE >= 140 MM HG: ICD-10-PCS | Mod: CPTII,S$GLB,, | Performed by: OBSTETRICS & GYNECOLOGY

## 2021-02-03 PROCEDURE — 99396 PR PREVENTIVE VISIT,EST,40-64: ICD-10-PCS | Mod: S$GLB,,, | Performed by: OBSTETRICS & GYNECOLOGY

## 2021-02-03 PROCEDURE — 99396 PREV VISIT EST AGE 40-64: CPT | Mod: S$GLB,,, | Performed by: OBSTETRICS & GYNECOLOGY

## 2021-02-03 PROCEDURE — 3077F SYST BP >= 140 MM HG: CPT | Mod: CPTII,S$GLB,, | Performed by: OBSTETRICS & GYNECOLOGY

## 2021-02-03 PROCEDURE — 3079F PR MOST RECENT DIASTOLIC BLOOD PRESSURE 80-89 MM HG: ICD-10-PCS | Mod: CPTII,S$GLB,, | Performed by: OBSTETRICS & GYNECOLOGY

## 2021-02-18 RX ORDER — RABEPRAZOLE SODIUM 20 MG/1
TABLET, DELAYED RELEASE ORAL
Qty: 60 TABLET | Refills: 12 | Status: SHIPPED | OUTPATIENT
Start: 2021-02-18 | End: 2022-02-21

## 2021-02-22 ENCOUNTER — TELEPHONE (OUTPATIENT)
Dept: OBSTETRICS AND GYNECOLOGY | Facility: CLINIC | Age: 66
End: 2021-02-22

## 2021-02-23 ENCOUNTER — OFFICE VISIT (OUTPATIENT)
Dept: URGENT CARE | Facility: CLINIC | Age: 66
End: 2021-02-23
Payer: COMMERCIAL

## 2021-02-23 ENCOUNTER — PATIENT MESSAGE (OUTPATIENT)
Dept: OBSTETRICS AND GYNECOLOGY | Facility: CLINIC | Age: 66
End: 2021-02-23

## 2021-02-23 VITALS
HEIGHT: 65 IN | DIASTOLIC BLOOD PRESSURE: 89 MMHG | SYSTOLIC BLOOD PRESSURE: 172 MMHG | RESPIRATION RATE: 18 BRPM | HEART RATE: 67 BPM | WEIGHT: 159 LBS | BODY MASS INDEX: 26.49 KG/M2 | OXYGEN SATURATION: 98 % | TEMPERATURE: 99 F

## 2021-02-23 DIAGNOSIS — S62.617A CLOSED DISPLACED FRACTURE OF PROXIMAL PHALANX OF LEFT LITTLE FINGER, INITIAL ENCOUNTER: Primary | ICD-10-CM

## 2021-02-23 DIAGNOSIS — N95.1 SYMPTOMATIC MENOPAUSAL OR FEMALE CLIMACTERIC STATES: ICD-10-CM

## 2021-02-23 DIAGNOSIS — M79.645 FINGER PAIN, LEFT: ICD-10-CM

## 2021-02-23 DIAGNOSIS — S80.212A ABRASION OF LEFT KNEE, INITIAL ENCOUNTER: ICD-10-CM

## 2021-02-23 PROCEDURE — 73130 XR HAND COMPLETE 3 VIEW LEFT: ICD-10-PCS | Mod: LT,S$GLB,, | Performed by: RADIOLOGY

## 2021-02-23 PROCEDURE — 3008F BODY MASS INDEX DOCD: CPT | Mod: CPTII,S$GLB,, | Performed by: PHYSICIAN ASSISTANT

## 2021-02-23 PROCEDURE — 3008F PR BODY MASS INDEX (BMI) DOCUMENTED: ICD-10-PCS | Mod: CPTII,S$GLB,, | Performed by: PHYSICIAN ASSISTANT

## 2021-02-23 PROCEDURE — 99214 PR OFFICE/OUTPT VISIT, EST, LEVL IV, 30-39 MIN: ICD-10-PCS | Mod: S$GLB,,, | Performed by: PHYSICIAN ASSISTANT

## 2021-02-23 PROCEDURE — 99214 OFFICE O/P EST MOD 30 MIN: CPT | Mod: S$GLB,,, | Performed by: PHYSICIAN ASSISTANT

## 2021-02-23 PROCEDURE — 73130 X-RAY EXAM OF HAND: CPT | Mod: LT,S$GLB,, | Performed by: RADIOLOGY

## 2021-02-23 RX ORDER — MUPIROCIN 20 MG/G
OINTMENT TOPICAL 3 TIMES DAILY
Qty: 22 G | Refills: 0 | Status: SHIPPED | OUTPATIENT
Start: 2021-02-23 | End: 2021-02-28

## 2021-02-24 ENCOUNTER — TELEPHONE (OUTPATIENT)
Dept: ORTHOPEDICS | Facility: CLINIC | Age: 66
End: 2021-02-24

## 2021-02-25 ENCOUNTER — OFFICE VISIT (OUTPATIENT)
Dept: ORTHOPEDICS | Facility: CLINIC | Age: 66
End: 2021-02-25
Payer: COMMERCIAL

## 2021-02-25 ENCOUNTER — HOSPITAL ENCOUNTER (OUTPATIENT)
Dept: RADIOLOGY | Facility: HOSPITAL | Age: 66
Discharge: HOME | End: 2021-02-25
Attending: ORTHOPAEDIC SURGERY
Payer: COMMERCIAL

## 2021-02-25 ENCOUNTER — TELEPHONE (OUTPATIENT)
Dept: ORTHOPEDICS | Facility: CLINIC | Age: 66
End: 2021-02-25

## 2021-02-25 VITALS — HEIGHT: 65 IN | WEIGHT: 158.94 LBS | BODY MASS INDEX: 26.48 KG/M2

## 2021-02-25 DIAGNOSIS — S62.617A CLOSED DISPLACED FRACTURE OF PROXIMAL PHALANX OF LEFT LITTLE FINGER, INITIAL ENCOUNTER: Primary | ICD-10-CM

## 2021-02-25 DIAGNOSIS — S62.617A CLOSED DISPLACED FRACTURE OF PROXIMAL PHALANX OF LEFT LITTLE FINGER, INITIAL ENCOUNTER: ICD-10-CM

## 2021-02-25 PROCEDURE — 99999 PR PBB SHADOW E&M-EST. PATIENT-LVL III: ICD-10-PCS | Mod: PBBFAC,,, | Performed by: ORTHOPAEDIC SURGERY

## 2021-02-25 PROCEDURE — 1125F PR PAIN SEVERITY QUANTIFIED, PAIN PRESENT: ICD-10-PCS | Mod: S$GLB,,, | Performed by: ORTHOPAEDIC SURGERY

## 2021-02-25 PROCEDURE — 73130 XR HAND COMPLETE 3 VIEW LEFT: ICD-10-PCS | Mod: 26,LT,, | Performed by: RADIOLOGY

## 2021-02-25 PROCEDURE — 99203 OFFICE O/P NEW LOW 30 MIN: CPT | Mod: 25,S$GLB,, | Performed by: ORTHOPAEDIC SURGERY

## 2021-02-25 PROCEDURE — 99203 PR OFFICE/OUTPT VISIT, NEW, LEVL III, 30-44 MIN: ICD-10-PCS | Mod: 25,S$GLB,, | Performed by: ORTHOPAEDIC SURGERY

## 2021-02-25 PROCEDURE — 73130 X-RAY EXAM OF HAND: CPT | Mod: TC,PN,LT

## 2021-02-25 PROCEDURE — 29075 APPL CST ELBW FNGR SHORT ARM: CPT | Mod: LT,S$GLB,, | Performed by: ORTHOPAEDIC SURGERY

## 2021-02-25 PROCEDURE — 3008F PR BODY MASS INDEX (BMI) DOCUMENTED: ICD-10-PCS | Mod: CPTII,S$GLB,, | Performed by: ORTHOPAEDIC SURGERY

## 2021-02-25 PROCEDURE — 29075 PR APPLY FOREARM CAST: ICD-10-PCS | Mod: LT,S$GLB,, | Performed by: ORTHOPAEDIC SURGERY

## 2021-02-25 PROCEDURE — 1125F AMNT PAIN NOTED PAIN PRSNT: CPT | Mod: S$GLB,,, | Performed by: ORTHOPAEDIC SURGERY

## 2021-02-25 PROCEDURE — 73130 X-RAY EXAM OF HAND: CPT | Mod: 26,LT,, | Performed by: RADIOLOGY

## 2021-02-25 PROCEDURE — 99999 PR PBB SHADOW E&M-EST. PATIENT-LVL III: CPT | Mod: PBBFAC,,, | Performed by: ORTHOPAEDIC SURGERY

## 2021-02-25 PROCEDURE — 3008F BODY MASS INDEX DOCD: CPT | Mod: CPTII,S$GLB,, | Performed by: ORTHOPAEDIC SURGERY

## 2021-03-02 ENCOUNTER — PATIENT OUTREACH (OUTPATIENT)
Dept: ADMINISTRATIVE | Facility: HOSPITAL | Age: 66
End: 2021-03-02

## 2021-03-02 ENCOUNTER — TELEPHONE (OUTPATIENT)
Dept: FAMILY MEDICINE | Facility: CLINIC | Age: 66
End: 2021-03-02

## 2021-03-02 DIAGNOSIS — E11.65 UNCONTROLLED TYPE 2 DIABETES MELLITUS WITH HYPERGLYCEMIA: ICD-10-CM

## 2021-03-02 DIAGNOSIS — D64.9 ANEMIA, UNSPECIFIED TYPE: ICD-10-CM

## 2021-03-02 DIAGNOSIS — Z00.00 ROUTINE MEDICAL EXAM: Primary | ICD-10-CM

## 2021-03-02 DIAGNOSIS — E55.9 VITAMIN D DEFICIENCY DISEASE: ICD-10-CM

## 2021-03-02 DIAGNOSIS — E78.5 HYPERLIPIDEMIA, UNSPECIFIED HYPERLIPIDEMIA TYPE: ICD-10-CM

## 2021-03-02 DIAGNOSIS — E11.9 TYPE 2 DIABETES MELLITUS WITHOUT COMPLICATION, UNSPECIFIED WHETHER LONG TERM INSULIN USE: Primary | ICD-10-CM

## 2021-03-02 DIAGNOSIS — E03.9 HYPOTHYROIDISM, UNSPECIFIED TYPE: ICD-10-CM

## 2021-03-16 ENCOUNTER — PATIENT OUTREACH (OUTPATIENT)
Dept: ADMINISTRATIVE | Facility: OTHER | Age: 66
End: 2021-03-16

## 2021-03-17 ENCOUNTER — PATIENT MESSAGE (OUTPATIENT)
Dept: ADMINISTRATIVE | Facility: HOSPITAL | Age: 66
End: 2021-03-17

## 2021-03-18 ENCOUNTER — OFFICE VISIT (OUTPATIENT)
Dept: ORTHOPEDICS | Facility: CLINIC | Age: 66
End: 2021-03-18
Payer: COMMERCIAL

## 2021-03-18 ENCOUNTER — HOSPITAL ENCOUNTER (OUTPATIENT)
Dept: RADIOLOGY | Facility: HOSPITAL | Age: 66
Discharge: HOME | End: 2021-03-18
Attending: ORTHOPAEDIC SURGERY
Payer: COMMERCIAL

## 2021-03-18 VITALS — HEIGHT: 65 IN | WEIGHT: 158.94 LBS | BODY MASS INDEX: 26.48 KG/M2

## 2021-03-18 DIAGNOSIS — S62.617D CLOSED DISPLACED FRACTURE OF PROXIMAL PHALANX OF LEFT LITTLE FINGER WITH ROUTINE HEALING, SUBSEQUENT ENCOUNTER: Primary | ICD-10-CM

## 2021-03-18 DIAGNOSIS — S62.617A CLOSED DISPLACED FRACTURE OF PROXIMAL PHALANX OF LEFT LITTLE FINGER, INITIAL ENCOUNTER: ICD-10-CM

## 2021-03-18 PROCEDURE — 99213 OFFICE O/P EST LOW 20 MIN: CPT | Mod: S$GLB,,, | Performed by: ORTHOPAEDIC SURGERY

## 2021-03-18 PROCEDURE — 99999 PR PBB SHADOW E&M-EST. PATIENT-LVL III: CPT | Mod: PBBFAC,,, | Performed by: ORTHOPAEDIC SURGERY

## 2021-03-18 PROCEDURE — 1126F PR PAIN SEVERITY QUANTIFIED, NO PAIN PRESENT: ICD-10-PCS | Mod: S$GLB,,, | Performed by: ORTHOPAEDIC SURGERY

## 2021-03-18 PROCEDURE — 73130 X-RAY EXAM OF HAND: CPT | Mod: 26,LT,, | Performed by: RADIOLOGY

## 2021-03-18 PROCEDURE — 73130 XR HAND COMPLETE 3 VIEW LEFT: ICD-10-PCS | Mod: 26,LT,, | Performed by: RADIOLOGY

## 2021-03-18 PROCEDURE — 1126F AMNT PAIN NOTED NONE PRSNT: CPT | Mod: S$GLB,,, | Performed by: ORTHOPAEDIC SURGERY

## 2021-03-18 PROCEDURE — 73130 X-RAY EXAM OF HAND: CPT | Mod: TC,PN,LT

## 2021-03-18 PROCEDURE — 99213 PR OFFICE/OUTPT VISIT, EST, LEVL III, 20-29 MIN: ICD-10-PCS | Mod: S$GLB,,, | Performed by: ORTHOPAEDIC SURGERY

## 2021-03-18 PROCEDURE — 99999 PR PBB SHADOW E&M-EST. PATIENT-LVL III: ICD-10-PCS | Mod: PBBFAC,,, | Performed by: ORTHOPAEDIC SURGERY

## 2021-03-18 PROCEDURE — 3008F BODY MASS INDEX DOCD: CPT | Mod: CPTII,S$GLB,, | Performed by: ORTHOPAEDIC SURGERY

## 2021-03-18 PROCEDURE — 3008F PR BODY MASS INDEX (BMI) DOCUMENTED: ICD-10-PCS | Mod: CPTII,S$GLB,, | Performed by: ORTHOPAEDIC SURGERY

## 2021-03-20 ENCOUNTER — LAB VISIT (OUTPATIENT)
Dept: LAB | Facility: HOSPITAL | Age: 66
End: 2021-03-20
Attending: INTERNAL MEDICINE
Payer: COMMERCIAL

## 2021-03-20 DIAGNOSIS — Z00.00 ROUTINE MEDICAL EXAM: ICD-10-CM

## 2021-03-20 LAB
25(OH)D3+25(OH)D2 SERPL-MCNC: 88 NG/ML (ref 30–96)
ALBUMIN SERPL BCP-MCNC: 3.6 G/DL (ref 3.5–5.2)
ALP SERPL-CCNC: 113 U/L (ref 55–135)
ALT SERPL W/O P-5'-P-CCNC: 10 U/L (ref 10–44)
ANION GAP SERPL CALC-SCNC: 13 MMOL/L (ref 8–16)
AST SERPL-CCNC: 15 U/L (ref 10–40)
BILIRUB SERPL-MCNC: 0.4 MG/DL (ref 0.1–1)
BUN SERPL-MCNC: 14 MG/DL (ref 8–23)
CALCIUM SERPL-MCNC: 8.8 MG/DL (ref 8.7–10.5)
CHLORIDE SERPL-SCNC: 105 MMOL/L (ref 95–110)
CHOLEST SERPL-MCNC: 188 MG/DL (ref 120–199)
CHOLEST/HDLC SERPL: 3.2 {RATIO} (ref 2–5)
CO2 SERPL-SCNC: 22 MMOL/L (ref 23–29)
CREAT SERPL-MCNC: 0.9 MG/DL (ref 0.5–1.4)
EST. GFR  (AFRICAN AMERICAN): >60 ML/MIN/1.73 M^2
EST. GFR  (NON AFRICAN AMERICAN): >60 ML/MIN/1.73 M^2
ESTIMATED AVG GLUCOSE: 140 MG/DL (ref 68–131)
GLUCOSE SERPL-MCNC: 106 MG/DL (ref 70–110)
HBA1C MFR BLD: 6.5 % (ref 4–5.6)
HDLC SERPL-MCNC: 58 MG/DL (ref 40–75)
HDLC SERPL: 30.9 % (ref 20–50)
LDLC SERPL CALC-MCNC: 102.8 MG/DL (ref 63–159)
NONHDLC SERPL-MCNC: 130 MG/DL
POTASSIUM SERPL-SCNC: 3.9 MMOL/L (ref 3.5–5.1)
PROT SERPL-MCNC: 7.4 G/DL (ref 6–8.4)
SODIUM SERPL-SCNC: 140 MMOL/L (ref 136–145)
TRIGL SERPL-MCNC: 136 MG/DL (ref 30–150)

## 2021-03-20 PROCEDURE — 82306 VITAMIN D 25 HYDROXY: CPT | Performed by: INTERNAL MEDICINE

## 2021-03-20 PROCEDURE — 80061 LIPID PANEL: CPT | Performed by: INTERNAL MEDICINE

## 2021-03-20 PROCEDURE — 83036 HEMOGLOBIN GLYCOSYLATED A1C: CPT | Performed by: INTERNAL MEDICINE

## 2021-03-20 PROCEDURE — 80053 COMPREHEN METABOLIC PANEL: CPT | Performed by: INTERNAL MEDICINE

## 2021-03-20 PROCEDURE — 36415 COLL VENOUS BLD VENIPUNCTURE: CPT | Mod: PO | Performed by: INTERNAL MEDICINE

## 2021-03-22 ENCOUNTER — PATIENT MESSAGE (OUTPATIENT)
Dept: FAMILY MEDICINE | Facility: CLINIC | Age: 66
End: 2021-03-22

## 2021-03-31 ENCOUNTER — TELEPHONE (OUTPATIENT)
Dept: ORTHOPEDICS | Facility: CLINIC | Age: 66
End: 2021-03-31

## 2021-03-31 DIAGNOSIS — S62.617A CLOSED DISPLACED FRACTURE OF PROXIMAL PHALANX OF LEFT LITTLE FINGER, INITIAL ENCOUNTER: Primary | ICD-10-CM

## 2021-04-01 ENCOUNTER — PATIENT MESSAGE (OUTPATIENT)
Dept: FAMILY MEDICINE | Facility: CLINIC | Age: 66
End: 2021-04-01

## 2021-04-01 ENCOUNTER — HOSPITAL ENCOUNTER (OUTPATIENT)
Dept: RADIOLOGY | Facility: HOSPITAL | Age: 66
Discharge: HOME | End: 2021-04-01
Attending: ORTHOPAEDIC SURGERY
Payer: COMMERCIAL

## 2021-04-01 ENCOUNTER — OFFICE VISIT (OUTPATIENT)
Dept: ORTHOPEDICS | Facility: CLINIC | Age: 66
End: 2021-04-01
Payer: COMMERCIAL

## 2021-04-01 VITALS — BODY MASS INDEX: 26.48 KG/M2 | HEIGHT: 65 IN | WEIGHT: 158.94 LBS

## 2021-04-01 DIAGNOSIS — S62.647D CLOSED NONDISPLACED FRACTURE OF PROXIMAL PHALANX OF LEFT LITTLE FINGER WITH ROUTINE HEALING: ICD-10-CM

## 2021-04-01 DIAGNOSIS — S62.617A CLOSED DISPLACED FRACTURE OF PROXIMAL PHALANX OF LEFT LITTLE FINGER, INITIAL ENCOUNTER: ICD-10-CM

## 2021-04-01 PROCEDURE — 73130 X-RAY EXAM OF HAND: CPT | Mod: 26,LT,, | Performed by: RADIOLOGY

## 2021-04-01 PROCEDURE — 3008F PR BODY MASS INDEX (BMI) DOCUMENTED: ICD-10-PCS | Mod: CPTII,S$GLB,, | Performed by: ORTHOPAEDIC SURGERY

## 2021-04-01 PROCEDURE — 1126F PR PAIN SEVERITY QUANTIFIED, NO PAIN PRESENT: ICD-10-PCS | Mod: S$GLB,,, | Performed by: ORTHOPAEDIC SURGERY

## 2021-04-01 PROCEDURE — 99213 OFFICE O/P EST LOW 20 MIN: CPT | Mod: S$GLB,,, | Performed by: ORTHOPAEDIC SURGERY

## 2021-04-01 PROCEDURE — 99999 PR PBB SHADOW E&M-EST. PATIENT-LVL III: CPT | Mod: PBBFAC,,, | Performed by: ORTHOPAEDIC SURGERY

## 2021-04-01 PROCEDURE — 3008F BODY MASS INDEX DOCD: CPT | Mod: CPTII,S$GLB,, | Performed by: ORTHOPAEDIC SURGERY

## 2021-04-01 PROCEDURE — 1126F AMNT PAIN NOTED NONE PRSNT: CPT | Mod: S$GLB,,, | Performed by: ORTHOPAEDIC SURGERY

## 2021-04-01 PROCEDURE — 73130 XR HAND COMPLETE 3 VIEW LEFT: ICD-10-PCS | Mod: 26,LT,, | Performed by: RADIOLOGY

## 2021-04-01 PROCEDURE — 99213 PR OFFICE/OUTPT VISIT, EST, LEVL III, 20-29 MIN: ICD-10-PCS | Mod: S$GLB,,, | Performed by: ORTHOPAEDIC SURGERY

## 2021-04-01 PROCEDURE — 99999 PR PBB SHADOW E&M-EST. PATIENT-LVL III: ICD-10-PCS | Mod: PBBFAC,,, | Performed by: ORTHOPAEDIC SURGERY

## 2021-04-01 PROCEDURE — 73130 X-RAY EXAM OF HAND: CPT | Mod: TC,PN,LT

## 2021-04-06 ENCOUNTER — TELEPHONE (OUTPATIENT)
Dept: ORTHOPEDICS | Facility: CLINIC | Age: 66
End: 2021-04-06

## 2021-04-07 ENCOUNTER — OFFICE VISIT (OUTPATIENT)
Dept: FAMILY MEDICINE | Facility: CLINIC | Age: 66
End: 2021-04-07
Payer: COMMERCIAL

## 2021-04-07 VITALS
WEIGHT: 162.06 LBS | HEIGHT: 65 IN | OXYGEN SATURATION: 96 % | HEART RATE: 67 BPM | TEMPERATURE: 98 F | BODY MASS INDEX: 27 KG/M2 | SYSTOLIC BLOOD PRESSURE: 130 MMHG | DIASTOLIC BLOOD PRESSURE: 60 MMHG

## 2021-04-07 DIAGNOSIS — E11.65 UNCONTROLLED TYPE 2 DIABETES MELLITUS WITH HYPERGLYCEMIA: ICD-10-CM

## 2021-04-07 DIAGNOSIS — E03.9 HYPOTHYROIDISM, UNSPECIFIED TYPE: ICD-10-CM

## 2021-04-07 DIAGNOSIS — D64.9 ANEMIA, UNSPECIFIED TYPE: ICD-10-CM

## 2021-04-07 DIAGNOSIS — Z00.00 ROUTINE MEDICAL EXAM: Primary | ICD-10-CM

## 2021-04-07 DIAGNOSIS — I70.0 ATHEROSCLEROSIS OF AORTA: ICD-10-CM

## 2021-04-07 DIAGNOSIS — I10 ESSENTIAL HYPERTENSION: ICD-10-CM

## 2021-04-07 DIAGNOSIS — Z86.010 HISTORY OF COLONIC POLYPS: ICD-10-CM

## 2021-04-07 DIAGNOSIS — E78.5 HYPERLIPIDEMIA, UNSPECIFIED HYPERLIPIDEMIA TYPE: ICD-10-CM

## 2021-04-07 DIAGNOSIS — E55.9 VITAMIN D DEFICIENCY DISEASE: ICD-10-CM

## 2021-04-07 DIAGNOSIS — M85.80 OSTEOPENIA, UNSPECIFIED LOCATION: ICD-10-CM

## 2021-04-07 PROCEDURE — 99999 PR PBB SHADOW E&M-EST. PATIENT-LVL III: ICD-10-PCS | Mod: PBBFAC,,, | Performed by: INTERNAL MEDICINE

## 2021-04-07 PROCEDURE — 3008F BODY MASS INDEX DOCD: CPT | Mod: CPTII,S$GLB,, | Performed by: INTERNAL MEDICINE

## 2021-04-07 PROCEDURE — 99396 PR PREVENTIVE VISIT,EST,40-64: ICD-10-PCS | Mod: S$GLB,,, | Performed by: INTERNAL MEDICINE

## 2021-04-07 PROCEDURE — 99396 PREV VISIT EST AGE 40-64: CPT | Mod: S$GLB,,, | Performed by: INTERNAL MEDICINE

## 2021-04-07 PROCEDURE — 99999 PR PBB SHADOW E&M-EST. PATIENT-LVL III: CPT | Mod: PBBFAC,,, | Performed by: INTERNAL MEDICINE

## 2021-04-07 PROCEDURE — 3008F PR BODY MASS INDEX (BMI) DOCUMENTED: ICD-10-PCS | Mod: CPTII,S$GLB,, | Performed by: INTERNAL MEDICINE

## 2021-04-12 ENCOUNTER — TELEPHONE (OUTPATIENT)
Dept: ADMINISTRATIVE | Facility: OTHER | Age: 66
End: 2021-04-12

## 2021-04-22 ENCOUNTER — PATIENT MESSAGE (OUTPATIENT)
Dept: ADMINISTRATIVE | Facility: HOSPITAL | Age: 66
End: 2021-04-22

## 2021-04-22 ENCOUNTER — PATIENT OUTREACH (OUTPATIENT)
Dept: ADMINISTRATIVE | Facility: HOSPITAL | Age: 66
End: 2021-04-22

## 2021-04-23 ENCOUNTER — TELEPHONE (OUTPATIENT)
Dept: FAMILY MEDICINE | Facility: CLINIC | Age: 66
End: 2021-04-23

## 2021-04-23 ENCOUNTER — PATIENT MESSAGE (OUTPATIENT)
Dept: FAMILY MEDICINE | Facility: CLINIC | Age: 66
End: 2021-04-23

## 2021-04-23 DIAGNOSIS — E03.9 HYPOTHYROIDISM, UNSPECIFIED TYPE: Primary | ICD-10-CM

## 2021-04-24 ENCOUNTER — LAB VISIT (OUTPATIENT)
Dept: LAB | Facility: HOSPITAL | Age: 66
End: 2021-04-24
Attending: INTERNAL MEDICINE
Payer: COMMERCIAL

## 2021-04-24 DIAGNOSIS — E03.9 HYPOTHYROIDISM, UNSPECIFIED TYPE: ICD-10-CM

## 2021-04-24 LAB
T4 FREE SERPL-MCNC: 1.21 NG/DL (ref 0.71–1.51)
TSH SERPL DL<=0.005 MIU/L-ACNC: 1.32 UIU/ML (ref 0.4–4)

## 2021-04-24 PROCEDURE — 84439 ASSAY OF FREE THYROXINE: CPT | Performed by: INTERNAL MEDICINE

## 2021-04-24 PROCEDURE — 84443 ASSAY THYROID STIM HORMONE: CPT | Performed by: INTERNAL MEDICINE

## 2021-04-24 PROCEDURE — 36415 COLL VENOUS BLD VENIPUNCTURE: CPT | Mod: PO | Performed by: INTERNAL MEDICINE

## 2021-04-26 ENCOUNTER — CLINICAL SUPPORT (OUTPATIENT)
Dept: REHABILITATION | Facility: HOSPITAL | Age: 66
End: 2021-04-26
Attending: ORTHOPAEDIC SURGERY
Payer: COMMERCIAL

## 2021-04-26 DIAGNOSIS — M25.642 DECREASED RANGE OF MOTION OF FINGER OF LEFT HAND: ICD-10-CM

## 2021-04-26 DIAGNOSIS — S62.647D CLOSED NONDISPLACED FRACTURE OF PROXIMAL PHALANX OF LEFT LITTLE FINGER WITH ROUTINE HEALING: ICD-10-CM

## 2021-04-26 PROCEDURE — 97022 WHIRLPOOL THERAPY: CPT | Mod: PN

## 2021-04-26 PROCEDURE — 97165 OT EVAL LOW COMPLEX 30 MIN: CPT | Mod: PN

## 2021-04-29 ENCOUNTER — HOSPITAL ENCOUNTER (OUTPATIENT)
Dept: RADIOLOGY | Facility: HOSPITAL | Age: 66
Discharge: HOME | End: 2021-04-29
Attending: ORTHOPAEDIC SURGERY
Payer: COMMERCIAL

## 2021-04-29 ENCOUNTER — OFFICE VISIT (OUTPATIENT)
Dept: ORTHOPEDICS | Facility: CLINIC | Age: 66
End: 2021-04-29
Payer: COMMERCIAL

## 2021-04-29 VITALS — WEIGHT: 162 LBS | HEIGHT: 65 IN | BODY MASS INDEX: 26.99 KG/M2

## 2021-04-29 DIAGNOSIS — S62.647D CLOSED NONDISPLACED FRACTURE OF PROXIMAL PHALANX OF LEFT LITTLE FINGER WITH ROUTINE HEALING: ICD-10-CM

## 2021-04-29 DIAGNOSIS — S62.617A CLOSED DISPLACED FRACTURE OF PROXIMAL PHALANX OF LEFT LITTLE FINGER, INITIAL ENCOUNTER: ICD-10-CM

## 2021-04-29 DIAGNOSIS — S62.617A CLOSED DISPLACED FRACTURE OF PROXIMAL PHALANX OF LEFT LITTLE FINGER, INITIAL ENCOUNTER: Primary | ICD-10-CM

## 2021-04-29 PROCEDURE — 1126F PR PAIN SEVERITY QUANTIFIED, NO PAIN PRESENT: ICD-10-PCS | Mod: S$GLB,,, | Performed by: ORTHOPAEDIC SURGERY

## 2021-04-29 PROCEDURE — 99999 PR PBB SHADOW E&M-EST. PATIENT-LVL III: CPT | Mod: PBBFAC,,, | Performed by: ORTHOPAEDIC SURGERY

## 2021-04-29 PROCEDURE — 73130 X-RAY EXAM OF HAND: CPT | Mod: TC,PN,LT

## 2021-04-29 PROCEDURE — 99999 PR PBB SHADOW E&M-EST. PATIENT-LVL III: ICD-10-PCS | Mod: PBBFAC,,, | Performed by: ORTHOPAEDIC SURGERY

## 2021-04-29 PROCEDURE — 1126F AMNT PAIN NOTED NONE PRSNT: CPT | Mod: S$GLB,,, | Performed by: ORTHOPAEDIC SURGERY

## 2021-04-29 PROCEDURE — 73130 X-RAY EXAM OF HAND: CPT | Mod: 26,LT,, | Performed by: RADIOLOGY

## 2021-04-29 PROCEDURE — 73130 XR HAND COMPLETE 3 VIEW LEFT: ICD-10-PCS | Mod: 26,LT,, | Performed by: RADIOLOGY

## 2021-04-29 PROCEDURE — 99213 PR OFFICE/OUTPT VISIT, EST, LEVL III, 20-29 MIN: ICD-10-PCS | Mod: S$GLB,,, | Performed by: ORTHOPAEDIC SURGERY

## 2021-04-29 PROCEDURE — 3008F PR BODY MASS INDEX (BMI) DOCUMENTED: ICD-10-PCS | Mod: CPTII,S$GLB,, | Performed by: ORTHOPAEDIC SURGERY

## 2021-04-29 PROCEDURE — 3008F BODY MASS INDEX DOCD: CPT | Mod: CPTII,S$GLB,, | Performed by: ORTHOPAEDIC SURGERY

## 2021-04-29 PROCEDURE — 99213 OFFICE O/P EST LOW 20 MIN: CPT | Mod: S$GLB,,, | Performed by: ORTHOPAEDIC SURGERY

## 2021-04-30 ENCOUNTER — OFFICE VISIT (OUTPATIENT)
Dept: DERMATOLOGY | Facility: CLINIC | Age: 66
End: 2021-04-30
Payer: COMMERCIAL

## 2021-04-30 VITALS — BODY MASS INDEX: 26.96 KG/M2 | WEIGHT: 162 LBS

## 2021-04-30 DIAGNOSIS — Z85.828 HISTORY OF SKIN CANCER: ICD-10-CM

## 2021-04-30 DIAGNOSIS — L71.9 ROSACEA: Primary | ICD-10-CM

## 2021-04-30 DIAGNOSIS — L81.4 LENTIGINES: ICD-10-CM

## 2021-04-30 PROBLEM — M25.642 DECREASED RANGE OF MOTION OF FINGER OF LEFT HAND: Status: ACTIVE | Noted: 2021-04-30

## 2021-04-30 PROCEDURE — 3008F PR BODY MASS INDEX (BMI) DOCUMENTED: ICD-10-PCS | Mod: CPTII,S$GLB,, | Performed by: DERMATOLOGY

## 2021-04-30 PROCEDURE — 99999 PR PBB SHADOW E&M-EST. PATIENT-LVL III: CPT | Mod: PBBFAC,,, | Performed by: DERMATOLOGY

## 2021-04-30 PROCEDURE — 99999 PR PBB SHADOW E&M-EST. PATIENT-LVL III: ICD-10-PCS | Mod: PBBFAC,,, | Performed by: DERMATOLOGY

## 2021-04-30 PROCEDURE — 99213 PR OFFICE/OUTPT VISIT, EST, LEVL III, 20-29 MIN: ICD-10-PCS | Mod: S$GLB,,, | Performed by: DERMATOLOGY

## 2021-04-30 PROCEDURE — 1126F AMNT PAIN NOTED NONE PRSNT: CPT | Mod: S$GLB,,, | Performed by: DERMATOLOGY

## 2021-04-30 PROCEDURE — 99213 OFFICE O/P EST LOW 20 MIN: CPT | Mod: S$GLB,,, | Performed by: DERMATOLOGY

## 2021-04-30 PROCEDURE — 3008F BODY MASS INDEX DOCD: CPT | Mod: CPTII,S$GLB,, | Performed by: DERMATOLOGY

## 2021-04-30 PROCEDURE — 1126F PR PAIN SEVERITY QUANTIFIED, NO PAIN PRESENT: ICD-10-PCS | Mod: S$GLB,,, | Performed by: DERMATOLOGY

## 2021-04-30 RX ORDER — CLINDAMYCIN PHOSPHATE 10 UG/ML
LOTION TOPICAL
Qty: 60 ML | Refills: 3 | Status: SHIPPED | OUTPATIENT
Start: 2021-04-30 | End: 2021-12-14

## 2021-05-20 ENCOUNTER — CLINICAL SUPPORT (OUTPATIENT)
Dept: REHABILITATION | Facility: HOSPITAL | Age: 66
End: 2021-05-20
Attending: ORTHOPAEDIC SURGERY
Payer: COMMERCIAL

## 2021-05-20 DIAGNOSIS — M25.642 DECREASED RANGE OF MOTION OF FINGER OF LEFT HAND: Primary | ICD-10-CM

## 2021-05-20 PROCEDURE — 97110 THERAPEUTIC EXERCISES: CPT | Mod: PN

## 2021-05-25 ENCOUNTER — PATIENT OUTREACH (OUTPATIENT)
Dept: ADMINISTRATIVE | Facility: OTHER | Age: 66
End: 2021-05-25

## 2021-05-26 ENCOUNTER — TELEPHONE (OUTPATIENT)
Dept: ORTHOPEDICS | Facility: CLINIC | Age: 66
End: 2021-05-26

## 2021-05-26 DIAGNOSIS — S62.617A CLOSED DISPLACED FRACTURE OF PROXIMAL PHALANX OF LEFT LITTLE FINGER, INITIAL ENCOUNTER: Primary | ICD-10-CM

## 2021-05-27 ENCOUNTER — HOSPITAL ENCOUNTER (OUTPATIENT)
Dept: RADIOLOGY | Facility: HOSPITAL | Age: 66
Discharge: HOME | End: 2021-05-27
Attending: ORTHOPAEDIC SURGERY
Payer: COMMERCIAL

## 2021-05-27 ENCOUNTER — OFFICE VISIT (OUTPATIENT)
Dept: ORTHOPEDICS | Facility: CLINIC | Age: 66
End: 2021-05-27
Payer: COMMERCIAL

## 2021-05-27 ENCOUNTER — DOCUMENTATION ONLY (OUTPATIENT)
Dept: REHABILITATION | Facility: HOSPITAL | Age: 66
End: 2021-05-27

## 2021-05-27 VITALS — BODY MASS INDEX: 26.99 KG/M2 | WEIGHT: 162 LBS | HEIGHT: 65 IN

## 2021-05-27 DIAGNOSIS — M25.642 DECREASED RANGE OF MOTION OF FINGER OF LEFT HAND: Primary | ICD-10-CM

## 2021-05-27 DIAGNOSIS — S62.647D CLOSED NONDISPLACED FRACTURE OF PROXIMAL PHALANX OF LEFT LITTLE FINGER WITH ROUTINE HEALING: Primary | ICD-10-CM

## 2021-05-27 DIAGNOSIS — S62.617A CLOSED DISPLACED FRACTURE OF PROXIMAL PHALANX OF LEFT LITTLE FINGER, INITIAL ENCOUNTER: ICD-10-CM

## 2021-05-27 PROCEDURE — 73130 XR HAND COMPLETE 3 VIEW LEFT: ICD-10-PCS | Mod: 26,LT,, | Performed by: RADIOLOGY

## 2021-05-27 PROCEDURE — 73130 X-RAY EXAM OF HAND: CPT | Mod: TC,PN,LT

## 2021-05-27 PROCEDURE — 99999 PR PBB SHADOW E&M-EST. PATIENT-LVL III: CPT | Mod: PBBFAC,,, | Performed by: ORTHOPAEDIC SURGERY

## 2021-05-27 PROCEDURE — 99999 PR PBB SHADOW E&M-EST. PATIENT-LVL III: ICD-10-PCS | Mod: PBBFAC,,, | Performed by: ORTHOPAEDIC SURGERY

## 2021-05-27 PROCEDURE — 3008F PR BODY MASS INDEX (BMI) DOCUMENTED: ICD-10-PCS | Mod: CPTII,S$GLB,, | Performed by: ORTHOPAEDIC SURGERY

## 2021-05-27 PROCEDURE — 73130 X-RAY EXAM OF HAND: CPT | Mod: 26,LT,, | Performed by: RADIOLOGY

## 2021-05-27 PROCEDURE — 3008F BODY MASS INDEX DOCD: CPT | Mod: CPTII,S$GLB,, | Performed by: ORTHOPAEDIC SURGERY

## 2021-05-27 PROCEDURE — 99213 OFFICE O/P EST LOW 20 MIN: CPT | Mod: S$GLB,,, | Performed by: ORTHOPAEDIC SURGERY

## 2021-05-27 PROCEDURE — 99213 PR OFFICE/OUTPT VISIT, EST, LEVL III, 20-29 MIN: ICD-10-PCS | Mod: S$GLB,,, | Performed by: ORTHOPAEDIC SURGERY

## 2021-05-27 PROCEDURE — 1125F AMNT PAIN NOTED PAIN PRSNT: CPT | Mod: S$GLB,,, | Performed by: ORTHOPAEDIC SURGERY

## 2021-05-27 PROCEDURE — 1125F PR PAIN SEVERITY QUANTIFIED, PAIN PRESENT: ICD-10-PCS | Mod: S$GLB,,, | Performed by: ORTHOPAEDIC SURGERY

## 2021-05-27 RX ORDER — AMITRIPTYLINE HYDROCHLORIDE 10 MG/1
10 TABLET, FILM COATED ORAL NIGHTLY
COMMUNITY
Start: 2020-12-04

## 2021-06-03 ENCOUNTER — CLINICAL SUPPORT (OUTPATIENT)
Dept: REHABILITATION | Facility: HOSPITAL | Age: 66
End: 2021-06-03
Attending: ORTHOPAEDIC SURGERY
Payer: COMMERCIAL

## 2021-06-03 DIAGNOSIS — M25.642 DECREASED RANGE OF MOTION OF FINGER OF LEFT HAND: Primary | ICD-10-CM

## 2021-06-03 PROCEDURE — 97110 THERAPEUTIC EXERCISES: CPT | Mod: PN

## 2021-06-17 ENCOUNTER — DOCUMENTATION ONLY (OUTPATIENT)
Dept: REHABILITATION | Facility: HOSPITAL | Age: 66
End: 2021-06-17

## 2021-06-17 DIAGNOSIS — M25.642 DECREASED RANGE OF MOTION OF FINGER OF LEFT HAND: Primary | ICD-10-CM

## 2021-06-21 RX ORDER — ATORVASTATIN CALCIUM 40 MG/1
TABLET, FILM COATED ORAL
Qty: 90 TABLET | Refills: 12 | Status: SHIPPED | OUTPATIENT
Start: 2021-06-21 | End: 2022-08-04

## 2021-06-21 RX ORDER — ATORVASTATIN CALCIUM 40 MG/1
TABLET, FILM COATED ORAL
Qty: 90 TABLET | Refills: 12 | Status: SHIPPED | OUTPATIENT
Start: 2021-06-21 | End: 2021-12-14

## 2021-06-24 ENCOUNTER — DOCUMENTATION ONLY (OUTPATIENT)
Dept: REHABILITATION | Facility: HOSPITAL | Age: 66
End: 2021-06-24

## 2021-06-24 DIAGNOSIS — M25.642 DECREASED RANGE OF MOTION OF FINGER OF LEFT HAND: Primary | ICD-10-CM

## 2021-07-23 ENCOUNTER — PATIENT OUTREACH (OUTPATIENT)
Dept: ADMINISTRATIVE | Facility: OTHER | Age: 66
End: 2021-07-23

## 2021-07-26 ENCOUNTER — OFFICE VISIT (OUTPATIENT)
Dept: DERMATOLOGY | Facility: CLINIC | Age: 66
End: 2021-07-26
Payer: COMMERCIAL

## 2021-07-26 VITALS — WEIGHT: 162 LBS | BODY MASS INDEX: 26.96 KG/M2

## 2021-07-26 DIAGNOSIS — L71.9 ROSACEA: ICD-10-CM

## 2021-07-26 DIAGNOSIS — L82.0 LICHENOID KERATOSIS: Primary | ICD-10-CM

## 2021-07-26 DIAGNOSIS — L81.4 LENTIGINES: ICD-10-CM

## 2021-07-26 DIAGNOSIS — Z85.828 HISTORY OF SKIN CANCER: ICD-10-CM

## 2021-07-26 PROCEDURE — 3008F PR BODY MASS INDEX (BMI) DOCUMENTED: ICD-10-PCS | Mod: CPTII,S$GLB,, | Performed by: DERMATOLOGY

## 2021-07-26 PROCEDURE — 99999 PR PBB SHADOW E&M-EST. PATIENT-LVL III: ICD-10-PCS | Mod: PBBFAC,,, | Performed by: DERMATOLOGY

## 2021-07-26 PROCEDURE — 1160F RVW MEDS BY RX/DR IN RCRD: CPT | Mod: CPTII,S$GLB,, | Performed by: DERMATOLOGY

## 2021-07-26 PROCEDURE — 17000 PR DESTRUCTION(LASER SURGERY,CRYOSURGERY,CHEMOSURGERY),PREMALIGNANT LESIONS,FIRST LESION: ICD-10-PCS | Mod: S$GLB,,, | Performed by: DERMATOLOGY

## 2021-07-26 PROCEDURE — 1126F AMNT PAIN NOTED NONE PRSNT: CPT | Mod: CPTII,S$GLB,, | Performed by: DERMATOLOGY

## 2021-07-26 PROCEDURE — 99213 OFFICE O/P EST LOW 20 MIN: CPT | Mod: 25,S$GLB,, | Performed by: DERMATOLOGY

## 2021-07-26 PROCEDURE — 3044F HG A1C LEVEL LT 7.0%: CPT | Mod: CPTII,S$GLB,, | Performed by: DERMATOLOGY

## 2021-07-26 PROCEDURE — 1159F MED LIST DOCD IN RCRD: CPT | Mod: CPTII,S$GLB,, | Performed by: DERMATOLOGY

## 2021-07-26 PROCEDURE — 99213 PR OFFICE/OUTPT VISIT, EST, LEVL III, 20-29 MIN: ICD-10-PCS | Mod: 25,S$GLB,, | Performed by: DERMATOLOGY

## 2021-07-26 PROCEDURE — 99999 PR PBB SHADOW E&M-EST. PATIENT-LVL III: CPT | Mod: PBBFAC,,, | Performed by: DERMATOLOGY

## 2021-07-26 PROCEDURE — 1160F PR REVIEW ALL MEDS BY PRESCRIBER/CLIN PHARMACIST DOCUMENTED: ICD-10-PCS | Mod: CPTII,S$GLB,, | Performed by: DERMATOLOGY

## 2021-07-26 PROCEDURE — 1126F PR PAIN SEVERITY QUANTIFIED, NO PAIN PRESENT: ICD-10-PCS | Mod: CPTII,S$GLB,, | Performed by: DERMATOLOGY

## 2021-07-26 PROCEDURE — 17000 DESTRUCT PREMALG LESION: CPT | Mod: S$GLB,,, | Performed by: DERMATOLOGY

## 2021-07-26 PROCEDURE — 3044F PR MOST RECENT HEMOGLOBIN A1C LEVEL <7.0%: ICD-10-PCS | Mod: CPTII,S$GLB,, | Performed by: DERMATOLOGY

## 2021-07-26 PROCEDURE — 1159F PR MEDICATION LIST DOCUMENTED IN MEDICAL RECORD: ICD-10-PCS | Mod: CPTII,S$GLB,, | Performed by: DERMATOLOGY

## 2021-07-26 PROCEDURE — 3008F BODY MASS INDEX DOCD: CPT | Mod: CPTII,S$GLB,, | Performed by: DERMATOLOGY

## 2021-12-13 DIAGNOSIS — M25.561 PAIN IN BOTH KNEES, UNSPECIFIED CHRONICITY: Primary | ICD-10-CM

## 2021-12-13 DIAGNOSIS — M25.562 PAIN IN BOTH KNEES, UNSPECIFIED CHRONICITY: Primary | ICD-10-CM

## 2021-12-14 ENCOUNTER — PATIENT OUTREACH (OUTPATIENT)
Dept: ADMINISTRATIVE | Facility: OTHER | Age: 66
End: 2021-12-14
Payer: COMMERCIAL

## 2021-12-14 ENCOUNTER — APPOINTMENT (OUTPATIENT)
Dept: RADIOLOGY | Facility: HOSPITAL | Age: 66
End: 2021-12-14
Attending: ORTHOPAEDIC SURGERY
Payer: COMMERCIAL

## 2021-12-14 DIAGNOSIS — M25.562 PAIN IN BOTH KNEES, UNSPECIFIED CHRONICITY: ICD-10-CM

## 2021-12-14 DIAGNOSIS — E11.649 UNCONTROLLED TYPE 2 DIABETES MELLITUS WITH HYPOGLYCEMIA WITHOUT COMA: Primary | ICD-10-CM

## 2021-12-14 DIAGNOSIS — M25.561 PAIN IN BOTH KNEES, UNSPECIFIED CHRONICITY: ICD-10-CM

## 2021-12-14 PROCEDURE — 73562 X-RAY EXAM OF KNEE 3: CPT | Mod: TC,50,FY,PN

## 2021-12-14 PROCEDURE — 73562 X-RAY EXAM OF KNEE 3: CPT | Mod: 26,,, | Performed by: RADIOLOGY

## 2021-12-14 PROCEDURE — 73562 XR KNEE 3 VIEW BILATERAL: ICD-10-PCS | Mod: 26,,, | Performed by: RADIOLOGY

## 2021-12-15 ENCOUNTER — OFFICE VISIT (OUTPATIENT)
Dept: ORTHOPEDICS | Facility: CLINIC | Age: 66
End: 2021-12-15
Payer: COMMERCIAL

## 2021-12-15 VITALS
SYSTOLIC BLOOD PRESSURE: 140 MMHG | RESPIRATION RATE: 18 BRPM | OXYGEN SATURATION: 97 % | BODY MASS INDEX: 27.85 KG/M2 | DIASTOLIC BLOOD PRESSURE: 80 MMHG | HEIGHT: 65 IN | HEART RATE: 67 BPM | WEIGHT: 167.13 LBS

## 2021-12-15 DIAGNOSIS — M17.12 PATELLOFEMORAL ARTHRITIS OF LEFT KNEE: Primary | ICD-10-CM

## 2021-12-15 DIAGNOSIS — M17.11 PATELLOFEMORAL ARTHRITIS OF RIGHT KNEE: ICD-10-CM

## 2021-12-15 PROCEDURE — 99999 PR PBB SHADOW E&M-EST. PATIENT-LVL IV: ICD-10-PCS | Mod: PBBFAC,,, | Performed by: ORTHOPAEDIC SURGERY

## 2021-12-15 PROCEDURE — 3066F PR DOCUMENTATION OF TREATMENT FOR NEPHROPATHY: ICD-10-PCS | Mod: CPTII,S$GLB,, | Performed by: ORTHOPAEDIC SURGERY

## 2021-12-15 PROCEDURE — 99999 PR PBB SHADOW E&M-EST. PATIENT-LVL IV: CPT | Mod: PBBFAC,,, | Performed by: ORTHOPAEDIC SURGERY

## 2021-12-15 PROCEDURE — 3066F NEPHROPATHY DOC TX: CPT | Mod: CPTII,S$GLB,, | Performed by: ORTHOPAEDIC SURGERY

## 2021-12-15 PROCEDURE — 3061F NEG MICROALBUMINURIA REV: CPT | Mod: CPTII,S$GLB,, | Performed by: ORTHOPAEDIC SURGERY

## 2021-12-15 PROCEDURE — 99213 PR OFFICE/OUTPT VISIT, EST, LEVL III, 20-29 MIN: ICD-10-PCS | Mod: S$GLB,,, | Performed by: ORTHOPAEDIC SURGERY

## 2021-12-15 PROCEDURE — 3061F PR NEG MICROALBUMINURIA RESULT DOCUMENTED/REVIEW: ICD-10-PCS | Mod: CPTII,S$GLB,, | Performed by: ORTHOPAEDIC SURGERY

## 2021-12-15 PROCEDURE — 99213 OFFICE O/P EST LOW 20 MIN: CPT | Mod: S$GLB,,, | Performed by: ORTHOPAEDIC SURGERY

## 2021-12-19 ENCOUNTER — PATIENT MESSAGE (OUTPATIENT)
Dept: ADMINISTRATIVE | Facility: HOSPITAL | Age: 66
End: 2021-12-19
Payer: COMMERCIAL

## 2021-12-20 ENCOUNTER — PATIENT MESSAGE (OUTPATIENT)
Dept: FAMILY MEDICINE | Facility: CLINIC | Age: 66
End: 2021-12-20
Payer: COMMERCIAL

## 2021-12-20 DIAGNOSIS — E11.9 TYPE 2 DIABETES MELLITUS WITHOUT COMPLICATION, UNSPECIFIED WHETHER LONG TERM INSULIN USE: ICD-10-CM

## 2021-12-27 ENCOUNTER — PATIENT MESSAGE (OUTPATIENT)
Dept: FAMILY MEDICINE | Facility: CLINIC | Age: 66
End: 2021-12-27
Payer: COMMERCIAL

## 2021-12-27 ENCOUNTER — TELEPHONE (OUTPATIENT)
Dept: FAMILY MEDICINE | Facility: CLINIC | Age: 66
End: 2021-12-27
Payer: COMMERCIAL

## 2021-12-27 RX ORDER — FLUOXETINE HYDROCHLORIDE 20 MG/1
20 CAPSULE ORAL DAILY
Qty: 90 CAPSULE | Refills: 12 | Status: SHIPPED | OUTPATIENT
Start: 2021-12-27 | End: 2023-02-01

## 2022-01-05 ENCOUNTER — OFFICE VISIT (OUTPATIENT)
Dept: DERMATOLOGY | Facility: CLINIC | Age: 67
End: 2022-01-05
Payer: COMMERCIAL

## 2022-01-05 VITALS — WEIGHT: 167 LBS | BODY MASS INDEX: 27.79 KG/M2

## 2022-01-05 DIAGNOSIS — Z85.828 HISTORY OF SKIN CANCER: ICD-10-CM

## 2022-01-05 DIAGNOSIS — L71.9 ROSACEA: ICD-10-CM

## 2022-01-05 DIAGNOSIS — L30.1 DYSHIDROTIC HAND DERMATITIS: Primary | ICD-10-CM

## 2022-01-05 DIAGNOSIS — L81.4 LENTIGINES: ICD-10-CM

## 2022-01-05 PROCEDURE — 3008F BODY MASS INDEX DOCD: CPT | Mod: CPTII,S$GLB,, | Performed by: DERMATOLOGY

## 2022-01-05 PROCEDURE — 1160F PR REVIEW ALL MEDS BY PRESCRIBER/CLIN PHARMACIST DOCUMENTED: ICD-10-PCS | Mod: CPTII,S$GLB,, | Performed by: DERMATOLOGY

## 2022-01-05 PROCEDURE — 99999 PR PBB SHADOW E&M-EST. PATIENT-LVL III: CPT | Mod: PBBFAC,,, | Performed by: DERMATOLOGY

## 2022-01-05 PROCEDURE — 99214 OFFICE O/P EST MOD 30 MIN: CPT | Mod: S$GLB,,, | Performed by: DERMATOLOGY

## 2022-01-05 PROCEDURE — 99999 PR PBB SHADOW E&M-EST. PATIENT-LVL III: ICD-10-PCS | Mod: PBBFAC,,, | Performed by: DERMATOLOGY

## 2022-01-05 PROCEDURE — 3008F PR BODY MASS INDEX (BMI) DOCUMENTED: ICD-10-PCS | Mod: CPTII,S$GLB,, | Performed by: DERMATOLOGY

## 2022-01-05 PROCEDURE — 99214 PR OFFICE/OUTPT VISIT, EST, LEVL IV, 30-39 MIN: ICD-10-PCS | Mod: S$GLB,,, | Performed by: DERMATOLOGY

## 2022-01-05 PROCEDURE — 1160F RVW MEDS BY RX/DR IN RCRD: CPT | Mod: CPTII,S$GLB,, | Performed by: DERMATOLOGY

## 2022-01-05 PROCEDURE — 1159F MED LIST DOCD IN RCRD: CPT | Mod: CPTII,S$GLB,, | Performed by: DERMATOLOGY

## 2022-01-05 PROCEDURE — 1159F PR MEDICATION LIST DOCUMENTED IN MEDICAL RECORD: ICD-10-PCS | Mod: CPTII,S$GLB,, | Performed by: DERMATOLOGY

## 2022-01-05 RX ORDER — MOMETASONE FUROATE 1 MG/G
CREAM TOPICAL
Qty: 50 G | Refills: 3 | Status: SHIPPED | OUTPATIENT
Start: 2022-01-05

## 2022-01-08 ENCOUNTER — PATIENT MESSAGE (OUTPATIENT)
Dept: FAMILY MEDICINE | Facility: CLINIC | Age: 67
End: 2022-01-08
Payer: COMMERCIAL

## 2022-01-08 ENCOUNTER — PATIENT MESSAGE (OUTPATIENT)
Dept: OBSTETRICS AND GYNECOLOGY | Facility: CLINIC | Age: 67
End: 2022-01-08
Payer: COMMERCIAL

## 2022-01-08 DIAGNOSIS — Z00.00 ROUTINE MEDICAL EXAM: Primary | ICD-10-CM

## 2022-01-08 DIAGNOSIS — Z12.31 ENCOUNTER FOR SCREENING MAMMOGRAM FOR BREAST CANCER: Primary | ICD-10-CM

## 2022-01-11 ENCOUNTER — PATIENT MESSAGE (OUTPATIENT)
Dept: FAMILY MEDICINE | Facility: CLINIC | Age: 67
End: 2022-01-11
Payer: COMMERCIAL

## 2022-01-22 NOTE — TELEPHONE ENCOUNTER
No new care gaps identified.  Powered by [employer]. Reference number: [id number].   1/22/2022 8:59:31 AM CST

## 2022-01-23 RX ORDER — METOPROLOL TARTRATE 50 MG/1
TABLET ORAL
Qty: 60 TABLET | Refills: 12 | Status: SHIPPED | OUTPATIENT
Start: 2022-01-23 | End: 2023-03-09

## 2022-01-28 ENCOUNTER — HOSPITAL ENCOUNTER (OUTPATIENT)
Dept: RADIOLOGY | Facility: HOSPITAL | Age: 67
Discharge: HOME | End: 2022-01-28
Attending: PHYSICIAN ASSISTANT
Payer: COMMERCIAL

## 2022-01-28 DIAGNOSIS — Z12.31 ENCOUNTER FOR SCREENING MAMMOGRAM FOR BREAST CANCER: ICD-10-CM

## 2022-01-28 PROCEDURE — 77063 BREAST TOMOSYNTHESIS BI: CPT | Mod: TC

## 2022-01-28 PROCEDURE — 77067 MAMMO DIGITAL SCREENING BILAT WITH TOMO: ICD-10-PCS | Mod: 26,,, | Performed by: RADIOLOGY

## 2022-01-28 PROCEDURE — 77067 SCR MAMMO BI INCL CAD: CPT | Mod: TC

## 2022-01-28 PROCEDURE — 77067 SCR MAMMO BI INCL CAD: CPT | Mod: 26,,, | Performed by: RADIOLOGY

## 2022-01-28 PROCEDURE — 77063 MAMMO DIGITAL SCREENING BILAT WITH TOMO: ICD-10-PCS | Mod: 26,,, | Performed by: RADIOLOGY

## 2022-01-28 PROCEDURE — 77063 BREAST TOMOSYNTHESIS BI: CPT | Mod: 26,,, | Performed by: RADIOLOGY

## 2022-02-01 ENCOUNTER — PATIENT MESSAGE (OUTPATIENT)
Dept: OBSTETRICS AND GYNECOLOGY | Facility: CLINIC | Age: 67
End: 2022-02-01
Payer: COMMERCIAL

## 2022-02-08 ENCOUNTER — OFFICE VISIT (OUTPATIENT)
Dept: OBSTETRICS AND GYNECOLOGY | Facility: CLINIC | Age: 67
End: 2022-02-08
Payer: COMMERCIAL

## 2022-02-08 VITALS
SYSTOLIC BLOOD PRESSURE: 148 MMHG | WEIGHT: 165.38 LBS | DIASTOLIC BLOOD PRESSURE: 78 MMHG | HEIGHT: 65 IN | BODY MASS INDEX: 27.56 KG/M2

## 2022-02-08 DIAGNOSIS — Z01.419 WELL WOMAN EXAM WITH ROUTINE GYNECOLOGICAL EXAM: Primary | ICD-10-CM

## 2022-02-08 DIAGNOSIS — N95.1 SYMPTOMATIC MENOPAUSAL OR FEMALE CLIMACTERIC STATES: ICD-10-CM

## 2022-02-08 PROCEDURE — 3078F DIAST BP <80 MM HG: CPT | Mod: CPTII,S$GLB,, | Performed by: OBSTETRICS & GYNECOLOGY

## 2022-02-08 PROCEDURE — 99396 PR PREVENTIVE VISIT,EST,40-64: ICD-10-PCS | Mod: S$GLB,,, | Performed by: OBSTETRICS & GYNECOLOGY

## 2022-02-08 PROCEDURE — 1160F PR REVIEW ALL MEDS BY PRESCRIBER/CLIN PHARMACIST DOCUMENTED: ICD-10-PCS | Mod: CPTII,S$GLB,, | Performed by: OBSTETRICS & GYNECOLOGY

## 2022-02-08 PROCEDURE — 1160F RVW MEDS BY RX/DR IN RCRD: CPT | Mod: CPTII,S$GLB,, | Performed by: OBSTETRICS & GYNECOLOGY

## 2022-02-08 PROCEDURE — 1159F PR MEDICATION LIST DOCUMENTED IN MEDICAL RECORD: ICD-10-PCS | Mod: CPTII,S$GLB,, | Performed by: OBSTETRICS & GYNECOLOGY

## 2022-02-08 PROCEDURE — 3008F BODY MASS INDEX DOCD: CPT | Mod: CPTII,S$GLB,, | Performed by: OBSTETRICS & GYNECOLOGY

## 2022-02-08 PROCEDURE — 1159F MED LIST DOCD IN RCRD: CPT | Mod: CPTII,S$GLB,, | Performed by: OBSTETRICS & GYNECOLOGY

## 2022-02-08 PROCEDURE — 99999 PR PBB SHADOW E&M-EST. PATIENT-LVL III: ICD-10-PCS | Mod: PBBFAC,,, | Performed by: OBSTETRICS & GYNECOLOGY

## 2022-02-08 PROCEDURE — 3008F PR BODY MASS INDEX (BMI) DOCUMENTED: ICD-10-PCS | Mod: CPTII,S$GLB,, | Performed by: OBSTETRICS & GYNECOLOGY

## 2022-02-08 PROCEDURE — 99999 PR PBB SHADOW E&M-EST. PATIENT-LVL III: CPT | Mod: PBBFAC,,, | Performed by: OBSTETRICS & GYNECOLOGY

## 2022-02-08 PROCEDURE — 3077F SYST BP >= 140 MM HG: CPT | Mod: CPTII,S$GLB,, | Performed by: OBSTETRICS & GYNECOLOGY

## 2022-02-08 PROCEDURE — 99396 PREV VISIT EST AGE 40-64: CPT | Mod: S$GLB,,, | Performed by: OBSTETRICS & GYNECOLOGY

## 2022-02-08 PROCEDURE — 3077F PR MOST RECENT SYSTOLIC BLOOD PRESSURE >= 140 MM HG: ICD-10-PCS | Mod: CPTII,S$GLB,, | Performed by: OBSTETRICS & GYNECOLOGY

## 2022-02-08 PROCEDURE — 3078F PR MOST RECENT DIASTOLIC BLOOD PRESSURE < 80 MM HG: ICD-10-PCS | Mod: CPTII,S$GLB,, | Performed by: OBSTETRICS & GYNECOLOGY

## 2022-02-08 RX ORDER — INFLUENZA A VIRUS A/WASHINGTON/19/2020 (H1N1) ANTIGEN (MDCK CELL DERIVED, PROPIOLACTONE INACTIVATED), INFLUENZA A VIRUS A/TASMANIA/503/2020 (H3N2) ANTIGEN (MDCK CELL DERIVED, PROPIOLACTONE INACTIVATED), INFLUENZA B VIRUS B/DARWIN/7/2019 ANTIGEN (MDCK CELL DERIVED, PROPIOLACTONE INACTIVATED), INFLUENZA B VIRUS B/SINGAPORE/INFTT-16-0610/2016 ANTIGEN (MDCK CELL DERIVED, PROPIOLACTONE INACTIVATED) 15; 15; 15; 15 UG/.5ML; UG/.5ML; UG/.5ML; UG/.5ML
INJECTION, SUSPENSION INTRAMUSCULAR
COMMUNITY
Start: 2021-12-08 | End: 2023-03-02

## 2022-02-08 NOTE — PROGRESS NOTES
Subjective:       Patient ID: Anushka Napier is a 66 y.o. female.    Chief Complaint:  Well Woman (Pt here for annual exam.  Last normal mammogram was 2022.  Pt had a QUIN in .)      History of Present Illness  HPI  Annual Exam-Postmenopausal  Patient presents for annual exam. The patient has no complaints today. The patient is sexually active. GYN screening history: last pap: patient does not recall when last pap was and last mammogram: approximate date 1/15/2021 and was normal. Last colonoscopy was in .  The patient is currently taking hormone replacement therapy. Patient denies post-menopausal vaginal bleeding. The patient wears seatbelts: yes. The patient participates in regular exercise: yes. Has the patient ever been transfused or tattooed?: no. The patient reports that there is not domestic violence in her life.     Status post  Total abdominal hysterectomy for prolapse in .  Doing well on Premarin.  Would like to continue understanding risks and benefits.    GYN & OB History  No LMP recorded. Patient has had a hysterectomy.   Date of Last Pap: No result found    OB History    Para Term  AB Living   7 4 3 1 3 3   SAB IAB Ectopic Multiple Live Births   3       3      # Outcome Date GA Lbr Amos/2nd Weight Sex Delivery Anes PTL Lv   7 Term 90 40w0d  4.309 kg (9 lb 8 oz) F Vag-Spont EPI N ALEX   6 Term 88 38w0d  3.657 kg (8 lb 1 oz) M Vag-Spont EPI N ALEX   5  10/08/82 36w0d  2.58 kg (5 lb 11 oz) M Vag-Spont EPI N ALEX   4 Term            3 SAB            2 SAB            1 SAB              Past Medical History:   Diagnosis Date    Allergy     Anxiety     Basal cell carcinoma     to face    Depression     Diabetes mellitus     Diabetes mellitus type II, uncontrolled 2014    Diabetic retinopathy 2020    DM retinopathy     GERD (gastroesophageal reflux disease) 2014    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 Dr Correia, long term PPI use    History of  colonic polyps 1/24/2020    Hyperlipidemia     Hypertension     Osteopenia 9/25/2014    Screening for colorectal cancer 9/25/2014    Normal 2009    Screening for colorectal cancer     Thyroid disease     Vitamin D deficiency disease 9/25/2014       Past Surgical History:   Procedure Laterality Date    HYSTERECTOMY  1997    complete for prolapse, Abdominal    OOPHORECTOMY      right shoulder      SINUS SURGERY  2012       Family History   Problem Relation Age of Onset    Breast cancer Cousin 40    Miscarriages / Stillbirths Maternal Grandmother     Miscarriages / Stillbirths Mother     Diabetes Mother     Hypertension Mother     Cancer Mother         cervical    Heart disease Father     Hypertension Father     Ovarian cancer Neg Hx        Social History     Socioeconomic History    Marital status:    Tobacco Use    Smoking status: Never Smoker    Smokeless tobacco: Never Used   Substance and Sexual Activity    Alcohol use: No    Drug use: No    Sexual activity: Yes     Partners: Male     Birth control/protection: Surgical   Social History Narrative     since 1986    Previous  for 8 years prior    He is a     She is a homemaker       Current Outpatient Medications   Medication Sig Dispense Refill    amitriptyline (ELAVIL) 10 MG tablet Take 10 mg by mouth nightly.      aspirin 81 MG chewable tablet Take 1 tablet by mouth Daily. 1 Tablet, Chewable Oral Every day      atorvastatin (LIPITOR) 40 MG tablet TAKE 1 TABLET(40 MG) BY MOUTH EVERY DAY 90 tablet 12    FLUCELVAX QUAD 0203-4506, PF, 60 mcg (15 mcg x 4)/0.5 mL Syrg       FLUoxetine 20 MG capsule Take 1 capsule (20 mg total) by mouth once daily. 90 capsule 12    levothyroxine (SYNTHROID, LEVOTHROID) 175 MCG tablet TAKE 1 TABLET(175 MCG) BY MOUTH BEFORE BREAKFAST 30 tablet 11    metFORMIN (GLUCOPHAGE-XR) 500 MG ER 24hr tablet TAKE 1 TABLET(500 MG) BY MOUTH EVERY DAY 90 tablet 3    metoprolol tartrate  (LOPRESSOR) 50 MG tablet TAKE 1 TABLET BY MOUTH DAILY 60 tablet 12    mometasone 0.1% (ELOCON) 0.1 % cream Use daily 50 g 3    omega-3 fatty acids-vitamin E 1,000 mg Cap Take 1 capsule by mouth Twice daily. 1 Capsule Oral Twice a day       RABEprazole (ACIPHEX) 20 mg tablet TAKE 1 TABLET(20 MG) BY MOUTH TWICE DAILY 60 tablet 12    sars-cov-2, covid-19, (PFIZER COVID-19) 30 mcg/0.3 ml injection       solifenacin (VESICARE) 5 MG tablet Take 5 mg by mouth.      vitamin D (VITAMIN D3) 1000 units Tab Take 1,000 Units by mouth once daily. 5 tabs daily      estrogens, conjugated, (PREMARIN) 0.625 MG tablet Take 1 tablet (0.625 mg total) by mouth once daily. 30 tablet 12     No current facility-administered medications for this visit.       Review of patient's allergies indicates:   Allergen Reactions    Tizanidine Swelling     Tongue swelling     Codeine Hives     Other reaction(s): Itching  Other reaction(s): Hives    Iodinated contrast media Hives     Other reaction(s): Hives    Iodine      Other reaction(s): Unknown    Sulfamethoxazole-trimethoprim Itching     Other reaction(s): Itching  Other reaction(s): Hives    Epinephrine Anxiety and Other (See Comments)     Almost passed out.         Review of Systems  Review of Systems   Constitutional: Negative for activity change, appetite change, chills, fatigue, fever and unexpected weight change.   HENT: Negative for mouth sores.    Respiratory: Negative for cough, shortness of breath and wheezing.    Cardiovascular: Negative for chest pain and palpitations.   Gastrointestinal: Negative for abdominal pain, bloating, blood in stool, constipation, nausea and vomiting.   Endocrine: Negative for diabetes and hot flashes.   Genitourinary: Negative for dysmenorrhea, dyspareunia, dysuria, frequency, hematuria, menorrhagia, menstrual problem, pelvic pain, urgency, vaginal bleeding, vaginal discharge, vaginal pain, urinary incontinence, postcoital bleeding and vaginal  odor.   Musculoskeletal: Negative for back pain and myalgias.   Integumentary:  Negative for rash, breast mass and nipple discharge.   Neurological: Negative for seizures and headaches.   Psychiatric/Behavioral: Negative for depression and sleep disturbance. The patient is not nervous/anxious.    Breast: Negative for mass, mastodynia and nipple discharge          Objective:    Physical Exam:   Constitutional: She appears well-developed and well-nourished. No distress.   BMI of 27.5    HENT:   Head: Normocephalic and atraumatic.    Eyes: EOM are normal.      Pulmonary/Chest: Effort normal. No respiratory distress.   Breasts: Non-tender, no engorgement, no masses, no retraction, no discharge. Negative for lymphadenopathy.         Abdominal: Soft. She exhibits no distension. There is no abdominal tenderness. There is no rebound and no guarding.   Pfannenstiel scar      Genitourinary:    Vagina normal.   No  no vaginal discharge in the vagina.    Genitourinary Comments: Severe atrophy.  Vulva without any obvious lesions.  Urethral meatus normal size and location without any lesion.  Urethra is non-tender without stricture or discharge.  Bladder is non-tender.  Vaginal vault with good support.  Minimal white discharge noted.  No obvious lesion.  Normal rugation.  Cervix and Uterus are surgically absent.  Adnexa is without any masses or tenderness.  Perineum without obvious lesion.               Musculoskeletal: Normal range of motion.       Neurological: She is alert.    Skin: Skin is warm and dry.    Psychiatric: She has a normal mood and affect.          Assessment:        1. Well woman exam with routine gynecological exam    2. Symptomatic menopausal or female climacteric states              Plan:          I have discussed with the patient her condition.  Monthly breast examination was instructed, discussed, and encouraged.  Patient was encouraged to consume a low-calorie, low fat diet, and to increase of physical  activity.  Healthy habits encouraged.  A Pap smear was not performed; she no longer would need Pap according to the USPSTF recommendations.  Mammogram was not ordered because it was done recently.  Gonorrhea and Chlamydia testing not performed;  HIV test not offered, again according to guidelines.  Colonoscopy discussed according to ACS guideline.    Patient is to continue her medications as prescribed.  Refills for Premarin 0.625 mg given.  She will come back to see me in one year for her annual visit.  She can come back to see me sooner as necessary.  All of her questions were answered appropriately to her satisfaction.

## 2022-02-19 NOTE — TELEPHONE ENCOUNTER
Care Due:                  Date            Visit Type   Department     Provider  --------------------------------------------------------------------------------                                EP -         [facility] FAM[facility]                              PRIMARY      MED/ INTERNAL  Gerardo Chappell  Last Visit: 04-      CARE ([facility])   MED/ PEDS      [facility]                              MYCHART                              ANNUAL       [facility]                              CHECKUP/PHY  MED/ INTERNAL  Gerardo Chappell  Next Visit: 03-      S            MED/ PEDS      [facility]                                                            Last  Test          Frequency    Reason                     Performed    Due Date  --------------------------------------------------------------------------------    CMP.........  12 months..  atorvastatin, metFORMIN..  03-   03-    HBA1C.......  6 months...  metFORMIN................  03- 09-    Lipid Panel.  12 months..  atorvastatin.............  03-   03-    Powered by [employer]. Reference number: [id number].   2/18/2022 7:36:20 PM CST

## 2022-02-21 RX ORDER — RABEPRAZOLE SODIUM 20 MG/1
TABLET, DELAYED RELEASE ORAL
Qty: 180 TABLET | Refills: 0 | Status: SHIPPED | OUTPATIENT
Start: 2022-02-21 | End: 2022-08-29

## 2022-02-21 NOTE — TELEPHONE ENCOUNTER
Refill Authorization Note   Anushka Napier  is requesting a refill authorization.  Brief Assessment and Rationale for Refill:  Approve     Medication Therapy Plan:  FLOS    Medication Reconciliation Completed: No   Comments:   --->Care Gap information included below if applicable.       Requested Prescriptions   Pending Prescriptions Disp Refills    RABEprazole (ACIPHEX) 20 mg tablet [Pharmacy Med Name: RABEPRAZOLE DR 20MG TABLETS] 180 tablet 0     Sig: TAKE 1 TABLET BY MOUTH TWICE DAILY       Gastroenterology: Proton Pump Inhibitors 2 Passed - 2/18/2022  7:35 PM        Passed - Patient is at least 18 years old        Passed - Osteoporosis is not on problem list        Passed - An appropriate indication is on the problem list        Passed - Valid encounter within last 15 months     Recent Visits  Date Type Provider Dept   04/07/21 Office Visit Gerardo Dobbs MD [facility] Med/ Internal Med/ Peds   04/20/20 Office Visit Gerardo Dobbs MD [facility]/ Internal Med/ Peds   Showing recent visits within past 720 days and meeting all other requirements  Future Appointments  No visits were found meeting these conditions.  Showing future appointments within next 150 days and meeting all other requirements      Future Appointments              In 3 weeks Raj Crow DPM [facility]    In 3 weeks LAB, [facility]    In 4 weeks Gerardo Dobbs MD [facility] [place]                    Appointments  past 12m or future 3m with PCP    Date Provider   Last Visit   4/7/2021 Gerardo Dobbs MD   Next Visit   3/22/2022 Gerardo Dobbs MD   ED visits in past 90 days: 0     Note composed:10:29 AM 02/21/2022

## 2022-03-11 ENCOUNTER — PATIENT OUTREACH (OUTPATIENT)
Dept: ADMINISTRATIVE | Facility: OTHER | Age: 67
End: 2022-03-11
Payer: COMMERCIAL

## 2022-03-14 ENCOUNTER — OFFICE VISIT (OUTPATIENT)
Dept: PODIATRY | Facility: CLINIC | Age: 67
End: 2022-03-14
Payer: COMMERCIAL

## 2022-03-14 VITALS — BODY MASS INDEX: 27.56 KG/M2 | WEIGHT: 165.38 LBS | HEIGHT: 65 IN

## 2022-03-14 DIAGNOSIS — E11.9 TYPE 2 DIABETES MELLITUS WITHOUT COMPLICATION, UNSPECIFIED WHETHER LONG TERM INSULIN USE: Primary | ICD-10-CM

## 2022-03-14 DIAGNOSIS — B35.1 ONYCHOMYCOSIS DUE TO DERMATOPHYTE: ICD-10-CM

## 2022-03-14 PROCEDURE — 1159F PR MEDICATION LIST DOCUMENTED IN MEDICAL RECORD: ICD-10-PCS | Mod: CPTII,S$GLB,, | Performed by: PODIATRIST

## 2022-03-14 PROCEDURE — 1159F MED LIST DOCD IN RCRD: CPT | Mod: CPTII,S$GLB,, | Performed by: PODIATRIST

## 2022-03-14 PROCEDURE — 99204 OFFICE O/P NEW MOD 45 MIN: CPT | Mod: S$GLB,,, | Performed by: PODIATRIST

## 2022-03-14 PROCEDURE — 1160F PR REVIEW ALL MEDS BY PRESCRIBER/CLIN PHARMACIST DOCUMENTED: ICD-10-PCS | Mod: CPTII,S$GLB,, | Performed by: PODIATRIST

## 2022-03-14 PROCEDURE — 3008F BODY MASS INDEX DOCD: CPT | Mod: CPTII,S$GLB,, | Performed by: PODIATRIST

## 2022-03-14 PROCEDURE — 3008F PR BODY MASS INDEX (BMI) DOCUMENTED: ICD-10-PCS | Mod: CPTII,S$GLB,, | Performed by: PODIATRIST

## 2022-03-14 PROCEDURE — 99999 PR PBB SHADOW E&M-EST. PATIENT-LVL III: CPT | Mod: PBBFAC,,, | Performed by: PODIATRIST

## 2022-03-14 PROCEDURE — 99204 PR OFFICE/OUTPT VISIT, NEW, LEVL IV, 45-59 MIN: ICD-10-PCS | Mod: S$GLB,,, | Performed by: PODIATRIST

## 2022-03-14 PROCEDURE — 99999 PR PBB SHADOW E&M-EST. PATIENT-LVL III: ICD-10-PCS | Mod: PBBFAC,,, | Performed by: PODIATRIST

## 2022-03-14 PROCEDURE — 1160F RVW MEDS BY RX/DR IN RCRD: CPT | Mod: CPTII,S$GLB,, | Performed by: PODIATRIST

## 2022-03-14 RX ORDER — CICLOPIROX 80 MG/ML
SOLUTION TOPICAL NIGHTLY
Qty: 6.6 ML | Refills: 11 | Status: SHIPPED | OUTPATIENT
Start: 2022-03-14 | End: 2024-02-28

## 2022-03-14 NOTE — PROGRESS NOTES
Subjective:      Patient ID: Anushka Napier is a 66 y.o. female.    Chief Complaint: Diabetes Mellitus (3/22/22 Dr Dobbs PCP), Diabetic Foot Exam, and Nail Problem    Anushka is a 66 y.o. female who presents to the clinic upon referral from Dr. Parekh  for evaluation and treatment of diabetic feet. Anushka has a past medical history of Allergy, Anxiety, Basal cell carcinoma, Depression, Diabetes mellitus, Diabetes mellitus type II, uncontrolled (9/17/2014), Diabetic retinopathy (01/14/2020), DM retinopathy, GERD (gastroesophageal reflux disease) (9/25/2014), History of colonic polyps (1/24/2020), Hyperlipidemia, Hypertension, Osteopenia (9/25/2014), Screening for colorectal cancer (9/25/2014), Screening for colorectal cancer, Thyroid disease, and Vitamin D deficiency disease (9/25/2014). Patient relates no major problem with feet. Only complaints today consist of Diabetes, increased risk amputation needing evaluation/management/optomization of foot care.    cc2 discolored misshapen toenails both big toes.  Gradual onset, worsening over past several weeks, aggravated by increased weight bearing, shoe gear, pressure.  No previous medical treatment.  OTC pain med not helping. .    PCP: Gerardo Dobbs MD    Date Last Seen by PCP:   Chief Complaint   Patient presents with    Diabetes Mellitus     3/22/22 Dr Dobbs PCP    Diabetic Foot Exam    Nail Problem         Current shoe gear: Casual shoes    Hemoglobin A1C   Date Value Ref Range Status   03/20/2021 6.5 (H) 4.0 - 5.6 % Final     Comment:     ADA Screening Guidelines:  5.7-6.4%  Consistent with prediabetes  >or=6.5%  Consistent with diabetes    High levels of fetal hemoglobin interfere with the HbA1C  assay. Heterozygous hemoglobin variants (HbS, HgC, etc)do  not significantly interfere with this assay.   However, presence of multiple variants may affect accuracy.     11/21/2020 6.5 (H) 4.0 - 5.6 % Final     Comment:     ADA Screening Guidelines:  5.7-6.4%   Consistent with prediabetes  >or=6.5%  Consistent with diabetes  High levels of fetal hemoglobin interfere with the HbA1C  assay. Heterozygous hemoglobin variants (HbS, HgC, etc)do  not significantly interfere with this assay.   However, presence of multiple variants may affect accuracy.     07/18/2020 6.7 (H) 4.0 - 5.6 % Final     Comment:     ADA Screening Guidelines:  5.7-6.4%  Consistent with prediabetes  >or=6.5%  Consistent with diabetes  High levels of fetal hemoglobin interfere with the HbA1C  assay. Heterozygous hemoglobin variants (HbS, HgC, etc)do  not significantly interfere with this assay.   However, presence of multiple variants may affect accuracy.             Review of Systems   Constitutional: Negative for chills, diaphoresis, fever, malaise/fatigue and night sweats.   Cardiovascular: Negative for claudication, cyanosis, leg swelling and syncope.   Skin: Positive for nail changes. Negative for color change, dry skin, rash, suspicious lesions and unusual hair distribution.   Musculoskeletal: Negative for falls, joint pain, joint swelling, muscle cramps, muscle weakness and stiffness.   Gastrointestinal: Negative for constipation, diarrhea, nausea and vomiting.   Neurological: Negative for brief paralysis, disturbances in coordination, focal weakness, numbness, paresthesias, sensory change and tremors.           Objective:      Physical Exam  Constitutional:       General: She is not in acute distress.     Appearance: She is well-developed. She is not diaphoretic.   Cardiovascular:      Pulses:           Popliteal pulses are 2+ on the right side and 2+ on the left side.        Dorsalis pedis pulses are 2+ on the right side and 2+ on the left side.        Posterior tibial pulses are 2+ on the right side and 2+ on the left side.      Comments: Capillary refill 3 seconds all toes/distal feet, all toes/both feet warm to touch.      Negative lymphadenopathy bilateral popliteal fossa and tarsal tunnel.       Negavie lower extremity edema bilateral.    Musculoskeletal:      Right ankle: No swelling, deformity, ecchymosis or lacerations. Normal range of motion. Normal pulse.      Right Achilles Tendon: Normal. No defects. Shipley's test negative.      Comments: Minimal deformity toes 1 right, 1,2 left without symptom.     Otherwise, Normal angle, base, station of gait. All ten toes without clubbing, cyanosis, or signs of ischemia.  No pain to palpation bilateral lower extremities.  Range of motion, stability, muscle strength, and muscle tone normal bilateral feet and legs.    Lymphadenopathy:      Lower Body: No right inguinal adenopathy. No left inguinal adenopathy.      Comments: Negative lymphadenopathy bilateral popliteal fossa and tarsal tunnel.    Negative lymphangitic streaking bilateral feet/ankles/legs.   Skin:     General: Skin is warm and dry.      Capillary Refill: Capillary refill takes 2 to 3 seconds.      Coloration: Skin is not pale.      Findings: No abrasion, bruising, burn, ecchymosis, erythema, laceration, lesion or rash.      Nails: There is no clubbing.      Comments: Toenails 1st  bilateral are hypertrophic, dystrophic, discolored tanish brown with tan, gray crumbly subungual debris.  Tender to distal nail plate pressure, without periungual skin abnormality of each.     Otherwise, Skin is normal age and health appropriate color, turgor, texture, and temperature bilateral lower extremities without ulceration, hyperpigmentation, discoloration, masses nodules or cords palpated.  No ecchymosis, erythema, edema, or cardinal signs of infection bilateral lower extremities.     Neurological:      Mental Status: She is alert and oriented to person, place, and time.      Sensory: No sensory deficit.      Motor: No tremor, atrophy or abnormal muscle tone.      Gait: Gait normal.      Deep Tendon Reflexes:      Reflex Scores:       Patellar reflexes are 2+ on the right side and 2+ on the left side.        Achilles reflexes are 2+ on the right side and 2+ on the left side.     Comments: Negative tinel sign to percussion sural, superficial peroneal, deep peroneal, saphenous, and posterior tibial nerves right and left ankles and feet.     Psychiatric:         Behavior: Behavior is cooperative.               Assessment:       Encounter Diagnoses   Name Primary?    Type 2 diabetes mellitus without complication, unspecified whether long term insulin use Yes    Onychomycosis due to dermatophyte          Plan:       Anushka was seen today for diabetes mellitus, diabetic foot exam and nail problem.    Diagnoses and all orders for this visit:    Type 2 diabetes mellitus without complication, unspecified whether long term insulin use    Onychomycosis due to dermatophyte    Other orders  -     ciclopirox (PENLAC) 8 % Soln; Apply topically nightly.      I counseled the patient on her conditions, their implications and medical management.        The patient has received literature on basic diabetic foot care.  Patient will inspect feet daily, wear protective shoe gear when ambulatory, and apply moisturizer to skin as needed to maintain elasticity and help prevent ulceration.    darya    Discussed conservative treatment with shoes of adequate dimensions, material, and style to alleviate symptoms and delay or prevent surgical intervention.            No follow-ups on file.

## 2022-03-19 ENCOUNTER — LAB VISIT (OUTPATIENT)
Dept: LAB | Facility: HOSPITAL | Age: 67
End: 2022-03-19
Attending: INTERNAL MEDICINE
Payer: COMMERCIAL

## 2022-03-19 DIAGNOSIS — Z00.00 ROUTINE MEDICAL EXAM: ICD-10-CM

## 2022-03-19 LAB
ALBUMIN SERPL BCP-MCNC: 3.4 G/DL (ref 3.5–5.2)
ALP SERPL-CCNC: 82 U/L (ref 55–135)
ALT SERPL W/O P-5'-P-CCNC: 17 U/L (ref 10–44)
ANION GAP SERPL CALC-SCNC: 12 MMOL/L (ref 8–16)
AST SERPL-CCNC: 20 U/L (ref 10–40)
BASOPHILS # BLD AUTO: 0.08 K/UL (ref 0–0.2)
BASOPHILS NFR BLD: 1.2 % (ref 0–1.9)
BILIRUB SERPL-MCNC: 0.4 MG/DL (ref 0.1–1)
BUN SERPL-MCNC: 15 MG/DL (ref 8–23)
CALCIUM SERPL-MCNC: 8.9 MG/DL (ref 8.7–10.5)
CHLORIDE SERPL-SCNC: 103 MMOL/L (ref 95–110)
CHOLEST SERPL-MCNC: 168 MG/DL (ref 120–199)
CHOLEST/HDLC SERPL: 2.8 {RATIO} (ref 2–5)
CO2 SERPL-SCNC: 26 MMOL/L (ref 23–29)
CREAT SERPL-MCNC: 0.9 MG/DL (ref 0.5–1.4)
DIFFERENTIAL METHOD: ABNORMAL
EOSINOPHIL # BLD AUTO: 0.2 K/UL (ref 0–0.5)
EOSINOPHIL NFR BLD: 3.1 % (ref 0–8)
ERYTHROCYTE [DISTWIDTH] IN BLOOD BY AUTOMATED COUNT: 13 % (ref 11.5–14.5)
EST. GFR  (AFRICAN AMERICAN): >60 ML/MIN/1.73 M^2
EST. GFR  (NON AFRICAN AMERICAN): >60 ML/MIN/1.73 M^2
ESTIMATED AVG GLUCOSE: 140 MG/DL (ref 68–131)
FERRITIN SERPL-MCNC: 93 NG/ML (ref 20–300)
GLUCOSE SERPL-MCNC: 107 MG/DL (ref 70–110)
HBA1C MFR BLD: 6.5 % (ref 4–5.6)
HCT VFR BLD AUTO: 41 % (ref 37–48.5)
HDLC SERPL-MCNC: 60 MG/DL (ref 40–75)
HDLC SERPL: 35.7 % (ref 20–50)
HGB BLD-MCNC: 12.7 G/DL (ref 12–16)
IMM GRANULOCYTES # BLD AUTO: 0.02 K/UL (ref 0–0.04)
IMM GRANULOCYTES NFR BLD AUTO: 0.3 % (ref 0–0.5)
IRON SERPL-MCNC: 70 UG/DL (ref 30–160)
LDLC SERPL CALC-MCNC: 79.8 MG/DL (ref 63–159)
LYMPHOCYTES # BLD AUTO: 2.3 K/UL (ref 1–4.8)
LYMPHOCYTES NFR BLD: 34.3 % (ref 18–48)
MCH RBC QN AUTO: 28.7 PG (ref 27–31)
MCHC RBC AUTO-ENTMCNC: 31 G/DL (ref 32–36)
MCV RBC AUTO: 93 FL (ref 82–98)
MONOCYTES # BLD AUTO: 0.6 K/UL (ref 0.3–1)
MONOCYTES NFR BLD: 8.4 % (ref 4–15)
NEUTROPHILS # BLD AUTO: 3.5 K/UL (ref 1.8–7.7)
NEUTROPHILS NFR BLD: 52.7 % (ref 38–73)
NONHDLC SERPL-MCNC: 108 MG/DL
NRBC BLD-RTO: 0 /100 WBC
PLATELET # BLD AUTO: 306 K/UL (ref 150–450)
PMV BLD AUTO: 9.7 FL (ref 9.2–12.9)
POTASSIUM SERPL-SCNC: 4.2 MMOL/L (ref 3.5–5.1)
PROT SERPL-MCNC: 6.8 G/DL (ref 6–8.4)
RBC # BLD AUTO: 4.42 M/UL (ref 4–5.4)
SATURATED IRON: 18 % (ref 20–50)
SODIUM SERPL-SCNC: 141 MMOL/L (ref 136–145)
T4 FREE SERPL-MCNC: 1.1 NG/DL (ref 0.71–1.51)
TOTAL IRON BINDING CAPACITY: 398 UG/DL (ref 250–450)
TRANSFERRIN SERPL-MCNC: 269 MG/DL (ref 200–375)
TRIGL SERPL-MCNC: 141 MG/DL (ref 30–150)
TSH SERPL DL<=0.005 MIU/L-ACNC: 3.42 UIU/ML (ref 0.4–4)
WBC # BLD AUTO: 6.67 K/UL (ref 3.9–12.7)

## 2022-03-19 PROCEDURE — 84466 ASSAY OF TRANSFERRIN: CPT | Performed by: INTERNAL MEDICINE

## 2022-03-19 PROCEDURE — 83036 HEMOGLOBIN GLYCOSYLATED A1C: CPT | Performed by: INTERNAL MEDICINE

## 2022-03-19 PROCEDURE — 85025 COMPLETE CBC W/AUTO DIFF WBC: CPT | Performed by: INTERNAL MEDICINE

## 2022-03-19 PROCEDURE — 36415 COLL VENOUS BLD VENIPUNCTURE: CPT | Mod: PO | Performed by: INTERNAL MEDICINE

## 2022-03-19 PROCEDURE — 80061 LIPID PANEL: CPT | Performed by: INTERNAL MEDICINE

## 2022-03-19 PROCEDURE — 84443 ASSAY THYROID STIM HORMONE: CPT | Performed by: INTERNAL MEDICINE

## 2022-03-19 PROCEDURE — 82652 VIT D 1 25-DIHYDROXY: CPT | Performed by: INTERNAL MEDICINE

## 2022-03-19 PROCEDURE — 80053 COMPREHEN METABOLIC PANEL: CPT | Performed by: INTERNAL MEDICINE

## 2022-03-19 PROCEDURE — 84439 ASSAY OF FREE THYROXINE: CPT | Performed by: INTERNAL MEDICINE

## 2022-03-19 PROCEDURE — 82728 ASSAY OF FERRITIN: CPT | Performed by: INTERNAL MEDICINE

## 2022-03-22 ENCOUNTER — OFFICE VISIT (OUTPATIENT)
Dept: FAMILY MEDICINE | Facility: CLINIC | Age: 67
End: 2022-03-22
Payer: COMMERCIAL

## 2022-03-22 VITALS
HEIGHT: 65 IN | HEART RATE: 57 BPM | OXYGEN SATURATION: 97 % | SYSTOLIC BLOOD PRESSURE: 126 MMHG | BODY MASS INDEX: 27.44 KG/M2 | DIASTOLIC BLOOD PRESSURE: 64 MMHG | TEMPERATURE: 98 F | WEIGHT: 164.69 LBS

## 2022-03-22 DIAGNOSIS — I10 ESSENTIAL HYPERTENSION: ICD-10-CM

## 2022-03-22 DIAGNOSIS — Z86.010 HISTORY OF COLONIC POLYPS: ICD-10-CM

## 2022-03-22 DIAGNOSIS — Z00.00 ROUTINE MEDICAL EXAM: Primary | ICD-10-CM

## 2022-03-22 DIAGNOSIS — E55.9 VITAMIN D DEFICIENCY DISEASE: ICD-10-CM

## 2022-03-22 DIAGNOSIS — E11.65 UNCONTROLLED TYPE 2 DIABETES MELLITUS WITH HYPERGLYCEMIA: ICD-10-CM

## 2022-03-22 DIAGNOSIS — D64.9 ANEMIA, UNSPECIFIED TYPE: ICD-10-CM

## 2022-03-22 DIAGNOSIS — M85.80 OSTEOPENIA, UNSPECIFIED LOCATION: ICD-10-CM

## 2022-03-22 DIAGNOSIS — E78.5 HYPERLIPIDEMIA, UNSPECIFIED HYPERLIPIDEMIA TYPE: ICD-10-CM

## 2022-03-22 DIAGNOSIS — I70.0 ATHEROSCLEROSIS OF AORTA: ICD-10-CM

## 2022-03-22 DIAGNOSIS — E03.9 HYPOTHYROIDISM, UNSPECIFIED TYPE: ICD-10-CM

## 2022-03-22 PROCEDURE — 3078F DIAST BP <80 MM HG: CPT | Mod: CPTII,S$GLB,, | Performed by: INTERNAL MEDICINE

## 2022-03-22 PROCEDURE — 99397 PER PM REEVAL EST PAT 65+ YR: CPT | Mod: S$GLB,,, | Performed by: INTERNAL MEDICINE

## 2022-03-22 PROCEDURE — 99999 PR PBB SHADOW E&M-EST. PATIENT-LVL IV: CPT | Mod: PBBFAC,,, | Performed by: INTERNAL MEDICINE

## 2022-03-22 PROCEDURE — 1159F PR MEDICATION LIST DOCUMENTED IN MEDICAL RECORD: ICD-10-PCS | Mod: CPTII,S$GLB,, | Performed by: INTERNAL MEDICINE

## 2022-03-22 PROCEDURE — 99999 PR PBB SHADOW E&M-EST. PATIENT-LVL IV: ICD-10-PCS | Mod: PBBFAC,,, | Performed by: INTERNAL MEDICINE

## 2022-03-22 PROCEDURE — 99397 PR PREVENTIVE VISIT,EST,65 & OVER: ICD-10-PCS | Mod: S$GLB,,, | Performed by: INTERNAL MEDICINE

## 2022-03-22 PROCEDURE — 1159F MED LIST DOCD IN RCRD: CPT | Mod: CPTII,S$GLB,, | Performed by: INTERNAL MEDICINE

## 2022-03-22 PROCEDURE — 3044F HG A1C LEVEL LT 7.0%: CPT | Mod: CPTII,S$GLB,, | Performed by: INTERNAL MEDICINE

## 2022-03-22 PROCEDURE — 3044F PR MOST RECENT HEMOGLOBIN A1C LEVEL <7.0%: ICD-10-PCS | Mod: CPTII,S$GLB,, | Performed by: INTERNAL MEDICINE

## 2022-03-22 PROCEDURE — 3288F FALL RISK ASSESSMENT DOCD: CPT | Mod: CPTII,S$GLB,, | Performed by: INTERNAL MEDICINE

## 2022-03-22 PROCEDURE — 3078F PR MOST RECENT DIASTOLIC BLOOD PRESSURE < 80 MM HG: ICD-10-PCS | Mod: CPTII,S$GLB,, | Performed by: INTERNAL MEDICINE

## 2022-03-22 PROCEDURE — 1101F PT FALLS ASSESS-DOCD LE1/YR: CPT | Mod: CPTII,S$GLB,, | Performed by: INTERNAL MEDICINE

## 2022-03-22 PROCEDURE — 3288F PR FALLS RISK ASSESSMENT DOCUMENTED: ICD-10-PCS | Mod: CPTII,S$GLB,, | Performed by: INTERNAL MEDICINE

## 2022-03-22 PROCEDURE — 1101F PR PT FALLS ASSESS DOC 0-1 FALLS W/OUT INJ PAST YR: ICD-10-PCS | Mod: CPTII,S$GLB,, | Performed by: INTERNAL MEDICINE

## 2022-03-22 PROCEDURE — 3008F BODY MASS INDEX DOCD: CPT | Mod: CPTII,S$GLB,, | Performed by: INTERNAL MEDICINE

## 2022-03-22 PROCEDURE — 3008F PR BODY MASS INDEX (BMI) DOCUMENTED: ICD-10-PCS | Mod: CPTII,S$GLB,, | Performed by: INTERNAL MEDICINE

## 2022-03-22 PROCEDURE — 3074F PR MOST RECENT SYSTOLIC BLOOD PRESSURE < 130 MM HG: ICD-10-PCS | Mod: CPTII,S$GLB,, | Performed by: INTERNAL MEDICINE

## 2022-03-22 PROCEDURE — 3074F SYST BP LT 130 MM HG: CPT | Mod: CPTII,S$GLB,, | Performed by: INTERNAL MEDICINE

## 2022-03-22 RX ORDER — METFORMIN HYDROCHLORIDE 500 MG/1
1000 TABLET, EXTENDED RELEASE ORAL DAILY
Qty: 180 TABLET | Refills: 3 | Status: SHIPPED | OUTPATIENT
Start: 2022-03-22 | End: 2023-03-23

## 2022-03-22 NOTE — PROGRESS NOTES
CHIEF COMPLAINT:  Physical.                                                                                                                               HISTORY OF PRESENT ILLNESS:  A 67-year-old white female who is a 1st cousin of mine by marriage.  We reviewed all her labs.  Her A1c is 6.5 and it was this high about 10 years ago.  Lipids good.  Vitamin-D upper normal and she has reduced her supplement slightly.  Many years ago it was low on PPI.  Up-to-date on bone density, mammogram and colonoscopy.      Back in 2012 She had an outside mammogram at [facility] done by her GYN.        subsequent spot compression film was all normal as well.  Last mammo 1/2022    Prior bone densities were done by prior GYN and had osteopenia, that being 2012.  She was taken off EVista 5/14 and continues on Premarin.    11/17 BMD  Impression       Normal mineralization of the lumbar spine and right hip.  Osteopenia of the left hip       2/2020  Osteopenia.                                                                              Her colonoscopy was normal 4/17/08. 1 polyp 3/18 -5 yrs  She had another upper endoscopy,   which was okay per outside GI.  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                                                                                                                                       ROS:   CONST: weight stable. EYES: no vision change. ENT: no sore throat. CV: no chest pain w/ exertion. RESP: no shortness of breath. GI: no nausea, vomiting, diarrhea. No dysphagia. : no urinary issues. MUSCULOSKELETAL: no new myalgias or arthralgias. SKIN: no new changes. NEURO: no focal deficits. PSYCH: no new issues. ENDOCRINE: no polyuria. HEME: no lymph nodes. ALLERGY: no general pruritis.                                                                                                                    PAST MEDICAL HISTORY:                                                         1. Osteopenia, last bone density in 11/17                                2. DIABETES                          3. Hypothyroidism.                                                           4. Hypertension.                                                             5. Hysterectomy - total.                                                6. Hyperlipidemia.                                                           7. GERD. EGDs with possible Davian's- - Dr Correia.                                                                   8. Anxiety and depression.   9. ENT septum surgery for sinusitis -Dr Sandoval 2012.  10. Colonoscopy 4/17/08 normal - 1 polyp 3/18 -5 yrs   11. H/O abnormal Mammo                                                                                                                               FAMILY HISTORY: Father is living with hypertension, heart disease and        diabetes.  Mom is living with a history of cervical cancer, hypertension     and diabetes.  One brother and one sister with no stated problems.                                                                                                                                                                                      ALLERGIES:  Penicillin, sulfa, codeine, iodine and shellfish.                                                                                             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  She does not smoke and never did.  She does not drink.                                                                                                            PHYSICAL EXAMINATION:                                                        VITAL SIGNS:  As above                             GENERAL:  Pleasant female.                                                   HEENT:  Conjunctivae clear.  Pupils equal and reactive.  Nose and mouth      clear and moist.   Teeth good.                                                NECK:  Supple.  Thyroid nonpalpable.                                         RESPIRATORY:  Effort is good.                                                LUNGS:  Clear.                                                               HEART:  Regular rate and rhythm without murmurs, gallops or rubs.  No        carotid bruits.  No edema.                                                   ABDOMEN:  Soft, nondistended, nontender.  No hepatosplenomegaly or masses.   SKIN:  No rashes.  Warm to touch.                                            EXTREMITIES:  Gait normal.  Digits without clubbing or cyanosis.                                                                                          Labs and x-ray reports reviewed                                                                                                                                     Anushka was seen today for annual exam.    Diagnoses and all orders for this visit:    Routine medical exam, up-to-date on cancer screening and health maintenance    Hypothyroidism, unspecified type, euthyroid    History of colonic polyps, due 2023    Uncontrolled type 2 diabetes mellitus with hyperglycemia, stable at 6.5 we discussed increasing metformin ER from one pill a day to two pills a day watching for diarrhea.    Anemia, unspecified type, iron deficiency resolved was probably due to prior menstrual bleeding which is gone, up-to-date on colonoscopy    Hyperlipidemia, unspecified hyperlipidemia type, excellent control on Lipitor 40    Vitamin D deficiency disease    Atherosclerosis of aorta    Essential hypertension, chronic and stable    Osteopenia, unspecified location, chronic and stable, probably repeat next year    Other orders  -     metFORMIN (GLUCOPHAGE-XR) 500 MG ER 24hr tablet; Take 2 tablets (1,000 mg total) by mouth once daily.

## 2022-03-23 LAB — 1,25(OH)2D3 SERPL-MCNC: 63 PG/ML (ref 20–79)

## 2022-04-06 LAB
LEFT EYE DM RETINOPATHY: NEGATIVE
RIGHT EYE DM RETINOPATHY: NEGATIVE

## 2022-04-19 ENCOUNTER — PATIENT OUTREACH (OUTPATIENT)
Dept: ADMINISTRATIVE | Facility: HOSPITAL | Age: 67
End: 2022-04-19
Payer: COMMERCIAL

## 2022-04-19 DIAGNOSIS — E11.65 UNCONTROLLED TYPE 2 DIABETES MELLITUS WITH HYPERGLYCEMIA: Primary | ICD-10-CM

## 2022-08-05 ENCOUNTER — OFFICE VISIT (OUTPATIENT)
Dept: URGENT CARE | Facility: CLINIC | Age: 67
End: 2022-08-05
Payer: COMMERCIAL

## 2022-08-05 VITALS
HEART RATE: 65 BPM | WEIGHT: 164 LBS | DIASTOLIC BLOOD PRESSURE: 86 MMHG | RESPIRATION RATE: 16 BRPM | OXYGEN SATURATION: 96 % | SYSTOLIC BLOOD PRESSURE: 155 MMHG | TEMPERATURE: 98 F | BODY MASS INDEX: 27.32 KG/M2 | HEIGHT: 65 IN

## 2022-08-05 DIAGNOSIS — S50.861A INSECT BITE OF RIGHT FOREARM, INITIAL ENCOUNTER: Primary | ICD-10-CM
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
## 2022-08-05 DIAGNOSIS — L03.113 CELLULITIS OF RIGHT FOREARM: ICD-10-CM

## 2022-08-05 PROCEDURE — 3079F DIAST BP 80-89 MM HG: CPT | Mod: CPTII,S$GLB,,

## 2022-08-05 PROCEDURE — 3008F PR BODY MASS INDEX (BMI) DOCUMENTED: ICD-10-PCS | Mod: CPTII,S$GLB,,

## 2022-08-05 PROCEDURE — 1159F PR MEDICATION LIST DOCUMENTED IN MEDICAL RECORD: ICD-10-PCS | Mod: CPTII,S$GLB,,

## 2022-08-05 PROCEDURE — 99213 PR OFFICE/OUTPT VISIT, EST, LEVL III, 20-29 MIN: ICD-10-PCS | Mod: S$GLB,,,

## 2022-08-05 PROCEDURE — 1159F MED LIST DOCD IN RCRD: CPT | Mod: CPTII,S$GLB,,

## 2022-08-05 PROCEDURE — 3079F PR MOST RECENT DIASTOLIC BLOOD PRESSURE 80-89 MM HG: ICD-10-PCS | Mod: CPTII,S$GLB,,

## 2022-08-05 PROCEDURE — 99213 OFFICE O/P EST LOW 20 MIN: CPT | Mod: S$GLB,,,

## 2022-08-05 PROCEDURE — 1160F RVW MEDS BY RX/DR IN RCRD: CPT | Mod: CPTII,S$GLB,,

## 2022-08-05 PROCEDURE — 1160F PR REVIEW ALL MEDS BY PRESCRIBER/CLIN PHARMACIST DOCUMENTED: ICD-10-PCS | Mod: CPTII,S$GLB,,

## 2022-08-05 PROCEDURE — 3044F PR MOST RECENT HEMOGLOBIN A1C LEVEL <7.0%: ICD-10-PCS | Mod: CPTII,S$GLB,,

## 2022-08-05 PROCEDURE — 3008F BODY MASS INDEX DOCD: CPT | Mod: CPTII,S$GLB,,

## 2022-08-05 PROCEDURE — 3077F SYST BP >= 140 MM HG: CPT | Mod: CPTII,S$GLB,,

## 2022-08-05 PROCEDURE — 3044F HG A1C LEVEL LT 7.0%: CPT | Mod: CPTII,S$GLB,,

## 2022-08-05 PROCEDURE — 3077F PR MOST RECENT SYSTOLIC BLOOD PRESSURE >= 140 MM HG: ICD-10-PCS | Mod: CPTII,S$GLB,,

## 2022-08-05 RX ORDER — CEPHALEXIN 500 MG/1
500 CAPSULE ORAL 4 TIMES DAILY
Qty: 28 CAPSULE | Refills: 0 | Status: SHIPPED | OUTPATIENT
Start: 2022-08-05 | End: 2022-08-12

## 2022-08-05 RX ORDER — ATORVASTATIN CALCIUM 40 MG/1
TABLET, FILM COATED ORAL
Qty: 90 TABLET | Refills: 12 | Status: SHIPPED | OUTPATIENT
Start: 2022-08-05 | End: 2023-08-21

## 2022-08-05 NOTE — TELEPHONE ENCOUNTER
Care Due:                  Date            Visit Type   Department     Provider  --------------------------------------------------------------------------------                                MYCHART                              ANNUAL       [facility]                              CHECKUP/PHY  MED/ INTERNAL  Gerardo Chappell  Last Visit: 03-      S            MED/ PEDS      [place]  Next Visit: None Scheduled  None         None Found                                                            Last  Test          Frequency    Reason                     Performed    Due Date  --------------------------------------------------------------------------------    HBA1C.......  6 months...  metFORMIN................  03-   09-    [employer] Embedded Care Gaps. Reference number: [id number]. 8/04/2022   8:05:59 PM CDT

## 2022-08-05 NOTE — TELEPHONE ENCOUNTER
Refill Routing Note   Medication(s) are not appropriate for processing by [facility] for the following reason(s):      - Drug-Disease Interaction ( atorvastatin and Diabetes mellitus type II, uncontrolled)    [facility] action(s):  Defer Medication-related problems identified:   Requires labs  Drug-disease interaction        Medication reconciliation completed: No     Appointments  past 12m or future 3m with PCP    Date Provider   Last Visit   3/22/2022 Gerardo Dobbs MD   Next Visit   Visit date not found Gerardo Dobbs MD   ED visits in past 90 days: 0        Note composed:8:57 PM 08/04/2022

## 2022-08-05 NOTE — PATIENT INSTRUCTIONS
- Rest.    - Drink plenty of fluids.    - Tylenol or Ibuprofen as directed as needed for fever/pain.      - You have been given an antibiotic to treat your condition today.    - Please complete the antibiotic as directed on the bottle.   - If you are female and on oral birth control pills, use additional methods to prevent pregnancy while on antibiotics and for one cycle after.     - Keep the wound clean and dry.   - Wash daily with soap and water  - Warm water soaks (with or without epsom salt) 2-3 times a day for 5-10 minutes at a time     - YOU SHOULD EXPERIENCE SIGNIFICANT IMPROVEMENT IN SYMPTOMS IN THE NEXT 1-2 DAYS.  IF SYMPTOMS WORSEN, GO TO ER.  IF YOU DO NOT EXPERIENCE ANY IMPROVEMENT IN SYMPTOMS AND THE NEXT 1-2 DAYS WHILE YOU TAKE ANTIBIOTICS, RETURN TO CLINIC OR SEEK MEDICAL ATTENTION IMMEDIATELY.  - Follow up with your PCP or specialty clinic as directed in the next 1-2 weeks if not improved or as needed.  You can call [phone] to schedule an appointment with the appropriate provider.    - Go to the ER if you DEVELOP ANY NEW OR WORSENING SYMPTOMS.     - You must understand that you have received an Urgent Care treatment only and that you may be released before all of your medical problems are known or treated.   - You, the patient, will arrange for follow up care as instructed.   - If your condition worsens or fails to improve we recommend that you receive another evaluation at the ER immediately or contact your PCP to discuss your concerns or return here.

## 2022-08-05 NOTE — PROGRESS NOTES
"Subjective:       Patient ID: Anushka Napier is a 67 y.o. female.    Vitals:  height is 5' 5" (1.651 m) and weight is 74.4 kg (164 lb). Her temperature is 98.3 °F (36.8 °C). Her blood pressure is 155/86 (abnormal) and her pulse is 65. Her respiration is 16 and oxygen saturation is 96%.     Chief Complaint: Insect Bite    Patient is a 67-year-old female who presents with and and bite on her forearm that occurred 3 days ago.  Since then, the area has increased in redness and swelling.  Denies tenderness.  +itchy.  Denies any fevers, numbness or tingling, joint swelling or pain.    Insect Bite  This is a new problem. The current episode started in the past 7 days. The problem occurs constantly. The problem has been gradually worsening. Pertinent negatives include no abdominal pain, arthralgias, chest pain, chills, coughing, fever, headaches, joint swelling, nausea, neck pain, sore throat or vomiting. Nothing aggravates the symptoms. She has tried ice for the symptoms. The treatment provided mild relief.       Constitution: Negative for chills and fever.   HENT: Negative for ear pain and sore throat.    Neck: Negative for neck pain.   Cardiovascular: Negative for chest pain.   Eyes: Negative for eye itching and eye pain.   Respiratory: Negative for cough and shortness of breath.    Gastrointestinal: Negative for abdominal pain, nausea, vomiting and diarrhea.   Musculoskeletal: Negative for joint pain and joint swelling.   Skin: Positive for erythema.   Allergic/Immunologic: Positive for itching. Negative for sneezing.   Neurological: Negative for dizziness and headaches.       Objective:      Physical Exam   Constitutional: She is oriented to person, place, and time. She appears well-developed.   HENT:   Head: Normocephalic and atraumatic. Head is without abrasion, without contusion and without laceration.   Ears:   Right Ear: External ear normal.   Left Ear: External ear normal.   Nose: Nose normal.   Mouth/Throat: " Oropharynx is clear and moist and mucous membranes are normal.   Eyes: Conjunctivae, EOM and lids are normal. Pupils are equal, round, and reactive to light.   Neck: Trachea normal and phonation normal. Neck supple.   Cardiovascular: Normal rate, regular rhythm and normal heart sounds.   Pulmonary/Chest: Effort normal and breath sounds normal. No stridor. No respiratory distress.   Musculoskeletal: Normal range of motion.         General: Normal range of motion.   Neurological: She is alert and oriented to person, place, and time.   Skin: Skin is warm, dry, intact and no rash. Capillary refill takes less than 2 seconds. erythema No abrasion, No burn, No bruising and No ecchymosis        Psychiatric: Her speech is normal and behavior is normal. Judgment and thought content normal.   Nursing note and vitals reviewed.        Assessment:       1. Insect bite of right forearm, initial encounter    2. Cellulitis of right forearm          Plan:         Insect bite of right forearm, initial encounter  Comments:  Benadryl for itching  Orders:  -     cephALEXin (KEFLEX) 500 MG capsule; Take 1 capsule (500 mg total) by mouth 4 (four) times daily. for 7 days  Dispense: 28 capsule; Refill: 0    Cellulitis of right forearm                   Patient Instructions   - Rest.    - Drink plenty of fluids.    - Tylenol or Ibuprofen as directed as needed for fever/pain.      - You have been given an antibiotic to treat your condition today.    - Please complete the antibiotic as directed on the bottle.   - If you are female and on oral birth control pills, use additional methods to prevent pregnancy while on antibiotics and for one cycle after.     - Keep the wound clean and dry.   - Wash daily with soap and water  - Warm water soaks (with or without epsom salt) 2-3 times a day for 5-10 minutes at a time     - YOU SHOULD EXPERIENCE SIGNIFICANT IMPROVEMENT IN SYMPTOMS IN THE NEXT 1-2 DAYS.  IF SYMPTOMS WORSEN, GO TO ER.  IF YOU DO NOT  EXPERIENCE ANY IMPROVEMENT IN SYMPTOMS AND THE NEXT 1-2 DAYS WHILE YOU TAKE ANTIBIOTICS, RETURN TO CLINIC OR SEEK MEDICAL ATTENTION IMMEDIATELY.  - Follow up with your PCP or specialty clinic as directed in the next 1-2 weeks if not improved or as needed.  You can call [phone] to schedule an appointment with the appropriate provider.    - Go to the ER if you DEVELOP ANY NEW OR WORSENING SYMPTOMS.     - You must understand that you have received an Urgent Care treatment only and that you may be released before all of your medical problems are known or treated.   - You, the patient, will arrange for follow up care as instructed.   - If your condition worsens or fails to improve we recommend that you receive another evaluation at the ER immediately or contact your PCP to discuss your concerns or return here.

## 2022-10-10 ENCOUNTER — PATIENT MESSAGE (OUTPATIENT)
Dept: ADMINISTRATIVE | Facility: HOSPITAL | Age: 67
End: 2022-10-10
Payer: COMMERCIAL

## 2022-10-11 ENCOUNTER — PATIENT OUTREACH (OUTPATIENT)
Dept: ADMINISTRATIVE | Facility: HOSPITAL | Age: 67
End: 2022-10-11
Payer: COMMERCIAL

## 2022-10-11 DIAGNOSIS — E11.65 UNCONTROLLED TYPE 2 DIABETES MELLITUS WITH HYPERGLYCEMIA: Primary | ICD-10-CM

## 2022-10-15 ENCOUNTER — LAB VISIT (OUTPATIENT)
Dept: LAB | Facility: HOSPITAL | Age: 67
End: 2022-10-15
Attending: INTERNAL MEDICINE
Payer: COMMERCIAL

## 2022-10-15 DIAGNOSIS — E11.65 UNCONTROLLED TYPE 2 DIABETES MELLITUS WITH HYPERGLYCEMIA: ICD-10-CM

## 2022-10-15 LAB
ESTIMATED AVG GLUCOSE: 148 MG/DL (ref 68–131)
HBA1C MFR BLD: 6.8 % (ref 4–5.6)

## 2022-10-15 PROCEDURE — 83036 HEMOGLOBIN GLYCOSYLATED A1C: CPT | Performed by: INTERNAL MEDICINE

## 2022-10-15 PROCEDURE — 36415 COLL VENOUS BLD VENIPUNCTURE: CPT | Mod: PO | Performed by: INTERNAL MEDICINE

## 2022-12-07 ENCOUNTER — PATIENT OUTREACH (OUTPATIENT)
Dept: ADMINISTRATIVE | Facility: HOSPITAL | Age: 67
End: 2022-12-07
Payer: COMMERCIAL

## 2023-01-24 ENCOUNTER — TELEPHONE (OUTPATIENT)
Dept: OBSTETRICS AND GYNECOLOGY | Facility: CLINIC | Age: 68
End: 2023-01-24
Payer: COMMERCIAL

## 2023-01-24 DIAGNOSIS — Z12.31 SCREENING MAMMOGRAM, ENCOUNTER FOR: Primary | ICD-10-CM

## 2023-01-24 NOTE — TELEPHONE ENCOUNTER
----- Message from Alfa Pérez MA sent at 1/24/2023  3:39 PM CST -----  Pt has apt with you 3/2/23  ----- Message -----  From: Adilene Charlton MA  Sent: 1/24/2023  12:40 PM CST  To: [facility] Staff    Type: Patient Call Back    Who called: Self    What is the request in detail: pt. Is asking for her mammogram order be placed so she can scheduled ..     Can the clinic reply by [facility]?No    Would the patient rather a call back or a response via [facility]? yes    Best call back number: [phone] (home)

## 2023-01-25 ENCOUNTER — TELEPHONE (OUTPATIENT)
Dept: OBSTETRICS AND GYNECOLOGY | Facility: CLINIC | Age: 68
End: 2023-01-25
Payer: COMMERCIAL

## 2023-01-25 NOTE — TELEPHONE ENCOUNTER
Left voicemail that orders were placed and she can call and set up her apt for mammogram.        ----- Message from Pan Tillman MD sent at 1/24/2023  3:57 PM CST -----  Done. Please let patient know    Pan Tillman MD  ----- Message -----  From: Alfa Pérez MA  Sent: 1/24/2023   3:39 PM CST  To: Pan Tillman MD    Pt has apt with you 3/2/23  ----- Message -----  From: Adilene Charlton MA  Sent: 1/24/2023  12:40 PM CST  To: [facility] Staff    Type: Patient Call Back    Who called: Self    What is the request in detail: pt. Is asking for her mammogram order be placed so she can scheduled ..     Can the clinic reply by [facility]?No    Would the patient rather a call back or a response via [facility]? yes    Best call back number: [phone] (home)

## 2023-02-19 ENCOUNTER — OFFICE VISIT (OUTPATIENT)
Dept: URGENT CARE | Facility: CLINIC | Age: 68
End: 2023-02-19
Payer: COMMERCIAL

## 2023-02-19 VITALS
TEMPERATURE: 99 F | WEIGHT: 164 LBS | RESPIRATION RATE: 18 BRPM | BODY MASS INDEX: 27.32 KG/M2 | SYSTOLIC BLOOD PRESSURE: 144 MMHG | HEART RATE: 87 BPM | OXYGEN SATURATION: 98 % | HEIGHT: 65 IN | DIASTOLIC BLOOD PRESSURE: 66 MMHG

## 2023-02-19 DIAGNOSIS — R05.9 COUGH, UNSPECIFIED TYPE: ICD-10-CM

## 2023-02-19 DIAGNOSIS — J01.10 ACUTE NON-RECURRENT FRONTAL SINUSITIS: Primary | ICD-10-CM

## 2023-02-19 PROBLEM — N32.81 OAB (OVERACTIVE BLADDER): Status: ACTIVE | Noted: 2022-04-26

## 2023-02-19 LAB
CTP QC/QA: YES
SARS-COV-2 AG RESP QL IA.RAPID: NEGATIVE

## 2023-02-19 PROCEDURE — 3078F PR MOST RECENT DIASTOLIC BLOOD PRESSURE < 80 MM HG: ICD-10-PCS | Mod: CPTII,S$GLB,, | Performed by: PHYSICIAN ASSISTANT

## 2023-02-19 PROCEDURE — 1160F PR REVIEW ALL MEDS BY PRESCRIBER/CLIN PHARMACIST DOCUMENTED: ICD-10-PCS | Mod: CPTII,S$GLB,, | Performed by: PHYSICIAN ASSISTANT

## 2023-02-19 PROCEDURE — 1159F MED LIST DOCD IN RCRD: CPT | Mod: CPTII,S$GLB,, | Performed by: PHYSICIAN ASSISTANT

## 2023-02-19 PROCEDURE — 3078F DIAST BP <80 MM HG: CPT | Mod: CPTII,S$GLB,, | Performed by: PHYSICIAN ASSISTANT

## 2023-02-19 PROCEDURE — 3008F BODY MASS INDEX DOCD: CPT | Mod: CPTII,S$GLB,, | Performed by: PHYSICIAN ASSISTANT

## 2023-02-19 PROCEDURE — 87811 SARS-COV-2 COVID19 W/OPTIC: CPT | Mod: QW,S$GLB,, | Performed by: PHYSICIAN ASSISTANT

## 2023-02-19 PROCEDURE — 3008F PR BODY MASS INDEX (BMI) DOCUMENTED: ICD-10-PCS | Mod: CPTII,S$GLB,, | Performed by: PHYSICIAN ASSISTANT

## 2023-02-19 PROCEDURE — 1125F PR PAIN SEVERITY QUANTIFIED, PAIN PRESENT: ICD-10-PCS | Mod: CPTII,S$GLB,, | Performed by: PHYSICIAN ASSISTANT

## 2023-02-19 PROCEDURE — 3077F PR MOST RECENT SYSTOLIC BLOOD PRESSURE >= 140 MM HG: ICD-10-PCS | Mod: CPTII,S$GLB,, | Performed by: PHYSICIAN ASSISTANT

## 2023-02-19 PROCEDURE — 99213 OFFICE O/P EST LOW 20 MIN: CPT | Mod: S$GLB,,, | Performed by: PHYSICIAN ASSISTANT

## 2023-02-19 PROCEDURE — 1160F RVW MEDS BY RX/DR IN RCRD: CPT | Mod: CPTII,S$GLB,, | Performed by: PHYSICIAN ASSISTANT

## 2023-02-19 PROCEDURE — 1125F AMNT PAIN NOTED PAIN PRSNT: CPT | Mod: CPTII,S$GLB,, | Performed by: PHYSICIAN ASSISTANT

## 2023-02-19 PROCEDURE — 87811 SARS CORONAVIRUS 2 ANTIGEN POCT, MANUAL READ: ICD-10-PCS | Mod: QW,S$GLB,, | Performed by: PHYSICIAN ASSISTANT

## 2023-02-19 PROCEDURE — 99213 PR OFFICE/OUTPT VISIT, EST, LEVL III, 20-29 MIN: ICD-10-PCS | Mod: S$GLB,,, | Performed by: PHYSICIAN ASSISTANT

## 2023-02-19 PROCEDURE — 1159F PR MEDICATION LIST DOCUMENTED IN MEDICAL RECORD: ICD-10-PCS | Mod: CPTII,S$GLB,, | Performed by: PHYSICIAN ASSISTANT

## 2023-02-19 PROCEDURE — 3077F SYST BP >= 140 MM HG: CPT | Mod: CPTII,S$GLB,, | Performed by: PHYSICIAN ASSISTANT

## 2023-02-19 RX ORDER — AMOXICILLIN AND CLAVULANATE POTASSIUM 875; 125 MG/1; MG/1
1 TABLET, FILM COATED ORAL 2 TIMES DAILY
Qty: 20 TABLET | Refills: 0 | Status: SHIPPED | OUTPATIENT
Start: 2023-02-19 | End: 2023-03-02 | Stop reason: ALTCHOICE

## 2023-02-19 RX ORDER — BENZONATATE 200 MG/1
200 CAPSULE ORAL 3 TIMES DAILY PRN
Qty: 30 CAPSULE | Refills: 0 | Status: SHIPPED | OUTPATIENT
Start: 2023-02-19 | End: 2023-03-02 | Stop reason: ALTCHOICE

## 2023-02-19 NOTE — PATIENT INSTRUCTIONS
"                                                          Sinusitis     If your condition worsens or fails to improve we recommend that you receive another evaluation at the urgent care/ER immediately or contact your PCP to discuss your concerns. You must understand that you've received an urgent care treatment only and that you may be released before all your medical problems are known or treated. You the patient will arrange for followup care as instructed.   Take antibiotics with food and as directed.   If you are female and on BCP and do take the antibiotics, use additional methods to prevent pregnancy while on the antibiotics and for one cycle after.   Flonase (fluticasone) is a nasal spray which is available over the counter and may help with your symptoms   Zyrtec D, Claritin D or allegra D can also help with symptoms of congestion and drainage.   If you have hypertension avoid using the "D" which is the decongestant   If you just have drainage you can take plain zyrtec, claritin or allegra   If you just have a congested feeling you can take pseudoephedrine (unless you have high blood pressure) which you have to sign for behind the counter.   Rest and fluids are also important.     Tylenol or ibuprofen can also be used as directed for pain unless you have an allergy to them or medical condition such as stomach ulcers, kidney or liver disease or blood thinners etc for which you should not be taking these type of medications.         "

## 2023-02-19 NOTE — PROGRESS NOTES
"Subjective:       Patient ID: Anushka Napier is a 67 y.o. female.    Vitals:  height is 5' 5" (1.651 m) and weight is 74.4 kg (164 lb). Her tympanic temperature is 99.3 °F (37.4 °C). Her blood pressure is 144/66 (abnormal) and her pulse is 87. Her respiration is 18 and oxygen saturation is 98%.     Chief Complaint: Sinus Problem    Pt is coming in with cough and congestion that started about two days ago. Pt says she was exposed to anything. Pt says she also has a slight headaches. Pt says her cough isn't constant and its a wet cough. Pt says she took OTC tylenol and sinus medication to help with mild relief.     Sinus Problem  This is a new problem. The current episode started in the past 7 days. The problem has been gradually worsening since onset. There has been no fever. Her pain is at a severity of 1/10. The pain is mild. Associated symptoms include congestion, coughing and sinus pressure. Pertinent negatives include no chills, diaphoresis, ear pain, headaches, neck pain, shortness of breath or sore throat. Past treatments include oral decongestants and acetaminophen. The treatment provided mild relief.     Constitution: Negative for appetite change, chills, sweating, fatigue, fever and unexpected weight change.   HENT:  Positive for congestion, postnasal drip, sinus pain and sinus pressure. Negative for ear pain, ear discharge, hearing loss, dental problem, drooling, mouth sores, tongue pain, tongue lesion, sore throat, trouble swallowing and voice change.    Neck: Negative for neck pain, neck stiffness and painful lymph nodes.   Cardiovascular:  Negative for chest pain and sob on exertion.   Eyes:  Negative for eye discharge and eye itching.   Respiratory:  Positive for cough. Negative for chest tightness, sputum production, shortness of breath and wheezing.    Gastrointestinal:  Negative for abdominal pain, nausea, vomiting, constipation and diarrhea.   Musculoskeletal:  Negative for pain, muscle cramps and " muscle ache.   Skin:  Negative for color change, pale and rash.   Neurological:  Negative for dizziness, headaches, disorientation and altered mental status.   Hematologic/Lymphatic: Negative for swollen lymph nodes.   Psychiatric/Behavioral:  Negative for altered mental status, disorientation and confusion.    Past Medical History:   Diagnosis Date    Allergy     Anxiety     Basal cell carcinoma     to face    Depression     Diabetes mellitus     Diabetes mellitus type II, uncontrolled 9/17/2014    Diabetic retinopathy 01/14/2020    DM retinopathy     GERD (gastroesophageal reflux disease) 9/25/2014    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 Dr Correia, long term PPI use    History of colonic polyps 1/24/2020    Hyperlipidemia     Hypertension     Osteopenia 9/25/2014    Screening for colorectal cancer 9/25/2014    Normal 2009    Screening for colorectal cancer     Thyroid disease     Vitamin D deficiency disease 9/25/2014       Past Surgical History:   Procedure Laterality Date    HYSTERECTOMY  1997    complete for prolapse, Abdominal    OOPHORECTOMY      right shoulder      SINUS SURGERY  2012       Family History   Problem Relation Age of Onset    Breast cancer Cousin 40    Miscarriages / Stillbirths Maternal Grandmother     Miscarriages / Stillbirths Mother     Diabetes Mother     Hypertension Mother     Cancer Mother         cervical    Heart disease Father     Hypertension Father     Ovarian cancer Neg Hx        Social History     Socioeconomic History    Marital status:    Tobacco Use    Smoking status: Never    Smokeless tobacco: Never   Substance and Sexual Activity    Alcohol use: No    Drug use: No    Sexual activity: Yes     Partners: Male     Birth control/protection: Surgical   Social History Narrative     since 1986    Previous  for 8 years prior    He is a     She is a homemaker       Current Outpatient Medications   Medication Sig Dispense Refill    amitriptyline (ELAVIL) 10 MG  tablet Take 10 mg by mouth nightly.      aspirin 81 MG chewable tablet Take 1 tablet by mouth Daily. 1 Tablet, Chewable Oral Every day      atorvastatin (LIPITOR) 40 MG tablet TAKE 1 TABLET(40 MG) BY MOUTH EVERY DAY 90 tablet 12    ciclopirox (PENLAC) 8 % Soln Apply topically nightly. 6.6 mL 11    FLUCELVAX QUAD 3047-6572, PF, 60 mcg (15 mcg x 4)/0.5 mL Syrg       FLUoxetine 20 MG capsule TAKE 1 CAPSULE(20 MG) BY MOUTH EVERY DAY 90 capsule 0    levothyroxine (SYNTHROID, LEVOTHROID) 175 MCG tablet Take 1 tablet (175 mcg total) by mouth before breakfast. 90 tablet 1    metFORMIN (GLUCOPHAGE-XR) 500 MG ER 24hr tablet Take 2 tablets (1,000 mg total) by mouth once daily. 180 tablet 3    metoprolol tartrate (LOPRESSOR) 50 MG tablet TAKE 1 TABLET BY MOUTH DAILY 60 tablet 12    mometasone 0.1% (ELOCON) 0.1 % cream Use daily 50 g 3    omega-3 fatty acids-vitamin E 1,000 mg Cap Take 1 capsule by mouth Twice daily. 1 Capsule Oral Twice a day       PREMARIN 0.625 mg tablet TAKE 1 TABLET(0.625 MG) BY MOUTH EVERY DAY 30 tablet 12    RABEprazole (ACIPHEX) 20 mg tablet TAKE 1 TABLET BY MOUTH TWICE DAILY 180 tablet 12    sars-cov-2, covid-19, (PFIZER COVID-19) 30 mcg/0.3 ml injection       solifenacin (VESICARE) 5 MG tablet Take 5 mg by mouth.      vitamin D (VITAMIN D3) 1000 units Tab Take 1,000 Units by mouth once daily. 5 tabs daily      benzonatate (TESSALON) 200 MG capsule Take 1 capsule (200 mg total) by mouth 3 (three) times daily as needed for Cough. 30 capsule 0     No current facility-administered medications for this visit.       Review of patient's allergies indicates:   Allergen Reactions    Tizanidine Swelling     Tongue swelling     Codeine Hives     Other reaction(s): Itching  Other reaction(s): Hives    Iodinated contrast media Hives     Other reaction(s): Hives    Iodine      Other reaction(s): Unknown    Sulfamethoxazole-trimethoprim Itching     Other reaction(s): Itching  Other reaction(s): Hives    Epinephrine  Anxiety and Other (See Comments)     Almost passed out.         Objective:      Physical Exam   Constitutional: She is oriented to person, place, and time. She appears well-developed. She is cooperative.  Non-toxic appearance. She does not appear ill. No distress. normal  HENT:   Head: Normocephalic and atraumatic.   Ears:   Right Ear: Hearing, external ear and ear canal normal. Tympanic membrane is not injected, not erythematous, not retracted and not bulging. A middle ear effusion is present. impacted cerumen  Left Ear: Hearing, external ear and ear canal normal. Tympanic membrane is not injected, not erythematous, not retracted and not bulging. A middle ear effusion is present. impacted cerumen  Nose: Congestion present. No mucosal edema, rhinorrhea or nasal deformity. No epistaxis. Right sinus exhibits no maxillary sinus tenderness and no frontal sinus tenderness. Left sinus exhibits no maxillary sinus tenderness and no frontal sinus tenderness.   Mouth/Throat: Uvula is midline and mucous membranes are normal. Mucous membranes are moist. No trismus in the jaw. Normal dentition. No uvula swelling. Posterior oropharyngeal erythema present. No oropharyngeal exudate or posterior oropharyngeal edema.   Frontal sinus pain upon palpation      Comments: Frontal sinus pain upon palpation  Eyes: Conjunctivae and lids are normal. Right eye exhibits no discharge. Left eye exhibits no discharge. No scleral icterus.   Neck: Trachea normal and phonation normal. Neck supple. No edema present. No erythema present. No neck rigidity present.   Cardiovascular: Normal rate, regular rhythm, normal heart sounds and normal pulses.   No murmur heard.Exam reveals no gallop.   Pulmonary/Chest: Effort normal and breath sounds normal. No stridor. No respiratory distress. She has no decreased breath sounds. She has no wheezes. She has no rhonchi. She has no rales.   Abdominal: Normal appearance.   Musculoskeletal:      Cervical back: She  exhibits no tenderness.   Lymphadenopathy:     She has no cervical adenopathy.   Neurological: She is alert and oriented to person, place, and time. She exhibits normal muscle tone. Coordination normal.   Skin: Skin is warm, dry, intact, not diaphoretic, not pale and no rash.   Psychiatric: Her speech is normal and behavior is normal. Mood, judgment and thought content normal.   Nursing note and vitals reviewed.      Results for orders placed or performed in visit on 02/19/23   SARS Coronavirus 2 Antigen, POCT Manual Read   Result Value Ref Range    SARS Coronavirus 2 Antigen Negative Negative     Acceptable Yes        Assessment:       1. Acute non-recurrent frontal sinusitis    2. Cough, unspecified type          Plan:     Results reviewed    Acute non-recurrent frontal sinusitis  -     amoxicillin-clavulanate 875-125mg (AUGMENTIN) 875-125 mg per tablet; Take 1 tablet by mouth 2 (two) times daily. for 10 days  Dispense: 20 tablet; Refill: 0    Cough, unspecified type  -     SARS Coronavirus 2 Antigen, POCT Manual Read  -     benzonatate (TESSALON) 200 MG capsule; Take 1 capsule (200 mg total) by mouth 3 (three) times daily as needed for Cough.  Dispense: 30 capsule; Refill: 0         Patient Instructions                                                             Sinusitis     If your condition worsens or fails to improve we recommend that you receive another evaluation at the urgent care/ER immediately or contact your PCP to discuss your concerns. You must understand that you've received an urgent care treatment only and that you may be released before all your medical problems are known or treated. You the patient will arrange for followup care as instructed.   Take antibiotics with food and as directed.   If you are female and on BCP and do take the antibiotics, use additional methods to prevent pregnancy while on the antibiotics and for one cycle after.   Flonase (fluticasone) is a nasal spray  "which is available over the counter and may help with your symptoms   Zyrtec D, Claritin D or allegra D can also help with symptoms of congestion and drainage.   If you have hypertension avoid using the "D" which is the decongestant   If you just have drainage you can take plain zyrtec, claritin or allegra   If you just have a congested feeling you can take pseudoephedrine (unless you have high blood pressure) which you have to sign for behind the counter.   Rest and fluids are also important.     Tylenol or ibuprofen can also be used as directed for pain unless you have an allergy to them or medical condition such as stomach ulcers, kidney or liver disease or blood thinners etc for which you should not be taking these type of medications.             "

## 2023-02-20 ENCOUNTER — HOSPITAL ENCOUNTER (OUTPATIENT)
Dept: RADIOLOGY | Facility: HOSPITAL | Age: 68
Discharge: HOME | End: 2023-02-20
Attending: OBSTETRICS & GYNECOLOGY
Payer: COMMERCIAL

## 2023-02-20 DIAGNOSIS — Z12.31 SCREENING MAMMOGRAM, ENCOUNTER FOR: ICD-10-CM

## 2023-02-20 PROCEDURE — 77063 MAMMO DIGITAL SCREENING BILAT WITH TOMO: ICD-10-PCS | Mod: 26,,, | Performed by: RADIOLOGY

## 2023-02-20 PROCEDURE — 77063 BREAST TOMOSYNTHESIS BI: CPT | Mod: 26,,, | Performed by: RADIOLOGY

## 2023-02-20 PROCEDURE — 77067 MAMMO DIGITAL SCREENING BILAT WITH TOMO: ICD-10-PCS | Mod: 26,,, | Performed by: RADIOLOGY

## 2023-02-20 PROCEDURE — 77067 SCR MAMMO BI INCL CAD: CPT | Mod: 26,,, | Performed by: RADIOLOGY

## 2023-02-20 PROCEDURE — 77067 SCR MAMMO BI INCL CAD: CPT | Mod: TC

## 2023-03-02 ENCOUNTER — OFFICE VISIT (OUTPATIENT)
Dept: OBSTETRICS AND GYNECOLOGY | Facility: CLINIC | Age: 68
End: 2023-03-02
Payer: COMMERCIAL

## 2023-03-02 VITALS
HEIGHT: 65 IN | SYSTOLIC BLOOD PRESSURE: 138 MMHG | DIASTOLIC BLOOD PRESSURE: 82 MMHG | BODY MASS INDEX: 27.18 KG/M2 | WEIGHT: 163.13 LBS

## 2023-03-02 DIAGNOSIS — Z12.11 COLON CANCER SCREENING: ICD-10-CM

## 2023-03-02 DIAGNOSIS — N95.1 SYMPTOMATIC MENOPAUSAL OR FEMALE CLIMACTERIC STATES: ICD-10-CM

## 2023-03-02 DIAGNOSIS — Z01.419 WELL WOMAN EXAM WITH ROUTINE GYNECOLOGICAL EXAM: Primary | ICD-10-CM

## 2023-03-02 DIAGNOSIS — Z78.0 MENOPAUSE: ICD-10-CM

## 2023-03-02 PROCEDURE — 1126F AMNT PAIN NOTED NONE PRSNT: CPT | Mod: CPTII,S$GLB,, | Performed by: OBSTETRICS & GYNECOLOGY

## 2023-03-02 PROCEDURE — 3008F PR BODY MASS INDEX (BMI) DOCUMENTED: ICD-10-PCS | Mod: CPTII,S$GLB,, | Performed by: OBSTETRICS & GYNECOLOGY

## 2023-03-02 PROCEDURE — 3288F PR FALLS RISK ASSESSMENT DOCUMENTED: ICD-10-PCS | Mod: CPTII,S$GLB,, | Performed by: OBSTETRICS & GYNECOLOGY

## 2023-03-02 PROCEDURE — 1160F RVW MEDS BY RX/DR IN RCRD: CPT | Mod: CPTII,S$GLB,, | Performed by: OBSTETRICS & GYNECOLOGY

## 2023-03-02 PROCEDURE — 3008F BODY MASS INDEX DOCD: CPT | Mod: CPTII,S$GLB,, | Performed by: OBSTETRICS & GYNECOLOGY

## 2023-03-02 PROCEDURE — 99397 PER PM REEVAL EST PAT 65+ YR: CPT | Mod: S$GLB,,, | Performed by: OBSTETRICS & GYNECOLOGY

## 2023-03-02 PROCEDURE — 99397 PR PREVENTIVE VISIT,EST,65 & OVER: ICD-10-PCS | Mod: S$GLB,,, | Performed by: OBSTETRICS & GYNECOLOGY

## 2023-03-02 PROCEDURE — 3075F SYST BP GE 130 - 139MM HG: CPT | Mod: CPTII,S$GLB,, | Performed by: OBSTETRICS & GYNECOLOGY

## 2023-03-02 PROCEDURE — 1101F PT FALLS ASSESS-DOCD LE1/YR: CPT | Mod: CPTII,S$GLB,, | Performed by: OBSTETRICS & GYNECOLOGY

## 2023-03-02 PROCEDURE — 1159F MED LIST DOCD IN RCRD: CPT | Mod: CPTII,S$GLB,, | Performed by: OBSTETRICS & GYNECOLOGY

## 2023-03-02 PROCEDURE — 99999 PR PBB SHADOW E&M-EST. PATIENT-LVL IV: ICD-10-PCS | Mod: PBBFAC,,, | Performed by: OBSTETRICS & GYNECOLOGY

## 2023-03-02 PROCEDURE — 1101F PR PT FALLS ASSESS DOC 0-1 FALLS W/OUT INJ PAST YR: ICD-10-PCS | Mod: CPTII,S$GLB,, | Performed by: OBSTETRICS & GYNECOLOGY

## 2023-03-02 PROCEDURE — 1159F PR MEDICATION LIST DOCUMENTED IN MEDICAL RECORD: ICD-10-PCS | Mod: CPTII,S$GLB,, | Performed by: OBSTETRICS & GYNECOLOGY

## 2023-03-02 PROCEDURE — 1126F PR PAIN SEVERITY QUANTIFIED, NO PAIN PRESENT: ICD-10-PCS | Mod: CPTII,S$GLB,, | Performed by: OBSTETRICS & GYNECOLOGY

## 2023-03-02 PROCEDURE — 99999 PR PBB SHADOW E&M-EST. PATIENT-LVL IV: CPT | Mod: PBBFAC,,, | Performed by: OBSTETRICS & GYNECOLOGY

## 2023-03-02 PROCEDURE — 3075F PR MOST RECENT SYSTOLIC BLOOD PRESS GE 130-139MM HG: ICD-10-PCS | Mod: CPTII,S$GLB,, | Performed by: OBSTETRICS & GYNECOLOGY

## 2023-03-02 PROCEDURE — 1160F PR REVIEW ALL MEDS BY PRESCRIBER/CLIN PHARMACIST DOCUMENTED: ICD-10-PCS | Mod: CPTII,S$GLB,, | Performed by: OBSTETRICS & GYNECOLOGY

## 2023-03-02 PROCEDURE — 3079F DIAST BP 80-89 MM HG: CPT | Mod: CPTII,S$GLB,, | Performed by: OBSTETRICS & GYNECOLOGY

## 2023-03-02 PROCEDURE — 3079F PR MOST RECENT DIASTOLIC BLOOD PRESSURE 80-89 MM HG: ICD-10-PCS | Mod: CPTII,S$GLB,, | Performed by: OBSTETRICS & GYNECOLOGY

## 2023-03-02 PROCEDURE — 3288F FALL RISK ASSESSMENT DOCD: CPT | Mod: CPTII,S$GLB,, | Performed by: OBSTETRICS & GYNECOLOGY

## 2023-03-02 RX ORDER — INFLUENZA A VIRUS A/VICTORIA/2570/2019 IVR-215 (H1N1) ANTIGEN (FORMALDEHYDE INACTIVATED), INFLUENZA A VIRUS A/DARWIN/9/2021 SAN-010 (H3N2) ANTIGEN (FORMALDEHYDE INACTIVATED), INFLUENZA B VIRUS B/PHUKET/3073/2013 ANTIGEN (FORMALDEHYDE INACTIVATED), AND INFLUENZA B VIRUS B/MICHIGAN/01/2021 ANTIGEN (FORMALDEHYDE INACTIVATED) 60; 60; 60; 60 UG/.7ML; UG/.7ML; UG/.7ML; UG/.7ML
INJECTION, SUSPENSION INTRAMUSCULAR
COMMUNITY
Start: 2022-10-21 | End: 2024-02-28 | Stop reason: ALTCHOICE

## 2023-03-02 NOTE — PROGRESS NOTES
Subjective:       Patient ID: Anushka Napier is a 67 y.o. female.    Chief Complaint:  Well Woman (Pt here for annual exam. Last normal mammogram was 2023. QUIN since )      History of Present Illness  HPI  Annual Exam-Postmenopausal  Patient presents for annual exam. The patient has no complaints today. The patient is sexually active. GYN screening history: last pap: patient does not recall when last pap was and last mammogram: approximate date 2023 and was normal. Last colonoscopy was 3/14/2018.  Last DXA on 2/3/20 with osteopenia.  The patient is currently taking estrogen replacement therapy. Patient denies post-menopausal vaginal bleeding. The patient wears seatbelts: yes. The patient participates in regular exercise: yes. Has the patient ever been transfused or tattooed?: no/no. The patient reports that there is not domestic violence in her life.     Status post  Total abdominal hysterectomy for prolapse in .  Doing well on Premarin.  Would like to continue understanding risks and benefits.    GYN & OB History  No LMP recorded. Patient has had a hysterectomy.   Date of Last Pap: No result found    OB History    Para Term  AB Living   7 4 3 1 3 3   SAB IAB Ectopic Multiple Live Births   3       3      # Outcome Date GA Lbr Amos/2nd Weight Sex Delivery Anes PTL Lv   7 Term 90 40w0d  4.309 kg (9 lb 8 oz) F Vag-Spont EPI N ALEX   6 Term 89 38w0d  3.657 kg (8 lb 1 oz) M Vag-Spont EPI N ALEX   5  10/08/82 36w0d  2.58 kg (5 lb 11 oz) M Vag-Spont EPI N ALEX   4 Term            3 SAB            2 SAB            1 SAB              Past Medical History:   Diagnosis Date    Allergy     Anxiety     Basal cell carcinoma     to face    Depression     Diabetes mellitus     Diabetes mellitus type II, uncontrolled 2014    Diabetic retinopathy 2020    DM retinopathy     GERD (gastroesophageal reflux disease) 2014    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 Dr Correia, long term  PPI use    History of colonic polyps 1/24/2020    Hyperlipidemia     Hypertension     Osteopenia 9/25/2014    Screening for colorectal cancer 9/25/2014    Normal 2009    Screening for colorectal cancer     Thyroid disease     Vitamin D deficiency disease 9/25/2014       Past Surgical History:   Procedure Laterality Date    HYSTERECTOMY  1997    complete for prolapse, Abdominal    OOPHORECTOMY      right shoulder      SINUS SURGERY  2012       Family History   Problem Relation Age of Onset    Breast cancer Cousin 40    Miscarriages / Stillbirths Maternal Grandmother     Miscarriages / Stillbirths Mother     Diabetes Mother     Hypertension Mother     Cancer Mother         cervical    Heart disease Father     Hypertension Father     Ovarian cancer Neg Hx        Social History     Socioeconomic History    Marital status:    Tobacco Use    Smoking status: Never    Smokeless tobacco: Never   Substance and Sexual Activity    Alcohol use: No    Drug use: No    Sexual activity: Yes     Partners: Male     Birth control/protection: Surgical   Social History Narrative     since 1986    Previous  for 8 years prior    He is a     She is a homemaker       Current Outpatient Medications   Medication Sig Dispense Refill    amitriptyline (ELAVIL) 10 MG tablet Take 10 mg by mouth nightly.      aspirin 81 MG chewable tablet Take 1 tablet by mouth Daily. 1 Tablet, Chewable Oral Every day      atorvastatin (LIPITOR) 40 MG tablet TAKE 1 TABLET(40 MG) BY MOUTH EVERY DAY 90 tablet 12    ciclopirox (PENLAC) 8 % Soln Apply topically nightly. 6.6 mL 11    FLUoxetine 20 MG capsule TAKE 1 CAPSULE(20 MG) BY MOUTH EVERY DAY 90 capsule 0    FLUZONE HIGHDOSE QUAD 22-23  mcg/0.7 mL Syrg       levothyroxine (SYNTHROID, LEVOTHROID) 175 MCG tablet Take 1 tablet (175 mcg total) by mouth before breakfast. 90 tablet 1    metFORMIN (GLUCOPHAGE-XR) 500 MG ER 24hr tablet Take 2 tablets (1,000 mg total) by mouth once daily.  180 tablet 3    metoprolol tartrate (LOPRESSOR) 50 MG tablet TAKE 1 TABLET BY MOUTH DAILY 60 tablet 12    mometasone 0.1% (ELOCON) 0.1 % cream Use daily 50 g 3    omega-3 fatty acids-vitamin E 1,000 mg Cap Take 1 capsule by mouth Twice daily. 1 Capsule Oral Twice a day       RABEprazole (ACIPHEX) 20 mg tablet TAKE 1 TABLET BY MOUTH TWICE DAILY 180 tablet 12    sars-cov-2, covid-19, (PFIZER COVID-19) 30 mcg/0.3 ml injection       solifenacin (VESICARE) 5 MG tablet Take 5 mg by mouth.      vitamin D (VITAMIN D3) 1000 units Tab Take 1,000 Units by mouth once daily. 5 tabs daily      estrogens, conjugated, (PREMARIN) 0.625 MG tablet TAKE 1 TABLET(0.625 MG) BY MOUTH EVERY DAY 30 tablet 12     No current facility-administered medications for this visit.       Review of patient's allergies indicates:   Allergen Reactions    Tizanidine Swelling     Tongue swelling     Codeine Hives     Other reaction(s): Itching  Other reaction(s): Hives    Iodinated contrast media Hives     Other reaction(s): Hives    Iodine      Other reaction(s): Unknown    Sulfamethoxazole-trimethoprim Itching     Other reaction(s): Itching  Other reaction(s): Hives    Epinephrine Anxiety and Other (See Comments)     Almost passed out.         Review of Systems  Review of Systems   Constitutional:  Negative for activity change, appetite change, chills, fatigue, fever and unexpected weight change.   HENT:  Negative for mouth sores.    Respiratory:  Negative for cough, shortness of breath and wheezing.    Cardiovascular:  Negative for chest pain and palpitations.   Gastrointestinal:  Negative for abdominal pain, bloating, blood in stool, constipation, nausea and vomiting.   Endocrine: Negative for diabetes and hot flashes.   Genitourinary:  Negative for dysmenorrhea, dyspareunia, dysuria, frequency, hematuria, menorrhagia, menstrual problem, pelvic pain, urgency, vaginal bleeding, vaginal discharge, vaginal pain, urinary incontinence, postcoital bleeding  and vaginal odor.   Musculoskeletal:  Negative for back pain and myalgias.   Integumentary:  Negative for rash, breast mass and nipple discharge.   Neurological:  Negative for seizures and headaches.   Psychiatric/Behavioral:  Negative for depression and sleep disturbance. The patient is not nervous/anxious.    Breast: Negative for mass, mastodynia and nipple discharge        Objective:    Physical Exam:   Constitutional: She appears well-developed and well-nourished. No distress.   BMI of 27.15    HENT:   Head: Normocephalic and atraumatic.    Eyes: EOM are normal.      Pulmonary/Chest: Effort normal. No respiratory distress.   Breasts: Non-tender, no engorgement, no masses, no retraction, no discharge. Negative for lymphadenopathy.         Abdominal: Soft. She exhibits no distension. There is no abdominal tenderness. There is no rebound and no guarding.   Pfannenstiel scar      Genitourinary:    Vagina normal.   No  no vaginal discharge in the vagina.    Genitourinary Comments: Severe atrophy.  Vulva without any obvious lesions.  Urethral meatus normal size and location without any lesion.  Urethra is non-tender without stricture or discharge.  Bladder is non-tender.  Vaginal vault with good support.  Minimal white discharge noted.  No obvious lesion.  Normal rugation.  Cervix and Uterus are surgically absent.  Adnexa is without any masses or tenderness.  Perineum without obvious lesion.               Musculoskeletal: Normal range of motion.       Neurological: She is alert.    Skin: Skin is warm and dry.    Psychiatric: She has a normal mood and affect.        Assessment:        1. Well woman exam with routine gynecological exam    2. Symptomatic menopausal or female climacteric states    3. Menopause    4. Colon cancer screening              Plan:          I have discussed with the patient her condition.  Monthly breast examination was instructed, discussed, and encouraged.  Patient was encouraged to consume a  low-calorie, low fat diet, and to increase of physical activity.  Healthy habits encouraged.  A Pap smear was not performed; she no longer would need Pap according to the USPSTF recommendations.  Mammogram was not ordered because it was done recently.  Gonorrhea and Chlamydia testing not performed;  HIV test not offered, again according to guidelines.  Colonoscopy discussed according to ACS guideline.    Care Gaps addressed  DXA scan  Colonoscopy  Discussed screening for diabetes mellitus.  Patient is to continue her medications as prescribed.  Refills for Premarin 0.625 mg given.  She will come back to see me in one year for her annual visit.  She can come back to see me sooner as necessary.  All of her questions were answered appropriately to her satisfaction.

## 2023-03-04 ENCOUNTER — PATIENT MESSAGE (OUTPATIENT)
Dept: FAMILY MEDICINE | Facility: CLINIC | Age: 68
End: 2023-03-04
Payer: COMMERCIAL

## 2023-03-04 DIAGNOSIS — E07.9 THYROID DISEASE: ICD-10-CM

## 2023-03-04 DIAGNOSIS — I10 PRIMARY HYPERTENSION: Primary | ICD-10-CM

## 2023-03-04 DIAGNOSIS — E11.65 UNCONTROLLED TYPE 2 DIABETES MELLITUS WITH HYPERGLYCEMIA: ICD-10-CM

## 2023-03-04 DIAGNOSIS — E03.9 HYPOTHYROIDISM, UNSPECIFIED TYPE: ICD-10-CM

## 2023-03-04 DIAGNOSIS — E78.5 HYPERLIPIDEMIA, UNSPECIFIED HYPERLIPIDEMIA TYPE: ICD-10-CM

## 2023-03-04 DIAGNOSIS — Z00.00 ROUTINE MEDICAL EXAM: ICD-10-CM

## 2023-03-04 DIAGNOSIS — D64.9 ANEMIA, UNSPECIFIED TYPE: ICD-10-CM

## 2023-03-04 DIAGNOSIS — E55.9 VITAMIN D DEFICIENCY DISEASE: ICD-10-CM

## 2023-04-17 ENCOUNTER — HOSPITAL ENCOUNTER (OUTPATIENT)
Dept: RADIOLOGY | Facility: CLINIC | Age: 68
Discharge: HOME | End: 2023-04-17
Attending: OBSTETRICS & GYNECOLOGY
Payer: COMMERCIAL

## 2023-04-17 DIAGNOSIS — Z78.0 MENOPAUSE: ICD-10-CM

## 2023-04-17 PROCEDURE — 77080 DXA BONE DENSITY AXIAL SKELETON 1 OR MORE SITES: ICD-10-PCS | Mod: 26,,, | Performed by: INTERNAL MEDICINE

## 2023-04-17 PROCEDURE — 77080 DXA BONE DENSITY AXIAL: CPT | Mod: TC,PO

## 2023-04-17 PROCEDURE — 77080 DXA BONE DENSITY AXIAL: CPT | Mod: 26,,, | Performed by: INTERNAL MEDICINE

## 2023-04-22 ENCOUNTER — LAB VISIT (OUTPATIENT)
Dept: LAB | Facility: HOSPITAL | Age: 68
End: 2023-04-22
Attending: INTERNAL MEDICINE
Payer: COMMERCIAL

## 2023-04-22 DIAGNOSIS — E07.9 THYROID DISEASE: ICD-10-CM

## 2023-04-22 DIAGNOSIS — D64.9 ANEMIA, UNSPECIFIED TYPE: ICD-10-CM

## 2023-04-22 DIAGNOSIS — I10 PRIMARY HYPERTENSION: ICD-10-CM

## 2023-04-22 DIAGNOSIS — E03.9 HYPOTHYROIDISM, UNSPECIFIED TYPE: ICD-10-CM

## 2023-04-22 DIAGNOSIS — E78.5 HYPERLIPIDEMIA, UNSPECIFIED HYPERLIPIDEMIA TYPE: ICD-10-CM

## 2023-04-22 DIAGNOSIS — Z00.00 ROUTINE MEDICAL EXAM: ICD-10-CM

## 2023-04-22 DIAGNOSIS — E11.65 UNCONTROLLED TYPE 2 DIABETES MELLITUS WITH HYPERGLYCEMIA: ICD-10-CM

## 2023-04-22 DIAGNOSIS — E55.9 VITAMIN D DEFICIENCY DISEASE: ICD-10-CM

## 2023-04-22 LAB
25(OH)D3+25(OH)D2 SERPL-MCNC: 57 NG/ML (ref 30–96)
ALBUMIN SERPL BCP-MCNC: 3.5 G/DL (ref 3.5–5.2)
ALP SERPL-CCNC: 99 U/L (ref 55–135)
ALT SERPL W/O P-5'-P-CCNC: 12 U/L (ref 10–44)
ANION GAP SERPL CALC-SCNC: 14 MMOL/L (ref 8–16)
AST SERPL-CCNC: 16 U/L (ref 10–40)
BASOPHILS # BLD AUTO: 0.09 K/UL (ref 0–0.2)
BASOPHILS NFR BLD: 1.3 % (ref 0–1.9)
BILIRUB SERPL-MCNC: 0.3 MG/DL (ref 0.1–1)
BUN SERPL-MCNC: 18 MG/DL (ref 8–23)
CALCIUM SERPL-MCNC: 8.9 MG/DL (ref 8.7–10.5)
CHLORIDE SERPL-SCNC: 104 MMOL/L (ref 95–110)
CHOLEST SERPL-MCNC: 202 MG/DL (ref 120–199)
CHOLEST/HDLC SERPL: 3.3 {RATIO} (ref 2–5)
CO2 SERPL-SCNC: 21 MMOL/L (ref 23–29)
CREAT SERPL-MCNC: 0.9 MG/DL (ref 0.5–1.4)
DIFFERENTIAL METHOD: ABNORMAL
EOSINOPHIL # BLD AUTO: 0.3 K/UL (ref 0–0.5)
EOSINOPHIL NFR BLD: 3.9 % (ref 0–8)
ERYTHROCYTE [DISTWIDTH] IN BLOOD BY AUTOMATED COUNT: 13.2 % (ref 11.5–14.5)
EST. GFR  (NO RACE VARIABLE): >60 ML/MIN/1.73 M^2
ESTIMATED AVG GLUCOSE: 143 MG/DL (ref 68–131)
FERRITIN SERPL-MCNC: 95 NG/ML (ref 20–300)
GLUCOSE SERPL-MCNC: 101 MG/DL (ref 70–110)
HBA1C MFR BLD: 6.6 % (ref 4–5.6)
HCT VFR BLD AUTO: 38.4 % (ref 37–48.5)
HDLC SERPL-MCNC: 62 MG/DL (ref 40–75)
HDLC SERPL: 30.7 % (ref 20–50)
HGB BLD-MCNC: 12 G/DL (ref 12–16)
IMM GRANULOCYTES # BLD AUTO: 0.02 K/UL (ref 0–0.04)
IMM GRANULOCYTES NFR BLD AUTO: 0.3 % (ref 0–0.5)
IRON SERPL-MCNC: 59 UG/DL (ref 30–160)
LDLC SERPL CALC-MCNC: 107.4 MG/DL (ref 63–159)
LYMPHOCYTES # BLD AUTO: 2.1 K/UL (ref 1–4.8)
LYMPHOCYTES NFR BLD: 30.6 % (ref 18–48)
MCH RBC QN AUTO: 28.1 PG (ref 27–31)
MCHC RBC AUTO-ENTMCNC: 31.3 G/DL (ref 32–36)
MCV RBC AUTO: 90 FL (ref 82–98)
MONOCYTES # BLD AUTO: 0.5 K/UL (ref 0.3–1)
MONOCYTES NFR BLD: 7.5 % (ref 4–15)
NEUTROPHILS # BLD AUTO: 3.9 K/UL (ref 1.8–7.7)
NEUTROPHILS NFR BLD: 56.4 % (ref 38–73)
NONHDLC SERPL-MCNC: 140 MG/DL
NRBC BLD-RTO: 0 /100 WBC
PLATELET # BLD AUTO: 307 K/UL (ref 150–450)
PMV BLD AUTO: 9.5 FL (ref 9.2–12.9)
POTASSIUM SERPL-SCNC: 4.2 MMOL/L (ref 3.5–5.1)
PROT SERPL-MCNC: 7 G/DL (ref 6–8.4)
RBC # BLD AUTO: 4.27 M/UL (ref 4–5.4)
SATURATED IRON: 15 % (ref 20–50)
SODIUM SERPL-SCNC: 139 MMOL/L (ref 136–145)
T4 FREE SERPL-MCNC: 0.9 NG/DL (ref 0.71–1.51)
TOTAL IRON BINDING CAPACITY: 386 UG/DL (ref 250–450)
TRANSFERRIN SERPL-MCNC: 261 MG/DL (ref 200–375)
TRIGL SERPL-MCNC: 163 MG/DL (ref 30–150)
TSH SERPL DL<=0.005 MIU/L-ACNC: 7.79 UIU/ML (ref 0.4–4)
WBC # BLD AUTO: 6.89 K/UL (ref 3.9–12.7)

## 2023-04-22 PROCEDURE — 84439 ASSAY OF FREE THYROXINE: CPT | Performed by: NURSE PRACTITIONER

## 2023-04-22 PROCEDURE — 85025 COMPLETE CBC W/AUTO DIFF WBC: CPT | Performed by: NURSE PRACTITIONER

## 2023-04-22 PROCEDURE — 82306 VITAMIN D 25 HYDROXY: CPT | Performed by: NURSE PRACTITIONER

## 2023-04-22 PROCEDURE — 80061 LIPID PANEL: CPT | Performed by: NURSE PRACTITIONER

## 2023-04-22 PROCEDURE — 84466 ASSAY OF TRANSFERRIN: CPT | Performed by: NURSE PRACTITIONER

## 2023-04-22 PROCEDURE — 83036 HEMOGLOBIN GLYCOSYLATED A1C: CPT | Performed by: NURSE PRACTITIONER

## 2023-04-22 PROCEDURE — 36415 COLL VENOUS BLD VENIPUNCTURE: CPT | Mod: PO | Performed by: NURSE PRACTITIONER

## 2023-04-22 PROCEDURE — 80053 COMPREHEN METABOLIC PANEL: CPT | Performed by: NURSE PRACTITIONER

## 2023-04-22 PROCEDURE — 82728 ASSAY OF FERRITIN: CPT | Performed by: NURSE PRACTITIONER

## 2023-04-22 PROCEDURE — 84443 ASSAY THYROID STIM HORMONE: CPT | Performed by: NURSE PRACTITIONER

## 2023-04-24 ENCOUNTER — OFFICE VISIT (OUTPATIENT)
Dept: FAMILY MEDICINE | Facility: CLINIC | Age: 68
End: 2023-04-24
Payer: COMMERCIAL

## 2023-04-24 VITALS
TEMPERATURE: 98 F | DIASTOLIC BLOOD PRESSURE: 84 MMHG | HEIGHT: 65 IN | HEART RATE: 67 BPM | BODY MASS INDEX: 27.7 KG/M2 | WEIGHT: 166.25 LBS | OXYGEN SATURATION: 96 % | SYSTOLIC BLOOD PRESSURE: 136 MMHG

## 2023-04-24 DIAGNOSIS — I10 PRIMARY HYPERTENSION: ICD-10-CM

## 2023-04-24 DIAGNOSIS — E55.9 VITAMIN D DEFICIENCY DISEASE: ICD-10-CM

## 2023-04-24 DIAGNOSIS — Z86.010 HISTORY OF COLONIC POLYPS: ICD-10-CM

## 2023-04-24 DIAGNOSIS — D64.9 ANEMIA, UNSPECIFIED TYPE: ICD-10-CM

## 2023-04-24 DIAGNOSIS — I70.0 ATHEROSCLEROSIS OF AORTA: ICD-10-CM

## 2023-04-24 DIAGNOSIS — E03.9 HYPOTHYROIDISM, UNSPECIFIED TYPE: ICD-10-CM

## 2023-04-24 DIAGNOSIS — E78.5 HYPERLIPIDEMIA, UNSPECIFIED HYPERLIPIDEMIA TYPE: ICD-10-CM

## 2023-04-24 DIAGNOSIS — E11.65 UNCONTROLLED TYPE 2 DIABETES MELLITUS WITH HYPERGLYCEMIA: ICD-10-CM

## 2023-04-24 DIAGNOSIS — Z00.00 ROUTINE MEDICAL EXAM: Primary | ICD-10-CM

## 2023-04-24 DIAGNOSIS — M85.80 OSTEOPENIA, UNSPECIFIED LOCATION: ICD-10-CM

## 2023-04-24 DIAGNOSIS — Z12.31 ENCOUNTER FOR SCREENING MAMMOGRAM FOR BREAST CANCER: ICD-10-CM

## 2023-04-24 PROCEDURE — 3079F DIAST BP 80-89 MM HG: CPT | Mod: CPTII,S$GLB,, | Performed by: INTERNAL MEDICINE

## 2023-04-24 PROCEDURE — 1126F PR PAIN SEVERITY QUANTIFIED, NO PAIN PRESENT: ICD-10-PCS | Mod: CPTII,S$GLB,, | Performed by: INTERNAL MEDICINE

## 2023-04-24 PROCEDURE — 1101F PT FALLS ASSESS-DOCD LE1/YR: CPT | Mod: CPTII,S$GLB,, | Performed by: INTERNAL MEDICINE

## 2023-04-24 PROCEDURE — 3075F PR MOST RECENT SYSTOLIC BLOOD PRESS GE 130-139MM HG: ICD-10-PCS | Mod: CPTII,S$GLB,, | Performed by: INTERNAL MEDICINE

## 2023-04-24 PROCEDURE — 3008F BODY MASS INDEX DOCD: CPT | Mod: CPTII,S$GLB,, | Performed by: INTERNAL MEDICINE

## 2023-04-24 PROCEDURE — 3288F PR FALLS RISK ASSESSMENT DOCUMENTED: ICD-10-PCS | Mod: CPTII,S$GLB,, | Performed by: INTERNAL MEDICINE

## 2023-04-24 PROCEDURE — 3044F HG A1C LEVEL LT 7.0%: CPT | Mod: CPTII,S$GLB,, | Performed by: INTERNAL MEDICINE

## 2023-04-24 PROCEDURE — 99397 PR PREVENTIVE VISIT,EST,65 & OVER: ICD-10-PCS | Mod: S$GLB,,, | Performed by: INTERNAL MEDICINE

## 2023-04-24 PROCEDURE — 99999 PR PBB SHADOW E&M-EST. PATIENT-LVL IV: ICD-10-PCS | Mod: PBBFAC,,, | Performed by: INTERNAL MEDICINE

## 2023-04-24 PROCEDURE — 3008F PR BODY MASS INDEX (BMI) DOCUMENTED: ICD-10-PCS | Mod: CPTII,S$GLB,, | Performed by: INTERNAL MEDICINE

## 2023-04-24 PROCEDURE — 1101F PR PT FALLS ASSESS DOC 0-1 FALLS W/OUT INJ PAST YR: ICD-10-PCS | Mod: CPTII,S$GLB,, | Performed by: INTERNAL MEDICINE

## 2023-04-24 PROCEDURE — 3044F PR MOST RECENT HEMOGLOBIN A1C LEVEL <7.0%: ICD-10-PCS | Mod: CPTII,S$GLB,, | Performed by: INTERNAL MEDICINE

## 2023-04-24 PROCEDURE — 1126F AMNT PAIN NOTED NONE PRSNT: CPT | Mod: CPTII,S$GLB,, | Performed by: INTERNAL MEDICINE

## 2023-04-24 PROCEDURE — 99999 PR PBB SHADOW E&M-EST. PATIENT-LVL IV: CPT | Mod: PBBFAC,,, | Performed by: INTERNAL MEDICINE

## 2023-04-24 PROCEDURE — 1159F MED LIST DOCD IN RCRD: CPT | Mod: CPTII,S$GLB,, | Performed by: INTERNAL MEDICINE

## 2023-04-24 PROCEDURE — 3288F FALL RISK ASSESSMENT DOCD: CPT | Mod: CPTII,S$GLB,, | Performed by: INTERNAL MEDICINE

## 2023-04-24 PROCEDURE — 3079F PR MOST RECENT DIASTOLIC BLOOD PRESSURE 80-89 MM HG: ICD-10-PCS | Mod: CPTII,S$GLB,, | Performed by: INTERNAL MEDICINE

## 2023-04-24 PROCEDURE — 3075F SYST BP GE 130 - 139MM HG: CPT | Mod: CPTII,S$GLB,, | Performed by: INTERNAL MEDICINE

## 2023-04-24 PROCEDURE — 99397 PER PM REEVAL EST PAT 65+ YR: CPT | Mod: S$GLB,,, | Performed by: INTERNAL MEDICINE

## 2023-04-24 PROCEDURE — 1159F PR MEDICATION LIST DOCUMENTED IN MEDICAL RECORD: ICD-10-PCS | Mod: CPTII,S$GLB,, | Performed by: INTERNAL MEDICINE

## 2023-04-24 RX ORDER — DULAGLUTIDE 0.75 MG/.5ML
0.75 INJECTION, SOLUTION SUBCUTANEOUS
Qty: 4 PEN | Refills: 11 | Status: SHIPPED | OUTPATIENT
Start: 2023-04-24 | End: 2023-08-22

## 2023-04-24 RX ORDER — SODIUM, POTASSIUM,MAG SULFATES 17.5-3.13G
SOLUTION, RECONSTITUTED, ORAL ORAL
COMMUNITY
Start: 2023-04-03 | End: 2024-02-28

## 2023-04-24 NOTE — PROGRESS NOTES
CHIEF COMPLAINT:  Physical.                                                                                                                               HISTORY OF PRESENT ILLNESS:  A 68 year-old white female who is a 1st cousin of mine by marriage.  We reviewed all her labs.  Her A1c is 6.5, 6.6 and it was this high about 10 years ago.  Lipids good.  Vitamin-D upper normal and she has reduced her supplement slightly.  Many years ago it was low on PPI.  Up-to-date on bone density, mammogram and colonoscopy.    TSH up to 7.7 ??  She does report taking the 175 every day on an empty stomach and she will check to make sure her dose at home is the same and check with the pharmacy to make sure they have not changed her manufacture.  If all is good that we might need to increase Synthroid to to 200.      Discussed Trulicity and potential side effects as a means to lose weight and also get diabetes under better control.    Back in 2012 She had an outside mammogram at [facility] done by her GYN.        subsequent spot compression film was all normal as well.  Last mammo 2/23    Prior bone densities were done by prior GYN and had osteopenia, that being 2012.  She was taken off EVista 5/14 and continues on Premarin.    11/17 BMD  Impression       Normal mineralization of the lumbar spine and right hip.  Osteopenia of the left hip       2/2020  Osteopenia.      4/2023  Impression:   *Low bone mass (Osteopenia); FRAX calculations do not support treatment as osteoporosis.   RECOMMENDATIONS:  *Daily calcium intake 0860-7397 mg, dietary sources preferred; Vitamin D 5201-8369 IU daily.  *Weight bearing exercise and fall precautions.  *No additional pharmacologic therapy recommended at this time.  *Repeat BMD in 2 years.                                                                            Her colonoscopy was normal 4/17/08. 1 polyp 3/18 -5 yrs  She had another upper endoscopy,   which was okay per outside GI.  She does  have a prior history of what sounds like near Catrer's esophagus.                                                                                                                                       ROS:   CONST: weight stable. EYES: no vision change. ENT: no sore throat. CV: no chest pain w/ exertion. RESP: no shortness of breath. GI: no nausea, vomiting, diarrhea. No dysphagia. : no urinary issues. MUSCULOSKELETAL: no new myalgias or arthralgias. SKIN: no new changes. NEURO: no focal deficits. PSYCH: no new issues. ENDOCRINE: no polyuria. HEME: no lymph nodes. ALLERGY: no general pruritis.                                                                                                                    PAST MEDICAL HISTORY:                                                        1. Osteopenia, last bone density in 11/17, 4/2023                                2. DIABETES                          3. Hypothyroidism.                                                           4. Hypertension.                                                             5. Hysterectomy - total.                                                6. Hyperlipidemia.                                                           7. GERD. EGDs with possible Davian's- - Dr Correia.                                                                   8. Anxiety and depression.   9. ENT septum surgery for sinusitis -Dr Sandoval 2012.  10. Colonoscopy 4/17/08 normal - 1 polyp 3/18 -5 yrs   11. H/O abnormal Mammo                                                                                                                               FAMILY HISTORY: Father is living with hypertension, heart disease and        diabetes.  Mom is living with a history of cervical cancer, hypertension     and diabetes.  One brother and one sister with no stated problems.                                                                                                                                                                                       ALLERGIES:  Penicillin, sulfa, codeine, iodine and shellfish.                                                                                             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  She does not smoke and never did.  She does not drink.                                                                                                            PHYSICAL EXAMINATION:                                                        VITAL SIGNS:  As above                             GENERAL:  Pleasant female.                                                   HEENT:  Conjunctivae clear.  Pupils equal and reactive.  Nose and mouth      clear and moist.  Teeth good.                                                NECK:  Supple.  Thyroid nonpalpable.                                         RESPIRATORY:  Effort is good.                                                LUNGS:  Clear.                                                               HEART:  Regular rate and rhythm without murmurs, gallops or rubs.  No        carotid bruits.  No edema.                                                   ABDOMEN:  Soft, nondistended, nontender.  No hepatosplenomegaly or masses.   SKIN:  No rashes.  Warm to touch.                                            EXTREMITIES:  Gait normal.  Digits without clubbing or cyanosis.                                                                                          Labs and x-ray reports reviewed                                                                                                                                     Diagnoses and all orders for this visit:    Routine medical exam, doing well, colonoscopy is scheduled, up-to-date on mammogram.  Lab work unremarkable except for the possible hypothyroidism that might need a dose adjustment and we will address that  as above.  We will try to get her A1c under better control by adding Trulicity which may assist her with weight loss    Encounter for screening mammogram for breast cancer    History of colonic polyps  -     Cancel: Ambulatory referral/consult to Endo Procedure ; Future    Primary hypertension    Hyperlipidemia, unspecified hyperlipidemia type    Vitamin D deficiency disease    Hypothyroidism, unspecified type    Uncontrolled type 2 diabetes mellitus with hyperglycemia  -     dulaglutide (TRULICITY) 0.75 mg/0.5 mL pen injector; Inject 0.75 mg into the skin every 7 days.    Anemia, unspecified type    Atherosclerosis of aorta    Osteopenia, unspecified location

## 2023-04-30 ENCOUNTER — PATIENT MESSAGE (OUTPATIENT)
Dept: FAMILY MEDICINE | Facility: CLINIC | Age: 68
End: 2023-04-30
Payer: COMMERCIAL

## 2023-04-30 DIAGNOSIS — E03.9 HYPOTHYROIDISM, UNSPECIFIED TYPE: Primary | ICD-10-CM

## 2023-05-01 NOTE — TELEPHONE ENCOUNTER
Please advise,    Gerardo, I am only taking 1 thyroid medication (Levothyroxine 0.175mg).     I am being very careful to take it before breakfast every morning so we get an accurate reading next time.     When would you like me to repeat labs?     Thank you - Anushka

## 2023-05-29 LAB — CRC RECOMMENDATION EXT: NORMAL

## 2023-06-02 ENCOUNTER — PATIENT OUTREACH (OUTPATIENT)
Dept: ADMINISTRATIVE | Facility: HOSPITAL | Age: 68
End: 2023-06-02
Payer: COMMERCIAL

## 2023-06-05 DIAGNOSIS — M25.562 LEFT KNEE PAIN, UNSPECIFIED CHRONICITY: Primary | ICD-10-CM

## 2023-06-10 RX ORDER — FLUOXETINE HYDROCHLORIDE 20 MG/1
CAPSULE ORAL
Qty: 90 CAPSULE | Refills: 3 | Status: SHIPPED | OUTPATIENT
Start: 2023-06-10

## 2023-06-10 NOTE — TELEPHONE ENCOUNTER
No care due was identified.  [facility] Embedded Care Due Messages. Reference number: [id number].   6/10/2023 3:16:50 PM CDT

## 2023-06-10 NOTE — TELEPHONE ENCOUNTER
Refill Decision Note   Anushka Lopezcarline  is requesting a refill authorization.  Brief Assessment and Rationale for Refill:  Approve     Medication Therapy Plan:       Medication Reconciliation Completed: No   Comments:     No Care Gaps recommended.     Note composed:6:52 PM 06/10/2023

## 2023-06-12 ENCOUNTER — OFFICE VISIT (OUTPATIENT)
Dept: ORTHOPEDICS | Facility: CLINIC | Age: 68
End: 2023-06-12
Attending: ORTHOPAEDIC SURGERY
Payer: COMMERCIAL

## 2023-06-12 DIAGNOSIS — M17.12 PATELLOFEMORAL ARTHRITIS OF LEFT KNEE: Primary | ICD-10-CM

## 2023-06-12 PROCEDURE — 3288F PR FALLS RISK ASSESSMENT DOCUMENTED: ICD-10-PCS | Mod: CPTII,S$GLB,, | Performed by: ORTHOPAEDIC SURGERY

## 2023-06-12 PROCEDURE — 3288F FALL RISK ASSESSMENT DOCD: CPT | Mod: CPTII,S$GLB,, | Performed by: ORTHOPAEDIC SURGERY

## 2023-06-12 PROCEDURE — 1126F AMNT PAIN NOTED NONE PRSNT: CPT | Mod: CPTII,S$GLB,, | Performed by: ORTHOPAEDIC SURGERY

## 2023-06-12 PROCEDURE — 1159F MED LIST DOCD IN RCRD: CPT | Mod: CPTII,S$GLB,, | Performed by: ORTHOPAEDIC SURGERY

## 2023-06-12 PROCEDURE — 1126F PR PAIN SEVERITY QUANTIFIED, NO PAIN PRESENT: ICD-10-PCS | Mod: CPTII,S$GLB,, | Performed by: ORTHOPAEDIC SURGERY

## 2023-06-12 PROCEDURE — 1101F PT FALLS ASSESS-DOCD LE1/YR: CPT | Mod: CPTII,S$GLB,, | Performed by: ORTHOPAEDIC SURGERY

## 2023-06-12 PROCEDURE — 3044F HG A1C LEVEL LT 7.0%: CPT | Mod: CPTII,S$GLB,, | Performed by: ORTHOPAEDIC SURGERY

## 2023-06-12 PROCEDURE — 99213 OFFICE O/P EST LOW 20 MIN: CPT | Mod: S$GLB,,, | Performed by: ORTHOPAEDIC SURGERY

## 2023-06-12 PROCEDURE — 1159F PR MEDICATION LIST DOCUMENTED IN MEDICAL RECORD: ICD-10-PCS | Mod: CPTII,S$GLB,, | Performed by: ORTHOPAEDIC SURGERY

## 2023-06-12 PROCEDURE — 1160F PR REVIEW ALL MEDS BY PRESCRIBER/CLIN PHARMACIST DOCUMENTED: ICD-10-PCS | Mod: CPTII,S$GLB,, | Performed by: ORTHOPAEDIC SURGERY

## 2023-06-12 PROCEDURE — 1101F PR PT FALLS ASSESS DOC 0-1 FALLS W/OUT INJ PAST YR: ICD-10-PCS | Mod: CPTII,S$GLB,, | Performed by: ORTHOPAEDIC SURGERY

## 2023-06-12 PROCEDURE — 99999 PR PBB SHADOW E&M-EST. PATIENT-LVL III: CPT | Mod: PBBFAC,,, | Performed by: ORTHOPAEDIC SURGERY

## 2023-06-12 PROCEDURE — 99999 PR PBB SHADOW E&M-EST. PATIENT-LVL III: ICD-10-PCS | Mod: PBBFAC,,, | Performed by: ORTHOPAEDIC SURGERY

## 2023-06-12 PROCEDURE — 3044F PR MOST RECENT HEMOGLOBIN A1C LEVEL <7.0%: ICD-10-PCS | Mod: CPTII,S$GLB,, | Performed by: ORTHOPAEDIC SURGERY

## 2023-06-12 PROCEDURE — 1160F RVW MEDS BY RX/DR IN RCRD: CPT | Mod: CPTII,S$GLB,, | Performed by: ORTHOPAEDIC SURGERY

## 2023-06-12 PROCEDURE — 99213 PR OFFICE/OUTPT VISIT, EST, LEVL III, 20-29 MIN: ICD-10-PCS | Mod: S$GLB,,, | Performed by: ORTHOPAEDIC SURGERY

## 2023-06-12 NOTE — PROGRESS NOTES
Chief Complaint   Patient presents with    Left Knee - Pain     HPI (12/14/21): Anushka Napier is a 68 y.o. female who presents today complaining of bilateral knee pain  Duration of symptoms:  About 6-8 months  Trauma or new activity: no  Pain is intermittent  Aggravating factors: Stairs, working out, getting up after sitting for a while   Relieving factors: rest   Radicular symptoms: no numbness, paresthesias   Associated symptoms:  popping.    Prior treatment: None   Pain does interfere with activities of daily living .    6/12/23  Has occasional popping, giving way of the left knee  Pain is located to anterior knee  Does not have pain or knee complaints outside of these episodes  Sometimes experiences this when she turns over in bed, usually happens when she is walking  Occasional swelling     This is the extent of the patient's complaints at this time.     Review of Systems   Unremarkable, no changes since last visit.     No change in medical, surgical, family or surgical history except as stated above     Review of patient's allergies indicates:  No Known Allergies     Review of patient's allergies indicates:   Allergen Reactions    Tizanidine Swelling     Tongue swelling     Codeine Hives     Other reaction(s): Itching  Other reaction(s): Hives    Iodinated contrast media Hives     Other reaction(s): Hives    Iodine      Other reaction(s): Unknown    Sulfamethoxazole-trimethoprim Itching     Other reaction(s): Itching  Other reaction(s): Hives    Epinephrine Anxiety and Other (See Comments)     Almost passed out.         Current Outpatient Medications:     amitriptyline (ELAVIL) 10 MG tablet, Take 10 mg by mouth nightly., Disp: , Rfl:     aspirin 81 MG chewable tablet, Take 1 tablet by mouth Daily. 1 Tablet, Chewable Oral Every day, Disp: , Rfl:     atorvastatin (LIPITOR) 40 MG tablet, TAKE 1 TABLET(40 MG) BY MOUTH EVERY DAY, Disp: 90 tablet, Rfl: 12    ciclopirox (PENLAC) 8 % Soln, Apply topically nightly.,  Disp: 6.6 mL, Rfl: 11    dulaglutide (TRULICITY) 0.75 mg/0.5 mL pen injector, Inject 0.75 mg into the skin every 7 days., Disp: 4 pen, Rfl: 11    estrogens, conjugated, (PREMARIN) 0.625 MG tablet, TAKE 1 TABLET(0.625 MG) BY MOUTH EVERY DAY, Disp: 30 tablet, Rfl: 12    FLUoxetine 20 MG capsule, TAKE 1 CAPSULE(20 MG) BY MOUTH EVERY DAY, Disp: 90 capsule, Rfl: 3    FLUZONE HIGHDOSE QUAD 22-23  mcg/0.7 mL Syrg, , Disp: , Rfl:     levothyroxine (SYNTHROID, LEVOTHROID) 175 MCG tablet, Take 1 tablet (175 mcg total) by mouth before breakfast., Disp: 90 tablet, Rfl: 1    metFORMIN (GLUCOPHAGE-XR) 500 MG ER 24hr tablet, TAKE 1 TABLET(500 MG) BY MOUTH EVERY DAY, Disp: 90 tablet, Rfl: 12    metoprolol tartrate (LOPRESSOR) 50 MG tablet, TAKE 1 TABLET BY MOUTH DAILY, Disp: 90 tablet, Rfl: 0    mometasone 0.1% (ELOCON) 0.1 % cream, Use daily, Disp: 50 g, Rfl: 3    omega-3 fatty acids-vitamin E 1,000 mg Cap, Take 1 capsule by mouth Twice daily. 1 Capsule Oral Twice a day , Disp: , Rfl:     RABEprazole (ACIPHEX) 20 mg tablet, TAKE 1 TABLET BY MOUTH TWICE DAILY, Disp: 180 tablet, Rfl: 12    sars-cov-2, covid-19, (PFIZER COVID-19) 30 mcg/0.3 ml injection, , Disp: , Rfl:     solifenacin (VESICARE) 5 MG tablet, Take 5 mg by mouth., Disp: , Rfl:     SUPREP BOWEL PREP KIT 17.5-3.13-1.6 gram SolR, Take as directed, Disp: , Rfl:     vitamin D (VITAMIN D3) 1000 units Tab, Take 1,000 Units by mouth once daily. 5 tabs daily, Disp: , Rfl:     Past Medical History:   Diagnosis Date    Allergy     Anxiety     Basal cell carcinoma     to face    Depression     Diabetes mellitus     Diabetes mellitus type II, uncontrolled 9/17/2014    Diabetic retinopathy 01/14/2020    DM retinopathy     GERD (gastroesophageal reflux disease) 9/25/2014    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 Dr Kedia, long term PPI use    History of colonic polyps 1/24/2020    Hyperlipidemia     Hypertension     Osteopenia 9/25/2014    Screening for colorectal cancer 9/25/2014     Normal 2009    Screening for colorectal cancer     Thyroid disease     Vitamin D deficiency disease 9/25/2014       Patient Active Problem List   Diagnosis    Thyroid disease    Menopause    Hypothyroidism    Diabetes mellitus type II, uncontrolled    Hyperlipidemia    Hypertension    GERD (gastroesophageal reflux disease)    Screening for colorectal cancer    Vitamin D deficiency disease    Osteopenia    Migraine without aura and without status migrainosus, not intractable    Left facial numbness    Right lateral epicondylitis    History of colonic polyps    Chest pain    Palpitations    Closed nondisplaced fracture of proximal phalanx of left little finger with routine healing    Decreased range of motion of finger of left hand    OAB (overactive bladder)       Past Surgical History:   Procedure Laterality Date    HYSTERECTOMY  1997    complete for prolapse, Abdominal    OOPHORECTOMY      right shoulder      SINUS SURGERY  2012       Social History     Tobacco Use    Smoking status: Never    Smokeless tobacco: Never   Substance Use Topics    Alcohol use: No    Drug use: No       Family History   Problem Relation Age of Onset    Breast cancer Cousin 40    Miscarriages / Stillbirths Maternal Grandmother     Miscarriages / Stillbirths Mother     Diabetes Mother     Hypertension Mother     Cancer Mother         cervical    Heart disease Father     Hypertension Father     Ovarian cancer Neg Hx        Physical Exam:   Vitals:    06/12/23 0803   PainSc: 0-No pain   PainLoc: Knee       General: Patient is alert, awake and oriented to time, place and person. Mood and affect are appropriate.  Patient does not appear to be in any distress, denies any constitutional symptoms and appears stated age.   HEENT: Pupils are equal and round, sclera are not injected. External examination of ears and nose reveals no abnormalities. Cranial nerves II-X are grossly intact  Skin: no rashes, abrasions or open wounds on the affected  extremity   Resp: No respiratory distress or audible wheezing   CV: 2+  pulses, all extremities warm and well perfused   Left knee(s)  Localizes pain over anterior knee   no swelling, effusion, or erythema   Active ROM: 0 - 150  Passive ROM: 0 - 150  No varus/valgus deformity   Tender to palpation over patella  Mild atrophy of L quad  normal (0 - 2 mm) Anterior drawer   normal (0 - 2 mm) posterior drawer   negative valgus instability   negative varus instability   Normal patellar tracking   +  Pain and crepitus with patellar compression + knee motion  ltsi s/s/sp/dp/t   + ehl/fhl/ta/gs  2 + DP      Imaging:  3 views bilateral knee: moderate patellofemoral OA with subchondral sclerosis, joint space narrowing, osteophyte formation     I personally reviewed and interpreted the patient's imaging obtained today in clinic     Assessment: 68 y.o. female with Left patellofemoral osteoarthritis     Plan:   - PT referral placed - [facility] per patient request  - Discussed consideration of MRI, ATS to assess for cartilage flap given primarily mechanical symptoms, she declined as the symptoms are not that bothersome at this point  - Return to clinic PRN if symptoms worsen or fail to improve.    All questions were answered in detail. The patient is in full agreement with th treatment plan and will proceed accordingly.        This note was created by combination of typed  and M-Modal dictation. Transcription and phonetic errors may be present.  If there are any questions, please contact me.      Current Outpatient Medications:     amitriptyline (ELAVIL) 10 MG tablet, Take 10 mg by mouth nightly., Disp: , Rfl:     aspirin 81 MG chewable tablet, Take 1 tablet by mouth Daily. 1 Tablet, Chewable Oral Every day, Disp: , Rfl:     atorvastatin (LIPITOR) 40 MG tablet, TAKE 1 TABLET(40 MG) BY MOUTH EVERY DAY, Disp: 90 tablet, Rfl: 12    ciclopirox (PENLAC) 8 % Soln, Apply topically nightly., Disp: 6.6 mL, Rfl: 11     dulaglutide (TRULICITY) 0.75 mg/0.5 mL pen injector, Inject 0.75 mg into the skin every 7 days., Disp: 4 pen, Rfl: 11    estrogens, conjugated, (PREMARIN) 0.625 MG tablet, TAKE 1 TABLET(0.625 MG) BY MOUTH EVERY DAY, Disp: 30 tablet, Rfl: 12    FLUoxetine 20 MG capsule, TAKE 1 CAPSULE(20 MG) BY MOUTH EVERY DAY, Disp: 90 capsule, Rfl: 3    FLUZONE HIGHDOSE QUAD 22-23  mcg/0.7 mL Syrg, , Disp: , Rfl:     levothyroxine (SYNTHROID, LEVOTHROID) 175 MCG tablet, Take 1 tablet (175 mcg total) by mouth before breakfast., Disp: 90 tablet, Rfl: 1    metFORMIN (GLUCOPHAGE-XR) 500 MG ER 24hr tablet, TAKE 1 TABLET(500 MG) BY MOUTH EVERY DAY, Disp: 90 tablet, Rfl: 12    metoprolol tartrate (LOPRESSOR) 50 MG tablet, TAKE 1 TABLET BY MOUTH DAILY, Disp: 90 tablet, Rfl: 0    mometasone 0.1% (ELOCON) 0.1 % cream, Use daily, Disp: 50 g, Rfl: 3    omega-3 fatty acids-vitamin E 1,000 mg Cap, Take 1 capsule by mouth Twice daily. 1 Capsule Oral Twice a day , Disp: , Rfl:     RABEprazole (ACIPHEX) 20 mg tablet, TAKE 1 TABLET BY MOUTH TWICE DAILY, Disp: 180 tablet, Rfl: 12    sars-cov-2, covid-19, (PFIZER COVID-19) 30 mcg/0.3 ml injection, , Disp: , Rfl:     solifenacin (VESICARE) 5 MG tablet, Take 5 mg by mouth., Disp: , Rfl:     SUPREP BOWEL PREP KIT 17.5-3.13-1.6 gram SolR, Take as directed, Disp: , Rfl:     vitamin D (VITAMIN D3) 1000 units Tab, Take 1,000 Units by mouth once daily. 5 tabs daily, Disp: , Rfl:

## 2023-06-13 ENCOUNTER — TELEPHONE (OUTPATIENT)
Dept: ORTHOPEDICS | Facility: CLINIC | Age: 68
End: 2023-06-13
Payer: COMMERCIAL

## 2023-06-13 NOTE — TELEPHONE ENCOUNTER
----- Message from Asia Schmidt sent at 6/13/2023  9:20 AM CDT -----  Regarding: patient call back  Type: Patient Call Back    Who called: Lizeth with [facility]     What is the request in detail: They received notes but still need patients demographics in order to reach out to her.      Can the clinic reply by [facility]? No     Would the patient rather a call back or a response via [facility]? Call     Best call back number: [phone]

## 2023-07-02 NOTE — TELEPHONE ENCOUNTER
No care due was identified.  [facility] Embedded Care Due Messages. Reference number: [id number].   7/02/2023 4:49:29 PM CDT

## 2023-07-03 RX ORDER — METOPROLOL TARTRATE 50 MG/1
TABLET ORAL
Qty: 90 TABLET | Refills: 3 | Status: SHIPPED | OUTPATIENT
Start: 2023-07-03

## 2023-07-03 NOTE — TELEPHONE ENCOUNTER
Refill Decision Note      Refill Decision Note   Anushka Napier  is requesting a refill authorization.  Brief Assessment and Rationale for Refill:  Approve     Medication Therapy Plan:         Comments:     Note composed:4:54 AM 07/03/2023             Appointments     Last Visit   4/24/2023 Gerardo Dobbs MD   Next Visit   Visit date not found Gerardo Dobbs MD

## 2023-07-16 NOTE — TELEPHONE ENCOUNTER
No care due was identified.  [facility] Embedded Care Due Messages. Reference number: [id number].   7/16/2023 1:56:31 PM CDT

## 2023-07-17 RX ORDER — LEVOTHYROXINE SODIUM 175 UG/1
TABLET ORAL
Qty: 90 TABLET | Refills: 3 | Status: SHIPPED | OUTPATIENT
Start: 2023-07-17

## 2023-07-17 NOTE — TELEPHONE ENCOUNTER
Refill Decision Note   Anushka Lopezcarline  is requesting a refill authorization.  Brief Assessment and Rationale for Refill:  Approve     Medication Therapy Plan:  MRM resolved    Medication Reconciliation Completed: No   Comments:     No Care Gaps recommended.     Note composed:11:51 AM 07/17/2023

## 2023-08-18 ENCOUNTER — OFFICE VISIT (OUTPATIENT)
Dept: DERMATOLOGY | Facility: CLINIC | Age: 68
End: 2023-08-18
Payer: COMMERCIAL

## 2023-08-18 VITALS — BODY MASS INDEX: 27.62 KG/M2 | WEIGHT: 166 LBS

## 2023-08-18 DIAGNOSIS — Z85.828 HISTORY OF SKIN CANCER: ICD-10-CM

## 2023-08-18 DIAGNOSIS — L57.0 MULTIPLE ACTINIC KERATOSES: Primary | ICD-10-CM

## 2023-08-18 DIAGNOSIS — L81.4 LENTIGINES: ICD-10-CM

## 2023-08-18 PROCEDURE — 1159F PR MEDICATION LIST DOCUMENTED IN MEDICAL RECORD: ICD-10-PCS | Mod: CPTII,S$GLB,, | Performed by: DERMATOLOGY

## 2023-08-18 PROCEDURE — 1126F PR PAIN SEVERITY QUANTIFIED, NO PAIN PRESENT: ICD-10-PCS | Mod: CPTII,S$GLB,, | Performed by: DERMATOLOGY

## 2023-08-18 PROCEDURE — 3044F HG A1C LEVEL LT 7.0%: CPT | Mod: CPTII,S$GLB,, | Performed by: DERMATOLOGY

## 2023-08-18 PROCEDURE — 3288F FALL RISK ASSESSMENT DOCD: CPT | Mod: CPTII,S$GLB,, | Performed by: DERMATOLOGY

## 2023-08-18 PROCEDURE — 17003 DESTRUCTION, PREMALIGNANT LESIONS; SECOND THROUGH 14 LESIONS: ICD-10-PCS | Mod: S$GLB,,, | Performed by: DERMATOLOGY

## 2023-08-18 PROCEDURE — 17003 DESTRUCT PREMALG LES 2-14: CPT | Mod: S$GLB,,, | Performed by: DERMATOLOGY

## 2023-08-18 PROCEDURE — 99999 PR PBB SHADOW E&M-EST. PATIENT-LVL III: ICD-10-PCS | Mod: PBBFAC,,, | Performed by: DERMATOLOGY

## 2023-08-18 PROCEDURE — 3288F PR FALLS RISK ASSESSMENT DOCUMENTED: ICD-10-PCS | Mod: CPTII,S$GLB,, | Performed by: DERMATOLOGY

## 2023-08-18 PROCEDURE — 1160F RVW MEDS BY RX/DR IN RCRD: CPT | Mod: CPTII,S$GLB,, | Performed by: DERMATOLOGY

## 2023-08-18 PROCEDURE — 1126F AMNT PAIN NOTED NONE PRSNT: CPT | Mod: CPTII,S$GLB,, | Performed by: DERMATOLOGY

## 2023-08-18 PROCEDURE — 1101F PR PT FALLS ASSESS DOC 0-1 FALLS W/OUT INJ PAST YR: ICD-10-PCS | Mod: CPTII,S$GLB,, | Performed by: DERMATOLOGY

## 2023-08-18 PROCEDURE — 3044F PR MOST RECENT HEMOGLOBIN A1C LEVEL <7.0%: ICD-10-PCS | Mod: CPTII,S$GLB,, | Performed by: DERMATOLOGY

## 2023-08-18 PROCEDURE — 17000 PR DESTRUCTION(LASER SURGERY,CRYOSURGERY,CHEMOSURGERY),PREMALIGNANT LESIONS,FIRST LESION: ICD-10-PCS | Mod: S$GLB,,, | Performed by: DERMATOLOGY

## 2023-08-18 PROCEDURE — 3008F BODY MASS INDEX DOCD: CPT | Mod: CPTII,S$GLB,, | Performed by: DERMATOLOGY

## 2023-08-18 PROCEDURE — 3008F PR BODY MASS INDEX (BMI) DOCUMENTED: ICD-10-PCS | Mod: CPTII,S$GLB,, | Performed by: DERMATOLOGY

## 2023-08-18 PROCEDURE — 1160F PR REVIEW ALL MEDS BY PRESCRIBER/CLIN PHARMACIST DOCUMENTED: ICD-10-PCS | Mod: CPTII,S$GLB,, | Performed by: DERMATOLOGY

## 2023-08-18 PROCEDURE — 1101F PT FALLS ASSESS-DOCD LE1/YR: CPT | Mod: CPTII,S$GLB,, | Performed by: DERMATOLOGY

## 2023-08-18 PROCEDURE — 99214 OFFICE O/P EST MOD 30 MIN: CPT | Mod: 25,S$GLB,, | Performed by: DERMATOLOGY

## 2023-08-18 PROCEDURE — 99214 PR OFFICE/OUTPT VISIT, EST, LEVL IV, 30-39 MIN: ICD-10-PCS | Mod: 25,S$GLB,, | Performed by: DERMATOLOGY

## 2023-08-18 PROCEDURE — 17000 DESTRUCT PREMALG LESION: CPT | Mod: S$GLB,,, | Performed by: DERMATOLOGY

## 2023-08-18 PROCEDURE — 99999 PR PBB SHADOW E&M-EST. PATIENT-LVL III: CPT | Mod: PBBFAC,,, | Performed by: DERMATOLOGY

## 2023-08-18 PROCEDURE — 1159F MED LIST DOCD IN RCRD: CPT | Mod: CPTII,S$GLB,, | Performed by: DERMATOLOGY

## 2023-08-18 NOTE — PROGRESS NOTES
Subjective:      Patient ID:  Anushka Napier is a 68 y.o. female who presents for   Chief Complaint   Patient presents with    Spot     Recurrent spots on bilateral hands     New spots on right dorsal hand. Her eczema resolved with elocon cream.     Spot - Initial  Affected locations: right hand and left hand  Signs / symptoms: asymptomatic      Review of Systems   Constitutional:  Negative for fever, chills, weight loss, weight gain, fatigue, night sweats and malaise.   Skin:  Positive for daily sunscreen use, activity-related sunscreen use and wears hat.   Hematologic/Lymphatic: Bruises/bleeds easily.       Objective:   Physical Exam   Constitutional: She appears well-developed and well-nourished. No distress.   Neurological: She is alert and oriented to person, place, and time. She is not disoriented.   Psychiatric: She has a normal mood and affect.   Skin:   Areas Examined (abnormalities noted in diagram):   Head / Face Inspection Performed  Neck Inspection Performed  Chest / Axilla Inspection Performed  Back Inspection Performed  RUE Inspected  LUE Inspection Performed                     Diagram Legend     Erythematous scaling macule/papule c/w actinic keratosis       Vascular papule c/w angioma      Pigmented verrucoid papule/plaque c/w seborrheic keratosis      Yellow umbilicated papule c/w sebaceous hyperplasia      Irregularly shaped tan macule c/w lentigo     1-2 mm smooth white papules consistent with Milia      Movable subcutaneous cyst with punctum c/w epidermal inclusion cyst      Subcutaneous movable cyst c/w pilar cyst      Firm pink to brown papule c/w dermatofibroma      Pedunculated fleshy papule(s) c/w skin tag(s)      Evenly pigmented macule c/w junctional nevus     Mildly variegated pigmented, slightly irregular-bordered macule c/w mildly atypical nevus      Flesh colored to evenly pigmented papule c/w intradermal nevus       Pink pearly papule/plaque c/w basal cell carcinoma       "Erythematous hyperkeratotic cursted plaque c/w SCC      Surgical scar with no sign of skin cancer recurrence      Open and closed comedones      Inflammatory papules and pustules      Verrucoid papule consistent consistent with wart     Erythematous eczematous patches and plaques     Dystrophic onycholytic nail with subungual debris c/w onychomycosis     Umbilicated papule    Erythematous-base heme-crusted tan verrucoid plaque consistent with inflamed seborrheic keratosis     Erythematous Silvery Scaling Plaque c/w Psoriasis     See annotation      Assessment / Plan:        Multiple actinic keratoses   Cryosurgery Procedure Note    Verbal consent from the patient is obtained and the patient is aware of the precancerous quality and need for treatment of these lesions. Liquid nitrogen cryosurgery is applied to the 2 actinic keratoses, as detailed in the physical exam, to produce a freeze injury.      Lentigines  The "ABCD" rules to observe pigmented lesions were reviewed.      History of skin cancers  Area(s) of previous NMSC evaluated with no signs of recurrence.  . No lesions suspicious for malignancy noted.    Recommend daily sun protection/avoidance and use of at least SPF 30, broad spectrum sunscreen (OTC drug).                Follow up in about 6 months (around 2/18/2024).  "

## 2023-08-21 ENCOUNTER — PATIENT MESSAGE (OUTPATIENT)
Dept: FAMILY MEDICINE | Facility: CLINIC | Age: 68
End: 2023-08-21
Payer: COMMERCIAL

## 2023-08-21 DIAGNOSIS — E11.65 UNCONTROLLED TYPE 2 DIABETES MELLITUS WITH HYPERGLYCEMIA: Primary | ICD-10-CM

## 2023-08-21 NOTE — TELEPHONE ENCOUNTER
Please advise,      good morning Gerardo. Jacobe been doing a little research and wondering if you might be able to switch me to Ozempic instead of the trulicity?  thank you.

## 2023-08-22 RX ORDER — SEMAGLUTIDE 0.68 MG/ML
0.25 INJECTION, SOLUTION SUBCUTANEOUS
Qty: 1 EACH | Refills: 12 | Status: SHIPPED | OUTPATIENT
Start: 2023-08-22 | End: 2023-12-08

## 2023-09-11 ENCOUNTER — PATIENT MESSAGE (OUTPATIENT)
Dept: FAMILY MEDICINE | Facility: CLINIC | Age: 68
End: 2023-09-11
Payer: COMMERCIAL

## 2023-09-11 NOTE — TELEPHONE ENCOUNTER
Please advise,      radha Carrillo, this is Anushka.  my blood pressure was 138/81   I had tingling in my toes and feet, and my blood sugar was 143  I have been laying in the bed since then, and occasionally taking my blood sugar and my blood pressure  The tingling has gone away. My blood sugar is 114 and my blood pressure is 153/92  should I take a second blood pressure pill or just keep checking the blood pressure?

## 2023-10-08 ENCOUNTER — OFFICE VISIT (OUTPATIENT)
Dept: URGENT CARE | Facility: CLINIC | Age: 68
End: 2023-10-08
Payer: COMMERCIAL

## 2023-10-08 VITALS
HEIGHT: 65 IN | TEMPERATURE: 98 F | DIASTOLIC BLOOD PRESSURE: 81 MMHG | SYSTOLIC BLOOD PRESSURE: 143 MMHG | BODY MASS INDEX: 27.66 KG/M2 | WEIGHT: 166 LBS | RESPIRATION RATE: 16 BRPM | OXYGEN SATURATION: 97 % | HEART RATE: 82 BPM

## 2023-10-08 DIAGNOSIS — B97.89 ACUTE VIRAL SINUSITIS: Primary | ICD-10-CM

## 2023-10-08 DIAGNOSIS — J01.90 ACUTE VIRAL SINUSITIS: Primary | ICD-10-CM

## 2023-10-08 DIAGNOSIS — R05.9 COUGH, UNSPECIFIED TYPE: ICD-10-CM

## 2023-10-08 LAB
CTP QC/QA: YES
SARS-COV-2 AG RESP QL IA.RAPID: NEGATIVE

## 2023-10-08 PROCEDURE — 87811 SARS CORONAVIRUS 2 ANTIGEN POCT, MANUAL READ: ICD-10-PCS | Mod: QW,S$GLB,, | Performed by: FAMILY MEDICINE

## 2023-10-08 PROCEDURE — 99213 OFFICE O/P EST LOW 20 MIN: CPT | Mod: S$GLB,,, | Performed by: FAMILY MEDICINE

## 2023-10-08 PROCEDURE — 87811 SARS-COV-2 COVID19 W/OPTIC: CPT | Mod: QW,S$GLB,, | Performed by: FAMILY MEDICINE

## 2023-10-08 PROCEDURE — 99213 PR OFFICE/OUTPT VISIT, EST, LEVL III, 20-29 MIN: ICD-10-PCS | Mod: S$GLB,,, | Performed by: FAMILY MEDICINE

## 2023-10-08 RX ORDER — AZELASTINE 1 MG/ML
1 SPRAY, METERED NASAL 2 TIMES DAILY
Qty: 30 ML | Refills: 0 | Status: SHIPPED | OUTPATIENT
Start: 2023-10-08 | End: 2024-10-07

## 2023-10-08 NOTE — PROGRESS NOTES
"Subjective:      Patient ID: Anushka Napier is a 68 y.o. female.    Vitals:  height is 5' 5" (1.651 m) and weight is 75.3 kg (166 lb 0.1 oz). Her oral temperature is 97.7 °F (36.5 °C). Her blood pressure is 143/81 (abnormal) and her pulse is 82. Her respiration is 16 and oxygen saturation is 97%.     Chief Complaint: Sinus Problem    Pt complains of congestion, sore throat and facial pressure that started yesterday.   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  She has been able to tolerate p.o., she takes a daily antihistamine and Flonase.  She denies any known sick contacts.  Symptoms started last night. No fever or chills. No cp or SOB. No GI related symptoms, including, N/v/D or constipation. No anosmia or ageusia.        Sinus Problem  This is a new problem. The current episode started yesterday. The problem is unchanged. There has been no fever. Her pain is at a severity of 0/10. She is experiencing no pain. Associated symptoms include congestion, coughing, sinus pressure and a sore throat. Pertinent negatives include no chills, diaphoresis, ear pain, headaches, hoarse voice, neck pain, shortness of breath, sneezing or swollen glands. Treatments tried: " coricidin. The treatment provided no relief.       Constitution: Negative for activity change, appetite change, chills, sweating, fatigue, fever and unexpected weight change.   HENT:  Positive for congestion, postnasal drip, sinus pain, sinus pressure and sore throat. Negative for ear pain, nosebleeds, foreign body in nose, trouble swallowing and voice change.    Neck: Negative for neck pain and neck stiffness.   Cardiovascular:  Negative for chest pain.   Respiratory:  Positive for cough. Negative for chest tightness, sputum production, shortness of breath, stridor, wheezing and asthma.    Gastrointestinal:  Negative for abdominal pain.   Skin:  Negative for rash.   Allergic/Immunologic: Negative for asthma and sneezing.   Neurological:  Negative for headaches.      Objective:     Physical Exam   Constitutional: She is oriented to person, place, and time. She appears well-developed.  Non-toxic appearance. She does not appear ill. No distress.   HENT:   Head: Normocephalic and atraumatic.   Ears:   Right Ear: External ear normal.   Left Ear: External ear normal.   Nose: Nose normal.   Mouth/Throat: Uvula is midline, oropharynx is clear and moist and mucous membranes are normal. No trismus in the jaw. No uvula swelling. Cobblestoning present. No oropharyngeal exudate, posterior oropharyngeal edema, posterior oropharyngeal erythema or tonsillar abscesses. No tonsillar exudate.   Eyes: Conjunctivae, EOM and lids are normal. Pupils are equal, round, and reactive to light.   Neck: Trachea normal and phonation normal. Neck supple.   Pulmonary/Chest: Effort normal and breath sounds normal.   Musculoskeletal: Normal range of motion.         General: Normal range of motion.   Neurological: She is alert and oriented to person, place, and time.   Skin: Skin is warm, dry, intact and not diaphoretic.   Psychiatric: Her speech is normal and behavior is normal. Judgment and thought content normal.   Nursing note and vitals  reviewed.      Assessment:     1. Acute viral sinusitis    2. Cough, unspecified type      Results for orders placed or performed in visit on 10/08/23   SARS Coronavirus 2 Antigen, POCT Manual Read   Result Value Ref Range    SARS Coronavirus 2 Antigen Negative Negative     Acceptable Yes       Plan:     Cv neg, vss, lungs ctab, otc meds reviewed  Declined rtw note    Discussed results/diagnosis/plan with patient in clinic. Strict precautions given to patient to monitor for worsening signs and symptoms. Advised to follow up with PCP or specialist.    Explained side effects of medications prescribed with patient and informed him/her to discontinue use if he/she has any side effects and to inform UC or PCP if this occurs. All questions answered. Strict ED verses clinic return precautions stressed and given in depth. Advised if symptoms worsens of fail to improve he/she should go to the Emergency Room. Discharge and follow-up instructions given verbally/printed with the patient who expressed understanding and willingness to comply with my recommendations. Patient voiced understanding and in agreement with current treatment plan. Patient exits the exam room in no acute distress. Conversant and engaged during discharge discussion, verbalized understanding.      Acute viral sinusitis  -     azelastine (ASTELIN) 137 mcg (0.1 %) nasal spray; 1 spray (137 mcg total) by Nasal route 2 (two) times daily.  Dispense: 30 mL; Refill: 0    Cough, unspecified type  -     SARS Coronavirus 2 Antigen, POCT Manual Read                Patient Instructions   General Discharge Instructions   PLEASE READ YOUR DISCHARGE INSTRUCTIONS ENTIRELY AS IT CONTAINS IMPORTANT INFORMATION.  If you were prescribed a narcotic or controlled medication, do not drive or operate heavy equipment or machinery while taking these medications.  If you were prescribed antibiotics, please take them to completion.  You must understand that you've  received an Urgent Care treatment only and that you may be released before all your medical problems are known or treated. You, the patient, will arrange for follow up care as instructed.    OVER THE COUNTER RECOMMENDATIONS/SUGGESTIONS.    Make sure to stay well hydrated.    Use Nasal Saline to mechanically move any post nasal drip from your eustachian tube or from the back of your throat.    Use warm salt water gargles to ease your throat pain. Warm salt water gargles as needed for sore throat- 1/2 tsp salt to 1 cup warm water, gargle as desired.    Use an antihistamine such as Claritin, Zyrtec or Allegra to dry you out.    Use pseudoephedrine (behind the counter) to decongest. Pseudoephedrine 30 mg up to 240 mg /day. It can raise your blood pressure and give you palpitations.    Use mucinex (guaifenesin) to break up mucous up to 2400mg/day to loosen any mucous.    The mucinex DM pill has a cough suppressant that can be sedating. It can be used at night to stop the tickle at the back of your throat.    You can use Mucinex D (it has guaifenesin and a high dose of pseudoephedrine) in the mornings to help decongest.    Use Afrin in each nare for no longer than 3 days, as it is addictive. It can also dry out your mucous membranes and cause elevated blood pressure. This is especially useful if you are flying.    Use Flonase 1-2 sprays/nostril per day. It is a local acting steroid nasal spray, if you develop a bloody nose, stop using the medication immediately.    Sometimes Nyquil at night is beneficial to help you get some rest, however it is sedating and it does have an antihistamine, and tylenol.    Honey is a natural cough suppressant that can be used.    Tylenol up to 4,000 mg a day is safe for short periods and can be used for body aches, pain, and fever. However in high doses and prolonged use it can cause liver irritation.    Ibuprofen is a non-steroidal anti-inflammatory that can be used for body aches, pain,  and fever.However it can also cause stomach irritation if over used.     Follow up with your PCP or specialty clinic as instructed in the next 2-3 days if not improved or as needed. You can call [phone] to schedule an appointment with appropriate provider.      If you condition worsens, we recommend that you receive another evaluation at the emergency room immediately or contact your primary medical clinic's after hours call service to discuss your concerns.      Please return here or go to the Emergency Department for any concerns or worsening condition.   You can also call [phone] to schedule an appointment with the appropriate provider.    Please return here or go to the Emergency Department for any concerns or worsening of condition.    Thank you for choosing [facility]!    Our goal in the Urgent Care is to always provide outstanding medical care. You may receive a survey by mail or e-mail in the next week regarding your experience today. We would greatly appreciate you completing and returning the survey. Your feedback provides us with a way to recognize our staff who provide very good care, and it helps us learn how to improve when your experience was below our aspiration of excellence.      We appreciate you trusting us with your medical care. We hope you feel better soon. We will be happy to take care of you for all of your future medical needs.    Sincerely,    SHENG Schuster  Follow up with your Primary Care Provider in 2-3 days if no improvement.  If you do not have one, please see the list provided and become established with one.  If your condition worsens we recommend that you receive another evaluation at the emergency room immediately or contact your primary medical clinics after hours call service to discuss your concerns.  Flonase nasal spray as directed. Breathe right strips at night to help you breathe.  A cool mist humidifier in bedroom may help with cough and  relieve stuffy nose.  Sore throat:  Lozenge, hard candy or honey.  Sinus rinses DO NOT USE TAP WATER, if you must, water must be a rolling boil for 1 minute, let it cool, then use.  May use distilled water.  Vics vapor rub in shower to help open nasal passages.  May use nasal gel to keep passages moisturized.  May use Nasal saline sprays during the day for added relief of congestion.   For those who go to the gym, please do not use the sauna or steam room now to clear sinuses.  During pollen season, change shirt if you are outside for a while when you go in.  Also wash your face.  Do not touch your face with your hands.  Wash your hands often in general while ill, avoid face contact with hands. Good nutrition. Lots of rest You may or may not have received a steroid injection, if you have, you may experience some jitters or develop nervousness.  You may also not rest well this night- these symptoms will resolve.  If you were give a prescription for steroids, do not medications such as Motrin, Advil, Ibuprofen, Aleve, Mobic, or Toradol while taking the steroid. these are non-steroidal anti-inflammatory medications which you do not need while taking steroids.  If you were given an antibiotic to take at home, please take it until it is completed even if you begin feeling better prior to the course of therapy being completed.  To attempt to minimize abdominal discomfort while taking antibiotics, you may try to take probiotics.  SEPARATE the antibiotics and probiotics by TWO hours, if not neither medication will work.  If you received a steroid shot today - this can elevate your blood pressure, elevate your blood sugar, water weight gain, nervous energy, redness to the face and dimpling of the skin where the shot goes in.     You must understand that you've received an Urgent Care treatment only and that you may be released before all your medical problems are known or treated. You, the patient, will arrange for follow up  care as instructed

## 2023-10-08 NOTE — PATIENT INSTRUCTIONS
General Discharge Instructions   PLEASE READ YOUR DISCHARGE INSTRUCTIONS ENTIRELY AS IT CONTAINS IMPORTANT INFORMATION.  If you were prescribed a narcotic or controlled medication, do not drive or operate heavy equipment or machinery while taking these medications.  If you were prescribed antibiotics, please take them to completion.  You must understand that you've received an Urgent Care treatment only and that you may be released before all your medical problems are known or treated. You, the patient, will arrange for follow up care as instructed.    OVER THE COUNTER RECOMMENDATIONS/SUGGESTIONS.    Make sure to stay well hydrated.    Use Nasal Saline to mechanically move any post nasal drip from your eustachian tube or from the back of your throat.    Use warm salt water gargles to ease your throat pain. Warm salt water gargles as needed for sore throat- 1/2 tsp salt to 1 cup warm water, gargle as desired.    Use an antihistamine such as Claritin, Zyrtec or Allegra to dry you out.    Use pseudoephedrine (behind the counter) to decongest. Pseudoephedrine 30 mg up to 240 mg /day. It can raise your blood pressure and give you palpitations.    Use mucinex (guaifenesin) to break up mucous up to 2400mg/day to loosen any mucous.    The mucinex DM pill has a cough suppressant that can be sedating. It can be used at night to stop the tickle at the back of your throat.    You can use Mucinex D (it has guaifenesin and a high dose of pseudoephedrine) in the mornings to help decongest.    Use Afrin in each nare for no longer than 3 days, as it is addictive. It can also dry out your mucous membranes and cause elevated blood pressure. This is especially useful if you are flying.    Use Flonase 1-2 sprays/nostril per day. It is a local acting steroid nasal spray, if you develop a bloody nose, stop using the medication immediately.    Sometimes Nyquil at night is beneficial to help you get some rest, however it is sedating and it  does have an antihistamine, and tylenol.    Honey is a natural cough suppressant that can be used.    Tylenol up to 4,000 mg a day is safe for short periods and can be used for body aches, pain, and fever. However in high doses and prolonged use it can cause liver irritation.    Ibuprofen is a non-steroidal anti-inflammatory that can be used for body aches, pain, and fever.However it can also cause stomach irritation if over used.     Follow up with your PCP or specialty clinic as instructed in the next 2-3 days if not improved or as needed. You can call [phone] to schedule an appointment with appropriate provider.      If you condition worsens, we recommend that you receive another evaluation at the emergency room immediately or contact your primary medical clinic's after hours call service to discuss your concerns.      Please return here or go to the Emergency Department for any concerns or worsening condition.   You can also call [phone] to schedule an appointment with the appropriate provider.    Please return here or go to the Emergency Department for any concerns or worsening of condition.    Thank you for choosing [facility]!    Our goal in the Urgent Care is to always provide outstanding medical care. You may receive a survey by mail or e-mail in the next week regarding your experience today. We would greatly appreciate you completing and returning the survey. Your feedback provides us with a way to recognize our staff who provide very good care, and it helps us learn how to improve when your experience was below our aspiration of excellence.      We appreciate you trusting us with your medical care. We hope you feel better soon. We will be happy to take care of you for all of your future medical needs.    Sincerely,    SHENG Schuster  Follow up with your Primary Care Provider in 2-3 days if no improvement.  If you do not have one, please see the list provided and become  established with one.  If your condition worsens we recommend that you receive another evaluation at the emergency room immediately or contact your primary medical clinics after hours call service to discuss your concerns.  Flonase nasal spray as directed. Breathe right strips at night to help you breathe.  A cool mist humidifier in bedroom may help with cough and relieve stuffy nose.  Sore throat:  Lozenge, hard candy or honey.  Sinus rinses DO NOT USE TAP WATER, if you must, water must be a rolling boil for 1 minute, let it cool, then use.  May use distilled water.  Vics vapor rub in shower to help open nasal passages.  May use nasal gel to keep passages moisturized.  May use Nasal saline sprays during the day for added relief of congestion.   For those who go to the gym, please do not use the sauna or steam room now to clear sinuses.  During pollen season, change shirt if you are outside for a while when you go in.  Also wash your face.  Do not touch your face with your hands.  Wash your hands often in general while ill, avoid face contact with hands. Good nutrition. Lots of rest You may or may not have received a steroid injection, if you have, you may experience some jitters or develop nervousness.  You may also not rest well this night- these symptoms will resolve.  If you were give a prescription for steroids, do not medications such as Motrin, Advil, Ibuprofen, Aleve, Mobic, or Toradol while taking the steroid. these are non-steroidal anti-inflammatory medications which you do not need while taking steroids.  If you were given an antibiotic to take at home, please take it until it is completed even if you begin feeling better prior to the course of therapy being completed.  To attempt to minimize abdominal discomfort while taking antibiotics, you may try to take probiotics.  SEPARATE the antibiotics and probiotics by TWO hours, if not neither medication will work.  If you received a steroid shot today - this  can elevate your blood pressure, elevate your blood sugar, water weight gain, nervous energy, redness to the face and dimpling of the skin where the shot goes in.     You must understand that you've received an Urgent Care treatment only and that you may be released before all your medical problems are known or treated. You, the patient, will arrange for follow up care as instructed

## 2023-10-21 ENCOUNTER — OFFICE VISIT (OUTPATIENT)
Dept: URGENT CARE | Facility: CLINIC | Age: 68
End: 2023-10-21
Payer: COMMERCIAL

## 2023-10-21 VITALS
DIASTOLIC BLOOD PRESSURE: 78 MMHG | BODY MASS INDEX: 27.66 KG/M2 | TEMPERATURE: 98 F | OXYGEN SATURATION: 96 % | WEIGHT: 166 LBS | SYSTOLIC BLOOD PRESSURE: 150 MMHG | RESPIRATION RATE: 20 BRPM | HEART RATE: 85 BPM | HEIGHT: 65 IN

## 2023-10-21 DIAGNOSIS — J01.00 ACUTE NON-RECURRENT MAXILLARY SINUSITIS: Primary | ICD-10-CM

## 2023-10-21 LAB
CTP QC/QA: YES
SARS-COV-2 AG RESP QL IA.RAPID: NEGATIVE

## 2023-10-21 PROCEDURE — 99213 OFFICE O/P EST LOW 20 MIN: CPT | Mod: S$GLB,,,

## 2023-10-21 PROCEDURE — 87811 SARS CORONAVIRUS 2 ANTIGEN POCT, MANUAL READ: ICD-10-PCS | Mod: QW,S$GLB,,

## 2023-10-21 PROCEDURE — 87811 SARS-COV-2 COVID19 W/OPTIC: CPT | Mod: QW,S$GLB,,

## 2023-10-21 PROCEDURE — 99213 PR OFFICE/OUTPT VISIT, EST, LEVL III, 20-29 MIN: ICD-10-PCS | Mod: S$GLB,,,

## 2023-10-21 RX ORDER — BENZONATATE 200 MG/1
200 CAPSULE ORAL 3 TIMES DAILY PRN
Qty: 30 CAPSULE | Refills: 0 | Status: SHIPPED | OUTPATIENT
Start: 2023-10-21 | End: 2023-10-31

## 2023-10-21 RX ORDER — AMOXICILLIN AND CLAVULANATE POTASSIUM 875; 125 MG/1; MG/1
1 TABLET, FILM COATED ORAL 2 TIMES DAILY
Qty: 14 TABLET | Refills: 0 | Status: SHIPPED | OUTPATIENT
Start: 2023-10-21 | End: 2023-10-28

## 2023-10-21 RX ORDER — SOLIFENACIN SUCCINATE 10 MG/1
1 TABLET, FILM COATED ORAL DAILY
COMMUNITY
Start: 2023-05-04 | End: 2024-03-11 | Stop reason: SDUPTHER

## 2023-10-21 RX ORDER — PREDNISONE 20 MG/1
40 TABLET ORAL DAILY
Qty: 8 TABLET | Refills: 0 | Status: SHIPPED | OUTPATIENT
Start: 2023-10-21 | End: 2023-10-25

## 2023-10-21 NOTE — PATIENT INSTRUCTIONS
"                                                          Sinusitis   If your condition worsens or fails to improve we recommend that you receive another evaluation at the ER immediately or contact your PCP to discuss your concerns or return here. You must understand that you've received an urgent care treatment only and that you may be released before all your medical problems are known or treated. You the patient will arrange for followup care as instructed.   If we discussed that I think your illness is viral it will not respond to antibiotics and it will last 10-14 days. However, if over the next few days the symptoms worsen start the antibiotics I have given you.   If we discussed that you require antibiotics start them now and take them to completion.   If you are female and on BCP and do take the antibiotics, use additional methods to prevent pregnancy while on the antibiotics and for one cycle after.   Steroids can raise both your blood pressure and blood sugar levels. Please continue to monitor both.  Flonase (fluticasone) is a nasal spray which is available over the counter and may help with your symptoms   Zyrtec D, Claritin D or allegra D can also help with symptoms of congestion and drainage.   If you have hypertension avoid using the "D" which is the decongestant   If you just have drainage you can take plain zyrtec, claritin or allegra   If you just have a congested feeling you can take pseudoephedrine (unless you have high blood pressure) which you have to sign for behind the counter. Do not buy the phenylephrine which is on the shelf as it is not effective   Rest and fluids are also important.   Tylenol or ibuprofen can also be used as directed for pain unless you have an allergy to them or medical condition such as stomach ulcers, kidney or liver disease or blood thinners etc for which you should not be taking these type of medications.   If you are flying in the next few days Afrin nose drops for " the airplane flight upon take off and landing may help. Other than at those times refrain from using afrin.   If you were prescribed a narcotic do not drive or operate heavy machinery while taking these medications.

## 2023-10-21 NOTE — PROGRESS NOTES
"Subjective:      Patient ID: Anushka Napier is a 68 y.o. female.    Vitals:  height is 5' 5" (1.651 m) and weight is 75.3 kg (166 lb 0.1 oz). Her oral temperature is 98.4 °F (36.9 °C). Her blood pressure is 150/78 (abnormal) and her pulse is 85. Her respiration is 20 and oxygen saturation is 96%.     Chief Complaint: No chief complaint on file.    Patient is a 68-year-old female presenting with a 2 week history of coughing, congestion, headache, sore throat, ear pressure.  Has been taking Tylenol ibuprofen.  Denies fever, chills, chest pain, SOB, nausea, vomiting, diarrhea.    Cough  This is a new problem. Episode onset: week ago. The problem has been gradually worsening. The problem occurs constantly. The cough is Productive of sputum. Associated symptoms include ear congestion, ear pain, headaches, nasal congestion, postnasal drip and a sore throat. Pertinent negatives include no chest pain, chills, fever, myalgias, rash or shortness of breath. Nothing aggravates the symptoms. Treatments tried: tylenol, ibuprohen. The treatment provided no relief.       Constitution: Negative for chills and fever.   HENT:  Positive for ear pain, congestion, postnasal drip, sinus pressure and sore throat.    Neck: Negative for neck pain and neck stiffness.   Cardiovascular:  Negative for chest pain.   Respiratory:  Positive for cough. Negative for shortness of breath.    Gastrointestinal:  Negative for abdominal pain, nausea, vomiting and diarrhea.   Genitourinary:  Negative for dysuria.   Musculoskeletal:  Negative for muscle ache.   Skin:  Negative for rash.   Allergic/Immunologic: Negative for sneezing.   Neurological:  Positive for headaches.      Objective:     Physical Exam   Constitutional: She is oriented to person, place, and time. She appears well-developed.   HENT:   Head: Normocephalic and atraumatic.   Ears:   Right Ear: External ear normal.   Left Ear: External ear normal.   Nose: Congestion present. Right sinus " exhibits maxillary sinus tenderness. Left sinus exhibits maxillary sinus tenderness.   Mouth/Throat: Oropharynx is clear and moist. Mucous membranes are moist. Oropharynx is clear.   Eyes: Conjunctivae, EOM and lids are normal. Pupils are equal, round, and reactive to light.   Neck: Trachea normal and phonation normal. Neck supple.   Musculoskeletal: Normal range of motion.         General: Normal range of motion.   Neurological: She is alert and oriented to person, place, and time.   Skin: Skin is warm, dry and intact.   Psychiatric: Her speech is normal and behavior is normal. Judgment and thought content normal.   Nursing note and vitals reviewed.      Results for orders placed or performed in visit on 10/21/23   SARS Coronavirus 2 Antigen, POCT Manual Read   Result Value Ref Range    SARS Coronavirus 2 Antigen Negative Negative     Acceptable Yes        Assessment:     1. Acute non-recurrent maxillary sinusitis        Plan:       Acute non-recurrent maxillary sinusitis  -     SARS Coronavirus 2 Antigen, POCT Manual Read  -     amoxicillin-clavulanate 875-125mg (AUGMENTIN) 875-125 mg per tablet; Take 1 tablet by mouth 2 (two) times daily. for 7 days  Dispense: 14 tablet; Refill: 0  -     predniSONE (DELTASONE) 20 MG tablet; Take 2 tablets (40 mg total) by mouth once daily. for 4 days  Dispense: 8 tablet; Refill: 0  -     benzonatate (TESSALON) 200 MG capsule; Take 1 capsule (200 mg total) by mouth 3 (three) times daily as needed for Cough.  Dispense: 30 capsule; Refill: 0              Patient Instructions                                                             Sinusitis   If your condition worsens or fails to improve we recommend that you receive another evaluation at the ER immediately or contact your PCP to discuss your concerns or return here. You must understand that you've received an urgent care treatment only and that you may be released before all your medical problems are known or  "treated. You the patient will arrange for followup care as instructed.   If we discussed that I think your illness is viral it will not respond to antibiotics and it will last 10-14 days. However, if over the next few days the symptoms worsen start the antibiotics I have given you.   If we discussed that you require antibiotics start them now and take them to completion.   If you are female and on BCP and do take the antibiotics, use additional methods to prevent pregnancy while on the antibiotics and for one cycle after.   Steroids can raise both your blood pressure and blood sugar levels. Please continue to monitor both.  Flonase (fluticasone) is a nasal spray which is available over the counter and may help with your symptoms   Zyrtec D, Claritin D or allegra D can also help with symptoms of congestion and drainage.   If you have hypertension avoid using the "D" which is the decongestant   If you just have drainage you can take plain zyrtec, claritin or allegra   If you just have a congested feeling you can take pseudoephedrine (unless you have high blood pressure) which you have to sign for behind the counter. Do not buy the phenylephrine which is on the shelf as it is not effective   Rest and fluids are also important.   Tylenol or ibuprofen can also be used as directed for pain unless you have an allergy to them or medical condition such as stomach ulcers, kidney or liver disease or blood thinners etc for which you should not be taking these type of medications.   If you are flying in the next few days Afrin nose drops for the airplane flight upon take off and landing may help. Other than at those times refrain from using afrin.   If you were prescribed a narcotic do not drive or operate heavy machinery while taking these medications.           "

## 2023-10-29 NOTE — TELEPHONE ENCOUNTER
Care Due:                  Date            Visit Type   Department     Provider  --------------------------------------------------------------------------------                                [facility]                              FOLLOWUP/OF  MED/ INTERNAL  Gerardo Chappell  Last Visit: 04-      [facility] VISIT   MED/ PEDS      [place]  Next Visit: None Scheduled  None         None Found                                                            Last  Test          Frequency    Reason                     Performed    Due Date  --------------------------------------------------------------------------------    HBA1C.......  6 months...  metFORMIN, semaglutide...  04-   10-    [facility] Embedded Care Due Messages. Reference number: [id number].   10/29/2023 2:21:12 PM CDT

## 2023-10-30 RX ORDER — RABEPRAZOLE SODIUM 20 MG/1
TABLET, DELAYED RELEASE ORAL
Qty: 180 TABLET | Refills: 12 | Status: SHIPPED | OUTPATIENT
Start: 2023-10-30

## 2023-10-30 NOTE — TELEPHONE ENCOUNTER
Refill Routing Note   Medication(s) are not appropriate for processing by [facility] for the following reason(s):      Medication outside of protocol    [facility] action(s):  Route Care Due:  Labs due     Medication Therapy Plan: Dosage exceeds [facility] protocol      Appointments  past 12m or future 3m with PCP    Date Provider   Last Visit   4/24/2023 Gerardo Dobbs MD   Next Visit   Visit date not found Gerardo Dobbs MD   ED visits in past 90 days: 0        Note composed:3:01 AM 10/30/2023

## 2023-11-07 ENCOUNTER — TELEPHONE (OUTPATIENT)
Dept: PHARMACY | Facility: CLINIC | Age: 68
End: 2023-11-07
Payer: COMMERCIAL

## 2023-12-01 ENCOUNTER — TELEPHONE (OUTPATIENT)
Dept: OTOLARYNGOLOGY | Facility: CLINIC | Age: 68
End: 2023-12-01
Payer: COMMERCIAL

## 2023-12-01 ENCOUNTER — HOSPITAL ENCOUNTER (EMERGENCY)
Facility: HOSPITAL | Age: 68
Discharge: HOME | End: 2023-12-01
Attending: EMERGENCY MEDICINE
Payer: COMMERCIAL

## 2023-12-01 VITALS
RESPIRATION RATE: 22 BRPM | HEART RATE: 77 BPM | WEIGHT: 160 LBS | DIASTOLIC BLOOD PRESSURE: 68 MMHG | OXYGEN SATURATION: 97 % | HEIGHT: 66 IN | SYSTOLIC BLOOD PRESSURE: 135 MMHG | BODY MASS INDEX: 25.71 KG/M2

## 2023-12-01 DIAGNOSIS — R04.0 LEFT-SIDED EPISTAXIS: Primary | ICD-10-CM

## 2023-12-01 LAB
ALBUMIN SERPL BCP-MCNC: 3.9 G/DL (ref 3.5–5.2)
ALP SERPL-CCNC: 126 U/L (ref 55–135)
ALT SERPL W/O P-5'-P-CCNC: 14 U/L (ref 10–44)
ANION GAP SERPL CALC-SCNC: 11 MMOL/L (ref 8–16)
AST SERPL-CCNC: 19 U/L (ref 10–40)
BASOPHILS # BLD AUTO: 0.09 K/UL (ref 0–0.2)
BASOPHILS NFR BLD: 1.1 % (ref 0–1.9)
BILIRUB SERPL-MCNC: 0.6 MG/DL (ref 0.1–1)
BUN SERPL-MCNC: 12 MG/DL (ref 8–23)
CALCIUM SERPL-MCNC: 9.3 MG/DL (ref 8.7–10.5)
CHLORIDE SERPL-SCNC: 107 MMOL/L (ref 95–110)
CO2 SERPL-SCNC: 21 MMOL/L (ref 23–29)
CREAT SERPL-MCNC: 0.8 MG/DL (ref 0.5–1.4)
DIFFERENTIAL METHOD: NORMAL
EOSINOPHIL # BLD AUTO: 0.2 K/UL (ref 0–0.5)
EOSINOPHIL NFR BLD: 2 % (ref 0–8)
ERYTHROCYTE [DISTWIDTH] IN BLOOD BY AUTOMATED COUNT: 13.8 % (ref 11.5–14.5)
EST. GFR  (NO RACE VARIABLE): >60 ML/MIN/1.73 M^2
GLUCOSE SERPL-MCNC: 136 MG/DL (ref 70–110)
HCT VFR BLD AUTO: 40 % (ref 37–48.5)
HGB BLD-MCNC: 12.9 G/DL (ref 12–16)
IMM GRANULOCYTES # BLD AUTO: 0.02 K/UL (ref 0–0.04)
IMM GRANULOCYTES NFR BLD AUTO: 0.2 % (ref 0–0.5)
LYMPHOCYTES # BLD AUTO: 1.9 K/UL (ref 1–4.8)
LYMPHOCYTES NFR BLD: 23.6 % (ref 18–48)
MCH RBC QN AUTO: 28.4 PG (ref 27–31)
MCHC RBC AUTO-ENTMCNC: 32.3 G/DL (ref 32–36)
MCV RBC AUTO: 88 FL (ref 82–98)
MONOCYTES # BLD AUTO: 0.6 K/UL (ref 0.3–1)
MONOCYTES NFR BLD: 7.4 % (ref 4–15)
NEUTROPHILS # BLD AUTO: 5.3 K/UL (ref 1.8–7.7)
NEUTROPHILS NFR BLD: 65.7 % (ref 38–73)
NRBC BLD-RTO: 0 /100 WBC
PLATELET # BLD AUTO: 345 K/UL (ref 150–450)
PMV BLD AUTO: 9.8 FL (ref 9.2–12.9)
POTASSIUM SERPL-SCNC: 4.3 MMOL/L (ref 3.5–5.1)
PROT SERPL-MCNC: 7.8 G/DL (ref 6–8.4)
RBC # BLD AUTO: 4.54 M/UL (ref 4–5.4)
SODIUM SERPL-SCNC: 139 MMOL/L (ref 136–145)
WBC # BLD AUTO: 8.01 K/UL (ref 3.9–12.7)

## 2023-12-01 PROCEDURE — 63600175 PHARM REV CODE 636 W HCPCS: Performed by: EMERGENCY MEDICINE

## 2023-12-01 PROCEDURE — 96374 THER/PROPH/DIAG INJ IV PUSH: CPT

## 2023-12-01 PROCEDURE — 80053 COMPREHEN METABOLIC PANEL: CPT | Performed by: EMERGENCY MEDICINE

## 2023-12-01 PROCEDURE — 96375 TX/PRO/DX INJ NEW DRUG ADDON: CPT

## 2023-12-01 PROCEDURE — 85025 COMPLETE CBC W/AUTO DIFF WBC: CPT | Performed by: EMERGENCY MEDICINE

## 2023-12-01 PROCEDURE — 99284 EMERGENCY DEPT VISIT MOD MDM: CPT | Mod: 25

## 2023-12-01 PROCEDURE — 25000003 PHARM REV CODE 250: Performed by: EMERGENCY MEDICINE

## 2023-12-01 RX ORDER — ONDANSETRON 2 MG/ML
4 INJECTION INTRAMUSCULAR; INTRAVENOUS
Status: COMPLETED | OUTPATIENT
Start: 2023-12-01 | End: 2023-12-01

## 2023-12-01 RX ORDER — DIPHENHYDRAMINE HYDROCHLORIDE 50 MG/ML
25 INJECTION INTRAMUSCULAR; INTRAVENOUS
Status: COMPLETED | OUTPATIENT
Start: 2023-12-01 | End: 2023-12-01

## 2023-12-01 RX ORDER — CEPHALEXIN 500 MG/1
1000 CAPSULE ORAL EVERY 12 HOURS
Qty: 28 CAPSULE | Refills: 0 | Status: SHIPPED | OUTPATIENT
Start: 2023-12-01 | End: 2023-12-08

## 2023-12-01 RX ORDER — CLONIDINE HYDROCHLORIDE 0.1 MG/1
0.1 TABLET ORAL
Status: COMPLETED | OUTPATIENT
Start: 2023-12-01 | End: 2023-12-01

## 2023-12-01 RX ORDER — HYDRALAZINE HYDROCHLORIDE 20 MG/ML
10 INJECTION INTRAMUSCULAR; INTRAVENOUS
Status: COMPLETED | OUTPATIENT
Start: 2023-12-01 | End: 2023-12-01

## 2023-12-01 RX ORDER — CEPHALEXIN 500 MG/1
1000 CAPSULE ORAL EVERY 12 HOURS
Qty: 28 CAPSULE | Refills: 0 | Status: SHIPPED | OUTPATIENT
Start: 2023-12-01 | End: 2023-12-01 | Stop reason: SDUPTHER

## 2023-12-01 RX ORDER — HYDROCODONE BITARTRATE AND ACETAMINOPHEN 5; 325 MG/1; MG/1
1 TABLET ORAL EVERY 6 HOURS PRN
Qty: 15 TABLET | Refills: 0 | Status: SHIPPED | OUTPATIENT
Start: 2023-12-01 | End: 2024-02-28

## 2023-12-01 RX ORDER — HYDROMORPHONE HYDROCHLORIDE 1 MG/ML
1 INJECTION, SOLUTION INTRAMUSCULAR; INTRAVENOUS; SUBCUTANEOUS
Status: COMPLETED | OUTPATIENT
Start: 2023-12-01 | End: 2023-12-01

## 2023-12-01 RX ADMIN — CLONIDINE HYDROCHLORIDE 0.1 MG: 0.1 TABLET ORAL at 08:12

## 2023-12-01 RX ADMIN — ONDANSETRON 4 MG: 2 INJECTION INTRAMUSCULAR; INTRAVENOUS at 07:12

## 2023-12-01 RX ADMIN — DIPHENHYDRAMINE HYDROCHLORIDE 25 MG: 50 INJECTION, SOLUTION INTRAMUSCULAR; INTRAVENOUS at 07:12

## 2023-12-01 RX ADMIN — HYDROMORPHONE HYDROCHLORIDE 1 MG: 1 INJECTION, SOLUTION INTRAMUSCULAR; INTRAVENOUS; SUBCUTANEOUS at 07:12

## 2023-12-01 RX ADMIN — HYDRALAZINE HYDROCHLORIDE 10 MG: 20 INJECTION INTRAMUSCULAR; INTRAVENOUS at 08:12

## 2023-12-01 NOTE — ED PROVIDER NOTES
Encounter Date: 12/1/2023    SCRIBE #1 NOTE: I, Hiwot Cabrera, am scribing for, and in the presence of,  Javan Le M. I have scribed the following portions of the note - Other sections scribed: HPI, ROS.       History     Chief Complaint   Patient presents with    Epistaxis     Ems called to 67yo female that had a spontaneous nosebleed start this morning about 0630 and she cannot get it to stop. Bp is elevated. Reported that she had to have one cauterized in the past.      CC: epistaxis    HPI: This is a 68 y.o.female patient, with a PMHx of Basal cell carcinoma, DM, HTN, and Hyperlipidemia, presenting to the ED for further evaluation of spontaneous nose bleed occurring at 6:30 AM today. Patient reports bleeding is primarily from the left nare. Patient reports a history of epistaxis. Patient states she can not get the bleeding to stop. Patient reports she had to have one cauterized in the past. Additional history obtained from independent historian: EMS. EMS reports patient's BP was elevated at 150/100. Patient denies any hematuria or melena/hematochezia. Patient denies cough, shortness of breath, chest pain, fever, chills, abdominal pain, nausea, vomiting, diarrhea, dysuria, headaches, congestion, sore throat, arm or leg trouble, eye pain, ear pain, rash, or other associated symptoms. No prior Tx. No alleviating or aggravating factors. Patient denies being on any blood thinners but states she does take Aspirin daily.         The history is provided by the patient and the EMS personnel. No  was used.     Review of patient's allergies indicates:   Allergen Reactions    Tizanidine Swelling     Tongue swelling     Codeine Hives     Other reaction(s): Itching  Other reaction(s): Hives    Iodinated contrast media Hives     Other reaction(s): Hives    Iodine      Other reaction(s): Unknown    Sulfamethoxazole-trimethoprim Itching     Other reaction(s): Itching  Other reaction(s): Hives     Epinephrine Anxiety and Other (See Comments)     Almost passed out.     Past Medical History:   Diagnosis Date    Allergy     Anxiety     Basal cell carcinoma     to face    Depression     Diabetes mellitus     Diabetes mellitus type II, uncontrolled 9/17/2014    Diabetic retinopathy 01/14/2020    DM retinopathy     GERD (gastroesophageal reflux disease) 9/25/2014    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 Dr Correia, long term PPI use    History of colonic polyps 1/24/2020    Hyperlipidemia     Hypertension     Osteopenia 9/25/2014    Screening for colorectal cancer 9/25/2014    Normal 2009    Screening for colorectal cancer     Thyroid disease     Vitamin D deficiency disease 9/25/2014     Past Surgical History:   Procedure Laterality Date    HYSTERECTOMY  1997    complete for prolapse, Abdominal    OOPHORECTOMY      right shoulder      SINUS SURGERY  2012     Family History   Problem Relation Age of Onset    Breast cancer Cousin 40    Miscarriages / Stillbirths Maternal Grandmother     Miscarriages / Stillbirths Mother     Diabetes Mother     Hypertension Mother     Cancer Mother         cervical    Heart disease Father     Hypertension Father     Ovarian cancer Neg Hx      Social History     Tobacco Use    Smoking status: Never    Smokeless tobacco: Never   Substance Use Topics    Alcohol use: No    Drug use: No     Review of Systems   Constitutional:  Negative for chills, diaphoresis and fever.   HENT:  Positive for nosebleeds. Negative for ear pain and sore throat.    Eyes:  Negative for pain.   Respiratory:  Negative for cough and shortness of breath.    Cardiovascular:  Negative for chest pain.   Gastrointestinal:  Negative for abdominal pain, diarrhea, nausea and vomiting.   Genitourinary:  Negative for dysuria and hematuria.   Musculoskeletal:  Negative for back pain.        (-) Arm or leg trouble.    Skin:  Negative for rash.   Neurological:  Negative for headaches.   Psychiatric/Behavioral:  Negative for confusion.         Physical Exam     Initial Vitals [12/01/23 0736]   BP Pulse Resp Temp SpO2   (!) 211/98 66 18 -- 97 %      MAP       --         Physical Exam  The patient was examined specifically for the following:   General:No significant distress, Good color, Warm and dry. Head and neck:Scalp atraumatic, Neck supple. Neurological:Appropriate conversation, Gross motor deficits. Eyes:Conjugate gaze, Clear corneas. ENT: No epistaxis. Cardiac: Regular rate and rhythm, Grossly normal heart tones. Pulmonary: Wheezing, Rales. Gastrointestinal: Abdominal tenderness, Abdominal distention. Musculoskeletal: Extremity deformity, Apparent pain with range of motion of the joints. Skin: Rash.   The findings on examination were normal except for the following:  The patient has brisk epistaxis from the left nares.  The patient's blood pressure on arrival is 211/98.  The lungs are clear.  The heart tones are normal.  The abdomen is soft.  The patient has good color.  There are no petechiae or purpura in the lower extremities.  ED Course   Procedures  Labs Reviewed   COMPREHENSIVE METABOLIC PANEL - Abnormal; Notable for the following components:       Result Value    CO2 21 (*)     Glucose 136 (*)     All other components within normal limits   CBC W/ AUTO DIFFERENTIAL          Imaging Results    None          Medications   HYDROmorphone injection 1 mg (1 mg Intravenous Given 12/1/23 0746)   diphenhydrAMINE injection 25 mg (25 mg Intravenous Given 12/1/23 0747)   ondansetron injection 4 mg (4 mg Intravenous Given 12/1/23 0746)   hydrALAZINE injection 10 mg (10 mg Intravenous Given 12/1/23 0808)   cloNIDine tablet 0.1 mg (0.1 mg Oral Given 12/1/23 0807)     Medical Decision Making  Amount and/or Complexity of Data Reviewed  Labs: ordered.    Risk  Prescription drug management.    Given the above, this patient presents emergency room with some left-sided epistaxis.  The bleeding was brisk.  A Merocel sponge was placed.  Bleeding was  controlled.  The patient had uncontrolled hypertension with a systolic blood pressure of 211.  The patient was treated with clonidine and hydralazine IV.  Repeat blood pressure is 157/75.  The patient was treated with hydromorphone for discomfort.  She was also treated with nausea and Benadryl.  Her symptoms improved.  The bleeding was stopped.  I will discharge to outpatient evaluation and treatment.  I will refer to ENT.        Scribe Attestation:   Scribe #1: I performed the above scribed service and the documentation accurately describes the services I performed. I attest to the accuracy of the note.                               Clinical Impression:  Final diagnoses:  [R04.0] Left-sided epistaxis (Primary)          ED Disposition Condition    Discharge Stable          ED Prescriptions       Medication Sig Dispense Start Date End Date Auth. Provider    HYDROcodone-acetaminophen (NORCO) 5-325 mg per tablet Take 1 tablet by mouth every 6 (six) hours as needed for Pain. 15 tablet 12/1/2023 -- Javan Le MD    cephALEXin (KEFLEX) 500 MG capsule Take 2 capsules (1,000 mg total) by mouth every 12 (twelve) hours. for 7 days 28 capsule 12/1/2023 12/8/2023 Javan Le MD          Follow-up Information       Follow up With Specialties Details Why Contact Info    Kaitlyn Grover MD Otolaryngology In 5 days  [address] [place] [id number] [phone]              Please note that the documentation on this chart was provided by the scribe above on the date of service noted above, and that the documentation in the chart accurately reflects the work and decisions made by me alone.  Signed, Javan Muñoz MD  12/01/23 9189

## 2023-12-01 NOTE — TELEPHONE ENCOUNTER
----- Message from Lydia Noriega sent at 12/1/2023  9:22 AM CST -----  Name of Caller pt   Reason for Visit/Symptoms pt requesting to be seen today for hosp f/u. She has nose bleed. The hospital packed it and needs something to pull out the packing. Call pt   Best Contact Number or Confirm if Mychart Preferred [phone] (home)     Preferred Date/Time of Appointment today   Interested in Virtual Visit (yes/no)  Additional Information

## 2023-12-01 NOTE — DISCHARGE INSTRUCTIONS
Medicines as directed.  Please return immediately if you get worse or if new problems develop.  You may use Benadryl 25-50 mg by mouth every 6 hours as needed for itching.  Tylenol 1000 mg by mouth every 8 hours as needed for discomfort, or Tylenol with hydrocodone as directed.  Keflex as directed.  Please follow-up with the Ear Nose and Throat doctor above in 5 days.  Call today for an appointment.

## 2023-12-01 NOTE — ED NOTES
Pt to ER per EMS with epistasis starting 30min PTA. Pt hypertensive. Pt to room 3. Dr Menchaca at bedside for evaluation

## 2023-12-07 ENCOUNTER — HOSPITAL ENCOUNTER (EMERGENCY)
Facility: HOSPITAL | Age: 68
Discharge: HOME | End: 2023-12-07
Attending: EMERGENCY MEDICINE
Payer: COMMERCIAL

## 2023-12-07 VITALS
HEART RATE: 67 BPM | RESPIRATION RATE: 16 BRPM | OXYGEN SATURATION: 96 % | TEMPERATURE: 98 F | WEIGHT: 160 LBS | HEIGHT: 66 IN | DIASTOLIC BLOOD PRESSURE: 65 MMHG | BODY MASS INDEX: 25.71 KG/M2 | SYSTOLIC BLOOD PRESSURE: 148 MMHG

## 2023-12-07 DIAGNOSIS — R04.0 EPISTAXIS: Primary | ICD-10-CM

## 2023-12-07 PROCEDURE — 25000003 PHARM REV CODE 250: Performed by: EMERGENCY MEDICINE

## 2023-12-07 PROCEDURE — 96374 THER/PROPH/DIAG INJ IV PUSH: CPT

## 2023-12-07 PROCEDURE — 30901 CONTROL OF NOSEBLEED: CPT | Mod: LT

## 2023-12-07 PROCEDURE — 99284 EMERGENCY DEPT VISIT MOD MDM: CPT | Mod: 25

## 2023-12-07 RX ORDER — OXYMETAZOLINE HCL 0.05 %
1 SPRAY, NON-AEROSOL (ML) NASAL
Status: COMPLETED | OUTPATIENT
Start: 2023-12-07 | End: 2023-12-07

## 2023-12-07 RX ADMIN — NASAL DECONGESTANT 1 SPRAY: 0.05 SPRAY NASAL at 07:12

## 2023-12-07 RX ADMIN — TRANEXAMIC ACID 25 MG: 100 INJECTION, SOLUTION INTRAVENOUS at 07:12

## 2023-12-07 RX ADMIN — BACITRACIN ZINC, NEOMYCIN, POLYMYXIN B 1 EACH: 400; 3.5; 5 OINTMENT TOPICAL at 08:12

## 2023-12-08 ENCOUNTER — PATIENT OUTREACH (OUTPATIENT)
Dept: ADMINISTRATIVE | Facility: HOSPITAL | Age: 68
End: 2023-12-08
Payer: COMMERCIAL

## 2023-12-08 ENCOUNTER — TELEPHONE (OUTPATIENT)
Dept: FAMILY MEDICINE | Facility: CLINIC | Age: 68
End: 2023-12-08
Payer: COMMERCIAL

## 2023-12-08 VITALS — DIASTOLIC BLOOD PRESSURE: 83 MMHG | SYSTOLIC BLOOD PRESSURE: 164 MMHG

## 2023-12-08 DIAGNOSIS — E11.65 UNCONTROLLED TYPE 2 DIABETES MELLITUS WITH HYPERGLYCEMIA: Primary | ICD-10-CM

## 2023-12-08 DIAGNOSIS — Z12.31 BREAST CANCER SCREENING BY MAMMOGRAM: ICD-10-CM

## 2023-12-08 DIAGNOSIS — R04.0 EPISTAXIS: Primary | ICD-10-CM

## 2023-12-08 RX ORDER — VALSARTAN 80 MG/1
80 TABLET ORAL DAILY
Qty: 90 TABLET | Refills: 3 | Status: SHIPPED | OUTPATIENT
Start: 2023-12-08 | End: 2024-12-07

## 2023-12-08 NOTE — TELEPHONE ENCOUNTER
Patient called stating that she went back to the ER yesterday for a nose bleed. Her nose is bleeding again today. She was in the ER on 12/01/23 for the same problem. Review of her chart showed her BP to be very high and states that the nose bleed is occurring when the BP is high. She is requesting a referral to an ENT. BP is 164/83.

## 2023-12-08 NOTE — ED PROVIDER NOTES
Encounter Date: 12/7/2023    SCRIBE #1 NOTE: I, Steve Hunter, am scribing for, and in the presence of,  Javan Hernandez MD.       History     Chief Complaint   Patient presents with    Epistaxis     69 yo fem to triage for nose bleed x 10 minutes ago. Pt has is actively bleeding in triage. Says she was seen here about a week ago for same thing and had her nose packed. Also states it happens when her BP is high. BP is 225/105 in triage, feeling very lightheaded and weak.     Hypertension    Weakness     Anushka Napier is a 68 y.o. female with a PMHx of BCC, DM, HTN, HLD, who presents to the ED for evaluation of epistaxis beginning 10 minutes PTA. Patient reports she was initially seen on Friday for complaints of epistaxis, noting she had her nose packed then. She states she was at home today when she had another episode once again. She endorses associated dizziness. Denies previous nose surgeries. No medications taken PTA. No alleviating or exacerbating factors noted. Denies fever, chills, headache, CP, SOB, or other associated symptoms. Denies EtOH, tobacco, or other illicit drug usage.     The history is provided by the patient. No  was used.     Review of patient's allergies indicates:   Allergen Reactions    Tizanidine Swelling     Tongue swelling     Codeine Hives     Other reaction(s): Itching  Other reaction(s): Hives    Iodinated contrast media Hives     Other reaction(s): Hives    Iodine      Other reaction(s): Unknown    Sulfamethoxazole-trimethoprim Itching     Other reaction(s): Itching  Other reaction(s): Hives    Epinephrine Anxiety and Other (See Comments)     Almost passed out.     Past Medical History:   Diagnosis Date    Allergy     Anxiety     Basal cell carcinoma     to face    Depression     Diabetes mellitus     Diabetes mellitus type II, uncontrolled 9/17/2014    Diabetic retinopathy 01/14/2020    DM retinopathy     GERD (gastroesophageal reflux disease) 9/25/2014    Possible  Carter's = EGDs per Dr Correia, long term PPI use    History of colonic polyps 1/24/2020    Hyperlipidemia     Hypertension     Osteopenia 9/25/2014    Screening for colorectal cancer 9/25/2014    Normal 2009    Screening for colorectal cancer     Thyroid disease     Vitamin D deficiency disease 9/25/2014     Past Surgical History:   Procedure Laterality Date    HYSTERECTOMY  1997    complete for prolapse, Abdominal    OOPHORECTOMY      right shoulder      SINUS SURGERY  2012     Family History   Problem Relation Age of Onset    Breast cancer Cousin 40    Miscarriages / Stillbirths Maternal Grandmother     Miscarriages / Stillbirths Mother     Diabetes Mother     Hypertension Mother     Cancer Mother         cervical    Heart disease Father     Hypertension Father     Ovarian cancer Neg Hx      Social History     Tobacco Use    Smoking status: Never    Smokeless tobacco: Never   Substance Use Topics    Alcohol use: No    Drug use: No     Review of Systems   Constitutional:  Negative for chills and fever.   HENT:  Positive for nosebleeds. Negative for sore throat.    Respiratory:  Negative for cough and shortness of breath.    Cardiovascular:  Negative for chest pain.   Gastrointestinal:  Negative for nausea.   Genitourinary:  Negative for dysuria.   Musculoskeletal:  Negative for back pain.   Skin:  Negative for rash.   Neurological:  Positive for dizziness. Negative for weakness.   Psychiatric/Behavioral:  Negative for confusion.        Physical Exam     Initial Vitals [12/07/23 1829]   BP Pulse Resp Temp SpO2   (!) 225/105 80 17 98 °F (36.7 °C) 97 %      MAP       --         Physical Exam    Nursing note and vitals reviewed.  Constitutional: She appears well-developed and well-nourished.   HENT:   Head: Normocephalic and atraumatic.   Epistaxis left nares.   Eyes: Conjunctivae are normal. Pupils are equal, round, and reactive to light.   Neck: Neck supple.   Normal range of motion.  Cardiovascular:  Normal rate,  regular rhythm and normal heart sounds.           Pulmonary/Chest: Breath sounds normal.   Abdominal: Abdomen is soft. She exhibits no distension. There is no abdominal tenderness.   Musculoskeletal:         General: Normal range of motion.      Cervical back: Normal range of motion and neck supple.     Neurological: She is alert and oriented to person, place, and time. GCS score is 15. GCS eye subscore is 4. GCS verbal subscore is 5. GCS motor subscore is 6.   Skin: Skin is warm and dry. Capillary refill takes less than 2 seconds.   Psychiatric: She has a normal mood and affect. Her behavior is normal. Judgment and thought content normal.         ED Course   Procedures  Labs Reviewed - No data to display       Imaging Results    None          Medications   tranexamic acid (CYKLOKAPRON) 500 mg in sodium chloride 0.9% 100 mL (0 mg Intravenous Stopped 12/7/23 1901)   oxymetazoline 0.05 % nasal spray 1 spray (1 spray Each Nostril Given by Provider 12/7/23 1900)   neomycin-bacitracnZn-polymyxnB packet 1 each (1 each Topical (Top) Given 12/7/23 2054)     Medical Decision Making    68-year-old female presenting with epistaxis from the left nare.  Atraumatic.  This is the 2nd incident and within the last few days.  Initially attempted  minimal improvement.  Attempted TXA with some improvement however bled.  Bleeding controlled with Merocel and TXA.  Bacitracin added for emollient.  Discussed holding pressure if bleeding reoccurs.  Discussed using home humidifier.  Patient is to follow up with primary care.      Differential diagnosis includes epistaxis secondary to dry nose    Risk  OTC drugs.            Scribe Attestation:   Scribe #1: I performed the above scribed service and the documentation accurately describes the services I performed. I attest to the accuracy of the note.        ED Course as of 12/08/23 0253   u Dec 07, 2023   1910 Nasal packing placed with TXA.   [JM]   2109 Bleeding controlled.   Bacitracin applied. [JM]      ED Course User Index  [JM] Javan Hernandez MD                       I, Javan Hernandez, personally performed the services described in this documentation. All medical record entries made by the scribe were at my direction and in my presence. I have reviewed the chart and agree that the record reflects my personal performance and is accurate and complete.      Clinical Impression:  Final diagnoses:  [R04.0] Epistaxis (Primary)          ED Disposition Condition    Discharge Stable          ED Prescriptions    None       Follow-up Information       Follow up With Specialties Details Why Contact Info    Gerardo Dobbs MD Internal Medicine Schedule an appointment as soon as possible for a visit in 3 days Primary care [address] [facility] [address]  [phone]      [facility] [place] [facility] Emergency Medicine  If symptoms worsen [address] [facility] [place] [id number] [phone]             Javan Hernandez MD  12/08/23 0253

## 2023-12-08 NOTE — ED TRIAGE NOTES
Patient presents to the ED c/o epistaxis, localized to Left nostril. Pt reports being seen and tx here for same complaint last Friday and had her nose packed. Bleeding controlled at this time w/ packing placed by MD upon arrival to ED. Denies lightheadedness/dizziness at this current time.

## 2023-12-08 NOTE — DISCHARGE INSTRUCTIONS
Recommend using humidifier near your bedside to help with nasal drying.  Recommend using Vaseline or Aquaphor to help coat the inside of the nose.  Can apply with a Q-tip.    If bleeding reoccurs hold pressure on the soft part of the nose.

## 2023-12-08 NOTE — TELEPHONE ENCOUNTER
Spoke to patient on phone.  Her  is my 1st cousin.  She did go to urgent ENT and they did some packing previously.  The PA said she thought she can see the blood vessel which was towards the front.  Today when she pinched her nose it did eventually stop the bleeding today.  Her blood pressure was in the 160s but prior to that was 122/78.  It has been running a little high with a lot of stress lately, lots of deaths in the family and friends.  We discussed adding an ARB which is probably overdue anyway with her diabetes.  Will start with valsartan 80 and adjust accordingly and she will continue her metoprolol.  Her pulse earlier today was 58 so probably can not easily increase her beta-blocker.  ER precautions given an today being Friday she might even want to go back to urgent ENT today if bleeding recurs.  Labs in the ER December 1st reviewed and looked okay.

## 2023-12-11 ENCOUNTER — PATIENT MESSAGE (OUTPATIENT)
Dept: OTOLARYNGOLOGY | Facility: CLINIC | Age: 68
End: 2023-12-11
Payer: COMMERCIAL

## 2023-12-28 ENCOUNTER — OFFICE VISIT (OUTPATIENT)
Dept: NEUROLOGY | Facility: CLINIC | Age: 68
End: 2023-12-28
Payer: COMMERCIAL

## 2023-12-28 ENCOUNTER — PATIENT MESSAGE (OUTPATIENT)
Dept: ADMINISTRATIVE | Facility: HOSPITAL | Age: 68
End: 2023-12-28
Payer: COMMERCIAL

## 2023-12-28 VITALS
WEIGHT: 160 LBS | HEART RATE: 65 BPM | BODY MASS INDEX: 26.66 KG/M2 | DIASTOLIC BLOOD PRESSURE: 74 MMHG | SYSTOLIC BLOOD PRESSURE: 148 MMHG | HEIGHT: 65 IN

## 2023-12-28 DIAGNOSIS — Z71.89 COUNSELING REGARDING GOALS OF CARE: ICD-10-CM

## 2023-12-28 DIAGNOSIS — R26.89 IMBALANCE: ICD-10-CM

## 2023-12-28 DIAGNOSIS — E03.9 HYPOTHYROIDISM, UNSPECIFIED TYPE: ICD-10-CM

## 2023-12-28 DIAGNOSIS — R53.1 WEAKNESS: ICD-10-CM

## 2023-12-28 DIAGNOSIS — R25.1 TREMOR: Primary | ICD-10-CM

## 2023-12-28 PROCEDURE — 1126F PR PAIN SEVERITY QUANTIFIED, NO PAIN PRESENT: ICD-10-PCS | Mod: CPTII,S$GLB,, | Performed by: PHYSICIAN ASSISTANT

## 2023-12-28 PROCEDURE — 99204 PR OFFICE/OUTPT VISIT, NEW, LEVL IV, 45-59 MIN: ICD-10-PCS | Mod: S$GLB,,, | Performed by: PHYSICIAN ASSISTANT

## 2023-12-28 PROCEDURE — 4010F ACE/ARB THERAPY RXD/TAKEN: CPT | Mod: CPTII,S$GLB,, | Performed by: PHYSICIAN ASSISTANT

## 2023-12-28 PROCEDURE — 3288F FALL RISK ASSESSMENT DOCD: CPT | Mod: CPTII,S$GLB,, | Performed by: PHYSICIAN ASSISTANT

## 2023-12-28 PROCEDURE — 4010F PR ACE/ARB THEARPY RXD/TAKEN: ICD-10-PCS | Mod: CPTII,S$GLB,, | Performed by: PHYSICIAN ASSISTANT

## 2023-12-28 PROCEDURE — 1160F RVW MEDS BY RX/DR IN RCRD: CPT | Mod: CPTII,S$GLB,, | Performed by: PHYSICIAN ASSISTANT

## 2023-12-28 PROCEDURE — 1159F MED LIST DOCD IN RCRD: CPT | Mod: CPTII,S$GLB,, | Performed by: PHYSICIAN ASSISTANT

## 2023-12-28 PROCEDURE — 3077F SYST BP >= 140 MM HG: CPT | Mod: CPTII,S$GLB,, | Performed by: PHYSICIAN ASSISTANT

## 2023-12-28 PROCEDURE — 99204 OFFICE O/P NEW MOD 45 MIN: CPT | Mod: S$GLB,,, | Performed by: PHYSICIAN ASSISTANT

## 2023-12-28 PROCEDURE — 99999 PR PBB SHADOW E&M-EST. PATIENT-LVL V: ICD-10-PCS | Mod: PBBFAC,,, | Performed by: PHYSICIAN ASSISTANT

## 2023-12-28 PROCEDURE — 3288F PR FALLS RISK ASSESSMENT DOCUMENTED: ICD-10-PCS | Mod: CPTII,S$GLB,, | Performed by: PHYSICIAN ASSISTANT

## 2023-12-28 PROCEDURE — 3077F PR MOST RECENT SYSTOLIC BLOOD PRESSURE >= 140 MM HG: ICD-10-PCS | Mod: CPTII,S$GLB,, | Performed by: PHYSICIAN ASSISTANT

## 2023-12-28 PROCEDURE — 1126F AMNT PAIN NOTED NONE PRSNT: CPT | Mod: CPTII,S$GLB,, | Performed by: PHYSICIAN ASSISTANT

## 2023-12-28 PROCEDURE — 1101F PT FALLS ASSESS-DOCD LE1/YR: CPT | Mod: CPTII,S$GLB,, | Performed by: PHYSICIAN ASSISTANT

## 2023-12-28 PROCEDURE — 3008F PR BODY MASS INDEX (BMI) DOCUMENTED: ICD-10-PCS | Mod: CPTII,S$GLB,, | Performed by: PHYSICIAN ASSISTANT

## 2023-12-28 PROCEDURE — 1101F PR PT FALLS ASSESS DOC 0-1 FALLS W/OUT INJ PAST YR: ICD-10-PCS | Mod: CPTII,S$GLB,, | Performed by: PHYSICIAN ASSISTANT

## 2023-12-28 PROCEDURE — 3044F HG A1C LEVEL LT 7.0%: CPT | Mod: CPTII,S$GLB,, | Performed by: PHYSICIAN ASSISTANT

## 2023-12-28 PROCEDURE — 1160F PR REVIEW ALL MEDS BY PRESCRIBER/CLIN PHARMACIST DOCUMENTED: ICD-10-PCS | Mod: CPTII,S$GLB,, | Performed by: PHYSICIAN ASSISTANT

## 2023-12-28 PROCEDURE — 3078F PR MOST RECENT DIASTOLIC BLOOD PRESSURE < 80 MM HG: ICD-10-PCS | Mod: CPTII,S$GLB,, | Performed by: PHYSICIAN ASSISTANT

## 2023-12-28 PROCEDURE — 1159F PR MEDICATION LIST DOCUMENTED IN MEDICAL RECORD: ICD-10-PCS | Mod: CPTII,S$GLB,, | Performed by: PHYSICIAN ASSISTANT

## 2023-12-28 PROCEDURE — 3078F DIAST BP <80 MM HG: CPT | Mod: CPTII,S$GLB,, | Performed by: PHYSICIAN ASSISTANT

## 2023-12-28 PROCEDURE — 3044F PR MOST RECENT HEMOGLOBIN A1C LEVEL <7.0%: ICD-10-PCS | Mod: CPTII,S$GLB,, | Performed by: PHYSICIAN ASSISTANT

## 2023-12-28 PROCEDURE — 3008F BODY MASS INDEX DOCD: CPT | Mod: CPTII,S$GLB,, | Performed by: PHYSICIAN ASSISTANT

## 2023-12-28 PROCEDURE — 99999 PR PBB SHADOW E&M-EST. PATIENT-LVL V: CPT | Mod: PBBFAC,,, | Performed by: PHYSICIAN ASSISTANT

## 2023-12-28 NOTE — PROGRESS NOTES
"Name: Anushka Napier  MRN: [id number]   CSN: [id number]      Date: 12/28/2023    Referring physician:  Self, Aaareferral  No address on file    Chief Complaint: tremors     History of Present Illness (HPI): Anushka Napier is a R handed 68 y.o. female with a medical issues significant for migraines, hypothyroidism, DMII, HLD, HTN, GERD who presents for tremors. Tremors for a couple of months, both hands -- equal on both sides. Started in both hands at the same time. Notices tremors with action, posture and at rest. No new medications started at that time. More stressed in the last 9 months, lost 4 family members including her parents. Tremors not associated with hypo or hyperglycemic events. History of hypothyroidism, TSH last checked in early 2023. No falls but does feel slightly off balance every once in a while. Has to walk slower to keep her balance. Denies shuffling.   Tingling in bilateral feet/toes, all digits. Feels like her legs are weak. Off balance whenever she closes her eyes in the shower and in the dark.     Does not consume etoh.     Family History: none     Neuroleptic Exposure: none     Nonmotor/Premotor ROS:  Anosmia: "a little different"   Dysarthria/Hypophonia: has been told she mumbles   Dysphagia/Sialorrhea: none   Urinary changes: on vesicare   Constipation: some   Falls: none   Micrographia: shaky   Sleep issues:  -RBD: talks in her sleep and acts out dreams       Review of Systems:   Review of Systems   Constitutional:  Negative for chills, fever and malaise/fatigue.   HENT:  Negative for hearing loss.    Eyes:  Negative for blurred vision and double vision.   Respiratory:  Negative for cough, shortness of breath and stridor.    Cardiovascular:  Negative for chest pain and leg swelling.   Gastrointestinal:  Negative for constipation, diarrhea and nausea.   Genitourinary:  Negative for frequency and urgency.   Musculoskeletal:  Negative for falls.   Skin:  Negative for itching and rash. "   Neurological:  Positive for tremors. Negative for dizziness, loss of consciousness and weakness.   Psychiatric/Behavioral:  Negative for hallucinations and memory loss.            Past Medical History: The patient  has a past medical history of Allergy, Anxiety, Basal cell carcinoma, Depression, Diabetes mellitus, Diabetes mellitus type II, uncontrolled (9/17/2014), Diabetic retinopathy (01/14/2020), DM retinopathy, GERD (gastroesophageal reflux disease) (9/25/2014), History of colonic polyps (1/24/2020), Hyperlipidemia, Hypertension, Osteopenia (9/25/2014), Screening for colorectal cancer (9/25/2014), Screening for colorectal cancer, Thyroid disease, and Vitamin D deficiency disease (9/25/2014).    Social History: The patient  reports that she has never smoked. She has never used smokeless tobacco. She reports that she does not drink alcohol and does not use drugs.    Family History: Their family history includes Breast cancer (age of onset: 40) in her cousin; Cancer in her mother; Diabetes in her mother; Heart disease in her father; Hypertension in her father and mother; Miscarriages / Stillbirths in her maternal grandmother and mother.    Allergies: Tizanidine, Codeine, Iodinated contrast media, Iodine, Sulfamethoxazole-trimethoprim, and Epinephrine     Meds:   Current Outpatient Medications on File Prior to Visit   Medication Sig Dispense Refill    amitriptyline (ELAVIL) 10 MG tablet Take 10 mg by mouth nightly.      atorvastatin (LIPITOR) 40 MG tablet Take 1 tablet (40 mg total) by mouth once daily. 90 tablet 2    azelastine (ASTELIN) 137 mcg (0.1 %) nasal spray 1 spray (137 mcg total) by Nasal route 2 (two) times daily. 30 mL 0    ciclopirox (PENLAC) 8 % Soln Apply topically nightly. 6.6 mL 11    estrogens, conjugated, (PREMARIN) 0.625 MG tablet TAKE 1 TABLET(0.625 MG) BY MOUTH EVERY DAY 30 tablet 12    FLUoxetine 20 MG capsule TAKE 1 CAPSULE(20 MG) BY MOUTH EVERY DAY 90 capsule 3    FLUZONE HIGHDOSE QUAD  "22-23  mcg/0.7 mL Syrg       HYDROcodone-acetaminophen (NORCO) 5-325 mg per tablet Take 1 tablet by mouth every 6 (six) hours as needed for Pain. 15 tablet 0    levothyroxine (SYNTHROID, LEVOTHROID) 175 MCG tablet TAKE 1 TABLET(175 MCG) BY MOUTH BEFORE BREAKFAST 90 tablet 3    metFORMIN (GLUCOPHAGE-XR) 500 MG ER 24hr tablet TAKE 1 TABLET(500 MG) BY MOUTH EVERY DAY 90 tablet 12    metoprolol tartrate (LOPRESSOR) 50 MG tablet TAKE 1 TABLET BY MOUTH DAILY 90 tablet 3    mometasone 0.1% (ELOCON) 0.1 % cream Use daily 50 g 3    omega-3 fatty acids-vitamin E 1,000 mg Cap Take 1 capsule by mouth Twice daily. 1 Capsule Oral Twice a day       RABEprazole (ACIPHEX) 20 mg tablet TAKE 1 TABLET BY MOUTH TWICE DAILY 180 tablet 12    sars-cov-2, covid-19, (PFIZER COVID-19) 30 mcg/0.3 ml injection       solifenacin (VESICARE) 10 MG tablet Take 1 tablet by mouth once daily.      solifenacin (VESICARE) 5 MG tablet Take 5 mg by mouth.      SUPREP BOWEL PREP KIT 17.5-3.13-1.6 gram SolR Take as directed      valsartan (DIOVAN) 80 MG tablet Take 1 tablet (80 mg total) by mouth once daily. 90 tablet 3    vitamin D (VITAMIN D3) 1000 units Tab Take 1,000 Units by mouth once daily. 5 tabs daily       No current facility-administered medications on file prior to visit.       Exam:  BP (!) 148/74   Pulse 65   Ht 5' 5" (1.651 m)   Wt 72.6 kg (160 lb)   BMI 26.63 kg/m²     Constitutional  Well-developed, well-nourished, appears stated age   Ophthalmoscopic  No papilledema with no hemorrhages or exudates bilaterally   Cardiovascular  Radial pulses 2+ and symmetric, no LE edema bilaterally   Neurological    * Mental status  MOCA =      - Orientation  Oriented to person, place, time, and situation     - Memory   Intact recent and remote     - Attention/concentration  Attentive, vigilant during exam     - Language  Naming & repetition intact, +2-step commands     - Fund of knowledge  Aware of current events     - Executive  Well-organized " thoughts     - Other     * Cranial nerves       - CN II  PERRL, visual fields full to confrontation     - CN III, IV, VI  Extraocular movements full, normal pursuits and saccades     - CN V  Sensation V1 - V3 intact     - CN VII  Face strong and symmetric bilaterally     - CN VIII  Hearing intact bilaterally     - CN IX, X  Palate raises midline and symmetric     - CN XI  SCM and trapezius 5/5 bilaterally     - CN XII  Tongue midline   * Motor  Muscle bulk normal, strength 5/5 throughout aside from    L tricep 4/5    R hip flexion 4/5     No fasciculations elicited on exam    * Sensory   Intact to light touch    Decreased vibratory sensation of bilateral LE    * Coordination  No dysmetria with finger-to-nose or heel-to-shin   * Gait  See below.   * Deep tendon reflexes  3+ and symmetric throughout   Clonus absent    Monroe absent    Babinski downgoing bilaterally   * Specialized movement exam  No hypophonic speech.    No facial masking, appropriately animated    No cogwheel rigidity.     No bradykinesia.   No tremor with rest   High frequency, low amplitude tremor with action and posture, L > R    No other dystonia, chorea, athetosis, myoclonus, or tics.   No motor impersistence.   Normal-based gait.   No shortened stride length.   No abnormal arm swing.     No postural instability.      Laboratory/Radiological:  - Results:  Admission on 12/01/2023, Discharged on 12/01/2023   Component Date Value Ref Range Status    WBC 12/01/2023 8.01  3.90 - 12.70 K/uL Final    RBC 12/01/2023 4.54  4.00 - 5.40 M/uL Final    Hemoglobin 12/01/2023 12.9  12.0 - 16.0 g/dL Final    Hematocrit 12/01/2023 40.0  37.0 - 48.5 % Final    MCV 12/01/2023 88  82 - 98 fL Final    MCH 12/01/2023 28.4  27.0 - 31.0 pg Final    MCHC 12/01/2023 32.3  32.0 - 36.0 g/dL Final    RDW 12/01/2023 13.8  11.5 - 14.5 % Final    Platelets 12/01/2023 345  150 - 450 K/uL Final    MPV 12/01/2023 9.8  9.2 - 12.9 fL Final    Immature Granulocytes 12/01/2023 0.2   0.0 - 0.5 % Final    Gran # (ANC) 12/01/2023 5.3  1.8 - 7.7 K/uL Final    Immature Grans (Abs) 12/01/2023 0.02  0.00 - 0.04 K/uL Final    Lymph # 12/01/2023 1.9  1.0 - 4.8 K/uL Final    Mono # 12/01/2023 0.6  0.3 - 1.0 K/uL Final    Eos # 12/01/2023 0.2  0.0 - 0.5 K/uL Final    Baso # 12/01/2023 0.09  0.00 - 0.20 K/uL Final    nRBC 12/01/2023 0  0 /100 WBC Final    Gran % 12/01/2023 65.7  38.0 - 73.0 % Final    Lymph % 12/01/2023 23.6  18.0 - 48.0 % Final    Mono % 12/01/2023 7.4  4.0 - 15.0 % Final    Eosinophil % 12/01/2023 2.0  0.0 - 8.0 % Final    Basophil % 12/01/2023 1.1  0.0 - 1.9 % Final    Differential Method 12/01/2023 Automated   Final    Sodium 12/01/2023 139  136 - 145 mmol/L Final    Potassium 12/01/2023 4.3  3.5 - 5.1 mmol/L Final    Chloride 12/01/2023 107  95 - 110 mmol/L Final    CO2 12/01/2023 21 (L)  23 - 29 mmol/L Final    Glucose 12/01/2023 136 (H)  70 - 110 mg/dL Final    BUN 12/01/2023 12  8 - 23 mg/dL Final    Creatinine 12/01/2023 0.8  0.5 - 1.4 mg/dL Final    Calcium 12/01/2023 9.3  8.7 - 10.5 mg/dL Final    Total Protein 12/01/2023 7.8  6.0 - 8.4 g/dL Final    Albumin 12/01/2023 3.9  3.5 - 5.2 g/dL Final    Total Bilirubin 12/01/2023 0.6  0.1 - 1.0 mg/dL Final    Alkaline Phosphatase 12/01/2023 126  55 - 135 U/L Final    AST 12/01/2023 19  10 - 40 U/L Final    ALT 12/01/2023 14  10 - 44 U/L Final    eGFR 12/01/2023 >60  >60 mL/min/1.73 m^2 Final    Anion Gap 12/01/2023 11  8 - 16 mmol/L Final   Office Visit on 10/21/2023   Component Date Value Ref Range Status    SARS Coronavirus 2 Antigen 10/21/2023 Negative  Negative Final     Acceptable 10/21/2023 Yes   Final   Office Visit on 10/08/2023   Component Date Value Ref Range Status    SARS Coronavirus 2 Antigen 10/08/2023 Negative  Negative Final     Acceptable 10/08/2023 Yes   Final       - Independent review of images:      Brain MRI from 2018  The ventricles are normal in size for age, without evidence of  hydrocephalus.     There are scattered T2/FLAIR signal hyperintensities within the supratentorial and periventricular white matter suggestive of a moderate degree of chronic microvascular ischemic changes, advanced for age. The brain otherwise appears within normal limits. No parenchymal mass, hemorrhage, edema or infarction.    - Independent review of consultant's notes: jenna barbour     ASSESSMENT/PLAN:  Tremor   - high frequency, low amplitude tremor of bilateral hands with posture and action  - checking tsh   - hold off on med    2. Imbalance   - L tricep weakness, R hip flexor weakness, 3+ reflexes throughout without other UMN signs  - decreased vibratory sensation  - brain and cervical MRI  - labs as below   - PT      Orders Placed This Encounter    MRI Brain Without Contrast    MRI Cervical Spine Without Contrast    TSH    Vitamin B1    Vitamin B12    COPPER, SERUM    ZINC    RPR    Ambulatory referral/consult to Physical/Occupational Therapy           Follow up: in 3 months with [facility]     Collaborating Physician, Dr. Duque, was available during today's encounter. Any change to plan along with cosign to appear in the EMR.       Total time spent with the patient: 48 minutes.  27 minutes of face-to-face consultation and 21 minutes of chart review and coordination of care, on the day of the visit. This includes face to face time and non-face to face time preparing to see the patient (eg, review of tests), obtaining and/or reviewing separately obtained history, documenting clinical information in the electronic or other health record, independently interpreting resultsand communicating results to the patient/family/caregiver, or care coordination.         Karine Landrum PA-C   [facility]  Department of Neurology  Movement Disorders

## 2023-12-29 ENCOUNTER — PATIENT OUTREACH (OUTPATIENT)
Dept: ADMINISTRATIVE | Facility: HOSPITAL | Age: 68
End: 2023-12-29
Payer: COMMERCIAL

## 2023-12-29 ENCOUNTER — PATIENT MESSAGE (OUTPATIENT)
Dept: FAMILY MEDICINE | Facility: CLINIC | Age: 68
End: 2023-12-29
Payer: COMMERCIAL

## 2023-12-29 DIAGNOSIS — E11.65 UNCONTROLLED TYPE 2 DIABETES MELLITUS WITH HYPERGLYCEMIA: Primary | ICD-10-CM

## 2023-12-29 DIAGNOSIS — Z00.00 ROUTINE MEDICAL EXAM: ICD-10-CM

## 2024-01-02 LAB
LEFT EYE DM RETINOPATHY: POSITIVE
RIGHT EYE DM RETINOPATHY: POSITIVE

## 2024-01-05 ENCOUNTER — LAB VISIT (OUTPATIENT)
Dept: LAB | Facility: HOSPITAL | Age: 69
End: 2024-01-05
Payer: COMMERCIAL

## 2024-01-05 DIAGNOSIS — Z00.00 ROUTINE MEDICAL EXAM: ICD-10-CM

## 2024-01-05 DIAGNOSIS — E11.65 UNCONTROLLED TYPE 2 DIABETES MELLITUS WITH HYPERGLYCEMIA: ICD-10-CM

## 2024-01-05 DIAGNOSIS — R26.89 IMBALANCE: ICD-10-CM

## 2024-01-05 DIAGNOSIS — R25.1 TREMOR: ICD-10-CM

## 2024-01-05 LAB
CHOLEST SERPL-MCNC: 188 MG/DL (ref 120–199)
CHOLEST/HDLC SERPL: 3.4 {RATIO} (ref 2–5)
ESTIMATED AVG GLUCOSE: 143 MG/DL (ref 68–131)
ESTIMATED AVG GLUCOSE: 143 MG/DL (ref 68–131)
HBA1C MFR BLD: 6.6 % (ref 4–5.6)
HBA1C MFR BLD: 6.6 % (ref 4–5.6)
HDLC SERPL-MCNC: 56 MG/DL (ref 40–75)
HDLC SERPL: 29.8 % (ref 20–50)
LDLC SERPL CALC-MCNC: 108.8 MG/DL (ref 63–159)
NONHDLC SERPL-MCNC: 132 MG/DL
PROGEST SERPL-MCNC: 0.9 NG/ML
T4 FREE SERPL-MCNC: 1.17 NG/DL (ref 0.71–1.51)
TESTOST SERPL-MCNC: 69 NG/DL (ref 5–73)
TRIGL SERPL-MCNC: 116 MG/DL (ref 30–150)
TSH SERPL DL<=0.005 MIU/L-ACNC: 0.8 UIU/ML (ref 0.4–4)
TSH SERPL DL<=0.005 MIU/L-ACNC: 0.8 UIU/ML (ref 0.4–4)
VIT B12 SERPL-MCNC: 376 PG/ML (ref 210–950)

## 2024-01-05 PROCEDURE — 84439 ASSAY OF FREE THYROXINE: CPT | Performed by: INTERNAL MEDICINE

## 2024-01-05 PROCEDURE — 83036 HEMOGLOBIN GLYCOSYLATED A1C: CPT | Performed by: INTERNAL MEDICINE

## 2024-01-05 PROCEDURE — 84403 ASSAY OF TOTAL TESTOSTERONE: CPT | Performed by: INTERNAL MEDICINE

## 2024-01-05 PROCEDURE — 82626 DEHYDROEPIANDROSTERONE: CPT | Performed by: INTERNAL MEDICINE

## 2024-01-05 PROCEDURE — 86592 SYPHILIS TEST NON-TREP QUAL: CPT | Performed by: PHYSICIAN ASSISTANT

## 2024-01-05 PROCEDURE — 80061 LIPID PANEL: CPT | Performed by: INTERNAL MEDICINE

## 2024-01-05 PROCEDURE — 84443 ASSAY THYROID STIM HORMONE: CPT | Performed by: PHYSICIAN ASSISTANT

## 2024-01-05 PROCEDURE — 84425 ASSAY OF VITAMIN B-1: CPT | Performed by: PHYSICIAN ASSISTANT

## 2024-01-05 PROCEDURE — 84144 ASSAY OF PROGESTERONE: CPT | Performed by: INTERNAL MEDICINE

## 2024-01-05 PROCEDURE — 82672 ASSAY OF ESTROGEN: CPT | Performed by: INTERNAL MEDICINE

## 2024-01-05 PROCEDURE — 84630 ASSAY OF ZINC: CPT | Performed by: PHYSICIAN ASSISTANT

## 2024-01-05 PROCEDURE — 82525 ASSAY OF COPPER: CPT | Performed by: PHYSICIAN ASSISTANT

## 2024-01-05 PROCEDURE — 36415 COLL VENOUS BLD VENIPUNCTURE: CPT | Mod: PO | Performed by: INTERNAL MEDICINE

## 2024-01-05 PROCEDURE — 82607 VITAMIN B-12: CPT | Performed by: PHYSICIAN ASSISTANT

## 2024-01-06 LAB — RPR SER QL: NORMAL

## 2024-01-08 LAB
ESTROGEN SERPL-MCNC: 120 PG/ML
ZINC SERPL-MCNC: 68 UG/DL (ref 60–130)

## 2024-01-09 LAB
DHEA SERPL-MCNC: 1.89 NG/ML (ref 0.63–4.7)
VIT B1 BLD-MCNC: 60 UG/L (ref 38–122)

## 2024-01-10 LAB — COPPER SERPL-MCNC: 1342 UG/L (ref 810–1990)

## 2024-01-14 ENCOUNTER — HOSPITAL ENCOUNTER (OUTPATIENT)
Dept: RADIOLOGY | Facility: HOSPITAL | Age: 69
Discharge: HOME | End: 2024-01-14
Attending: PHYSICIAN ASSISTANT
Payer: COMMERCIAL

## 2024-01-14 DIAGNOSIS — R25.1 TREMOR: ICD-10-CM

## 2024-01-14 DIAGNOSIS — R26.89 IMBALANCE: ICD-10-CM

## 2024-01-14 PROCEDURE — 72141 MRI NECK SPINE W/O DYE: CPT | Mod: 26,,, | Performed by: RADIOLOGY

## 2024-01-14 PROCEDURE — 72141 MRI NECK SPINE W/O DYE: CPT | Mod: TC

## 2024-01-16 ENCOUNTER — TELEPHONE (OUTPATIENT)
Dept: NEUROLOGY | Facility: CLINIC | Age: 69
End: 2024-01-16
Payer: COMMERCIAL

## 2024-01-16 NOTE — TELEPHONE ENCOUNTER
----- Message from Karine Landrum PA-C sent at 1/16/2024  9:42 AM CST -----  Can you find out why the brain MRI was not done?   ----- Message -----  From: Interface, Rad Results In  Sent: 1/14/2024  10:19 AM CST  To: Karine Landrum PA-C

## 2024-01-16 NOTE — TELEPHONE ENCOUNTER
Pt insurance did not approve the order.  Once approved pt will call the office and reschedule the  Brain Mri appt

## 2024-01-17 ENCOUNTER — PATIENT MESSAGE (OUTPATIENT)
Dept: NEUROLOGY | Facility: CLINIC | Age: 69
End: 2024-01-17
Payer: COMMERCIAL

## 2024-01-18 ENCOUNTER — PATIENT OUTREACH (OUTPATIENT)
Dept: ADMINISTRATIVE | Facility: HOSPITAL | Age: 69
End: 2024-01-18
Payer: COMMERCIAL

## 2024-02-04 ENCOUNTER — HOSPITAL ENCOUNTER (OUTPATIENT)
Dept: RADIOLOGY | Facility: HOSPITAL | Age: 69
Discharge: HOME | End: 2024-02-04
Attending: PHYSICIAN ASSISTANT
Payer: COMMERCIAL

## 2024-02-04 DIAGNOSIS — R26.89 IMBALANCE: ICD-10-CM

## 2024-02-04 DIAGNOSIS — R25.1 TREMOR: ICD-10-CM

## 2024-02-04 PROCEDURE — 70551 MRI BRAIN STEM W/O DYE: CPT | Mod: TC

## 2024-02-04 PROCEDURE — 70551 MRI BRAIN STEM W/O DYE: CPT | Mod: 26,,, | Performed by: RADIOLOGY

## 2024-02-16 ENCOUNTER — TELEPHONE (OUTPATIENT)
Dept: FAMILY MEDICINE | Facility: CLINIC | Age: 69
End: 2024-02-16
Payer: COMMERCIAL

## 2024-02-16 DIAGNOSIS — H53.8 BLURRY VISION: Primary | ICD-10-CM

## 2024-02-16 DIAGNOSIS — H26.9 CATARACT, UNSPECIFIED CATARACT TYPE, UNSPECIFIED LATERALITY: ICD-10-CM

## 2024-02-16 NOTE — TELEPHONE ENCOUNTER
Received call from patient's  who is my 1st cousin.  Her vision became blurry with her without her glasses and originally her optometrist said she was a candidate it it for cataract surgery but when she went to a specialist presumably an ophthalmologist he said her eyes were perfect.  She would like to establish with Ophthalmology at [facility].  She apparently saw Dr. Koby Calderon see for the cataracts    Have to love the dictation system

## 2024-02-18 ENCOUNTER — OFFICE VISIT (OUTPATIENT)
Dept: URGENT CARE | Facility: CLINIC | Age: 69
End: 2024-02-18
Payer: COMMERCIAL

## 2024-02-18 VITALS
DIASTOLIC BLOOD PRESSURE: 92 MMHG | RESPIRATION RATE: 16 BRPM | WEIGHT: 160 LBS | TEMPERATURE: 98 F | HEART RATE: 61 BPM | BODY MASS INDEX: 26.66 KG/M2 | HEIGHT: 65 IN | OXYGEN SATURATION: 98 % | SYSTOLIC BLOOD PRESSURE: 152 MMHG

## 2024-02-18 DIAGNOSIS — J06.9 VIRAL URI WITH COUGH: Primary | ICD-10-CM

## 2024-02-18 LAB
CTP QC/QA: YES
CTP QC/QA: YES
POC MOLECULAR INFLUENZA A AGN: NEGATIVE
POC MOLECULAR INFLUENZA B AGN: NEGATIVE
SARS-COV-2 AG RESP QL IA.RAPID: NEGATIVE

## 2024-02-18 PROCEDURE — 87502 INFLUENZA DNA AMP PROBE: CPT | Mod: QW,S$GLB,, | Performed by: NURSE PRACTITIONER

## 2024-02-18 PROCEDURE — 87811 SARS-COV-2 COVID19 W/OPTIC: CPT | Mod: QW,S$GLB,, | Performed by: NURSE PRACTITIONER

## 2024-02-18 PROCEDURE — 99213 OFFICE O/P EST LOW 20 MIN: CPT | Mod: S$GLB,,, | Performed by: NURSE PRACTITIONER

## 2024-02-18 RX ORDER — QUINIDINE GLUCONATE 324 MG
TABLET, EXTENDED RELEASE ORAL
COMMUNITY

## 2024-02-18 RX ORDER — UBIQUINOL 100 MG
CAPSULE ORAL
COMMUNITY

## 2024-02-18 RX ORDER — CHOLECALCIFEROL (VITAMIN D3) 2400/ML
LIQUID (ML) MISCELLANEOUS
COMMUNITY
End: 2024-02-28

## 2024-02-18 RX ORDER — ASPIRIN 81 MG/1
TABLET ORAL
COMMUNITY

## 2024-02-18 NOTE — PATIENT INSTRUCTIONS
- Follow up with your PCP or specialty clinic as directed in the next 1-2 weeks if not improved or as needed.  You can call [phone] to schedule an appointment with the appropriate provider.    - Go to the ER or seek medical attention immediately if you develop new or worsening symptoms.    - You must understand that you have received an Urgent Care treatment only and that you may be released before all of your medical problems are known or treated.   - You, the patient, will arrange for follow up care as instructed.   - If your condition worsens or fails to improve we recommend that you receive another evaluation at the ER immediately or contact your PCP to discuss your concerns or return here.     You have tested negative for COVID on our Binax test.    If you test negative within the first 7 days of experiencing COVID-like symptoms, test again with at least 48 hours in between tests     If you test negative with no symptoms, test again 2 more times with at least 48 hours between each test    URI    - Rest.    - Drink plenty of fluids.      - Viral upper respiratory infections typically run their course in 10-14 days.      - Tylenol or Ibuprofen as directed as needed for fever/pain. Avoid tylenol if you have a history of liver disease. Do not take ibuprofen if you have a history of GI bleeding, kidney disease, or if you take blood thinners.   - Take ibuprofen 600-800 mg every 6-8 hours for pain and inflammation.  You can also take Tylenol/acetaminophen 650-1000 mg every 6-8 hours for added pain relief.     - You can take over-the-counter claritin, zyrtec, allegra, or xyzal as directed. These are antihistamines that can help with runny nose, nasal congestion, sneezing, and helps to dry up post-nasal drip, which usually causes sore throat and cough.              - If you do NOT have high blood pressure, you may use a decongestant form (D)  of this medication or if you do not take the D form, you can take  sudafed (pseudoephedrine) over the counter, which is a decongestant.     - You can use Flonase (fluticasone) nasal spray as directed for sinus congestion and postnasal drip. This is a steroid nasal spray that works locally over time to decrease the inflammation in your nose/sinuses and help with allergic symptoms. This is not an quick- relief spray like afrin, but it works well if used daily.  Discontinue if you develop nose bleed  - use nasal saline prior to Flonase.     - Use Ocean Spray Nasal Saline 1-3 puffs each nostril every 2-3 hours then blow out onto tissue. This is to irrigate the nasal passage way to clear the sinus openings. Use until sinus problem resolved.     - you can take plain Mucinex (guaifenesin) 1200 mg twice a day to help loosen mucous     -warm salt water gargles can help with sore throat     - warm tea with honey can help with cough. Honey is a natural cough suppressant.     - Follow up with your PCP or specialty clinic as directed in the next 1-2 weeks if not improved or as needed.  You can call [phone] to schedule an appointment with the appropriate provider.       - Go to the ER if you develop new or worsening symptoms.

## 2024-02-18 NOTE — PROGRESS NOTES
"Subjective:      Patient ID: Anushka Napier is a 68 y.o. female.    Vitals:  height is 5' 5" (1.651 m) and weight is 72.6 kg (160 lb). Her oral temperature is 98.2 °F (36.8 °C). Her blood pressure is 152/92 (abnormal) and her pulse is 61. Her respiration is 16 and oxygen saturation is 98%.     Chief Complaint: Sinus Problem    58-year-old female presents to clinic for evaluation of nasal congestion, sinus pressure, sore throat, headache that started yesterday.  She reports recently returning from a trip [place].  She denies any specific sick contacts.  She reports taking Tylenol and ibuprofen.  She is awake and alert, answers questions appropriately, no acute distress noted on today's visit.    Sinus Problem  This is a new problem. The current episode started yesterday. The problem has been gradually worsening since onset. There has been no fever. Her pain is at a severity of 0/10. She is experiencing no pain. Associated symptoms include congestion, ear pain, headaches, sinus pressure and a sore throat. Pertinent negatives include no chills or diaphoresis. Past treatments include acetaminophen (ibu). The treatment provided no relief.       Constitution: Negative for activity change, appetite change, chills, sweating, fatigue and fever.   HENT:  Positive for ear pain, congestion, sinus pressure and sore throat.    Cardiovascular:  Negative for chest pain.   Gastrointestinal:  Negative for abdominal pain.   Neurological:  Positive for headaches. Negative for dizziness.      Objective:     Physical Exam   Constitutional: She is oriented to person, place, and time. She appears well-developed.  Non-toxic appearance. She does not appear ill.   HENT:   Head: Normocephalic and atraumatic. Head is without abrasion, without contusion and without laceration.   Ears:   Right Ear: Tympanic membrane and external ear normal.   Left Ear: Tympanic membrane and external ear normal.   Nose: Congestion present.   Mouth/Throat: Mucous " membranes are normal. Mucous membranes are moist. Posterior oropharyngeal erythema present. No oropharyngeal exudate. Oropharynx is clear.   Eyes: Conjunctivae, EOM and lids are normal.   Neck: Trachea normal and phonation normal.   Cardiovascular: Normal rate.   Pulmonary/Chest: Effort normal and breath sounds normal. No stridor. No respiratory distress.   Abdominal: Normal appearance.   Musculoskeletal: Normal range of motion.         General: Normal range of motion.   Neurological: She is alert and oriented to person, place, and time.   Skin: Skin is dry, intact and not pale. No abrasion, No burn and No ecchymosis   Psychiatric: Her speech is normal and behavior is normal. Mood, judgment and thought content normal.   Nursing note and vitals reviewed.    Results for orders placed or performed in visit on 02/18/24   SARS Coronavirus 2 Antigen, POCT Manual Read   Result Value Ref Range    SARS Coronavirus 2 Antigen Negative Negative     Acceptable Yes    POCT Influenza A/B MOLECULAR   Result Value Ref Range    POC Molecular Influenza A Ag Negative Negative, Not Reported    POC Molecular Influenza B Ag Negative Negative, Not Reported     Acceptable Yes          Assessment:     1. Viral URI with cough        Plan:       Viral URI with cough  -     SARS Coronavirus 2 Antigen, POCT Manual Read  -     POCT Influenza A/B MOLECULAR      - negative COVID, negative influenza on today's visit.  Discussed symptomatic care for likely viral etiology at this time.  Recommend retesting for COVID in the next 48 hours given early onset of symptoms.  Patient declined the need for any prescription medications on today's visit.  Follow-up with PCP.  Patient verbalized understanding and is in agreement with plan.    Patient Instructions   - Follow up with your PCP or specialty clinic as directed in the next 1-2 weeks if not improved or as needed.  You can call [phone] to schedule an appointment with  the appropriate provider.    - Go to the ER or seek medical attention immediately if you develop new or worsening symptoms.    - You must understand that you have received an Urgent Care treatment only and that you may be released before all of your medical problems are known or treated.   - You, the patient, will arrange for follow up care as instructed.   - If your condition worsens or fails to improve we recommend that you receive another evaluation at the ER immediately or contact your PCP to discuss your concerns or return here.     You have tested negative for COVID on our Binax test.    If you test negative within the first 7 days of experiencing COVID-like symptoms, test again with at least 48 hours in between tests     If you test negative with no symptoms, test again 2 more times with at least 48 hours between each test    URI    - Rest.    - Drink plenty of fluids.      - Viral upper respiratory infections typically run their course in 10-14 days.      - Tylenol or Ibuprofen as directed as needed for fever/pain. Avoid tylenol if you have a history of liver disease. Do not take ibuprofen if you have a history of GI bleeding, kidney disease, or if you take blood thinners.   - Take ibuprofen 600-800 mg every 6-8 hours for pain and inflammation.  You can also take Tylenol/acetaminophen 650-1000 mg every 6-8 hours for added pain relief.     - You can take over-the-counter claritin, zyrtec, allegra, or xyzal as directed. These are antihistamines that can help with runny nose, nasal congestion, sneezing, and helps to dry up post-nasal drip, which usually causes sore throat and cough.              - If you do NOT have high blood pressure, you may use a decongestant form (D)  of this medication or if you do not take the D form, you can take sudafed (pseudoephedrine) over the counter, which is a decongestant.     - You can use Flonase (fluticasone) nasal spray as directed for sinus congestion and postnasal drip.  This is a steroid nasal spray that works locally over time to decrease the inflammation in your nose/sinuses and help with allergic symptoms. This is not an quick- relief spray like afrin, but it works well if used daily.  Discontinue if you develop nose bleed  - use nasal saline prior to Flonase.     - Use Ocean Spray Nasal Saline 1-3 puffs each nostril every 2-3 hours then blow out onto tissue. This is to irrigate the nasal passage way to clear the sinus openings. Use until sinus problem resolved.     - you can take plain Mucinex (guaifenesin) 1200 mg twice a day to help loosen mucous     -warm salt water gargles can help with sore throat     - warm tea with honey can help with cough. Honey is a natural cough suppressant.     - Follow up with your PCP or specialty clinic as directed in the next 1-2 weeks if not improved or as needed.  You can call [phone] to schedule an appointment with the appropriate provider.       - Go to the ER if you develop new or worsening symptoms.

## 2024-02-20 ENCOUNTER — PATIENT MESSAGE (OUTPATIENT)
Dept: NEUROLOGY | Facility: CLINIC | Age: 69
End: 2024-02-20
Payer: COMMERCIAL

## 2024-02-20 ENCOUNTER — TELEPHONE (OUTPATIENT)
Dept: FAMILY MEDICINE | Facility: CLINIC | Age: 69
End: 2024-02-20
Payer: COMMERCIAL

## 2024-02-20 DIAGNOSIS — H53.9 VISUAL DISTURBANCES: Primary | ICD-10-CM

## 2024-02-20 NOTE — TELEPHONE ENCOUNTER
----- Message from Paul Flores sent at 2/20/2024  9:57 AM CST -----  Type: Patient Call Back    Who called:Self    What is the request in detail:In regards to blurry vision and trembling in hands    Can the clinic reply by [facility]?No    Would the patient rather a call back or a response via [facility]? Call    Best call back number:[phone] (home)       Additional Information:

## 2024-02-23 ENCOUNTER — HOSPITAL ENCOUNTER (OUTPATIENT)
Dept: RADIOLOGY | Facility: HOSPITAL | Age: 69
Discharge: HOME | End: 2024-02-23
Attending: INTERNAL MEDICINE
Payer: COMMERCIAL

## 2024-02-23 VITALS — BODY MASS INDEX: 26.67 KG/M2 | WEIGHT: 160.06 LBS | HEIGHT: 65 IN

## 2024-02-23 DIAGNOSIS — Z12.31 BREAST CANCER SCREENING BY MAMMOGRAM: ICD-10-CM

## 2024-02-23 PROCEDURE — 77063 BREAST TOMOSYNTHESIS BI: CPT | Mod: 26,,, | Performed by: RADIOLOGY

## 2024-02-23 PROCEDURE — 77067 SCR MAMMO BI INCL CAD: CPT | Mod: TC

## 2024-02-23 PROCEDURE — 77067 SCR MAMMO BI INCL CAD: CPT | Mod: 26,,, | Performed by: RADIOLOGY

## 2024-02-28 ENCOUNTER — OFFICE VISIT (OUTPATIENT)
Dept: FAMILY MEDICINE | Facility: CLINIC | Age: 69
End: 2024-02-28
Payer: COMMERCIAL

## 2024-02-28 VITALS
HEART RATE: 58 BPM | OXYGEN SATURATION: 97 % | WEIGHT: 166.44 LBS | SYSTOLIC BLOOD PRESSURE: 146 MMHG | DIASTOLIC BLOOD PRESSURE: 78 MMHG | BODY MASS INDEX: 27.73 KG/M2 | HEIGHT: 65 IN | TEMPERATURE: 98 F

## 2024-02-28 DIAGNOSIS — H26.9 CATARACT, UNSPECIFIED CATARACT TYPE, UNSPECIFIED LATERALITY: ICD-10-CM

## 2024-02-28 DIAGNOSIS — K22.70 BARRETT'S ESOPHAGUS WITHOUT DYSPLASIA: ICD-10-CM

## 2024-02-28 DIAGNOSIS — Z86.010 HISTORY OF COLONIC POLYPS: ICD-10-CM

## 2024-02-28 DIAGNOSIS — D64.9 ANEMIA, UNSPECIFIED TYPE: ICD-10-CM

## 2024-02-28 DIAGNOSIS — I70.0 ATHEROSCLEROSIS OF AORTA: ICD-10-CM

## 2024-02-28 DIAGNOSIS — R26.89 BALANCE DISORDER: ICD-10-CM

## 2024-02-28 DIAGNOSIS — I10 PRIMARY HYPERTENSION: ICD-10-CM

## 2024-02-28 DIAGNOSIS — E11.65 UNCONTROLLED TYPE 2 DIABETES MELLITUS WITH HYPERGLYCEMIA: ICD-10-CM

## 2024-02-28 DIAGNOSIS — M62.81 MUSCLE WEAKNESS: ICD-10-CM

## 2024-02-28 DIAGNOSIS — Z00.00 ROUTINE MEDICAL EXAM: Primary | ICD-10-CM

## 2024-02-28 DIAGNOSIS — E55.9 VITAMIN D DEFICIENCY DISEASE: ICD-10-CM

## 2024-02-28 DIAGNOSIS — H53.9 VISUAL DISTURBANCES: ICD-10-CM

## 2024-02-28 DIAGNOSIS — R25.1 TREMOR: ICD-10-CM

## 2024-02-28 DIAGNOSIS — E03.9 HYPOTHYROIDISM, UNSPECIFIED TYPE: ICD-10-CM

## 2024-02-28 PROCEDURE — 3288F FALL RISK ASSESSMENT DOCD: CPT | Mod: CPTII,S$GLB,, | Performed by: INTERNAL MEDICINE

## 2024-02-28 PROCEDURE — 3044F HG A1C LEVEL LT 7.0%: CPT | Mod: CPTII,S$GLB,, | Performed by: INTERNAL MEDICINE

## 2024-02-28 PROCEDURE — 1159F MED LIST DOCD IN RCRD: CPT | Mod: CPTII,S$GLB,, | Performed by: INTERNAL MEDICINE

## 2024-02-28 PROCEDURE — 1126F AMNT PAIN NOTED NONE PRSNT: CPT | Mod: CPTII,S$GLB,, | Performed by: INTERNAL MEDICINE

## 2024-02-28 PROCEDURE — 3008F BODY MASS INDEX DOCD: CPT | Mod: CPTII,S$GLB,, | Performed by: INTERNAL MEDICINE

## 2024-02-28 PROCEDURE — 99999 PR PBB SHADOW E&M-EST. PATIENT-LVL IV: CPT | Mod: PBBFAC,,, | Performed by: INTERNAL MEDICINE

## 2024-02-28 PROCEDURE — 1101F PT FALLS ASSESS-DOCD LE1/YR: CPT | Mod: CPTII,S$GLB,, | Performed by: INTERNAL MEDICINE

## 2024-02-28 PROCEDURE — 3077F SYST BP >= 140 MM HG: CPT | Mod: CPTII,S$GLB,, | Performed by: INTERNAL MEDICINE

## 2024-02-28 PROCEDURE — 3078F DIAST BP <80 MM HG: CPT | Mod: CPTII,S$GLB,, | Performed by: INTERNAL MEDICINE

## 2024-02-28 PROCEDURE — 99397 PER PM REEVAL EST PAT 65+ YR: CPT | Mod: S$GLB,,, | Performed by: INTERNAL MEDICINE

## 2024-02-28 NOTE — PROGRESS NOTES
CHIEF COMPLAINT:  Physical and discuss neurology issue so vision issue                                                                                                                               HISTORY OF PRESENT ILLNESS:  A 68 year-old white female who is a 1st cousin of mine by marriage.  We reviewed all her labs.  Her A1c is 6.5, 6.6 and it was this high about 10 years ago.  Lipids good.  Vitamin-D upper normal and she has reduced her supplement slightly.  Many years ago it was low on PPI. Ok 4/23    She saw an outside optometrist who said she had cataracts she does have intermittent blurry vision and glare around lights and so forth but no vision loss.  She then saw an ophthalmologist in the same office who said her vision was fine and she does not need to have cataracts removed an insurance would not cover it.  She sought out an appointment with [facility] due to having persistent vision changes in his also seen Neurology lately for some new tremor weakness and so forth we wanted to make sure this was not part of some neurological deficit.  Apparently in the prior evaluations they did not mention to or anything about retina problems and so forth    For several months she has developed a very slight tremor at times.  She does not really have it today in the office.  Also for 6-8 months her legs feel generally weak.  Prior she was not needing to use her arms to assist her getting up off the floor but now she is having to do so.  He does not notice any particular new weakness in her hands or arms or any loss of coordination.  Neurology noted some weakness in the left triceps and maybe right hip flexors that a reflexes were plus three.  Patient does have brisk biceps triceps and Achilles and triceps reflexes they do not seem to be asymmetrical or exaggerated.  On testing for clonus I do believe she possibly has at least one beat of clonus reproducibly in both ankles.  Her gait is normal speech is  normal.  She thinks she might have had a little bit of a Bell's palsy years ago and has a little bit of left ptosis in the corner of her left mouth according to others does look a little bit crooked but it does not sound like her onset of symptoms was anything abrupt and severe.  MRI of the brain did not show any old strokes.  She just had the expected chronic white matter changes which I explained to her.  Cervical spine had no spinal stenosis.  Apparently there some family members with MS with discussed overall symptoms would not necessarily be consistent with that in the MRI did not show any pattern suspicious for MS.  We will continue to follow and she does have follow-up with Neurology    Discussed blood pressure elevation today and she needs to start monitoring home.  She is only on low-dose valsartan and discussed high probability we will increase she will continue the metoprolol which is probably limited by her pulse    Seen Neuro NP 12/23  History of Present Illness (HPI): Anushka Napier is a R handed 68 y.o. female with a medical issues significant for migraines, hypothyroidism, DMII, HLD, HTN, GERD who presents for tremors. Tremors for a couple of months, both hands -- equal on both sides. Started in both hands at the same time. Notices tremors with action, posture and at rest. No new medications started at that time. More stressed in the last 9 months, lost 4 family members including her parents. Tremors not associated with hypo or hyperglycemic events. History of hypothyroidism, TSH last checked in early 2023. No falls but does feel slightly off balance every once in a while. Has to walk slower to keep her balance. Denies shuffling.   Tingling in bilateral feet/toes, all digits. Feels like her legs are weak. Off balance whenever she closes her eyes in the shower and in the dark.   ASSESSMENT/PLAN:  Tremor   - high frequency, low amplitude tremor of bilateral hands with posture and action  - checking tsh    - hold off on med   2. Imbalance   - L tricep weakness, R hip flexor weakness, 3+ reflexes throughout without other UMN signs  - decreased vibratory sensation  - brain and cervical MRI  - labs as below   - PT      Orders Placed This Encounter    MRI Brain Without Contrast    MRI Cervical Spine Without Contrast    TSH    Vitamin B1    Vitamin B12    COPPER, SERUM    ZINC    RPR    Ambulatory referral/consult to Physical/Occupational Therapy              Back in 2012 She had an outside mammogram at [facility] done by her GYN.        subsequent spot compression film was all normal as well.  Last mammo 2/24    Prior bone densities were done by prior GYN and had osteopenia, that being 2012.  She was taken off EVista 5/14 and continues on Premarin.    11/17 BMD  Impression       Normal mineralization of the lumbar spine and right hip.  Osteopenia of the left hip       2/2020  Osteopenia.      4/2023  Impression:   *Low bone mass (Osteopenia); FRAX calculations do not support treatment as osteoporosis.   RECOMMENDATIONS:  *Daily calcium intake 3888-4825 mg, dietary sources preferred; Vitamin D 0336-2085 IU daily.  *Weight bearing exercise and fall precautions.  *No additional pharmacologic therapy recommended at this time.  *Repeat BMD in 2 years.                                                                            Her colonoscopy was normal 4/17/08. 1 polyp 3/18 -5 yrs  She had another upper endoscopy,   which was okay per outside GI.  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                                                                                                                                       ROS:   CONST: weight stable. EYES: no vision change. ENT: no sore throat. CV: no chest pain w/ exertion. RESP: no shortness of breath. GI: no nausea, vomiting, diarrhea. No dysphagia. : no urinary issues. MUSCULOSKELETAL: no new myalgias or arthralgias. SKIN: no new  changes. NEURO: no focal deficits. PSYCH: no new issues. ENDOCRINE: no polyuria. HEME: no lymph nodes. ALLERGY: no general pruritis.                                                                                                                    PAST MEDICAL HISTORY:                                                        1. Osteopenia, last bone density in 11/17, 4/2023                                2. DIABETES                          3. Hypothyroidism.                                                           4. Hypertension.                                                             5. Hysterectomy - total.                                                6. Hyperlipidemia.                                                           7. GERD. EGDs with possible Davian's- - Dr Yi Carr. EGD 11/22 w Barrets, next EGD 2025                                                                   8. Anxiety and depression.   9. ENT septum surgery for sinusitis -Dr Sandoval 2012.  10. Colonoscopy 4/17/08 normal - 1 polyp 3/18 -5/23 -5 yrs   11. H/O abnormal Mammo                                                                                                                               FAMILY HISTORY: Father is living with hypertension, heart disease and        diabetes.  Mom is living with a history of cervical cancer, hypertension     and diabetes.  One brother and one sister with no stated problems.                                                                                                                                                                                      ALLERGIES:  Penicillin, sulfa, codeine, iodine and shellfish.                                                                                             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  She does not smoke and never did.  She does not drink.                                                                                                             PHYSICAL EXAMINATION:                                                        VITAL SIGNS:  As above                             GENERAL:  Pleasant female.                                                   HEENT:  Conjunctivae clear.  Pupils equal and reactive.  Nose and mouth      clear and moist.  Teeth good.                                                NECK:  Supple.  Thyroid nonpalpable.                                         RESPIRATORY:  Effort is good.                                                LUNGS:  Clear.                                                               HEART:  Regular rate and rhythm without murmurs, gallops or rubs.  No        carotid bruits.  No edema.                                                   ABDOMEN:  Soft, nondistended, nontender.  No hepatosplenomegaly or masses.   SKIN:  No rashes.  Warm to touch.                                            EXTREMITIES:  Gait normal.  Digits without clubbing or cyanosis.     Neurologic, speech normal, slight ptosis and slight weakness at the corner of the left mouth as above.  Reflexes are all plus two but with perhaps one beat of clonus in the ankles but is slight.                                                                                       Labs and x-ray reports reviewed                                                                                                                                     Diagnoses and all orders for this visit:    Routine medical exam, up-to-date on breast and colon cancer screening    Tremor, slight, continue to follow, does not appear to have an underlying metabolic or cardiovascular cause.  No signs of Parkinson's    Balance disorder, allergy had noted some reduced vibratory sense but B12 normal.  She does not perceive any balance disorder but does have some weakness in the muscles getting up off the floor but does not have any  proximal muscle weakness to suggest a need to check sed rate and so forth for polymyalgia.  Symptoms were all occurring while on Lipitor 40 mg and she has been on that chronically in his been no recent dose adjustment and so forth to suggest any statin side effect    Muscle weakness, differential as above    Visual disturbances, hopefully all just cataracts which can be addressed    Cataract, unspecified cataract type, unspecified laterality    Uncontrolled type 2 diabetes mellitus with hyperglycemia, chronic and stable    Hypothyroidism, unspecified type, chronic and stable    History of colonic polyps    Primary hypertension, needs to monitor, possibly increase valsartan    Vitamin D deficiency disease, chronic and stable    Anemia, unspecified type, improved    Atherosclerosis of aorta, chronic and stable    Carter's esophagus without dysplasia, up-to-date on EGD

## 2024-03-01 ENCOUNTER — PATIENT MESSAGE (OUTPATIENT)
Dept: ADMINISTRATIVE | Facility: HOSPITAL | Age: 69
End: 2024-03-01
Payer: COMMERCIAL

## 2024-03-04 ENCOUNTER — OFFICE VISIT (OUTPATIENT)
Dept: OPHTHALMOLOGY | Facility: CLINIC | Age: 69
End: 2024-03-04
Payer: COMMERCIAL

## 2024-03-04 DIAGNOSIS — H26.9 CATARACT, UNSPECIFIED CATARACT TYPE, UNSPECIFIED LATERALITY: ICD-10-CM

## 2024-03-04 DIAGNOSIS — E11.9 DM TYPE 2 WITHOUT RETINOPATHY: ICD-10-CM

## 2024-03-04 DIAGNOSIS — H25.13 NUCLEAR SCLEROSIS OF BOTH EYES: Primary | ICD-10-CM

## 2024-03-04 DIAGNOSIS — H53.8 BLURRY VISION: ICD-10-CM

## 2024-03-04 DIAGNOSIS — H52.7 REFRACTIVE ERROR: ICD-10-CM

## 2024-03-04 PROCEDURE — 99999 PR PBB SHADOW E&M-EST. PATIENT-LVL III: CPT | Mod: PBBFAC,,, | Performed by: OPHTHALMOLOGY

## 2024-03-04 PROCEDURE — 1101F PT FALLS ASSESS-DOCD LE1/YR: CPT | Mod: CPTII,S$GLB,, | Performed by: OPHTHALMOLOGY

## 2024-03-04 PROCEDURE — 92004 COMPRE OPH EXAM NEW PT 1/>: CPT | Mod: S$GLB,,, | Performed by: OPHTHALMOLOGY

## 2024-03-04 PROCEDURE — 1160F RVW MEDS BY RX/DR IN RCRD: CPT | Mod: CPTII,S$GLB,, | Performed by: OPHTHALMOLOGY

## 2024-03-04 PROCEDURE — 2023F DILAT RTA XM W/O RTNOPTHY: CPT | Mod: CPTII,S$GLB,, | Performed by: OPHTHALMOLOGY

## 2024-03-04 PROCEDURE — 3044F HG A1C LEVEL LT 7.0%: CPT | Mod: CPTII,S$GLB,, | Performed by: OPHTHALMOLOGY

## 2024-03-04 PROCEDURE — 1159F MED LIST DOCD IN RCRD: CPT | Mod: CPTII,S$GLB,, | Performed by: OPHTHALMOLOGY

## 2024-03-04 PROCEDURE — 3288F FALL RISK ASSESSMENT DOCD: CPT | Mod: CPTII,S$GLB,, | Performed by: OPHTHALMOLOGY

## 2024-03-04 PROCEDURE — 1126F AMNT PAIN NOTED NONE PRSNT: CPT | Mod: CPTII,S$GLB,, | Performed by: OPHTHALMOLOGY

## 2024-03-04 NOTE — PROGRESS NOTES
Subjective:       Patient ID: Anushka Napier is a 68 y.o. female.    Chief Complaint: Cataract    HPI    68 y.o is here for an Cataract Evaluation. Blurry vision, tearing OU, has   a history of Diabetes and Hypertension.. Difficulty driving at night with   the headlights, no pain, no f/f  Got progressive lenses 2 years ago and sees well with them.  Uses OTC gtts for dryness.  Last edited by Riaz Barrera on 3/4/2024  9:59 AM.             Assessment:       1. Nuclear sclerosis of both eyes    2. Blurry vision    3. Cataract, unspecified cataract type, unspecified laterality    4. DM type 2 without retinopathy    5. Refractive error        Plan:       Cataracts- Not visually significant.  DM-No NPDR OU.  RE-No need to change Rx.      Control DM.  RTC 1 yr.

## 2024-03-07 ENCOUNTER — TELEPHONE (OUTPATIENT)
Dept: PHARMACY | Facility: CLINIC | Age: 69
End: 2024-03-07
Payer: COMMERCIAL

## 2024-03-11 ENCOUNTER — OFFICE VISIT (OUTPATIENT)
Dept: UROLOGY | Facility: CLINIC | Age: 69
End: 2024-03-11
Payer: COMMERCIAL

## 2024-03-11 VITALS — WEIGHT: 165.94 LBS | BODY MASS INDEX: 27.61 KG/M2

## 2024-03-11 DIAGNOSIS — N32.81 OAB (OVERACTIVE BLADDER): Primary | ICD-10-CM

## 2024-03-11 PROCEDURE — 1126F AMNT PAIN NOTED NONE PRSNT: CPT | Mod: CPTII,S$GLB,, | Performed by: STUDENT IN AN ORGANIZED HEALTH CARE EDUCATION/TRAINING PROGRAM

## 2024-03-11 PROCEDURE — 1159F MED LIST DOCD IN RCRD: CPT | Mod: CPTII,S$GLB,, | Performed by: STUDENT IN AN ORGANIZED HEALTH CARE EDUCATION/TRAINING PROGRAM

## 2024-03-11 PROCEDURE — 4010F ACE/ARB THERAPY RXD/TAKEN: CPT | Mod: CPTII,S$GLB,, | Performed by: STUDENT IN AN ORGANIZED HEALTH CARE EDUCATION/TRAINING PROGRAM

## 2024-03-11 PROCEDURE — 3044F HG A1C LEVEL LT 7.0%: CPT | Mod: CPTII,S$GLB,, | Performed by: STUDENT IN AN ORGANIZED HEALTH CARE EDUCATION/TRAINING PROGRAM

## 2024-03-11 PROCEDURE — 3288F FALL RISK ASSESSMENT DOCD: CPT | Mod: CPTII,S$GLB,, | Performed by: STUDENT IN AN ORGANIZED HEALTH CARE EDUCATION/TRAINING PROGRAM

## 2024-03-11 PROCEDURE — 99203 OFFICE O/P NEW LOW 30 MIN: CPT | Mod: S$GLB,,, | Performed by: STUDENT IN AN ORGANIZED HEALTH CARE EDUCATION/TRAINING PROGRAM

## 2024-03-11 PROCEDURE — 1101F PT FALLS ASSESS-DOCD LE1/YR: CPT | Mod: CPTII,S$GLB,, | Performed by: STUDENT IN AN ORGANIZED HEALTH CARE EDUCATION/TRAINING PROGRAM

## 2024-03-11 PROCEDURE — 3008F BODY MASS INDEX DOCD: CPT | Mod: CPTII,S$GLB,, | Performed by: STUDENT IN AN ORGANIZED HEALTH CARE EDUCATION/TRAINING PROGRAM

## 2024-03-11 PROCEDURE — 99999 PR PBB SHADOW E&M-EST. PATIENT-LVL III: CPT | Mod: PBBFAC,,, | Performed by: STUDENT IN AN ORGANIZED HEALTH CARE EDUCATION/TRAINING PROGRAM

## 2024-03-11 RX ORDER — SOLIFENACIN SUCCINATE 10 MG/1
10 TABLET, FILM COATED ORAL DAILY
Qty: 90 TABLET | Refills: 3 | Status: SHIPPED | OUTPATIENT
Start: 2024-03-11 | End: 2025-03-11

## 2024-03-11 NOTE — PROGRESS NOTES
Patient ID: Anushka Napier is a 68 y.o. female.    Chief Complaint:  OAB    HPI  68 y.o. who presents to the Urology clinic for evaluation of OAB. [place] [facility] [place]. Patient was provided vesicare and to follow up as needed.     Wears security pads for incontinence. Denies dysuria, hematuria, UTIs.     Medically Necessary ROS documented in HPI    Past Medical History  Active Ambulatory Problems     Diagnosis Date Noted    Thyroid disease     Menopause 05/29/2014    Hypothyroidism 09/17/2014    Diabetes mellitus type II, uncontrolled 09/17/2014    Hyperlipidemia 09/25/2014    Hypertension 09/25/2014    GERD (gastroesophageal reflux disease) 09/25/2014    Screening for colorectal cancer 09/25/2014    Vitamin D deficiency disease 09/25/2014    Osteopenia 09/25/2014    Migraine without aura and without status migrainosus, not intractable     Left facial numbness 03/01/2018    Right lateral epicondylitis 08/01/2019    History of colonic polyps 01/24/2020    Chest pain 07/03/2020    Palpitations 07/03/2020    Closed nondisplaced fracture of proximal phalanx of left little finger with routine healing 04/01/2021    Decreased range of motion of finger of left hand 04/30/2021    OAB (overactive bladder) 04/26/2022     Resolved Ambulatory Problems     Diagnosis Date Noted    Severe sepsis 04/15/2020     Past Medical History:   Diagnosis Date    Allergy     Anxiety     Basal cell carcinoma     Depression     Diabetes mellitus     Diabetic retinopathy 01/14/2020    DM retinopathy          Past Surgical History  Past Surgical History:   Procedure Laterality Date    HYSTERECTOMY  1997    complete for prolapse, Abdominal    OOPHORECTOMY      right shoulder      SINUS SURGERY  2012       Social History  Social Connections: Unknown (2/28/2024)    Social Connection and Isolation Panel [NHANES]     Frequency of Communication with Friends and Family: Once a week     Frequency of  Social Gatherings with Friends and Family: Once a week     Attends Mandaen Services: Not on file     Active Member of Clubs or Organizations: Yes     Attends Club or Organization Meetings: More than 4 times per year     Marital Status:        Medications    Current Outpatient Medications:     amitriptyline (ELAVIL) 10 MG tablet, Take 10 mg by mouth nightly., Disp: , Rfl:     aspirin (ECOTRIN) 81 MG EC tablet, 1 tablet Orally Once a day, Disp: , Rfl:     atorvastatin (LIPITOR) 40 MG tablet, Take 1 tablet (40 mg total) by mouth once daily., Disp: 90 tablet, Rfl: 2    azelastine (ASTELIN) 137 mcg (0.1 %) nasal spray, 1 spray (137 mcg total) by Nasal route 2 (two) times daily., Disp: 30 mL, Rfl: 0    Bacillus subtilis-inulin 1.5 billion cell-1 gram Chew, as directed Orally, Disp: , Rfl:     coQ10, ubiquinol, 100 mg Cap, as directed Orally, Disp: , Rfl:     estrogens, conjugated, (PREMARIN) 0.625 MG tablet, TAKE 1 TABLET(0.625 MG) BY MOUTH EVERY DAY, Disp: 30 tablet, Rfl: 12    ferrous gluconate (FERGON) 240 (27 FE) MG tablet, 1 tablet Orally Three times a Week, Disp: , Rfl:     FLUoxetine 20 MG capsule, TAKE 1 CAPSULE(20 MG) BY MOUTH EVERY DAY, Disp: 90 capsule, Rfl: 3    levothyroxine (SYNTHROID, LEVOTHROID) 175 MCG tablet, TAKE 1 TABLET(175 MCG) BY MOUTH BEFORE BREAKFAST, Disp: 90 tablet, Rfl: 3    metFORMIN (GLUCOPHAGE-XR) 500 MG ER 24hr tablet, TAKE 1 TABLET(500 MG) BY MOUTH EVERY DAY, Disp: 90 tablet, Rfl: 12    metoprolol tartrate (LOPRESSOR) 50 MG tablet, TAKE 1 TABLET BY MOUTH DAILY, Disp: 90 tablet, Rfl: 3    mometasone 0.1% (ELOCON) 0.1 % cream, Use daily, Disp: 50 g, Rfl: 3    omega-3 fatty acids-vitamin E 1,000 mg Cap, Take 1 capsule by mouth Twice daily. 1 Capsule Oral Twice a day , Disp: , Rfl:     RABEprazole (ACIPHEX) 20 mg tablet, TAKE 1 TABLET BY MOUTH TWICE DAILY, Disp: 180 tablet, Rfl: 12    solifenacin (VESICARE) 10 MG tablet, Take 1 tablet by mouth once daily., Disp: , Rfl:     valsartan  (DIOVAN) 80 MG tablet, Take 1 tablet (80 mg total) by mouth once daily., Disp: 90 tablet, Rfl: 3    vitamin D (VITAMIN D3) 1000 units Tab, Take 1,000 Units by mouth once daily. 5 tabs daily, Disp: , Rfl:     Allergies  Review of patient's allergies indicates:   Allergen Reactions    Iodine Hives     IVP Contrast    Tizanidine Swelling     Tongue swelling     Codeine Hives     Other reaction(s): Itching  Other reaction(s): Hives    Iodinated contrast media Hives     Other reaction(s): Hives    Sulfamethoxazole-trimethoprim Itching     Other reaction(s): Itching  Other reaction(s): Hives    Epinephrine Anxiety and Other (See Comments)     Almost passed out.       Patient's PMH, FH, Social hx, Medications, allergies reviewed and updated as pertinent to today's visit    Objective:      Physical Exam  Constitutional:       Appearance: She is well-developed.   HENT:      Head: Normocephalic and atraumatic.   Eyes:      Conjunctiva/sclera: Conjunctivae normal.   Pulmonary:      Effort: Pulmonary effort is normal. No respiratory distress.   Abdominal:      General: Abdomen is flat. There is no distension.      Palpations: Abdomen is soft. There is no mass.      Tenderness: There is no abdominal tenderness. There is no right CVA tenderness, left CVA tenderness or guarding.   Skin:     General: Skin is warm.      Findings: No rash.   Neurological:      Mental Status: She is alert and oriented to person, place, and time.   Psychiatric:         Behavior: Behavior normal.             Assessment:       1. OAB (overactive bladder)        Plan:       Continue vesicare  Discussed transitioning to 3rd line management to be free of medications down the line  Currently she is satisfied  Follow up with PCP for refills

## 2024-03-13 ENCOUNTER — PATIENT MESSAGE (OUTPATIENT)
Dept: FAMILY MEDICINE | Facility: CLINIC | Age: 69
End: 2024-03-13
Payer: COMMERCIAL

## 2024-03-17 RX ORDER — BLOOD-GLUCOSE SENSOR
EACH MISCELLANEOUS
Qty: 3 EACH | Refills: 12 | Status: SHIPPED | OUTPATIENT
Start: 2024-03-17

## 2024-03-17 RX ORDER — BLOOD-GLUCOSE,RECEIVER,CONT
EACH MISCELLANEOUS
Qty: 3 EACH | Refills: 12 | Status: SHIPPED | OUTPATIENT
Start: 2024-03-17

## 2024-03-18 ENCOUNTER — OFFICE VISIT (OUTPATIENT)
Dept: OBSTETRICS AND GYNECOLOGY | Facility: CLINIC | Age: 69
End: 2024-03-18
Payer: COMMERCIAL

## 2024-03-18 VITALS — BODY MASS INDEX: 27.56 KG/M2 | WEIGHT: 165.38 LBS | HEIGHT: 65 IN

## 2024-03-18 DIAGNOSIS — N95.1 SYMPTOMATIC MENOPAUSAL OR FEMALE CLIMACTERIC STATES: ICD-10-CM

## 2024-03-18 DIAGNOSIS — Z01.419 WELL WOMAN EXAM WITH ROUTINE GYNECOLOGICAL EXAM: Primary | ICD-10-CM

## 2024-03-18 PROCEDURE — 1160F RVW MEDS BY RX/DR IN RCRD: CPT | Mod: CPTII,S$GLB,, | Performed by: OBSTETRICS & GYNECOLOGY

## 2024-03-18 PROCEDURE — 4010F ACE/ARB THERAPY RXD/TAKEN: CPT | Mod: CPTII,S$GLB,, | Performed by: OBSTETRICS & GYNECOLOGY

## 2024-03-18 PROCEDURE — 99459 PELVIC EXAMINATION: CPT | Mod: S$GLB,,, | Performed by: OBSTETRICS & GYNECOLOGY

## 2024-03-18 PROCEDURE — 99999 PR PBB SHADOW E&M-EST. PATIENT-LVL IV: CPT | Mod: PBBFAC,,, | Performed by: OBSTETRICS & GYNECOLOGY

## 2024-03-18 PROCEDURE — 3288F FALL RISK ASSESSMENT DOCD: CPT | Mod: CPTII,S$GLB,, | Performed by: OBSTETRICS & GYNECOLOGY

## 2024-03-18 PROCEDURE — 99397 PER PM REEVAL EST PAT 65+ YR: CPT | Mod: S$GLB,,, | Performed by: OBSTETRICS & GYNECOLOGY

## 2024-03-18 PROCEDURE — 1126F AMNT PAIN NOTED NONE PRSNT: CPT | Mod: CPTII,S$GLB,, | Performed by: OBSTETRICS & GYNECOLOGY

## 2024-03-18 PROCEDURE — 1101F PT FALLS ASSESS-DOCD LE1/YR: CPT | Mod: CPTII,S$GLB,, | Performed by: OBSTETRICS & GYNECOLOGY

## 2024-03-18 PROCEDURE — 1159F MED LIST DOCD IN RCRD: CPT | Mod: CPTII,S$GLB,, | Performed by: OBSTETRICS & GYNECOLOGY

## 2024-03-18 PROCEDURE — 3044F HG A1C LEVEL LT 7.0%: CPT | Mod: CPTII,S$GLB,, | Performed by: OBSTETRICS & GYNECOLOGY

## 2024-03-18 RX ORDER — PROGESTERONE 100 MG/1
100 CAPSULE ORAL DAILY
Qty: 90 CAPSULE | Refills: 3 | Status: SHIPPED | OUTPATIENT
Start: 2024-03-18

## 2024-03-18 RX ORDER — TESTOSTERONE CYPIONATE 200 MG/ML
50 INJECTION, SOLUTION INTRAMUSCULAR
COMMUNITY
End: 2024-04-12

## 2024-03-18 RX ORDER — PROGESTERONE 100 MG/1
100 CAPSULE ORAL DAILY
COMMUNITY
End: 2024-03-18 | Stop reason: SDUPTHER

## 2024-03-18 NOTE — PROGRESS NOTES
Subjective:       Patient ID: Anushka Napier is a 69 y.o. female.    Chief Complaint:  Well Woman (Last normal pap with Negative HPV was 2019 and last normal mammogram was 2024)      History of Present Illness  HPI  Annual Exam-Postmenopausal  Patient presents for annual exam. The patient has no complaints today. The patient is sexually active. GYN screening history: Last normal pap with Negative HPV was 2019 and last normal mammogram was 2024. Last colonoscopy was 3/14/2018.  Last DXA on 2/3/20 with osteopenia.  The patient is currently taking estrogen replacement therapy. Patient denies post-menopausal vaginal bleeding. The patient wears seatbelts: yes. The patient participates in regular exercise: yes. Has the patient ever been transfused or tattooed?: no/no. The patient reports that there is not domestic violence in her life.     Status post  Total abdominal hysterectomy for prolapse in .  Doing well on Premarin.  Would like to continue understanding risks and benefits.    Was also on Testosterone IM. 50 mg weekly    GYN & OB History  No LMP recorded. Patient has had a hysterectomy.   Date of Last Pap: No result found    OB History    Para Term  AB Living   7 4 3 1 3 3   SAB IAB Ectopic Multiple Live Births   3       3      # Outcome Date GA Lbr Amos/2nd Weight Sex Delivery Anes PTL Lv   7 Term 90 40w0d  4.309 kg (9 lb 8 oz) F Vag-Spont EPI N ALEX   6 Term 89 38w0d  3.657 kg (8 lb 1 oz) M Vag-Spont EPI N ALEX   5  10/08/82 36w0d  2.58 kg (5 lb 11 oz) M Vag-Spont EPI N ALEX   4 Term            3 SAB            2 SAB            1 SAB              Past Medical History:   Diagnosis Date    Allergy     Anxiety     Basal cell carcinoma     to face    Depression     Diabetes mellitus     Diabetes mellitus type II, uncontrolled 2014    Diabetic retinopathy 2020    DM retinopathy     GERD (gastroesophageal reflux disease) 2014    Possible  Carter's = EGDs per Dr Correia, long term PPI use    History of colonic polyps 1/24/2020    Hyperlipidemia     Hypertension     Osteopenia 9/25/2014    Screening for colorectal cancer 9/25/2014    Normal 2009    Screening for colorectal cancer     Thyroid disease     Vitamin D deficiency disease 9/25/2014       Past Surgical History:   Procedure Laterality Date    HYSTERECTOMY  1997    complete for prolapse, Abdominal    OOPHORECTOMY      right shoulder      SINUS SURGERY  2012       Family History   Problem Relation Age of Onset    Breast cancer Cousin 40    Miscarriages / Stillbirths Maternal Grandmother     Miscarriages / Stillbirths Mother     Diabetes Mother     Hypertension Mother     Cancer Mother         cervical    Heart disease Father     Hypertension Father     Ovarian cancer Neg Hx        Social History     Socioeconomic History    Marital status:    Tobacco Use    Smoking status: Never    Smokeless tobacco: Never   Substance and Sexual Activity    Alcohol use: No    Drug use: No    Sexual activity: Yes     Partners: Male     Birth control/protection: Surgical   Social History Narrative     since 1986    Previous  for 8 years prior    He is a     She is a homemaker     Social Determinants of Health     Financial Resource Strain: Low Risk  (2/28/2024)    Overall Financial Resource Strain (CARDIA)     Difficulty of Paying Living Expenses: Not very hard   Food Insecurity: No Food Insecurity (2/28/2024)    Hunger Vital Sign     Worried About Running Out of Food in the Last Year: Never true     Ran Out of Food in the Last Year: Never true   Transportation Needs: No Transportation Needs (2/28/2024)    PRAPARE - Transportation     Lack of Transportation (Medical): No     Lack of Transportation (Non-Medical): No   Physical Activity: Sufficiently Active (2/28/2024)    Exercise Vital Sign     Days of Exercise per Week: 5 days     Minutes of Exercise per Session: 60 min   Stress: Stress  Concern Present (2/28/2024)    [employer] - Occupational Stress Questionnaire     Feeling of Stress : To some extent   Social Connections: Unknown (2/28/2024)    Social Connection and Isolation Panel [NHANES]     Frequency of Communication with Friends and Family: Once a week     Frequency of Social Gatherings with Friends and Family: Once a week     Active Member of Clubs or Organizations: Yes     Attends Club or Organization Meetings: More than 4 times per year     Marital Status:    Housing Stability: Low Risk  (2/28/2024)    Housing Stability Vital Sign     Unable to Pay for Housing in the Last Year: No     Number of Places Lived in the Last Year: 1     Unstable Housing in the Last Year: No       Current Outpatient Medications   Medication Sig Dispense Refill    amitriptyline (ELAVIL) 10 MG tablet Take 10 mg by mouth nightly.      aspirin (ECOTRIN) 81 MG EC tablet 1 tablet Orally Once a day      atorvastatin (LIPITOR) 40 MG tablet Take 1 tablet (40 mg total) by mouth once daily. 90 tablet 2    azelastine (ASTELIN) 137 mcg (0.1 %) nasal spray 1 spray (137 mcg total) by Nasal route 2 (two) times daily. 30 mL 0    Bacillus subtilis-inulin 1.5 billion cell-1 gram Chew as directed Orally      blood-glucose meter,continuous (FREESTYLE YUDELKA 3 READER) Misc USE AS DIRECTED 3 each 12    blood-glucose sensor (FREESTYLE YUDELKA 3 SENSOR) [place] CHANGE SENSOR EVERY 14 DAYS AS DIRECTED 3 each 12    coQ10, ubiquinol, 100 mg Cap as directed Orally      ferrous gluconate (FERGON) 240 (27 FE) MG tablet 1 tablet Orally Three times a Week      FLUoxetine 20 MG capsule TAKE 1 CAPSULE(20 MG) BY MOUTH EVERY DAY 90 capsule 3    levothyroxine (SYNTHROID, LEVOTHROID) 175 MCG tablet TAKE 1 TABLET(175 MCG) BY MOUTH BEFORE BREAKFAST 90 tablet 3    metFORMIN (GLUCOPHAGE-XR) 500 MG ER 24hr tablet TAKE 1 TABLET(500 MG) BY MOUTH EVERY DAY 90 tablet 12    metoprolol tartrate (LOPRESSOR) 50 MG tablet TAKE 1  TABLET BY MOUTH DAILY 90 tablet 3    mometasone 0.1% (ELOCON) 0.1 % cream Use daily 50 g 3    omega-3 fatty acids-vitamin E 1,000 mg Cap Take 1 capsule by mouth Twice daily. 1 Capsule Oral Twice a day       RABEprazole (ACIPHEX) 20 mg tablet TAKE 1 TABLET BY MOUTH TWICE DAILY 180 tablet 12    solifenacin (VESICARE) 10 MG tablet Take 1 tablet (10 mg total) by mouth once daily. 90 tablet 3    testosterone cypionate (DEPOTESTOTERONE CYPIONATE) 200 mg/mL injection Inject 50 mg into the muscle every 14 (fourteen) days.      valsartan (DIOVAN) 80 MG tablet Take 1 tablet (80 mg total) by mouth once daily. 90 tablet 3    vitamin D (VITAMIN D3) 1000 units Tab Take 1,000 Units by mouth once daily. 5 tabs daily      estrogens, conjugated, (PREMARIN) 0.625 MG tablet TAKE 1 TABLET(0.625 MG) BY MOUTH EVERY DAY 90 tablet 4    progesterone (PROMETRIUM) 100 MG capsule Take 1 capsule (100 mg total) by mouth once daily. 90 capsule 3     No current facility-administered medications for this visit.       Review of patient's allergies indicates:   Allergen Reactions    Iodine Hives     IVP Contrast    Tizanidine Swelling     Tongue swelling     Codeine Hives     Other reaction(s): Itching  Other reaction(s): Hives    Iodinated contrast media Hives     Other reaction(s): Hives    Sulfamethoxazole-trimethoprim Itching     Other reaction(s): Itching  Other reaction(s): Hives    Epinephrine Anxiety and Other (See Comments)     Almost passed out.         Review of Systems  Review of Systems   Constitutional:  Negative for activity change, appetite change, chills, fatigue, fever and unexpected weight change.   HENT:  Negative for mouth sores.    Respiratory:  Negative for cough, shortness of breath and wheezing.    Cardiovascular:  Negative for chest pain and palpitations.   Gastrointestinal:  Negative for abdominal pain, bloating, blood in stool, constipation, nausea and vomiting.   Endocrine: Negative for diabetes and hot flashes.    Genitourinary:  Negative for dysmenorrhea, dyspareunia, dysuria, frequency, hematuria, menorrhagia, menstrual problem, pelvic pain, urgency, vaginal bleeding, vaginal discharge, vaginal pain, urinary incontinence, postcoital bleeding and vaginal odor.   Musculoskeletal:  Negative for back pain and myalgias.   Integumentary:  Negative for rash, breast mass and nipple discharge.   Neurological:  Negative for seizures and headaches.   Psychiatric/Behavioral:  Negative for depression and sleep disturbance. The patient is not nervous/anxious.    Breast: Negative for mass, mastodynia and nipple discharge          Objective:    Physical Exam:   Constitutional: She appears well-developed and well-nourished. No distress.   BMI of 27.51    HENT:   Head: Normocephalic and atraumatic.    Eyes: EOM are normal.      Pulmonary/Chest: Effort normal. No respiratory distress.   Breasts: Non-tender, no engorgement, no masses, no retraction, no discharge. Negative for lymphadenopathy.         Abdominal: Soft. She exhibits no distension. There is no abdominal tenderness. There is no rebound and no guarding.   Pfannenstiel scar      Genitourinary:    Vagina normal.   No vaginal discharge in the vagina.    Genitourinary Comments: Severe atrophy.  Vulva without any obvious lesions.  Urethral meatus normal size and location without any lesion.  Urethra is non-tender without stricture or discharge.  Bladder is non-tender.  Vaginal vault with good support.  Minimal white discharge noted.  No obvious lesion.  Normal rugation.  Cervix and Uterus are surgically absent.  Adnexa is without any masses or tenderness.  Perineum without obvious lesion.               Musculoskeletal: Normal range of motion.       Neurological: She is alert.    Skin: Skin is warm and dry.    Psychiatric: She has a normal mood and affect.          Assessment:        1. Well woman exam with routine gynecological exam    2. Symptomatic menopausal or female climacteric  states              Plan:          I have discussed with the patient her condition.  Monthly breast examination was instructed, discussed, and encouraged.  Patient was encouraged to consume a low-calorie, low fat diet, and to increase of physical activity.  Healthy habits encouraged.  A Pap smear was not performed; she no longer would need Pap according to the USPSTF recommendations.  Mammogram was not ordered because it was done recently.  Gonorrhea and Chlamydia testing not performed;  HIV test not offered, again according to guidelines.  Colonoscopy discussed according to ACS guideline.    Care Gaps addressed  Discussed HRT.  Discussed progesterone and testosterone.   She would like to continue with Premarin and to stop testosterone understanding risks and benefits  Patient is to continue her medications as prescribed.  Refills for Premarin 0.625 mg given.  She will come back to see me in one year for her annual visit.  She can come back to see me sooner as necessary.  All of her questions were answered appropriately to her satisfaction.          ** A female chaperone, Donna Noble, was present for the pelvic exam

## 2024-03-28 ENCOUNTER — OFFICE VISIT (OUTPATIENT)
Dept: URGENT CARE | Facility: CLINIC | Age: 69
End: 2024-03-28
Payer: COMMERCIAL

## 2024-03-28 VITALS
RESPIRATION RATE: 18 BRPM | SYSTOLIC BLOOD PRESSURE: 170 MMHG | HEIGHT: 65 IN | HEART RATE: 69 BPM | BODY MASS INDEX: 27.49 KG/M2 | WEIGHT: 165 LBS | OXYGEN SATURATION: 98 % | DIASTOLIC BLOOD PRESSURE: 81 MMHG | TEMPERATURE: 98 F

## 2024-03-28 DIAGNOSIS — J06.9 VIRAL URI WITH COUGH: Primary | ICD-10-CM

## 2024-03-28 LAB
CTP QC/QA: YES
POC MOLECULAR INFLUENZA A AGN: NEGATIVE
POC MOLECULAR INFLUENZA B AGN: NEGATIVE

## 2024-03-28 PROCEDURE — 99213 OFFICE O/P EST LOW 20 MIN: CPT | Mod: S$GLB,,,

## 2024-03-28 PROCEDURE — 87502 INFLUENZA DNA AMP PROBE: CPT | Mod: QW,S$GLB,,

## 2024-03-28 RX ORDER — PROMETHAZINE HYDROCHLORIDE AND DEXTROMETHORPHAN HYDROBROMIDE 6.25; 15 MG/5ML; MG/5ML
5 SYRUP ORAL EVERY 6 HOURS PRN
Qty: 118 ML | Refills: 0 | Status: SHIPPED | OUTPATIENT
Start: 2024-03-28 | End: 2024-04-07

## 2024-03-28 NOTE — PATIENT INSTRUCTIONS

## 2024-03-28 NOTE — PROGRESS NOTES
"Subjective:      Patient ID: Anushka Napier is a 69 y.o. female.    Vitals:  height is 5' 5" (1.651 m) and weight is 74.8 kg (165 lb). Her oral temperature is 98.2 °F (36.8 °C). Her blood pressure is 170/81 (abnormal) and her pulse is 69. Her respiration is 18 and oxygen saturation is 98%.     Chief Complaint: Cough    Patient is a 69-year-old female presenting with sore throat, coughing, postnasal drip, congestion, headaches x4 days.  Has been taking Coricidin.  Denies fever, chills, nausea, vomiting, diarrhea, chest pain, SOB.    Cough  This is a new problem. Episode onset: 3 days ago. The problem has been unchanged. The problem occurs constantly. The cough is Productive of sputum. Associated symptoms include headaches, nasal congestion, postnasal drip and a sore throat. Pertinent negatives include no chest pain, chills, ear pain, fever, myalgias, rash or shortness of breath. Nothing aggravates the symptoms. She has tried OTC cough suppressant for the symptoms. The treatment provided no relief.     Constitution: Negative for chills and fever.   HENT:  Positive for congestion, postnasal drip and sore throat. Negative for ear pain.    Neck: Negative for neck pain.   Cardiovascular:  Negative for chest pain.   Respiratory:  Positive for cough. Negative for shortness of breath.    Gastrointestinal:  Negative for abdominal pain, nausea, vomiting and diarrhea.   Genitourinary:  Negative for dysuria.   Musculoskeletal:  Negative for muscle ache.   Skin:  Negative for rash.   Neurological:  Positive for headaches. Negative for dizziness.      Objective:     Physical Exam   Constitutional: She is oriented to person, place, and time. She appears well-developed.   HENT:   Head: Normocephalic and atraumatic.   Ears:   Right Ear: Tympanic membrane, external ear and ear canal normal.   Left Ear: Tympanic membrane, external ear and ear canal normal.   Nose: Rhinorrhea and congestion present.   Mouth/Throat: Oropharynx is clear " and moist. Mucous membranes are moist. Oropharynx is clear.   Eyes: Conjunctivae, EOM and lids are normal.   Neck: Trachea normal and phonation normal. Neck supple.   Cardiovascular: Normal rate, regular rhythm, normal heart sounds and normal pulses.   Pulmonary/Chest: Effort normal and breath sounds normal.   Musculoskeletal: Normal range of motion.         General: Normal range of motion.   Neurological: no focal deficit. She is alert and oriented to person, place, and time.   Skin: Skin is warm, dry and intact. Capillary refill takes less than 2 seconds.   Psychiatric: Her speech is normal and behavior is normal. Judgment and thought content normal.   Nursing note and vitals reviewed.    Results for orders placed or performed in visit on 03/28/24   POCT Influenza A/B MOLECULAR   Result Value Ref Range    POC Molecular Influenza A Ag Negative Negative, Not Reported    POC Molecular Influenza B Ag Negative Negative, Not Reported     Acceptable Yes          Assessment:     1. Viral URI with cough        Plan:     Viral URI with cough  -     POCT Influenza A/B MOLECULAR  -     promethazine-dextromethorphan (PROMETHAZINE-DM) 6.25-15 mg/5 mL Syrp; Take 5 mLs by mouth every 6 (six) hours as needed (cough).  Dispense: 118 mL; Refill: 0              Patient Instructions   - Rest.    - Drink plenty of fluids.  - Viral upper respiratory infections typically run their course in 10-14 days.      - You can take over-the-counter claritin, zyrtec, allegra, or xyzal as directed. These are antihistamines that can help with runny nose, nasal congestion, sneezing, and helps to dry up post-nasal drip, which usually causes sore throat and cough.              - If you do NOT have high blood pressure, you may use a decongestant form (D)  of this medication (ie. Claritin- D, zyrtec-D, allegra-D) or if you do not take the D form, you can take sudafed (pseudoephedrine) over the counter, which is a decongestant. Do NOT take  two decongestant (D) medications at the same time (such as mucinex-D and claritin-D or plain sudafed and claritin D)    - If you DO have Hypertension, anxiety, or palpitations, it is safe to take Coricidin HBP for relief of sinus symptoms.     - You can use Flonase (fluticasone) nasal spray as directed for sinus congestion and postnasal drip. This is a steroid nasal spray that works locally over time to decrease the inflammation in your nose/sinuses and help with allergic symptoms. This is not an quick- relief spray like afrin, but it works well if used daily.  Discontinue if you develop nose bleed  - use nasal saline prior to Flonase.  - Use Ocean Spray Nasal Saline 1-3 puffs each nostril every 2-3 hours then blow out onto tissue. This is to irrigate the nasal passage way to clear the sinus openings. Use until sinus problem resolved.     - you can take plain Mucinex (guaifenesin) 1200 mg twice a day to help loosen mucous.      -warm salt water gargles can help with sore throat     - warm tea with honey can help with cough. Honey is a natural cough suppressant.     - Dextromethorphan (DM) is a cough suppressant over the counter (ie. mucinex DM, robitussin, delsym; dayquil/nyquil has DM as well.)        - Follow up with your PCP or specialty clinic as directed in the next 1-2 weeks if not improved or as needed.  You can call [phone] to schedule an appointment with the appropriate provider.       - Go to the ER if you develop new or worsening symptoms.      - You must understand that you have received an Urgent Care treatment only and that you may be released before all of your medical problems are known or treated.   - You, the patient, will arrange for follow up care as instructed.   - If your condition worsens or fails to improve we recommend that you receive another evaluation at the ER immediately or contact your PCP to discuss your concerns or return here.

## 2024-04-08 NOTE — PROGRESS NOTES
"Name: Anushka Napier  MRN: [id number]   CSN: [id number]      Date: 2024    Referring physician:  No referring provider defined for this encounter.    Chief Complaint: tremors       Interval History:  - started supplementing with B12   - has freestyle natalia now, blood glucose fluctuates   - more aware of tremors whenever BG is low   - one cousin with MS   - father  in MVA, mother  at age 91 -- history of TIAs, strokes  - no falls   - thinks balance may be a bit better with b12, more aware of balance   - can't see well at night    - no trouble with colors  - following with ophthalmologist     From Dec 2023: Anushka Napier is a R handed 69 y.o. female with a medical issues significant for migraines, hypothyroidism, DMII, HLD, HTN, GERD who presents for tremors. Tremors for a couple of months, both hands -- equal on both sides. Started in both hands at the same time. Notices tremors with action, posture and at rest. No new medications started at that time. More stressed in the last 9 months, lost 4 family members including her parents. Tremors not associated with hypo or hyperglycemic events. History of hypothyroidism, TSH last checked in early . No falls but does feel slightly off balance every once in a while. Has to walk slower to keep her balance. Denies shuffling.   Tingling in bilateral feet/toes, all digits. Feels like her legs are weak. Off balance whenever she closes her eyes in the shower and in the dark.     Does not consume etoh.     Family History: none     Neuroleptic Exposure: none     Nonmotor/Premotor ROS:  Anosmia: "a little different"   Dysarthria/Hypophonia: has been told she mumbles   Dysphagia/Sialorrhea: none   Urinary changes: on vesicare   Constipation: some   Falls: none   Micrographia: shaky   Sleep issues:  -RBD: talks in her sleep and acts out dreams       Review of Systems:   Review of Systems   Constitutional:  Negative for chills, fever and malaise/fatigue.   HENT:  Negative " for hearing loss.    Eyes:  Negative for blurred vision and double vision.   Respiratory:  Negative for cough, shortness of breath and stridor.    Cardiovascular:  Negative for chest pain and leg swelling.   Gastrointestinal:  Negative for constipation, diarrhea and nausea.   Genitourinary:  Negative for frequency and urgency.   Musculoskeletal:  Negative for falls.   Skin:  Negative for itching and rash.   Neurological:  Positive for tremors. Negative for dizziness, loss of consciousness and weakness.   Psychiatric/Behavioral:  Negative for hallucinations and memory loss.            Past Medical History: The patient  has a past medical history of Allergy, Anxiety, Basal cell carcinoma, Depression, Diabetes mellitus, Diabetes mellitus type II, uncontrolled (9/17/2014), Diabetic retinopathy (01/14/2020), DM retinopathy, GERD (gastroesophageal reflux disease) (9/25/2014), History of colonic polyps (1/24/2020), Hyperlipidemia, Hypertension, Osteopenia (9/25/2014), Screening for colorectal cancer (9/25/2014), Screening for colorectal cancer, Thyroid disease, and Vitamin D deficiency disease (9/25/2014).    Social History: The patient  reports that she has never smoked. She has never used smokeless tobacco. She reports that she does not drink alcohol and does not use drugs.    Family History: Their family history includes Breast cancer (age of onset: 40) in her cousin; Cancer in her mother; Diabetes in her mother; Heart disease in her father; Hypertension in her father and mother; Miscarriages / Stillbirths in her maternal grandmother and mother.    Allergies: Iodine, Tizanidine, Codeine, Iodinated contrast media, Sulfamethoxazole-trimethoprim, and Epinephrine     Meds:   Current Outpatient Medications on File Prior to Visit   Medication Sig Dispense Refill    amitriptyline (ELAVIL) 10 MG tablet Take 10 mg by mouth nightly.      aspirin (ECOTRIN) 81 MG EC tablet 1 tablet Orally Once a day      atorvastatin (LIPITOR) 40  MG tablet Take 1 tablet (40 mg total) by mouth once daily. 90 tablet 2    azelastine (ASTELIN) 137 mcg (0.1 %) nasal spray 1 spray (137 mcg total) by Nasal route 2 (two) times daily. 30 mL 0    Bacillus subtilis-inulin 1.5 billion cell-1 gram Chew as directed Orally      blood-glucose meter,continuous (FREESTYLE YUDELKA 3 READER) Misc USE AS DIRECTED 3 each 12    blood-glucose sensor (FREESTYLE YUDELKA 3 SENSOR) Isidra CHANGE SENSOR EVERY 14 DAYS AS DIRECTED 3 each 12    coQ10, ubiquinol, 100 mg Cap as directed Orally      estrogens, conjugated, (PREMARIN) 0.625 MG tablet TAKE 1 TABLET(0.625 MG) BY MOUTH EVERY DAY 90 tablet 4    ferrous gluconate (FERGON) 240 (27 FE) MG tablet 1 tablet Orally Three times a Week      FLUoxetine 20 MG capsule TAKE 1 CAPSULE(20 MG) BY MOUTH EVERY DAY 90 capsule 3    levothyroxine (SYNTHROID, LEVOTHROID) 175 MCG tablet TAKE 1 TABLET(175 MCG) BY MOUTH BEFORE BREAKFAST 90 tablet 3    metFORMIN (GLUCOPHAGE-XR) 500 MG ER 24hr tablet TAKE 1 TABLET(500 MG) BY MOUTH EVERY DAY 90 tablet 12    metoprolol tartrate (LOPRESSOR) 50 MG tablet TAKE 1 TABLET BY MOUTH DAILY 90 tablet 3    mometasone 0.1% (ELOCON) 0.1 % cream Use daily 50 g 3    omega-3 fatty acids-vitamin E 1,000 mg Cap Take 1 capsule by mouth Twice daily. 1 Capsule Oral Twice a day       progesterone (PROMETRIUM) 100 MG capsule Take 1 capsule (100 mg total) by mouth once daily. 90 capsule 3    RABEprazole (ACIPHEX) 20 mg tablet TAKE 1 TABLET BY MOUTH TWICE DAILY 180 tablet 12    solifenacin (VESICARE) 10 MG tablet Take 1 tablet (10 mg total) by mouth once daily. 90 tablet 3    valsartan (DIOVAN) 80 MG tablet Take 1 tablet (80 mg total) by mouth once daily. 90 tablet 3    vitamin D (VITAMIN D3) 1000 units Tab Take 1,000 Units by mouth once daily. 5 tabs daily      [DISCONTINUED] testosterone cypionate (DEPOTESTOTERONE CYPIONATE) 200 mg/mL injection Inject 50 mg into the muscle every 14 (fourteen) days.       No current facility-administered  "medications on file prior to visit.       Exam:  BP (!) 150/76   Pulse 65   Ht 5' 5" (1.651 m)   Wt 72.6 kg (160 lb)   BMI 26.63 kg/m²     Constitutional  Well-developed, well-nourished, appears stated age   Ophthalmoscopic  No papilledema with no hemorrhages or exudates bilaterally   Cardiovascular  Radial pulses 2+ and symmetric, no LE edema bilaterally   Neurological    * Mental status  MOCA =      - Orientation  Oriented to person, place, time, and situation     - Memory   Intact recent and remote     - Attention/concentration  Attentive, vigilant during exam     - Language  Naming & repetition intact, +2-step commands     - Fund of knowledge  Aware of current events     - Executive  Well-organized thoughts     - Other     * Cranial nerves       - CN II  PERRL, visual fields full to confrontation     - CN III, IV, VI  Extraocular movements full, normal pursuits and saccades     - CN V  Sensation V1 - V3 intact     - CN VII  Face strong and symmetric bilaterally     - CN VIII  Hearing intact bilaterally     - CN IX, X  Palate raises midline and symmetric     - CN XI  SCM and trapezius 5/5 bilaterally     - CN XII  Tongue midline   * Motor  Muscle bulk normal, strength 5/5 throughout aside from    L tricep 4/5    R hip flexion 4/5     No fasciculations elicited on exam    * Sensory   Intact to light touch    Decreased vibratory sensation of bilateral LE    * Coordination  No dysmetria with finger-to-nose or heel-to-shin   * Gait  See below.   * Deep tendon reflexes  3+ R > LUE    3+ patellar reflexes    Crossadductors    Clonus absent    Monroe present bilaterally , L > R   Babinski downgoing bilaterally   * Specialized movement exam  No hypophonic speech.    No facial masking, appropriately animated    No cogwheel rigidity.     No bradykinesia.   No tremor with rest   High frequency, low amplitude tremor with action and posture, L > R    No other dystonia, chorea, athetosis, myoclonus, or tics.   No motor " impersistence.   Normal-based gait.   No shortened stride length.   No abnormal arm swing.     No postural instability.      Laboratory/Radiological:  - Results:  Office Visit on 03/28/2024   Component Date Value Ref Range Status    POC Molecular Influenza A Ag 03/28/2024 Negative  Negative, Not Reported Final    POC Molecular Influenza B Ag 03/28/2024 Negative  Negative, Not Reported Final     Acceptable 03/28/2024 Yes   Final   Office Visit on 02/18/2024   Component Date Value Ref Range Status    SARS Coronavirus 2 Antigen 02/18/2024 Negative  Negative Final     Acceptable 02/18/2024 Yes   Final    POC Molecular Influenza A Ag 02/18/2024 Negative  Negative, Not Reported Final    POC Molecular Influenza B Ag 02/18/2024 Negative  Negative, Not Reported Final     Acceptable 02/18/2024 Yes   Final   Patient Outreach on 01/18/2024   Component Date Value Ref Range Status    Left Eye DM Retinopathy 01/02/2024 Positive   Final    Right Eye DM Retinopathy 01/02/2024 Positive   Final       - Independent review of images:      Brain MRI from 2018  The ventricles are normal in size for age, without evidence of hydrocephalus.     There are scattered T2/FLAIR signal hyperintensities within the supratentorial and periventricular white matter suggestive of a moderate degree of chronic microvascular ischemic changes, advanced for age. The brain otherwise appears within normal limits. No parenchymal mass, hemorrhage, edema or infarction.      MRI from 2024  Progression in periventricular white matter changes.     - Independent review of consultant's notes: jenna barbour     ASSESSMENT/PLAN:  Tremor   - high frequency, low amplitude tremor of bilateral hands with posture and action  - possibly associated with hypoglycemic events   - hold off on med    2. Imbalance   - borderline low b12, supplementing with b12 with some improvement in balance       Of note, there is progression in the  periventricular white matter changes in brain MRI from 2024 compared to 2018. I have reviewed her chart extensively to review vascular risk factors over the last 6 years. HTN and HLD have been relatively well controlled. DMII A1C has stayed in the mid 6 range. Repeating brain MRI in 1 year.              The patient has a documented history of HTN, DMII, hyperlipidemia and/or hypercholesteremia with long-term complications such as cerebrovascular disease, peripheral vascular disease, and/or aortic atherosclerosis. Collectively these risk factors may contribute to cerebral atherosclerosis, and cerebral hypoperfusion compounded neurocognitive disorder. We discussed maximizing cerebrovascular-related medical therapy, including but not limited to cholesterol medications and antiplatelet agents. We have discussed the value of aggressively controlling vascular risk factors like hypertension, hyperlipidemia, and Diabetes SBP<130, LDL<100, and A1C<7.0. We discussed the need to optimize lifestyle choices, including a heart-healthy diet (e.g., Mediterranean or DASH), increased cardiovascular exercise (goal 150 minutes of moderate-intensity per week), and staying cognitively and socially active.        Follow up: in 6 months with [facility]     Collaborating Physician, Dr. Duque, was available during today's encounter. Any change to plan along with cosign to appear in the EMR.       Total time spent with the patient: 48 minutes.  27 minutes of face-to-face consultation and 21 minutes of chart review and coordination of care, on the day of the visit. This includes face to face time and non-face to face time preparing to see the patient (eg, review of tests), obtaining and/or reviewing separately obtained history, documenting clinical information in the electronic or other health record, independently interpreting resultsand communicating results to the patient/family/caregiver, or care coordination.         Karine Landrum PA-C    [facility]  Department of Neurology  Movement Disorders

## 2024-04-09 ENCOUNTER — OFFICE VISIT (OUTPATIENT)
Dept: NEUROLOGY | Facility: CLINIC | Age: 69
End: 2024-04-09
Payer: COMMERCIAL

## 2024-04-09 VITALS
HEIGHT: 65 IN | SYSTOLIC BLOOD PRESSURE: 150 MMHG | HEART RATE: 65 BPM | BODY MASS INDEX: 26.66 KG/M2 | WEIGHT: 160 LBS | DIASTOLIC BLOOD PRESSURE: 76 MMHG

## 2024-04-09 DIAGNOSIS — I10 PRIMARY HYPERTENSION: ICD-10-CM

## 2024-04-09 DIAGNOSIS — R25.1 TREMOR: Primary | ICD-10-CM

## 2024-04-09 DIAGNOSIS — R26.89 IMBALANCE: ICD-10-CM

## 2024-04-09 DIAGNOSIS — E11.9 TYPE 2 DIABETES MELLITUS WITHOUT COMPLICATION, UNSPECIFIED WHETHER LONG TERM INSULIN USE: ICD-10-CM

## 2024-04-09 DIAGNOSIS — E03.9 HYPOTHYROIDISM, UNSPECIFIED TYPE: ICD-10-CM

## 2024-04-09 DIAGNOSIS — E78.5 HYPERLIPIDEMIA, UNSPECIFIED HYPERLIPIDEMIA TYPE: ICD-10-CM

## 2024-04-09 PROCEDURE — 1159F MED LIST DOCD IN RCRD: CPT | Mod: CPTII,S$GLB,, | Performed by: PHYSICIAN ASSISTANT

## 2024-04-09 PROCEDURE — 99214 OFFICE O/P EST MOD 30 MIN: CPT | Mod: S$GLB,,, | Performed by: PHYSICIAN ASSISTANT

## 2024-04-09 PROCEDURE — 3077F SYST BP >= 140 MM HG: CPT | Mod: CPTII,S$GLB,, | Performed by: PHYSICIAN ASSISTANT

## 2024-04-09 PROCEDURE — 3044F HG A1C LEVEL LT 7.0%: CPT | Mod: CPTII,S$GLB,, | Performed by: PHYSICIAN ASSISTANT

## 2024-04-09 PROCEDURE — 1126F AMNT PAIN NOTED NONE PRSNT: CPT | Mod: CPTII,S$GLB,, | Performed by: PHYSICIAN ASSISTANT

## 2024-04-09 PROCEDURE — 3078F DIAST BP <80 MM HG: CPT | Mod: CPTII,S$GLB,, | Performed by: PHYSICIAN ASSISTANT

## 2024-04-09 PROCEDURE — 1160F RVW MEDS BY RX/DR IN RCRD: CPT | Mod: CPTII,S$GLB,, | Performed by: PHYSICIAN ASSISTANT

## 2024-04-09 PROCEDURE — 3288F FALL RISK ASSESSMENT DOCD: CPT | Mod: CPTII,S$GLB,, | Performed by: PHYSICIAN ASSISTANT

## 2024-04-09 PROCEDURE — 1101F PT FALLS ASSESS-DOCD LE1/YR: CPT | Mod: CPTII,S$GLB,, | Performed by: PHYSICIAN ASSISTANT

## 2024-04-09 PROCEDURE — 4010F ACE/ARB THERAPY RXD/TAKEN: CPT | Mod: CPTII,S$GLB,, | Performed by: PHYSICIAN ASSISTANT

## 2024-04-09 PROCEDURE — 3008F BODY MASS INDEX DOCD: CPT | Mod: CPTII,S$GLB,, | Performed by: PHYSICIAN ASSISTANT

## 2024-04-09 PROCEDURE — 99999 PR PBB SHADOW E&M-EST. PATIENT-LVL IV: CPT | Mod: PBBFAC,,, | Performed by: PHYSICIAN ASSISTANT

## 2024-04-11 ENCOUNTER — PATIENT MESSAGE (OUTPATIENT)
Dept: NEUROLOGY | Facility: CLINIC | Age: 69
End: 2024-04-11
Payer: COMMERCIAL

## 2024-04-12 PROBLEM — R25.1 TREMOR: Status: ACTIVE | Noted: 2024-04-12

## 2024-04-16 ENCOUNTER — TELEPHONE (OUTPATIENT)
Dept: FAMILY MEDICINE | Facility: CLINIC | Age: 69
End: 2024-04-16
Payer: COMMERCIAL

## 2024-04-16 ENCOUNTER — NURSE TRIAGE (OUTPATIENT)
Dept: ADMINISTRATIVE | Facility: CLINIC | Age: 69
End: 2024-04-16
Payer: COMMERCIAL

## 2024-04-16 ENCOUNTER — PATIENT MESSAGE (OUTPATIENT)
Dept: FAMILY MEDICINE | Facility: CLINIC | Age: 69
End: 2024-04-16
Payer: COMMERCIAL

## 2024-04-16 NOTE — TELEPHONE ENCOUNTER
Patient is  to my 1st cousin and I can text her to see if she will respond more directly.  Advise patient that headache is probably raising the pressure and to reply if she has any idea what is causing the headache and also to let me know what her blood pressure was doing days or a week ago prior to the headache as we can expected to normalize if it was normal then.

## 2024-04-16 NOTE — TELEPHONE ENCOUNTER
----- Message from Paul Flores sent at 4/16/2024 11:08 AM CDT -----  Type: Patient Call Back    Who called:SELF    What is the request in detail:/90    Can the clinic reply by [facility]?NO    Would the patient rather a call back or a response via [facility]? CALL    Best call back number:[phone] (home)       Additional Information:

## 2024-04-16 NOTE — TELEPHONE ENCOUNTER
Patient says headache came after the high blood pressure.  She saw neurology last week and MRI showed microvascular ischemic change and I reassured her that is pretty normal.  Will ask her about the location of the headache.  She is only on valsartan 80 so will have her go to 160 and then 320 if necessary.

## 2024-04-16 NOTE — TELEPHONE ENCOUNTER
Pt co /95 co headache takes two different BP meds has not missed a dose towards end of triage pt informs nurse she also has had off on blurred vision none at current.  Reason for Disposition   Patient wants to be seen    Additional Information   Negative: Sounds like a life-threatening emergency to the triager   Negative: Pregnant > 20 weeks or postpartum (< 6 weeks after delivery) and new hand or face swelling   Negative: Pregnant > 20 weeks and BP > 140/90   Negative: Systolic BP >= 160 OR Diastolic >= 100, and any cardiac or neurologic symptoms (e.g., chest pain, difficulty breathing, unsteady gait, blurred vision)   Negative: Patient sounds very sick or weak to the triager   Negative: BP Systolic BP >= 140 OR Diastolic >= 90 and postpartum (from 0 to 6 weeks after delivery)   Negative: Systolic BP >= 180 OR Diastolic >= 110, and missed most recent dose of blood pressure medication   Negative: Systolic BP >= 180 OR Diastolic >= 110    Protocols used: High Blood Pressure-A-OH

## 2024-05-26 DIAGNOSIS — N95.1 SYMPTOMATIC MENOPAUSAL OR FEMALE CLIMACTERIC STATES: ICD-10-CM

## 2024-05-27 RX ORDER — METFORMIN HYDROCHLORIDE 500 MG/1
TABLET, EXTENDED RELEASE ORAL
Qty: 90 TABLET | Refills: 0 | Status: SHIPPED | OUTPATIENT
Start: 2024-05-27

## 2024-05-27 NOTE — TELEPHONE ENCOUNTER
Care Due:                  Date            Visit Type   Department     Provider  --------------------------------------------------------------------------------                                             [facility]     MED/ INTERNAL  Gerardo Chappell  Last Visit: 02-      FOLLOW UP    MED/ PEDS      Ehrensing  Next Visit: None Scheduled  None         None Found                                                            Last  Test          Frequency    Reason                     Performed    Due Date  --------------------------------------------------------------------------------    HBA1C.......  6 months...  metFORMIN................  01- 07-    [facility] Embedded Care Due Messages. Reference number: [id number].   5/27/2024 6:04:01 AM CDT

## 2024-05-27 NOTE — TELEPHONE ENCOUNTER
Refill Routing Note   Medication(s) are not appropriate for processing by [facility] for the following reason(s):        Required vitals abnormal  (!) 150/76       [facility] action(s):  Defer        Medication Therapy Plan: Vitals:  (!) 150/76; Last order sent has not been filled. Likely on hold as it was too soon at that time      Appointments  past 12m or future 3m with PCP    Date Provider   Last Visit   3/18/2024 Pan Tillman MD   Next Visit   Visit date not found Pan Tillman MD   ED visits in past 90 days: 0        Note composed:11:15 AM 05/27/2024

## 2024-05-27 NOTE — TELEPHONE ENCOUNTER
Provider Staff:  Action required for this patient    Requires labs      Please see care gap opportunities below in Care Due Message.    Thanks!  [facility]     Appointments      Date Provider   Last Visit   2/28/2024 Gerardo Dobbs MD   Next Visit   Visit date not found Gerardo Dobbs MD     Refill Decision Note   Anushka Napier  is requesting a refill authorization.  Brief Assessment and Rationale for Refill:  Approve     Medication Therapy Plan:         Comments:     Note composed:7:06 AM 05/27/2024

## 2024-06-24 RX ORDER — ATORVASTATIN CALCIUM 40 MG/1
40 TABLET, FILM COATED ORAL
Qty: 90 TABLET | Refills: 1 | Status: SHIPPED | OUTPATIENT
Start: 2024-06-24

## 2024-06-25 NOTE — TELEPHONE ENCOUNTER
Refill Decision Note   Anushkajono Napier  is requesting a refill authorization.  Brief Assessment and Rationale for Refill:  Approve     Medication Therapy Plan:         Comments:     Note composed:7:47 PM 06/24/2024

## 2024-06-25 NOTE — TELEPHONE ENCOUNTER
No care due was identified.  [facility] Embedded Care Due Messages. Reference number: [id number].   6/24/2024 7:39:26 PM CDT

## 2024-07-28 NOTE — TELEPHONE ENCOUNTER
Care Due:                  Date            Visit Type   Department     Provider  --------------------------------------------------------------------------------                                             [facility]     MED/ INTERNAL  Gerardo Chappell  Last Visit: 02-      FOLLOW UP    MED/ PEDS      Ehrensing  Next Visit: None Scheduled  None         None Found                                                            Last  Test          Frequency    Reason                     Performed    Due Date  --------------------------------------------------------------------------------    HBA1C.......  6 months...  metFORMIN................  01- 07-    [facility] Embedded Care Due Messages. Reference number: [id number].   7/28/2024 7:36:58 AM CDT

## 2024-07-29 RX ORDER — LEVOTHYROXINE SODIUM 175 UG/1
TABLET ORAL
Qty: 90 TABLET | Refills: 1 | Status: SHIPPED | OUTPATIENT
Start: 2024-07-29

## 2024-07-29 NOTE — TELEPHONE ENCOUNTER
Provider Staff:  Action required for this patient    Requires labs      Please see care gap opportunities below in Care Due Message.    Thanks!  [facility]     Appointments      Date Provider   Last Visit   2/28/2024 Gerardo Dobbs MD   Next Visit   Visit date not found Gerardo Dobbs MD     Refill Decision Note   Anushka Napier  is requesting a refill authorization.  Brief Assessment and Rationale for Refill:  Approve     Medication Therapy Plan: TSH-WNL      Comments:     Note composed:2:15 PM 07/29/2024

## 2024-07-31 RX ORDER — FLUOXETINE HYDROCHLORIDE 20 MG/1
CAPSULE ORAL
Qty: 90 CAPSULE | Refills: 1 | Status: SHIPPED | OUTPATIENT
Start: 2024-07-31

## 2024-07-31 NOTE — TELEPHONE ENCOUNTER
No care due was identified.  [facility] Embedded Care Due Messages. Reference number: [id number].   7/31/2024 5:37:15 AM CDT

## 2024-07-31 NOTE — TELEPHONE ENCOUNTER
Refill Decision Note   Anushka Napier  is requesting a refill authorization.  Brief Assessment and Rationale for Refill:  Approve     Medication Therapy Plan:        Comments:     Note composed:11:03 AM 07/31/2024

## 2024-08-26 RX ORDER — METOPROLOL TARTRATE 50 MG/1
TABLET ORAL
Qty: 90 TABLET | Refills: 1 | Status: SHIPPED | OUTPATIENT
Start: 2024-08-26

## 2024-08-26 NOTE — TELEPHONE ENCOUNTER
Refill Routing Note   Medication(s) are not appropriate for processing by [facility] for the following reason(s):        Required vitals abnormal    [facility] action(s):  Defer             Appointments  past 12m or future 3m with PCP    Date Provider   Last Visit   2/28/2024 Gerardo Dobbs MD   Next Visit   Visit date not found Gerardo Dobbs MD   ED visits in past 90 days: 0        Note composed:9:40 AM 08/26/2024

## 2024-08-26 NOTE — TELEPHONE ENCOUNTER
No care due was identified.  [facility] Embedded Care Due Messages. Reference number: [id number].   8/26/2024 8:30:02 AM CDT

## 2024-09-04 ENCOUNTER — OFFICE VISIT (OUTPATIENT)
Dept: CARDIOLOGY | Facility: CLINIC | Age: 69
End: 2024-09-04
Payer: COMMERCIAL

## 2024-09-04 VITALS
SYSTOLIC BLOOD PRESSURE: 152 MMHG | BODY MASS INDEX: 27.97 KG/M2 | OXYGEN SATURATION: 96 % | WEIGHT: 167.88 LBS | HEART RATE: 63 BPM | DIASTOLIC BLOOD PRESSURE: 56 MMHG | HEIGHT: 65 IN | RESPIRATION RATE: 15 BRPM

## 2024-09-04 DIAGNOSIS — E78.5 HYPERLIPIDEMIA ASSOCIATED WITH TYPE 2 DIABETES MELLITUS: ICD-10-CM

## 2024-09-04 DIAGNOSIS — R26.9 GAIT ABNORMALITY: ICD-10-CM

## 2024-09-04 DIAGNOSIS — R00.2 PALPITATIONS: Primary | ICD-10-CM

## 2024-09-04 DIAGNOSIS — E03.9 HYPOTHYROIDISM, UNSPECIFIED TYPE: ICD-10-CM

## 2024-09-04 DIAGNOSIS — E11.9 TYPE 2 DIABETES MELLITUS WITHOUT COMPLICATION, UNSPECIFIED WHETHER LONG TERM INSULIN USE: ICD-10-CM

## 2024-09-04 DIAGNOSIS — E11.69 HYPERLIPIDEMIA ASSOCIATED WITH TYPE 2 DIABETES MELLITUS: ICD-10-CM

## 2024-09-04 DIAGNOSIS — I10 PRIMARY HYPERTENSION: ICD-10-CM

## 2024-09-04 PROCEDURE — 3078F DIAST BP <80 MM HG: CPT | Mod: CPTII,S$GLB,, | Performed by: INTERNAL MEDICINE

## 2024-09-04 PROCEDURE — 1126F AMNT PAIN NOTED NONE PRSNT: CPT | Mod: CPTII,S$GLB,, | Performed by: INTERNAL MEDICINE

## 2024-09-04 PROCEDURE — 3288F FALL RISK ASSESSMENT DOCD: CPT | Mod: CPTII,S$GLB,, | Performed by: INTERNAL MEDICINE

## 2024-09-04 PROCEDURE — 3044F HG A1C LEVEL LT 7.0%: CPT | Mod: CPTII,S$GLB,, | Performed by: INTERNAL MEDICINE

## 2024-09-04 PROCEDURE — 4010F ACE/ARB THERAPY RXD/TAKEN: CPT | Mod: CPTII,S$GLB,, | Performed by: INTERNAL MEDICINE

## 2024-09-04 PROCEDURE — 1101F PT FALLS ASSESS-DOCD LE1/YR: CPT | Mod: CPTII,S$GLB,, | Performed by: INTERNAL MEDICINE

## 2024-09-04 PROCEDURE — 3008F BODY MASS INDEX DOCD: CPT | Mod: CPTII,S$GLB,, | Performed by: INTERNAL MEDICINE

## 2024-09-04 PROCEDURE — 1159F MED LIST DOCD IN RCRD: CPT | Mod: CPTII,S$GLB,, | Performed by: INTERNAL MEDICINE

## 2024-09-04 PROCEDURE — 3077F SYST BP >= 140 MM HG: CPT | Mod: CPTII,S$GLB,, | Performed by: INTERNAL MEDICINE

## 2024-09-04 PROCEDURE — 99999 PR PBB SHADOW E&M-EST. PATIENT-LVL V: CPT | Mod: PBBFAC,,, | Performed by: INTERNAL MEDICINE

## 2024-09-04 PROCEDURE — 99203 OFFICE O/P NEW LOW 30 MIN: CPT | Mod: S$GLB,,, | Performed by: INTERNAL MEDICINE

## 2024-09-04 RX ORDER — VALSARTAN 160 MG/1
160 TABLET ORAL DAILY
Qty: 90 TABLET | Refills: 3 | Status: SHIPPED | OUTPATIENT
Start: 2024-09-04 | End: 2025-09-04

## 2024-09-04 NOTE — PROGRESS NOTES
CARDIOLOGY CLINIC VISIT      HISTORY OF PRESENT ILLNESS:     07/06/2020: Anushka Napier presents for evaluation of palpitations.  Last week she had noted some double vision in her right eye.  Vision was blurry.  Spontaneous resolution.  A day or two after she noted palpitations.  She states that she has had a history what she calls her heart fluttering.  However in this episode was more intense. Lasted a few hours.  Afterwards she had some chest discomfort, describes it as if she had worked out. Pressure sensation across her chest.  No history of coronary artery disease. CV RF HTN, HLP, DM. Nl tsh from 1/20.  EKG from 7/3/20 - NSR.     09/04/2024: She has recently noticed recurrence of palpitations. Daily. Describes as fluttering sensation. Similar but more frequent when to compared to episodes a few years ago. She also notices lower extremity weakness. Feels that she has difficulty walking. Feels her gait is off. Numbness and tingling in her face. One episode of jaw pain. Not associated with palpitations.    CARDIOVASCULAR HISTORY:     None    PAST MEDICAL HISTORY:     Past Medical History:   Diagnosis Date    Allergy     Anxiety     Basal cell carcinoma     to face    Depression     Diabetes mellitus     Diabetes mellitus type II, uncontrolled 9/17/2014    Diabetic retinopathy 01/14/2020    DM retinopathy     GERD (gastroesophageal reflux disease) 9/25/2014    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 Dr Correia, long term PPI use    History of colonic polyps 1/24/2020    Hyperlipidemia     Hypertension     Osteopenia 9/25/2014    Screening for colorectal cancer 9/25/2014    Normal 2009    Screening for colorectal cancer     Thyroid disease     Vitamin D deficiency disease 9/25/2014       PAST SURGICAL HISTORY:     Past Surgical History:   Procedure Laterality Date    HYSTERECTOMY  1997    complete for prolapse, Abdominal    OOPHORECTOMY      right shoulder      SINUS SURGERY  2012       ALLERGIES AND MEDICATION:     Review of  patient's allergies indicates:   Allergen Reactions    Iodine Hives     IVP Contrast    Tizanidine Swelling     Tongue swelling     Codeine Hives     Other reaction(s): Itching  Other reaction(s): Hives    Iodinated contrast media Hives     Other reaction(s): Hives    Sulfamethoxazole-trimethoprim Itching     Other reaction(s): Itching  Other reaction(s): Hives    Epinephrine Anxiety and Other (See Comments)     Almost passed out.        Medication List            Accurate as of September 4, 2024 12:52 PM. If you have any questions, ask your nurse or doctor.                CHANGE how you take these medications      valsartan 160 MG tablet  Commonly known as: DIOVAN  Take 1 tablet (160 mg total) by mouth once daily.  What changed:   medication strength  how much to take  Changed by: Sreedhar Richmond MD            CONTINUE taking these medications      amitriptyline 10 MG tablet  Commonly known as: ELAVIL     aspirin 81 MG EC tablet  Commonly known as: ECOTRIN     atorvastatin 40 MG tablet  Commonly known as: LIPITOR  TAKE 1 TABLET(40 MG) BY MOUTH EVERY DAY     azelastine 137 mcg (0.1 %) nasal spray  Commonly known as: ASTELIN  1 spray (137 mcg total) by Nasal route 2 (two) times daily.     Bacillus subtilis-inulin 1.5 billion cell-1 gram Chew     coQ10 (ubiquinol) 100 mg Cap     estrogens (conjugated) 0.625 MG tablet  Commonly known as: PREMARIN  TAKE 1 TABLET(0.625 MG) BY MOUTH EVERY DAY     FERGON 240 (27 FE) MG tablet  Generic drug: ferrous gluconate     FLUoxetine 20 MG capsule  TAKE 1 CAPSULE(20 MG) BY MOUTH EVERY DAY     FREESTYLE YUDELKA 3 READER Misc  Generic drug: blood-glucose meter,continuous  USE AS DIRECTED     FREESTYLE YUDELKA 3 SENSOR Isidra  Generic drug: blood-glucose sensor  CHANGE SENSOR EVERY 14 DAYS AS DIRECTED     levothyroxine 175 MCG tablet  Commonly known as: SYNTHROID, LEVOTHROID  TAKE 1 TABLET(175 MCG) BY MOUTH BEFORE BREAKFAST     metFORMIN 500 MG ER 24hr tablet  Commonly known as:  GLUCOPHAGE-XR  TAKE 1 TABLET(500 MG) BY MOUTH EVERY DAY     metoprolol tartrate 50 MG tablet  Commonly known as: LOPRESSOR  TAKE 1 TABLET BY MOUTH DAILY     mometasone 0.1% 0.1 % cream  Commonly known as: ELOCON  Use daily     omega-3 fatty acids-vitamin E 1,000 mg Cap     progesterone 100 MG capsule  Commonly known as: PROMETRIUM  Take 1 capsule (100 mg total) by mouth once daily.     RABEprazole 20 mg tablet  Commonly known as: ACIPHEX  TAKE 1 TABLET BY MOUTH TWICE DAILY     solifenacin 10 MG tablet  Commonly known as: VESICARE  Take 1 tablet (10 mg total) by mouth once daily.     vitamin D 1000 units Tab  Commonly known as: VITAMIN D3               Where to Get Your Medications        These medications were sent to [id number] - [place]  [facility] [address] [address]      Phone: [phone]   valsartan 160 MG tablet         SOCIAL HISTORY:     Social History     Socioeconomic History    Marital status:    Tobacco Use    Smoking status: Never    Smokeless tobacco: Never   Substance and Sexual Activity    Alcohol use: No    Drug use: No    Sexual activity: Yes     Partners: Male     Birth control/protection: Surgical   Social History Narrative     since 1986    Previous  for 8 years prior    He is a     She is a homemaker     Social Determinants of Health     Financial Resource Strain: Low Risk  (2/28/2024)    Overall Financial Resource Strain (CARDIA)     Difficulty of Paying Living Expenses: Not very hard   Food Insecurity: No Food Insecurity (2/28/2024)    Hunger Vital Sign     Worried About Running Out of Food in the Last Year: Never true     Ran Out of Food in the Last Year: Never true   Transportation Needs: No Transportation Needs (2/28/2024)    PRAPARE - Transportation     Lack of Transportation (Medical): No     Lack of Transportation (Non-Medical): No   Physical Activity: Sufficiently Active (2/28/2024)     Exercise Vital Sign     Days of Exercise per Week: 5 days     Minutes of Exercise per Session: 60 min   Stress: Stress Concern Present (2/28/2024)    [employer] [place] [employer] - Occupational Stress Questionnaire     Feeling of Stress : To some extent   Housing Stability: Low Risk  (2/28/2024)    Housing Stability Vital Sign     Unable to Pay for Housing in the Last Year: No     Number of Places Lived in the Last Year: 1     Unstable Housing in the Last Year: No       FAMILY HISTORY:     Family History   Problem Relation Name Age of Onset    Breast cancer Cousin maternal 40    Miscarriages / Stillbirths Maternal Grandmother      Miscarriages / Stillbirths Mother      Diabetes Mother      Hypertension Mother      Cancer Mother          cervical    Heart disease Father      Hypertension Father      Ovarian cancer Neg Hx         REVIEW OF SYSTEMS:   Review of Systems   Constitutional:  Negative for chills, diaphoresis, fever, malaise/fatigue and weight loss.   HENT:  Negative for congestion, hearing loss, sinus pain, sore throat and tinnitus.    Eyes:  Negative for blurred vision, double vision, photophobia, pain and discharge.   Respiratory:  Negative for cough, hemoptysis, sputum production, shortness of breath, wheezing and stridor.    Cardiovascular:  Positive for palpitations. Negative for chest pain, orthopnea, claudication, leg swelling and PND.   Gastrointestinal:  Negative for abdominal pain, blood in stool, heartburn, melena, nausea and vomiting.   Musculoskeletal:  Positive for back pain. Negative for falls, joint pain, myalgias and neck pain.   Neurological:  Positive for tingling and weakness. Negative for dizziness, tremors, sensory change, speech change, focal weakness, seizures, loss of consciousness and headaches.   Endo/Heme/Allergies:  Does not bruise/bleed easily.   Psychiatric/Behavioral:  Negative for depression, memory loss and substance abuse. The patient is not nervous/anxious.  "       PHYSICAL EXAM:     Vitals:    09/04/24 1119   BP: (!) 152/56   Pulse: 63   Resp: 15    Body mass index is 27.94 kg/m².  Weight: 76.1 kg (167 lb 14.1 oz)   Height: 5' 5" (165.1 cm)     Physical Exam  Vitals reviewed.   Constitutional:       General: She is not in acute distress.     Appearance: She is well-developed. She is not diaphoretic.   HENT:      Head: Normocephalic.   Neck:      Vascular: No carotid bruit or JVD.   Cardiovascular:      Rate and Rhythm: Normal rate and regular rhythm.      Pulses: Normal pulses.      Heart sounds: Murmur heard.      Systolic murmur is present with a grade of 3/6.   Pulmonary:      Effort: Pulmonary effort is normal.      Breath sounds: Normal breath sounds.   Abdominal:      General: Bowel sounds are normal.      Palpations: Abdomen is soft.      Tenderness: There is no abdominal tenderness.   Skin:     General: Skin is warm and dry.   Neurological:      Mental Status: She is alert and oriented to person, place, and time.   Psychiatric:         Speech: Speech normal.         Behavior: Behavior normal.         Thought Content: Thought content normal.         DATA:   EKG: (personally reviewed tracing)  7/3/20 - NSR  Laboratory:  CBC:  Recent Labs   Lab 03/19/22  0804 04/22/23  0838 12/01/23  0750   WBC 6.67 6.89 8.01   Hemoglobin 12.7 12.0 12.9   Hematocrit 41.0 38.4 40.0   Platelets 306 307 345       CHEMISTRIES:  Recent Labs   Lab 03/19/22  0804 04/22/23  0838 12/01/23  0750   Glucose 107 101 136 H   Sodium 141 139 139   Potassium 4.2 4.2 4.3   BUN 15 18 12   Creatinine 0.9 0.9 0.8   eGFR if  >60.0  --   --    eGFR if non  >60.0  --   --    Calcium 8.9 8.9 9.3       CARDIAC BIOMARKERS:          COAGS:          LIPIDS/LFTS:  Recent Labs   Lab 03/19/22  0804 04/22/23  0838 12/01/23  0750 01/05/24  1040   Cholesterol 168 202 H  --  188   Triglycerides 141 163 H  --  116   HDL 60 62  --  56   LDL Cholesterol 79.8 107.4  --  108.8   Non-HDL " Cholesterol 108 140  --  132   AST 20 16 19  --    ALT 17 12 14  --            ASSESSMENT:     Palpitations: normal TFT  Hypertension  Hyperlipidemia  Diabetes  Weakness  Gait abnormality    PLAN:     Palpitations: holter  Hypertension: increase Valsartan to 160 qd  Hyperlipidemia: goal LDL < 70  Weakness/Gait abnormality: Neurology referral.  Return to clinic one month.

## 2024-09-09 ENCOUNTER — PATIENT OUTREACH (OUTPATIENT)
Dept: ADMINISTRATIVE | Facility: HOSPITAL | Age: 69
End: 2024-09-09
Payer: COMMERCIAL

## 2024-09-09 DIAGNOSIS — E11.65 UNCONTROLLED TYPE 2 DIABETES MELLITUS WITH HYPERGLYCEMIA: Primary | ICD-10-CM

## 2024-09-14 NOTE — TELEPHONE ENCOUNTER
Care Due:                  Date            Visit Type   Department     Provider  --------------------------------------------------------------------------------                                             [facility]     MED/ INTERNAL  Gerardo Chappell  Last Visit: 02-      FOLLOW UP    MED/ PEDS      Ehrensing  Next Visit: None Scheduled  None         None Found                                                            Last  Test          Frequency    Reason                     Performed    Due Date  --------------------------------------------------------------------------------    CMP.........  12 months..  atorvastatin, metFORMIN..  12- 11-    [facility] Embedded Care Due Messages. Reference number: [id number].   9/14/2024 3:34:10 PM CDT

## 2024-09-15 NOTE — TELEPHONE ENCOUNTER
Refill Routing Note   Medication(s) are not appropriate for processing by [facility] for the following reason(s):        Required labs outdated    [facility] action(s):  Defer   Requires labs : Yes             Appointments  past 12m or future 3m with PCP    Date Provider   Last Visit   Visit date not found Gerardo Dobbs MD   Next Visit   Visit date not found Gerardo Dobbs MD   ED visits in past 90 days: 0        Note composed:6:24 PM 09/15/2024

## 2024-09-16 RX ORDER — METFORMIN HYDROCHLORIDE 500 MG/1
TABLET, EXTENDED RELEASE ORAL
Qty: 90 TABLET | Refills: 0 | Status: SHIPPED | OUTPATIENT
Start: 2024-09-16

## 2024-09-17 ENCOUNTER — PATIENT OUTREACH (OUTPATIENT)
Dept: ADMINISTRATIVE | Facility: HOSPITAL | Age: 69
End: 2024-09-17
Payer: COMMERCIAL

## 2024-09-17 DIAGNOSIS — E11.65 UNCONTROLLED TYPE 2 DIABETES MELLITUS WITH HYPERGLYCEMIA: Primary | ICD-10-CM

## 2024-09-18 ENCOUNTER — HOSPITAL ENCOUNTER (OUTPATIENT)
Dept: CARDIOLOGY | Facility: HOSPITAL | Age: 69
Discharge: HOME | End: 2024-09-18
Attending: INTERNAL MEDICINE
Payer: COMMERCIAL

## 2024-09-18 DIAGNOSIS — R00.2 PALPITATIONS: ICD-10-CM

## 2024-09-18 DIAGNOSIS — I10 PRIMARY HYPERTENSION: ICD-10-CM

## 2024-09-18 LAB
APICAL FOUR CHAMBER EJECTION FRACTION: 77 %
APICAL TWO CHAMBER EJECTION FRACTION: 67 %
ASCENDING AORTA: 3.1 CM
AV INDEX (PROSTH): 0.8
AV MEAN GRADIENT: 5 MMHG
AV PEAK GRADIENT: 9 MMHG
AV REGURGITATION PRESSURE HALF TIME: 978.04 MS
AV VALVE AREA BY VELOCITY RATIO: 2.17 CM²
AV VALVE AREA: 2.23 CM²
AV VELOCITY RATIO: 0.77
CV ECHO LV RWT: 0.44 CM
DOP CALC AO PEAK VEL: 1.51 M/S
DOP CALC AO VTI: 36.2 CM
DOP CALC LVOT AREA: 2.8 CM2
DOP CALC LVOT DIAMETER: 1.89 CM
DOP CALC LVOT PEAK VEL: 1.17 M/S
DOP CALC LVOT STROKE VOLUME: 80.76 CM3
DOP CALCLVOT PEAK VEL VTI: 28.8 CM
E WAVE DECELERATION TIME: 207.3 MSEC
E/A RATIO: 1.13
E/E' RATIO: 14.33 M/S
ECHO LV POSTERIOR WALL: 1.02 CM (ref 0.6–1.1)
FRACTIONAL SHORTENING: 35 % (ref 28–44)
INTERVENTRICULAR SEPTUM: 0.96 CM (ref 0.6–1.1)
IVC DIAMETER: 1.54 CM
LA MAJOR: 3.77 CM
LA MINOR: 4.23 CM
LA WIDTH: 2.1 CM
LEFT ATRIUM SIZE: 3.58 CM
LEFT ATRIUM VOLUME: 25.48 CM3
LEFT INTERNAL DIMENSION IN SYSTOLE: 3.05 CM (ref 2.1–4)
LEFT VENTRICLE DIASTOLIC VOLUME: 101.45 ML
LEFT VENTRICLE END DIASTOLIC VOLUME APICAL 2 CHAMBER: 58.65 ML
LEFT VENTRICLE END DIASTOLIC VOLUME APICAL 4 CHAMBER: 72.08 ML
LEFT VENTRICLE SYSTOLIC VOLUME: 36.38 ML
LEFT VENTRICULAR INTERNAL DIMENSION IN DIASTOLE: 4.68 CM (ref 3.5–6)
LEFT VENTRICULAR MASS: 161.1 G
LV LATERAL E/E' RATIO: 12.29 M/S
LV SEPTAL E/E' RATIO: 17.2 M/S
LVED V (TEICH): 101.45 ML
LVES V (TEICH): 36.38 ML
LVOT MG: 3.09 MMHG
LVOT MV: 0.84 CM/S
MV PEAK A VEL: 0.76 M/S
MV PEAK E VEL: 0.86 M/S
MV STENOSIS PRESSURE HALF TIME: 60.12 MS
MV VALVE AREA P 1/2 METHOD: 3.66 CM2
OHS CV RV/LV RATIO: 0.64 CM
OHS LV EJECTION FRACTION SIMPSONS BIPLANE MOD: 77 %
PISA AR MAX VEL: 4.04 M/S
PISA TR MAX VEL: 2.36 M/S
PULM VEIN S/D RATIO: 1.03
PV PEAK D VEL: 0.4 M/S
PV PEAK GRADIENT: 3 MMHG
PV PEAK S VEL: 0.41 M/S
PV PEAK VELOCITY: 0.92 M/S
RA MAJOR: 3.49 CM
RA PRESSURE ESTIMATED: 8 MMHG
RA WIDTH: 2.6 CM
RIGHT VENTRICLE DIASTOLIC BASEL DIMENSION: 3 CM
RIGHT VENTRICULAR END-DIASTOLIC DIMENSION: 3.01 CM
RV TB RVSP: 10 MMHG
RV TISSUE DOPPLER FREE WALL SYSTOLIC VELOCITY 1 (APICAL 4 CHAMBER VIEW): 11.65 CM/S
SINUS: 2.84 CM
STJ: 3.11 CM
TDI LATERAL: 0.07 M/S
TDI SEPTAL: 0.05 M/S
TDI: 0.06 M/S
TR MAX PG: 22 MMHG
TRICUSPID ANNULAR PLANE SYSTOLIC EXCURSION: 2.05 CM
TV REST PULMONARY ARTERY PRESSURE: 30 MMHG

## 2024-09-18 PROCEDURE — 93226 XTRNL ECG REC<48 HR SCAN A/R: CPT

## 2024-09-18 PROCEDURE — 93306 TTE W/DOPPLER COMPLETE: CPT | Mod: 26,,, | Performed by: INTERNAL MEDICINE

## 2024-09-18 PROCEDURE — 93306 TTE W/DOPPLER COMPLETE: CPT

## 2024-09-18 PROCEDURE — 93225 XTRNL ECG REC<48 HRS REC: CPT

## 2024-10-02 ENCOUNTER — PATIENT MESSAGE (OUTPATIENT)
Dept: CARDIOLOGY | Facility: CLINIC | Age: 69
End: 2024-10-02

## 2024-10-02 ENCOUNTER — OFFICE VISIT (OUTPATIENT)
Dept: CARDIOLOGY | Facility: CLINIC | Age: 69
End: 2024-10-02
Payer: COMMERCIAL

## 2024-10-02 VITALS
BODY MASS INDEX: 27.18 KG/M2 | SYSTOLIC BLOOD PRESSURE: 142 MMHG | RESPIRATION RATE: 15 BRPM | DIASTOLIC BLOOD PRESSURE: 81 MMHG | WEIGHT: 163.13 LBS | HEIGHT: 65 IN | OXYGEN SATURATION: 97 % | HEART RATE: 78 BPM

## 2024-10-02 DIAGNOSIS — R20.2 NUMBNESS AND TINGLING: ICD-10-CM

## 2024-10-02 DIAGNOSIS — I27.20 PULMONARY HYPERTENSION: ICD-10-CM

## 2024-10-02 DIAGNOSIS — R20.0 NUMBNESS AND TINGLING: ICD-10-CM

## 2024-10-02 DIAGNOSIS — E11.69 HYPERLIPIDEMIA ASSOCIATED WITH TYPE 2 DIABETES MELLITUS: ICD-10-CM

## 2024-10-02 DIAGNOSIS — E11.9 TYPE 2 DIABETES MELLITUS WITHOUT COMPLICATION, UNSPECIFIED WHETHER LONG TERM INSULIN USE: ICD-10-CM

## 2024-10-02 DIAGNOSIS — I10 PRIMARY HYPERTENSION: ICD-10-CM

## 2024-10-02 DIAGNOSIS — E78.5 HYPERLIPIDEMIA ASSOCIATED WITH TYPE 2 DIABETES MELLITUS: ICD-10-CM

## 2024-10-02 DIAGNOSIS — R26.9 GAIT ABNORMALITY: ICD-10-CM

## 2024-10-02 DIAGNOSIS — R00.2 PALPITATIONS: Primary | ICD-10-CM

## 2024-10-02 LAB
OHS QRS DURATION: 78 MS
OHS QTC CALCULATION: 435 MS

## 2024-10-02 PROCEDURE — 3066F NEPHROPATHY DOC TX: CPT | Mod: CPTII,S$GLB,, | Performed by: INTERNAL MEDICINE

## 2024-10-02 PROCEDURE — 3008F BODY MASS INDEX DOCD: CPT | Mod: CPTII,S$GLB,, | Performed by: INTERNAL MEDICINE

## 2024-10-02 PROCEDURE — 3061F NEG MICROALBUMINURIA REV: CPT | Mod: CPTII,S$GLB,, | Performed by: INTERNAL MEDICINE

## 2024-10-02 PROCEDURE — 4010F ACE/ARB THERAPY RXD/TAKEN: CPT | Mod: CPTII,S$GLB,, | Performed by: INTERNAL MEDICINE

## 2024-10-02 PROCEDURE — 3051F HG A1C>EQUAL 7.0%<8.0%: CPT | Mod: CPTII,S$GLB,, | Performed by: INTERNAL MEDICINE

## 2024-10-02 PROCEDURE — 1101F PT FALLS ASSESS-DOCD LE1/YR: CPT | Mod: CPTII,S$GLB,, | Performed by: INTERNAL MEDICINE

## 2024-10-02 PROCEDURE — 1159F MED LIST DOCD IN RCRD: CPT | Mod: CPTII,S$GLB,, | Performed by: INTERNAL MEDICINE

## 2024-10-02 PROCEDURE — 99999 PR PBB SHADOW E&M-EST. PATIENT-LVL V: CPT | Mod: PBBFAC,,, | Performed by: INTERNAL MEDICINE

## 2024-10-02 PROCEDURE — 1160F RVW MEDS BY RX/DR IN RCRD: CPT | Mod: CPTII,S$GLB,, | Performed by: INTERNAL MEDICINE

## 2024-10-02 PROCEDURE — 3077F SYST BP >= 140 MM HG: CPT | Mod: CPTII,S$GLB,, | Performed by: INTERNAL MEDICINE

## 2024-10-02 PROCEDURE — 3079F DIAST BP 80-89 MM HG: CPT | Mod: CPTII,S$GLB,, | Performed by: INTERNAL MEDICINE

## 2024-10-02 PROCEDURE — 1126F AMNT PAIN NOTED NONE PRSNT: CPT | Mod: CPTII,S$GLB,, | Performed by: INTERNAL MEDICINE

## 2024-10-02 PROCEDURE — 3288F FALL RISK ASSESSMENT DOCD: CPT | Mod: CPTII,S$GLB,, | Performed by: INTERNAL MEDICINE

## 2024-10-02 PROCEDURE — 99214 OFFICE O/P EST MOD 30 MIN: CPT | Mod: S$GLB,,, | Performed by: INTERNAL MEDICINE

## 2024-10-02 PROCEDURE — 93000 ELECTROCARDIOGRAM COMPLETE: CPT | Mod: S$GLB,,, | Performed by: INTERNAL MEDICINE

## 2024-10-02 NOTE — PROGRESS NOTES
CARDIOLOGY CLINIC VISIT      HISTORY OF PRESENT ILLNESS:     07/06/2020: Anushka Napier presents for evaluation of palpitations.  Last week she had noted some double vision in her right eye.  Vision was blurry.  Spontaneous resolution.  A day or two after she noted palpitations.  She states that she has had a history what she calls her heart fluttering.  However in this episode was more intense. Lasted a few hours.  Afterwards she had some chest discomfort, describes it as if she had worked out. Pressure sensation across her chest.  No history of coronary artery disease. CV RF HTN, HLP, DM. Nl tsh from 1/20.  EKG from 7/3/20 - NSR.     09/04/2024: She has recently noticed recurrence of palpitations. Daily. Describes as fluttering sensation. Similar but more frequent when to compared to episodes a few years ago. She also notices lower extremity weakness. Feels that she has difficulty walking. Feels her gait is off. Numbness and tingling in her face. One episode of jaw pain. Not associated with palpitations.    10/02/2024:  Echocardiogram shows ejection fraction of 55-60%. Mild pulmonary hypertension. Holter showed normal sinus rhythm.  Average heart rate 66 beats per minute.  Very rare PVCs and PACs.  She did have some palpitations while wearing the monitor.  Has not seen Neurology yet.  Still with some difficulty ambulating.  Notes numbness/tingling.  Orthostatics today negative.    CARDIOVASCULAR HISTORY:     Pulmonary hypertension    PAST MEDICAL HISTORY:     Past Medical History:   Diagnosis Date    Allergy     Anxiety     Basal cell carcinoma     to face    Depression     Diabetes mellitus     Diabetes mellitus type II, uncontrolled 9/17/2014    Diabetic retinopathy 01/14/2020    DM retinopathy     GERD (gastroesophageal reflux disease) 9/25/2014    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 Dr Correia, long term PPI use    History of colonic polyps 1/24/2020    Hyperlipidemia     Hypertension     Osteopenia 9/25/2014     Screening for colorectal cancer 9/25/2014    Normal 2009    Screening for colorectal cancer     Thyroid disease     Vitamin D deficiency disease 9/25/2014       PAST SURGICAL HISTORY:     Past Surgical History:   Procedure Laterality Date    HYSTERECTOMY  1997    complete for prolapse, Abdominal    OOPHORECTOMY      right shoulder      SINUS SURGERY  2012       ALLERGIES AND MEDICATION:     Review of patient's allergies indicates:   Allergen Reactions    Iodine Hives     IVP Contrast    Tizanidine Swelling     Tongue swelling     Codeine Hives     Other reaction(s): Itching  Other reaction(s): Hives    Iodinated contrast media Hives     Other reaction(s): Hives    Sulfamethoxazole-trimethoprim Itching     Other reaction(s): Itching  Other reaction(s): Hives    Epinephrine Anxiety and Other (See Comments)     Almost passed out.        Medication List            Accurate as of October 2, 2024  8:23 AM. If you have any questions, ask your nurse or doctor.                CONTINUE taking these medications      amitriptyline 10 MG tablet  Commonly known as: ELAVIL     aspirin 81 MG EC tablet  Commonly known as: ECOTRIN     atorvastatin 40 MG tablet  Commonly known as: LIPITOR  TAKE 1 TABLET(40 MG) BY MOUTH EVERY DAY     azelastine 137 mcg (0.1 %) nasal spray  Commonly known as: ASTELIN  1 spray (137 mcg total) by Nasal route 2 (two) times daily.     Bacillus subtilis-inulin 1.5 billion cell-1 gram Chew     coQ10 (ubiquinol) 100 mg Cap     estrogens (conjugated) 0.625 MG tablet  Commonly known as: PREMARIN  TAKE 1 TABLET(0.625 MG) BY MOUTH EVERY DAY     FERGON 240 (27 FE) MG tablet  Generic drug: ferrous gluconate     FLUoxetine 20 MG capsule  TAKE 1 CAPSULE(20 MG) BY MOUTH EVERY DAY     FREESTYLE YUDELKA 3 READER Misc  Generic drug: blood-glucose meter,continuous  USE AS DIRECTED     FREESTYLE YUDELKA 3 SENSOR Isidra  Generic drug: blood-glucose sensor  CHANGE SENSOR EVERY 14 DAYS AS DIRECTED     levothyroxine 175 MCG  tablet  Commonly known as: SYNTHROID, LEVOTHROID  TAKE 1 TABLET(175 MCG) BY MOUTH BEFORE BREAKFAST     metFORMIN 500 MG ER 24hr tablet  Commonly known as: GLUCOPHAGE-XR  TAKE 1 TABLET(500 MG) BY MOUTH EVERY DAY     metoprolol tartrate 50 MG tablet  Commonly known as: LOPRESSOR  TAKE 1 TABLET BY MOUTH DAILY     mometasone 0.1% 0.1 % cream  Commonly known as: ELOCON  Use daily     omega-3 fatty acids-vitamin E 1,000 mg Cap     progesterone 100 MG capsule  Commonly known as: PROMETRIUM  Take 1 capsule (100 mg total) by mouth once daily.     RABEprazole 20 mg tablet  Commonly known as: ACIPHEX  TAKE 1 TABLET BY MOUTH TWICE DAILY     solifenacin 10 MG tablet  Commonly known as: VESICARE  Take 1 tablet (10 mg total) by mouth once daily.     valsartan 160 MG tablet  Commonly known as: DIOVAN  Take 1 tablet (160 mg total) by mouth once daily.     vitamin D 1000 units Tab  Commonly known as: VITAMIN D3              SOCIAL HISTORY:     Social History     Socioeconomic History    Marital status:    Tobacco Use    Smoking status: Never    Smokeless tobacco: Never   Substance and Sexual Activity    Alcohol use: No    Drug use: No    Sexual activity: Yes     Partners: Male     Birth control/protection: Surgical   Social History Narrative     since 1986    Previous  for 8 years prior    He is a     She is a homemaker     Social Drivers of Health     Financial Resource Strain: Low Risk  (2/28/2024)    Overall Financial Resource Strain (CARDIA)     Difficulty of Paying Living Expenses: Not very hard   Food Insecurity: No Food Insecurity (2/28/2024)    Hunger Vital Sign     Worried About Running Out of Food in the Last Year: Never true     Ran Out of Food in the Last Year: Never true   Transportation Needs: No Transportation Needs (2/28/2024)    PRAPARE - Transportation     Lack of Transportation (Medical): No     Lack of Transportation (Non-Medical): No   Physical Activity: Sufficiently Active  (2/28/2024)    Exercise Vital Sign     Days of Exercise per Week: 5 days     Minutes of Exercise per Session: 60 min   Stress: Stress Concern Present (2/28/2024)    [employer] [place] [employer] - Occupational Stress Questionnaire     Feeling of Stress : To some extent   Housing Stability: Low Risk  (2/28/2024)    Housing Stability Vital Sign     Unable to Pay for Housing in the Last Year: No     Number of Places Lived in the Last Year: 1     Unstable Housing in the Last Year: No       FAMILY HISTORY:     Family History   Problem Relation Name Age of Onset    Breast cancer Cousin maternal 40    Miscarriages / Stillbirths Maternal Grandmother      Miscarriages / Stillbirths Mother      Diabetes Mother      Hypertension Mother      Cancer Mother          cervical    Heart disease Father      Hypertension Father      Ovarian cancer Neg Hx         REVIEW OF SYSTEMS:   Review of Systems   Constitutional:  Negative for chills, diaphoresis, fever, malaise/fatigue and weight loss.   HENT:  Negative for congestion, hearing loss, sinus pain, sore throat and tinnitus.    Eyes:  Negative for blurred vision, double vision, photophobia, pain and discharge.   Respiratory:  Negative for cough, hemoptysis, sputum production, shortness of breath, wheezing and stridor.    Cardiovascular:  Positive for palpitations. Negative for chest pain, orthopnea, claudication, leg swelling and PND.   Gastrointestinal:  Negative for abdominal pain, blood in stool, heartburn, melena, nausea and vomiting.   Musculoskeletal:  Positive for back pain. Negative for falls, joint pain, myalgias and neck pain.   Neurological:  Positive for tingling. Negative for dizziness, tremors, sensory change, speech change, focal weakness, seizures, loss of consciousness, weakness and headaches.   Endo/Heme/Allergies:  Does not bruise/bleed easily.   Psychiatric/Behavioral:  Negative for depression, memory loss and substance abuse. The patient is not  "nervous/anxious.        PHYSICAL EXAM:     Vitals:    10/02/24 0753   BP: (!) 142/81   Pulse: 78   Resp: 15      Body mass index is 27.15 kg/m².  Weight: 74 kg (163 lb 2.3 oz)   Height: 5' 5" (165.1 cm)     Physical Exam  Vitals reviewed.   Constitutional:       General: She is not in acute distress.     Appearance: She is well-developed. She is not diaphoretic.   HENT:      Head: Normocephalic.   Neck:      Vascular: No carotid bruit or JVD.   Cardiovascular:      Rate and Rhythm: Normal rate and regular rhythm.      Pulses: Normal pulses.      Heart sounds: Murmur heard.      Systolic murmur is present with a grade of 3/6.   Pulmonary:      Effort: Pulmonary effort is normal.      Breath sounds: Normal breath sounds.   Abdominal:      General: Bowel sounds are normal.      Palpations: Abdomen is soft.      Tenderness: There is no abdominal tenderness.   Skin:     General: Skin is warm and dry.   Neurological:      Mental Status: She is alert and oriented to person, place, and time.   Psychiatric:         Speech: Speech normal.         Behavior: Behavior normal.         Thought Content: Thought content normal.         DATA:   EKG: (personally reviewed tracing)  7/3/20 - NSR  Laboratory:  CBC:  Recent Labs   Lab 03/19/22  0804 04/22/23  0838 12/01/23  0750   WBC 6.67 6.89 8.01   Hemoglobin 12.7 12.0 12.9   Hematocrit 41.0 38.4 40.0   Platelets 306 307 345       CHEMISTRIES:  Recent Labs   Lab 03/19/22  0804 04/22/23  0838 12/01/23  0750   Glucose 107 101 136 H   Sodium 141 139 139   Potassium 4.2 4.2 4.3   BUN 15 18 12   Creatinine 0.9 0.9 0.8   eGFR if  >60.0  --   --    eGFR if non  >60.0  --   --    Calcium 8.9 8.9 9.3       CARDIAC BIOMARKERS:          COAGS:          LIPIDS/LFTS:  Recent Labs   Lab 03/19/22  0804 04/22/23  0838 12/01/23  0750 01/05/24  1040   Cholesterol 168 202 H  --  188   Triglycerides 141 163 H  --  116   HDL 60 62  --  56   LDL Cholesterol 79.8 107.4  --  " 108.8   Non-HDL Cholesterol 108 140  --  132   AST 20 16 19  --    ALT 17 12 14  --      Cardiovascular Testing:    Echocardiogram 09/18/2024:      Left Ventricle: The left ventricle is normal in size. Normal wall thickness. There is normal systolic function with a visually estimated ejection fraction of 55 - 60%. Grade I diastolic dysfunction.    Right Ventricle: Normal right ventricular cavity size. Systolic function is normal.    Pulmonary Artery: The estimated pulmonary artery systolic pressure is 30 mmHg.    Holter 09/18/2024:      NSR. Heart rates varied between 48 and 96 BPM with an average of 66 BPM.    There were very rare PVCs totalling 2 and averaging 0.02 per hour.    There were very rare PACs totalling 122 and averaging 1.27 per hour.    ASSESSMENT:     Palpitations  Hypertension  Hyperlipidemia  Diabetes  Pulmonary hypertension  Gait abnormality    PLAN:     Palpitations: holter without significant abnormalities.  Increase metoprolol if needed.  Hypertension:  Monitor.  Hyperlipidemia:  Follow-up lipids.  Gait abnormality: Neurology referral pending.  Return to clinic six months.

## 2024-10-03 ENCOUNTER — PATIENT MESSAGE (OUTPATIENT)
Dept: FAMILY MEDICINE | Facility: CLINIC | Age: 69
End: 2024-10-03
Payer: COMMERCIAL

## 2024-10-07 ENCOUNTER — OFFICE VISIT (OUTPATIENT)
Dept: NEUROLOGY | Facility: CLINIC | Age: 69
End: 2024-10-07
Payer: COMMERCIAL

## 2024-10-07 ENCOUNTER — LAB VISIT (OUTPATIENT)
Dept: LAB | Facility: HOSPITAL | Age: 69
End: 2024-10-07
Attending: STUDENT IN AN ORGANIZED HEALTH CARE EDUCATION/TRAINING PROGRAM
Payer: COMMERCIAL

## 2024-10-07 VITALS
BODY MASS INDEX: 27.81 KG/M2 | HEIGHT: 65 IN | WEIGHT: 166.88 LBS | DIASTOLIC BLOOD PRESSURE: 72 MMHG | OXYGEN SATURATION: 98 % | SYSTOLIC BLOOD PRESSURE: 164 MMHG | HEART RATE: 74 BPM

## 2024-10-07 DIAGNOSIS — R26.9 GAIT ABNORMALITY: ICD-10-CM

## 2024-10-07 DIAGNOSIS — G60.9 IDIOPATHIC NEUROPATHY: Primary | ICD-10-CM

## 2024-10-07 DIAGNOSIS — G60.9 IDIOPATHIC NEUROPATHY: ICD-10-CM

## 2024-10-07 LAB — VIT B12 SERPL-MCNC: 995 PG/ML (ref 210–950)

## 2024-10-07 PROCEDURE — 84425 ASSAY OF VITAMIN B-1: CPT | Performed by: STUDENT IN AN ORGANIZED HEALTH CARE EDUCATION/TRAINING PROGRAM

## 2024-10-07 PROCEDURE — 3061F NEG MICROALBUMINURIA REV: CPT | Mod: CPTII,S$GLB,, | Performed by: STUDENT IN AN ORGANIZED HEALTH CARE EDUCATION/TRAINING PROGRAM

## 2024-10-07 PROCEDURE — 82607 VITAMIN B-12: CPT | Performed by: STUDENT IN AN ORGANIZED HEALTH CARE EDUCATION/TRAINING PROGRAM

## 2024-10-07 PROCEDURE — 3288F FALL RISK ASSESSMENT DOCD: CPT | Mod: CPTII,S$GLB,, | Performed by: STUDENT IN AN ORGANIZED HEALTH CARE EDUCATION/TRAINING PROGRAM

## 2024-10-07 PROCEDURE — 84207 ASSAY OF VITAMIN B-6: CPT | Performed by: STUDENT IN AN ORGANIZED HEALTH CARE EDUCATION/TRAINING PROGRAM

## 2024-10-07 PROCEDURE — 3077F SYST BP >= 140 MM HG: CPT | Mod: CPTII,S$GLB,, | Performed by: STUDENT IN AN ORGANIZED HEALTH CARE EDUCATION/TRAINING PROGRAM

## 2024-10-07 PROCEDURE — 3051F HG A1C>EQUAL 7.0%<8.0%: CPT | Mod: CPTII,S$GLB,, | Performed by: STUDENT IN AN ORGANIZED HEALTH CARE EDUCATION/TRAINING PROGRAM

## 2024-10-07 PROCEDURE — 3008F BODY MASS INDEX DOCD: CPT | Mod: CPTII,S$GLB,, | Performed by: STUDENT IN AN ORGANIZED HEALTH CARE EDUCATION/TRAINING PROGRAM

## 2024-10-07 PROCEDURE — 99215 OFFICE O/P EST HI 40 MIN: CPT | Mod: S$GLB,,, | Performed by: STUDENT IN AN ORGANIZED HEALTH CARE EDUCATION/TRAINING PROGRAM

## 2024-10-07 PROCEDURE — 86334 IMMUNOFIX E-PHORESIS SERUM: CPT | Mod: 26,,, | Performed by: PATHOLOGY

## 2024-10-07 PROCEDURE — 84165 PROTEIN E-PHORESIS SERUM: CPT | Performed by: STUDENT IN AN ORGANIZED HEALTH CARE EDUCATION/TRAINING PROGRAM

## 2024-10-07 PROCEDURE — 1159F MED LIST DOCD IN RCRD: CPT | Mod: CPTII,S$GLB,, | Performed by: STUDENT IN AN ORGANIZED HEALTH CARE EDUCATION/TRAINING PROGRAM

## 2024-10-07 PROCEDURE — 84165 PROTEIN E-PHORESIS SERUM: CPT | Mod: 26,,, | Performed by: PATHOLOGY

## 2024-10-07 PROCEDURE — 3066F NEPHROPATHY DOC TX: CPT | Mod: CPTII,S$GLB,, | Performed by: STUDENT IN AN ORGANIZED HEALTH CARE EDUCATION/TRAINING PROGRAM

## 2024-10-07 PROCEDURE — 36415 COLL VENOUS BLD VENIPUNCTURE: CPT | Performed by: STUDENT IN AN ORGANIZED HEALTH CARE EDUCATION/TRAINING PROGRAM

## 2024-10-07 PROCEDURE — 3078F DIAST BP <80 MM HG: CPT | Mod: CPTII,S$GLB,, | Performed by: STUDENT IN AN ORGANIZED HEALTH CARE EDUCATION/TRAINING PROGRAM

## 2024-10-07 PROCEDURE — 4010F ACE/ARB THERAPY RXD/TAKEN: CPT | Mod: CPTII,S$GLB,, | Performed by: STUDENT IN AN ORGANIZED HEALTH CARE EDUCATION/TRAINING PROGRAM

## 2024-10-07 PROCEDURE — 99999 PR PBB SHADOW E&M-EST. PATIENT-LVL V: CPT | Mod: PBBFAC,,, | Performed by: STUDENT IN AN ORGANIZED HEALTH CARE EDUCATION/TRAINING PROGRAM

## 2024-10-07 PROCEDURE — 1101F PT FALLS ASSESS-DOCD LE1/YR: CPT | Mod: CPTII,S$GLB,, | Performed by: STUDENT IN AN ORGANIZED HEALTH CARE EDUCATION/TRAINING PROGRAM

## 2024-10-07 PROCEDURE — 86334 IMMUNOFIX E-PHORESIS SERUM: CPT | Performed by: STUDENT IN AN ORGANIZED HEALTH CARE EDUCATION/TRAINING PROGRAM

## 2024-10-07 PROCEDURE — 1126F AMNT PAIN NOTED NONE PRSNT: CPT | Mod: CPTII,S$GLB,, | Performed by: STUDENT IN AN ORGANIZED HEALTH CARE EDUCATION/TRAINING PROGRAM

## 2024-10-07 NOTE — PROGRESS NOTES
Chief Complaint and Duration     Chief Complaint   Patient presents with    Numbness    Imbalance     Tingling & numbness in toes   Tremors in hands/ imbalance      History of Present Illness     Anushka Napier is a 69 y.o. right handed female with a history of multiple medical diagnoses as listed below that presents for evaluation of neuropathy.    Patient's history hypothyroidism, diabetes type 2, hyperlipidemia, hypertension.  Has been evaluated by movement disorders for an essential tremor.  Not on medications.    Endorses bilateral numbness and tingling starting with the feet.  In her toes.  Feels it might be progressing to her fingertips.  Not interested in neuropathic pain agent at this time.  Does not wake her from sleep at night.    Has started supplementation with B12.      Review of patient's allergies indicates:   Allergen Reactions    Iodine Hives     IVP Contrast    Tizanidine Swelling     Tongue swelling     Codeine Hives     Other reaction(s): Itching  Other reaction(s): Hives    Iodinated contrast media Hives     Other reaction(s): Hives    Sulfamethoxazole-trimethoprim Itching     Other reaction(s): Itching  Other reaction(s): Hives    Epinephrine Anxiety and Other (See Comments)     Almost passed out.     Current Outpatient Medications   Medication Sig Dispense Refill    amitriptyline (ELAVIL) 10 MG tablet Take 10 mg by mouth nightly.      aspirin (ECOTRIN) 81 MG EC tablet 1 tablet Orally Once a day      atorvastatin (LIPITOR) 40 MG tablet TAKE 1 TABLET(40 MG) BY MOUTH EVERY DAY 90 tablet 1    azelastine (ASTELIN) 137 mcg (0.1 %) nasal spray 1 spray (137 mcg total) by Nasal route 2 (two) times daily. 30 mL 0    Bacillus subtilis-inulin 1.5 billion cell-1 gram Chew as directed Orally      blood-glucose meter,continuous (FREESTYLE YUDELKA 3 READER) [facility] AS DIRECTED 3 each 12    blood-glucose sensor (FREESTYLE YUDELKA 3 SENSOR) [facility] CHANGE SENSOR EVERY 14 DAYS AS DIRECTED 3 each 12    coQ10,  ubiquinol, 100 mg Cap as directed Orally      estrogens, conjugated, (PREMARIN) 0.625 MG tablet TAKE 1 TABLET(0.625 MG) BY MOUTH EVERY DAY 90 tablet 3    ferrous gluconate (FERGON) 240 (27 FE) MG tablet 1 tablet Orally Three times a Week      FLUoxetine 20 MG capsule TAKE 1 CAPSULE(20 MG) BY MOUTH EVERY DAY 90 capsule 1    levothyroxine (SYNTHROID, LEVOTHROID) 175 MCG tablet TAKE 1 TABLET(175 MCG) BY MOUTH BEFORE BREAKFAST 90 tablet 1    metFORMIN (GLUCOPHAGE-XR) 500 MG ER 24hr tablet TAKE 1 TABLET(500 MG) BY MOUTH EVERY DAY 90 tablet 0    metoprolol tartrate (LOPRESSOR) 50 MG tablet TAKE 1 TABLET BY MOUTH DAILY 90 tablet 1    mometasone 0.1% (ELOCON) 0.1 % cream Use daily 50 g 3    omega-3 fatty acids-vitamin E 1,000 mg Cap Take 1 capsule by mouth Twice daily. 1 Capsule Oral Twice a day       progesterone (PROMETRIUM) 100 MG capsule Take 1 capsule (100 mg total) by mouth once daily. 90 capsule 3    RABEprazole (ACIPHEX) 20 mg tablet TAKE 1 TABLET BY MOUTH TWICE DAILY 180 tablet 12    solifenacin (VESICARE) 10 MG tablet Take 1 tablet (10 mg total) by mouth once daily. 90 tablet 3    valsartan (DIOVAN) 160 MG tablet Take 1 tablet (160 mg total) by mouth once daily. 90 tablet 3    vitamin D (VITAMIN D3) 1000 units Tab Take 1,000 Units by mouth once daily. 5 tabs daily       No current facility-administered medications for this visit.       Medical History     Past Medical History:   Diagnosis Date    Allergy     Anxiety     Basal cell carcinoma     to face    Depression     Diabetes mellitus     Diabetes mellitus type II, uncontrolled 9/17/2014    Diabetic retinopathy 01/14/2020    DM retinopathy     GERD (gastroesophageal reflux disease) 9/25/2014    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 Dr Correia, long term PPI use    History of colonic polyps 1/24/2020    Hyperlipidemia     Hypertension     Osteopenia 9/25/2014    Screening for colorectal cancer 9/25/2014    Normal 2009    Screening for colorectal cancer     Thyroid disease      Vitamin D deficiency disease 9/25/2014     Past Surgical History:   Procedure Laterality Date    HYSTERECTOMY  1997    complete for prolapse, Abdominal    OOPHORECTOMY      right shoulder      SINUS SURGERY  2012     Family History   Problem Relation Name Age of Onset    Breast cancer Cousin maternal 40    Miscarriages / Stillbirths Maternal Grandmother      Miscarriages / Stillbirths Mother      Diabetes Mother      Hypertension Mother      Cancer Mother          cervical    Heart disease Father      Hypertension Father      Ovarian cancer Neg Hx       Social History     Socioeconomic History    Marital status:    Tobacco Use    Smoking status: Never    Smokeless tobacco: Never   Substance and Sexual Activity    Alcohol use: No    Drug use: No    Sexual activity: Yes     Partners: Male     Birth control/protection: Surgical   Social History Narrative     since 1986    Previous  for 8 years prior    He is a     She is a homemaker     Social Drivers of Health     Financial Resource Strain: Low Risk  (2/28/2024)    Overall Financial Resource Strain (CARDIA)     Difficulty of Paying Living Expenses: Not very hard   Food Insecurity: No Food Insecurity (2/28/2024)    Hunger Vital Sign     Worried About Running Out of Food in the Last Year: Never true     Ran Out of Food in the Last Year: Never true   Transportation Needs: No Transportation Needs (2/28/2024)    PRAPARE - Transportation     Lack of Transportation (Medical): No     Lack of Transportation (Non-Medical): No   Physical Activity: Sufficiently Active (2/28/2024)    Exercise Vital Sign     Days of Exercise per Week: 5 days     Minutes of Exercise per Session: 60 min   Stress: Stress Concern Present (2/28/2024)    [employer] - Occupational Stress Questionnaire     Feeling of Stress : To some extent   Housing Stability: Low Risk  (2/28/2024)    Housing Stability Vital Sign     Unable to Pay for Housing  in the Last Year: No     Number of Places Lived in the Last Year: 1     Unstable Housing in the Last Year: No       Exam     Vitals:    10/07/24 1002   BP: (!) 164/72   Pulse:       Physical Exam:  General: Not in acute distress. Not ill-appearing.   HENT: Normocephalic and atraumatic. Moist mucous membranes.  Eyes: Conjunctivae normal.   Pulmonary: Pulmonary effort is normal.   Skin: Skin is warm and dry. No rashes.   Psychiatric: Mood normal.        Neurologic Exam   Mental status: oriented to person, place, and time  Attention: Normal. Concentration: normal.  Speech: speech is normal.  Cranial Nerves: EOMI intact, V1-V3 Facial sensation intact. Symmetric facies. Hearing grossly intact. Palate and uvula midline, symmetric. No tongue deviation. Trapezius strength intact.     Motor exam: bulk and tone normal. Strength 5/5 in bilateral upper extremities: deltoids, biceps, triceps, wrist flexion/extension, finger abduction/adduction. Strength 5/5 in bilateral lower extremities: hip flexion/extension, thigh adduction/abduction, knee flexion/extension, dorsiflexion/plantarflexion, foot eversion/inversion.    Sensory exam: light touch intact    Gait exam: normal  Romberg: positive  Coordination: normal    Tremor: none  Cogwheel rigidity: none    Labs and Imaging     Labs: reviewed  No results found for this or any previous visit (from the past 24 hours).    Thyroid normal  HgA1C%:  7, increased 6.6  LDL:  108    B12: 376 1/2024    Imaging:   I have personally reviewed the images performed.   MRI brain 2024 - no acute intracranial process  MRI Cervical spine 2024 - multilevel cervical spondylosis. No spinal canal stenosis.    Assessment and Plan     Problem List Items Addressed This Visit    None  Visit Diagnoses       Idiopathic neuropathy    -  Primary    Relevant Orders    Immunofixation Electrophoresis    Protein Electrophoresis, Serum    Vitamin B1    Vitamin B12    Vitamin B6    Gait abnormality              BP no  stroke symptoms.  migraines, hypothyroidism, DMII, HLD, HTN, GERD who presents for tremors.     This is an extremely pleasant 69-year-old female who is new to me.  Here for neuropathy workup.  Patient with a history of hypothyroidism, diabetes type 2, hyperlipidemia, hypertension.  Recent evaluation by movement disorder specialists for essential tremor, currently controlled not on medications.    Patient is here for evaluation for peripheral neuropathy.  We will initiate workup to assess for vitamin deficiencies or conditions associated with neuropathy, however suspect multifactorial component with a history of her diabetes, B12 deficiency, could have idiopathic component as well.  Discussed she does have a positive Romberg, to use night lights to help with visualization and balance.    Questions answered.    Follow-up:  P.r.n., we will review patient's labs if significant we will reach out to patient.  Otherwise patient is to return as needed or if further symptoms arise or further questions arise.  Appreciate opportunity to care for her.    Time spent on this encounter: 45 minutes. This includes face to face time and non-face to face time preparing to see the patient (eg, review of tests), obtaining and/or reviewing separately obtained history, documenting clinical information in the electronic or other health record, independently interpreting results and communicating results to the patient/family/caregiver, or care coordinator.     This note was created by combination of typed  and M-Modal dictation. Transcription and phonetic errors may be present.  If there are any questions, please contact me.

## 2024-10-08 LAB
ALBUMIN SERPL ELPH-MCNC: 3.77 G/DL (ref 3.35–5.55)
ALPHA1 GLOB SERPL ELPH-MCNC: 0.29 G/DL (ref 0.17–0.41)
ALPHA2 GLOB SERPL ELPH-MCNC: 0.77 G/DL (ref 0.43–0.99)
B-GLOBULIN SERPL ELPH-MCNC: 0.93 G/DL (ref 0.5–1.1)
GAMMA GLOB SERPL ELPH-MCNC: 0.94 G/DL (ref 0.67–1.58)
INTERPRETATION SERPL IFE-IMP: NORMAL
PROT SERPL-MCNC: 6.7 G/DL (ref 6–8.4)

## 2024-10-09 LAB
PATHOLOGIST INTERPRETATION IFE: NORMAL
PATHOLOGIST INTERPRETATION SPE: NORMAL

## 2024-10-11 LAB — VIT B1 BLD-MCNC: 72 UG/L (ref 38–122)

## 2024-10-14 ENCOUNTER — PATIENT MESSAGE (OUTPATIENT)
Dept: NEUROLOGY | Facility: CLINIC | Age: 69
End: 2024-10-14
Payer: COMMERCIAL

## 2024-10-15 ENCOUNTER — PATIENT MESSAGE (OUTPATIENT)
Dept: ADMINISTRATIVE | Facility: HOSPITAL | Age: 69
End: 2024-10-15
Payer: COMMERCIAL

## 2024-10-16 ENCOUNTER — PATIENT MESSAGE (OUTPATIENT)
Dept: NEUROLOGY | Facility: CLINIC | Age: 69
End: 2024-10-16
Payer: COMMERCIAL

## 2024-10-16 NOTE — TELEPHONE ENCOUNTER
I spoke with Dr. Potts and Ms. Napier. All labs were normal and there are no concerns per Dr. Potts. Patient to reschedule virtual or schedule a face to face visit in clinic with Dr. Potts if any other concerns or questions arise, if new symptoms occur or worsen.   Ms. Napier verbalized understanding and will schedule a 6 month follow-up/re-evaluation with provider in April 2025.      Cam Crump MA :)

## 2024-11-01 ENCOUNTER — OFFICE VISIT (OUTPATIENT)
Dept: DERMATOLOGY | Facility: CLINIC | Age: 69
End: 2024-11-01
Payer: COMMERCIAL

## 2024-11-01 VITALS — WEIGHT: 166 LBS | BODY MASS INDEX: 27.62 KG/M2

## 2024-11-01 DIAGNOSIS — L82.1 SEBORRHEIC KERATOSES: Primary | ICD-10-CM

## 2024-11-01 DIAGNOSIS — L81.4 LENTIGINES: ICD-10-CM

## 2024-11-01 DIAGNOSIS — Z85.828 HISTORY OF SKIN CANCER: ICD-10-CM

## 2024-11-01 DIAGNOSIS — L71.9 ROSACEA: ICD-10-CM

## 2024-11-01 DIAGNOSIS — L30.1 DYSHIDROTIC HAND DERMATITIS: ICD-10-CM

## 2024-11-01 PROCEDURE — 99999 PR PBB SHADOW E&M-EST. PATIENT-LVL IV: CPT | Mod: PBBFAC,,, | Performed by: DERMATOLOGY

## 2024-11-01 RX ORDER — MOMETASONE FUROATE 1 MG/G
CREAM TOPICAL
Qty: 50 G | Refills: 3 | Status: SHIPPED | OUTPATIENT
Start: 2024-11-01

## 2024-11-13 ENCOUNTER — PATIENT MESSAGE (OUTPATIENT)
Dept: ADMINISTRATIVE | Facility: HOSPITAL | Age: 69
End: 2024-11-13
Payer: COMMERCIAL

## 2024-11-13 ENCOUNTER — TELEPHONE (OUTPATIENT)
Dept: ADMINISTRATIVE | Facility: HOSPITAL | Age: 69
End: 2024-11-13
Payer: COMMERCIAL

## 2024-11-13 ENCOUNTER — PATIENT OUTREACH (OUTPATIENT)
Dept: ADMINISTRATIVE | Facility: HOSPITAL | Age: 69
End: 2024-11-13
Payer: COMMERCIAL

## 2024-11-13 VITALS — SYSTOLIC BLOOD PRESSURE: 134 MMHG | DIASTOLIC BLOOD PRESSURE: 71 MMHG

## 2024-11-13 DIAGNOSIS — E11.65 UNCONTROLLED TYPE 2 DIABETES MELLITUS WITH HYPERGLYCEMIA: Primary | ICD-10-CM

## 2024-11-13 NOTE — TELEPHONE ENCOUNTER
Pt would like to get an appointment with a Nutritionist, she saw a Dietician several years ago and was given some print-outs. Please advise

## 2024-11-13 NOTE — TELEPHONE ENCOUNTER
"Thanks, let her know we put in a referral to nutrition Services we will need to see if the insurance covers seeing that but it usually does for diabetes.    We also need to see if they have nutrition services on the [facility].  For diabetes we sometimes refer to "diabetes education" and they do the nutrition counseling regarding diabetes  "

## 2024-11-13 NOTE — TELEPHONE ENCOUNTER
Informed patient referral has been placed for nutrition services. Patient also advised normal diabetic patients are referred to diabetic education who will do nutrition counseling . Patient advised will receive call from to assist with scheduling appointment . Patient voiced understanding .

## 2024-11-19 ENCOUNTER — PATIENT MESSAGE (OUTPATIENT)
Dept: FAMILY MEDICINE | Facility: CLINIC | Age: 69
End: 2024-11-19
Payer: COMMERCIAL

## 2024-11-19 RX ORDER — RABEPRAZOLE SODIUM 20 MG/1
TABLET, DELAYED RELEASE ORAL
Qty: 180 TABLET | Refills: 12 | Status: SHIPPED | OUTPATIENT
Start: 2024-11-19

## 2024-11-19 NOTE — TELEPHONE ENCOUNTER
Care Due:                  Date            Visit Type   Department     Provider  --------------------------------------------------------------------------------                                             [facility]     MED/ INTERNAL  Gerardo Chappell  Last Visit: 02-      FOLLOW UP    MED/ PEDS      Ehrensing  Next Visit: None Scheduled  None         None Found                                                            Last  Test          Frequency    Reason                     Performed    Due Date  --------------------------------------------------------------------------------    CMP.........  12 months..  atorvastatin, metFORMIN..  12- 11-    Lipid Panel.  12 months..  atorvastatin.............  01- 12-    [facility] Embedded Care Due Messages. Reference number: [id number].   11/19/2024 8:06:43 AM CST

## 2024-11-19 NOTE — TELEPHONE ENCOUNTER
Refill Routing Note   Medication(s) are not appropriate for processing by [facility] for the following reason(s):        Outside of protocol    [facility] action(s):  Route   Requires appointment : Yes     Requires labs : Yes      Medication Therapy Plan: Total daily dose exceeds maintenance dosing for PPI      Appointments  past 12m or future 3m with PCP    Date Provider   Last Visit   2/28/2024 Gerardo Dobbs MD   Next Visit   Visit date not found Gerardo Dobbs MD   ED visits in past 90 days: 0        Note composed:9:44 AM 11/19/2024

## 2024-11-25 ENCOUNTER — PATIENT MESSAGE (OUTPATIENT)
Dept: FAMILY MEDICINE | Facility: CLINIC | Age: 69
End: 2024-11-25
Payer: COMMERCIAL

## 2024-12-03 ENCOUNTER — PATIENT MESSAGE (OUTPATIENT)
Dept: FAMILY MEDICINE | Facility: CLINIC | Age: 69
End: 2024-12-03
Payer: COMMERCIAL

## 2024-12-04 NOTE — PROGRESS NOTES
Subjective:       Patient ID:  Anushka Napier is a 66 y.o. female who presents for   Chief Complaint   Patient presents with    Rash     Between right fingers, several weeks, itching      Dermatitis on right hand no tx.  Also hx skin cancer, This is a high risk patient here to check for the development of new lesions. No lesions of concern, the area above her lip resolved.  Rosacea doing well with cleocin t lotion.         Review of Systems   Constitutional: Negative for fever, chills, weight loss, weight gain, fatigue and malaise.   Skin: Positive for itching, rash, daily sunscreen use, activity-related sunscreen use and wears hat.   Hematologic/Lymphatic: Bruises/bleeds easily.        Objective:    Physical Exam   Constitutional: She appears well-developed and well-nourished. No distress.   Neurological: She is alert and oriented to person, place, and time. She is not disoriented.   Psychiatric: She has a normal mood and affect.   Skin:   Areas Examined (abnormalities noted in diagram):   Head / Face Inspection Performed  Neck Inspection Performed  Chest / Axilla Inspection Performed  Back Inspection Performed  RUE Inspected  LUE Inspection Performed                       Diagram Legend     Erythematous scaling macule/papule c/w actinic keratosis       Vascular papule c/w angioma      Pigmented verrucoid papule/plaque c/w seborrheic keratosis      Yellow umbilicated papule c/w sebaceous hyperplasia      Irregularly shaped tan macule c/w lentigo     1-2 mm smooth white papules consistent with Milia      Movable subcutaneous cyst with punctum c/w epidermal inclusion cyst      Subcutaneous movable cyst c/w pilar cyst      Firm pink to brown papule c/w dermatofibroma      Pedunculated fleshy papule(s) c/w skin tag(s)      Evenly pigmented macule c/w junctional nevus     Mildly variegated pigmented, slightly irregular-bordered macule c/w mildly atypical nevus      Flesh colored to evenly pigmented papule c/w  "intradermal nevus       Pink pearly papule/plaque c/w basal cell carcinoma      Erythematous hyperkeratotic cursted plaque c/w SCC      Surgical scar with no sign of skin cancer recurrence      Open and closed comedones      Inflammatory papules and pustules      Verrucoid papule consistent consistent with wart     Erythematous eczematous patches and plaques     Dystrophic onycholytic nail with subungual debris c/w onychomycosis     Umbilicated papule    Erythematous-base heme-crusted tan verrucoid plaque consistent with inflamed seborrheic keratosis     Erythematous Silvery Scaling Plaque c/w Psoriasis     See annotation      Assessment / Plan:        Dyshidrotic hand dermatitis  -     mometasone 0.1% (ELOCON) 0.1 % cream; Use daily  Dispense: 50 g; Refill: 3 for flares  cetaphil lotion after hand washing  Brochure provided      Lentigines  The "ABCD" rules to observe pigmented lesions were reviewed.  sunscreen    History of skin cancers  Area(s) of previous NMSC evaluated with no signs of recurrence.    . No lesions suspicious for malignancy noted.    Recommend daily sun protection/avoidance and use of at least SPF 30, broad spectrum sunscreen (OTC drug).       Rosacea  Cont cleocin t lotion             Follow up in about 6 months (around 7/5/2022).  " 58

## 2025-01-03 RX ORDER — METFORMIN HYDROCHLORIDE 500 MG/1
TABLET, EXTENDED RELEASE ORAL
Qty: 90 TABLET | Refills: 12 | Status: SHIPPED | OUTPATIENT
Start: 2025-01-03

## 2025-01-03 NOTE — TELEPHONE ENCOUNTER
Care Due:                  Date            Visit Type   Department     Provider  --------------------------------------------------------------------------------                                             [facility]     MED/ INTERNAL  Gerardo Chappell  Last Visit: 02-      FOLLOW UP    MED/ PEDS      Ehrensing  Next Visit: None Scheduled  None         None Found                                                            Last  Test          Frequency    Reason                     Performed    Due Date  --------------------------------------------------------------------------------    HBA1C.......  6 months...  metFORMIN................  09- 03-    [facility] Embedded Care Due Messages. Reference number: [id number].   1/03/2025 6:01:25 AM CST

## 2025-01-04 NOTE — TELEPHONE ENCOUNTER
Refill Routing Note   Medication(s) are not appropriate for processing by [facility] for the following reason(s):      Required labs outdated    [facility] action(s):  Defer Care Due:  Labs due            Appointments  past 12m or future 3m with PCP    Date Provider   Last Visit   2/28/2024 Gerardo Dobbs MD   Next Visit   Visit date not found Gerardo Dobbs MD   ED visits in past 90 days: 0        Note composed:7:37 PM 01/03/2025

## 2025-01-08 ENCOUNTER — TELEPHONE (OUTPATIENT)
Dept: PHARMACY | Facility: CLINIC | Age: 70
End: 2025-01-08
Payer: COMMERCIAL

## 2025-01-08 NOTE — TELEPHONE ENCOUNTER
[facility]/Population Health Chart Review & Patient Outreach Details For Medication Adherence Project    Reason for Outreach Encounter: 3rd Party payor non-compliance report ([employer], [employer], C, etc)  2.  Patient Outreach Method: Reviewed Patient Chart  3.   Medication in question: atorvastatin    LAST FILLED: 10/10/24 for 90 day supply  Hyperlipidemia Medications               atorvastatin (LIPITOR) 40 MG tablet TAKE 1 TABLET(40 MG) BY MOUTH EVERY DAY    omega-3 fatty acids-vitamin E 1,000 mg Cap Take 1 capsule by mouth Twice daily. 1 Capsule Oral Twice a day                4.  Reviewed and or Updates Made To: Patient Chart  5. Outreach Outcomes and/or actions taken: Patient filled medication and is on track to be adherent

## 2025-01-24 NOTE — TELEPHONE ENCOUNTER
Care Due:                  Date            Visit Type   Department     Provider  --------------------------------------------------------------------------------                                             [facility]     MED/ INTERNAL  Gerardo Chappell  Last Visit: 02-      FOLLOW UP    MED/ PEDS      Ehrensing  Next Visit: None Scheduled  None         None Found                                                            Last  Test          Frequency    Reason                     Performed    Due Date  --------------------------------------------------------------------------------    CMP.........  12 months..  atorvastatin, metFORMIN..  12- 11-    Lipid Panel.  12 months..  atorvastatin.............  01- 12-    [facility] Embedded Care Due Messages. Reference number: [id number].   1/24/2025 4:03:55 PM CST

## 2025-01-25 NOTE — TELEPHONE ENCOUNTER
Refill Routing Note   Medication(s) are not appropriate for processing by [facility] for the following reason(s):        Required labs outdated    [facility] action(s):  Defer   Requires labs : Yes             Appointments  past 12m or future 3m with PCP    Date Provider   Last Visit   2/28/2024 Gerardo Dobbs MD   Next Visit   Visit date not found Gerardo Dobbs MD   ED visits in past 90 days: 0        Note composed:9:48 PM 01/24/2025

## 2025-01-26 RX ORDER — ATORVASTATIN CALCIUM 40 MG/1
40 TABLET, FILM COATED ORAL
Qty: 90 TABLET | Refills: 12 | Status: SHIPPED | OUTPATIENT
Start: 2025-01-26

## 2025-01-31 RX ORDER — LEVOTHYROXINE SODIUM 175 UG/1
TABLET ORAL
Qty: 90 TABLET | Refills: 12 | Status: SHIPPED | OUTPATIENT
Start: 2025-01-31

## 2025-01-31 NOTE — TELEPHONE ENCOUNTER
No care due was identified.  [facility] Embedded Care Due Messages. Reference number: [id number].   1/30/2025 7:36:16 PM CST

## 2025-01-31 NOTE — TELEPHONE ENCOUNTER
Refill Routing Note   Medication(s) are not appropriate for processing by [facility] for the following reason(s):        Required labs outdated    [facility] action(s):  Defer             Appointments  past 12m or future 3m with PCP    Date Provider   Last Visit   2/28/2024 Gerardo Dobbs MD   Next Visit   Visit date not found Gerardo Dobbs MD   ED visits in past 90 days: 0        Note composed:8:19 PM 01/30/2025

## 2025-02-07 ENCOUNTER — PATIENT MESSAGE (OUTPATIENT)
Dept: OBSTETRICS AND GYNECOLOGY | Facility: CLINIC | Age: 70
End: 2025-02-07
Payer: COMMERCIAL

## 2025-02-07 ENCOUNTER — TELEPHONE (OUTPATIENT)
Dept: CARDIOLOGY | Facility: CLINIC | Age: 70
End: 2025-02-07
Payer: COMMERCIAL

## 2025-02-17 ENCOUNTER — TELEPHONE (OUTPATIENT)
Dept: UROLOGY | Facility: CLINIC | Age: 70
End: 2025-02-17
Payer: COMMERCIAL

## 2025-02-21 ENCOUNTER — NURSE TRIAGE (OUTPATIENT)
Dept: ADMINISTRATIVE | Facility: CLINIC | Age: 70
End: 2025-02-21
Payer: COMMERCIAL

## 2025-02-21 ENCOUNTER — HOSPITAL ENCOUNTER (EMERGENCY)
Facility: HOSPITAL | Age: 70
Discharge: HOME | End: 2025-02-21
Attending: EMERGENCY MEDICINE
Payer: COMMERCIAL

## 2025-02-21 ENCOUNTER — PATIENT MESSAGE (OUTPATIENT)
Dept: OPHTHALMOLOGY | Facility: CLINIC | Age: 70
End: 2025-02-21
Payer: COMMERCIAL

## 2025-02-21 VITALS
RESPIRATION RATE: 16 BRPM | HEIGHT: 65 IN | BODY MASS INDEX: 26.66 KG/M2 | SYSTOLIC BLOOD PRESSURE: 165 MMHG | WEIGHT: 160 LBS | OXYGEN SATURATION: 96 % | HEART RATE: 65 BPM | DIASTOLIC BLOOD PRESSURE: 71 MMHG | TEMPERATURE: 98 F

## 2025-02-21 DIAGNOSIS — R51.9 HEADACHE DISORDER: ICD-10-CM
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
## 2025-02-21 DIAGNOSIS — R55 SYNCOPE: ICD-10-CM

## 2025-02-21 DIAGNOSIS — H57.9 VISUAL COMPLAINT: Primary | ICD-10-CM

## 2025-02-21 DIAGNOSIS — G91.9 HYDROCEPHALUS, UNSPECIFIED TYPE: ICD-10-CM

## 2025-02-21 LAB
ALBUMIN SERPL BCP-MCNC: 3.8 G/DL (ref 3.5–5.2)
ALP SERPL-CCNC: 111 U/L (ref 40–150)
ALT SERPL W/O P-5'-P-CCNC: 14 U/L (ref 10–44)
ANION GAP SERPL CALC-SCNC: 12 MMOL/L (ref 8–16)
AST SERPL-CCNC: 24 U/L (ref 10–40)
BACTERIA #/AREA URNS AUTO: ABNORMAL /HPF
BACTERIA #/AREA URNS AUTO: ABNORMAL /HPF
BASOPHILS # BLD AUTO: 0.06 K/UL (ref 0–0.2)
BASOPHILS NFR BLD: 0.9 % (ref 0–1.9)
BILIRUB SERPL-MCNC: 0.5 MG/DL (ref 0.1–1)
BILIRUB UR QL STRIP: NEGATIVE
BILIRUB UR QL STRIP: NEGATIVE
BNP SERPL-MCNC: 41 PG/ML (ref 0–99)
BUN SERPL-MCNC: 15 MG/DL (ref 8–23)
CALCIUM SERPL-MCNC: 9.2 MG/DL (ref 8.7–10.5)
CHLORIDE SERPL-SCNC: 104 MMOL/L (ref 95–110)
CLARITY UR REFRACT.AUTO: ABNORMAL
CLARITY UR REFRACT.AUTO: ABNORMAL
CO2 SERPL-SCNC: 22 MMOL/L (ref 23–29)
COLOR UR AUTO: YELLOW
COLOR UR AUTO: YELLOW
CREAT SERPL-MCNC: 0.9 MG/DL (ref 0.5–1.4)
DIFFERENTIAL METHOD BLD: ABNORMAL
EOSINOPHIL # BLD AUTO: 0.2 K/UL (ref 0–0.5)
EOSINOPHIL NFR BLD: 3.2 % (ref 0–8)
ERYTHROCYTE [DISTWIDTH] IN BLOOD BY AUTOMATED COUNT: 13.1 % (ref 11.5–14.5)
EST. GFR  (NO RACE VARIABLE): >60 ML/MIN/1.73 M^2
GLUCOSE SERPL-MCNC: 111 MG/DL (ref 70–110)
GLUCOSE UR QL STRIP: NEGATIVE
GLUCOSE UR QL STRIP: NEGATIVE
HCT VFR BLD AUTO: 41.9 % (ref 37–48.5)
HCV AB SERPL QL IA: NORMAL
HGB BLD-MCNC: 13.2 G/DL (ref 12–16)
HGB UR QL STRIP: NEGATIVE
HGB UR QL STRIP: NEGATIVE
HIV 1+2 AB+HIV1 P24 AG SERPL QL IA: NORMAL
HYALINE CASTS UR QL AUTO: 2 /LPF
IMM GRANULOCYTES # BLD AUTO: 0.02 K/UL (ref 0–0.04)
IMM GRANULOCYTES NFR BLD AUTO: 0.3 % (ref 0–0.5)
KETONES UR QL STRIP: NEGATIVE
KETONES UR QL STRIP: NEGATIVE
LEUKOCYTE ESTERASE UR QL STRIP: ABNORMAL
LEUKOCYTE ESTERASE UR QL STRIP: ABNORMAL
LYMPHOCYTES # BLD AUTO: 1.7 K/UL (ref 1–4.8)
LYMPHOCYTES NFR BLD: 26 % (ref 18–48)
MAGNESIUM SERPL-MCNC: 1.8 MG/DL (ref 1.6–2.6)
MCH RBC QN AUTO: 28 PG (ref 27–31)
MCHC RBC AUTO-ENTMCNC: 31.5 G/DL (ref 32–36)
MCV RBC AUTO: 89 FL (ref 82–98)
MICROSCOPIC COMMENT: ABNORMAL
MICROSCOPIC COMMENT: ABNORMAL
MONOCYTES # BLD AUTO: 0.5 K/UL (ref 0.3–1)
MONOCYTES NFR BLD: 7.7 % (ref 4–15)
NEUTROPHILS # BLD AUTO: 4.1 K/UL (ref 1.8–7.7)
NEUTROPHILS NFR BLD: 61.9 % (ref 38–73)
NITRITE UR QL STRIP: NEGATIVE
NITRITE UR QL STRIP: NEGATIVE
NRBC BLD-RTO: 0 /100 WBC
OHS QRS DURATION: 80 MS
OHS QTC CALCULATION: 450 MS
PH UR STRIP: 5 [PH] (ref 5–8)
PH UR STRIP: 5 [PH] (ref 5–8)
PLATELET # BLD AUTO: 374 K/UL (ref 150–450)
PMV BLD AUTO: 9.2 FL (ref 9.2–12.9)
POCT GLUCOSE: 105 MG/DL (ref 70–110)
POTASSIUM SERPL-SCNC: 4.1 MMOL/L (ref 3.5–5.1)
PROT SERPL-MCNC: 7.4 G/DL (ref 6–8.4)
PROT UR QL STRIP: NEGATIVE
PROT UR QL STRIP: NEGATIVE
RBC # BLD AUTO: 4.72 M/UL (ref 4–5.4)
RBC #/AREA URNS AUTO: 2 /HPF (ref 0–4)
RBC #/AREA URNS AUTO: 2 /HPF (ref 0–4)
SODIUM SERPL-SCNC: 138 MMOL/L (ref 136–145)
SP GR UR STRIP: 1.01 (ref 1–1.03)
SP GR UR STRIP: 1.01 (ref 1–1.03)
SQUAMOUS #/AREA URNS AUTO: 10 /HPF
SQUAMOUS #/AREA URNS AUTO: 12 /HPF
TROPONIN I SERPL DL<=0.01 NG/ML-MCNC: <3 NG/L (ref 0–14)
TSH SERPL DL<=0.005 MIU/L-ACNC: 2.46 UIU/ML (ref 0.4–4)
URN SPEC COLLECT METH UR: ABNORMAL
URN SPEC COLLECT METH UR: ABNORMAL
WBC # BLD AUTO: 6.61 K/UL (ref 3.9–12.7)
WBC #/AREA URNS AUTO: 30 /HPF (ref 0–5)
WBC #/AREA URNS AUTO: 34 /HPF (ref 0–5)

## 2025-02-21 PROCEDURE — 84484 ASSAY OF TROPONIN QUANT: CPT | Performed by: STUDENT IN AN ORGANIZED HEALTH CARE EDUCATION/TRAINING PROGRAM

## 2025-02-21 PROCEDURE — 83880 ASSAY OF NATRIURETIC PEPTIDE: CPT | Performed by: STUDENT IN AN ORGANIZED HEALTH CARE EDUCATION/TRAINING PROGRAM

## 2025-02-21 PROCEDURE — 87186 SC STD MICRODIL/AGAR DIL: CPT | Mod: 59 | Performed by: STUDENT IN AN ORGANIZED HEALTH CARE EDUCATION/TRAINING PROGRAM

## 2025-02-21 PROCEDURE — 86803 HEPATITIS C AB TEST: CPT | Performed by: EMERGENCY MEDICINE

## 2025-02-21 PROCEDURE — 99285 EMERGENCY DEPT VISIT HI MDM: CPT | Mod: 25

## 2025-02-21 PROCEDURE — 87389 HIV-1 AG W/HIV-1&-2 AB AG IA: CPT | Performed by: EMERGENCY MEDICINE

## 2025-02-21 PROCEDURE — 96375 TX/PRO/DX INJ NEW DRUG ADDON: CPT

## 2025-02-21 PROCEDURE — 93010 ELECTROCARDIOGRAM REPORT: CPT | Mod: ,,, | Performed by: INTERNAL MEDICINE

## 2025-02-21 PROCEDURE — 84443 ASSAY THYROID STIM HORMONE: CPT | Performed by: STUDENT IN AN ORGANIZED HEALTH CARE EDUCATION/TRAINING PROGRAM

## 2025-02-21 PROCEDURE — 85025 COMPLETE CBC W/AUTO DIFF WBC: CPT | Performed by: STUDENT IN AN ORGANIZED HEALTH CARE EDUCATION/TRAINING PROGRAM

## 2025-02-21 PROCEDURE — 93005 ELECTROCARDIOGRAM TRACING: CPT

## 2025-02-21 PROCEDURE — 25500020 PHARM REV CODE 255: Performed by: EMERGENCY MEDICINE

## 2025-02-21 PROCEDURE — 25000003 PHARM REV CODE 250: Performed by: EMERGENCY MEDICINE

## 2025-02-21 PROCEDURE — A9585 GADOBUTROL INJECTION: HCPCS | Performed by: EMERGENCY MEDICINE

## 2025-02-21 PROCEDURE — 81001 URINALYSIS AUTO W/SCOPE: CPT | Performed by: STUDENT IN AN ORGANIZED HEALTH CARE EDUCATION/TRAINING PROGRAM

## 2025-02-21 PROCEDURE — 82962 GLUCOSE BLOOD TEST: CPT

## 2025-02-21 PROCEDURE — 63600175 PHARM REV CODE 636 W HCPCS: Mod: JZ,TB | Performed by: STUDENT IN AN ORGANIZED HEALTH CARE EDUCATION/TRAINING PROGRAM

## 2025-02-21 PROCEDURE — 87088 URINE BACTERIA CULTURE: CPT | Mod: 59 | Performed by: STUDENT IN AN ORGANIZED HEALTH CARE EDUCATION/TRAINING PROGRAM

## 2025-02-21 PROCEDURE — 83735 ASSAY OF MAGNESIUM: CPT | Performed by: STUDENT IN AN ORGANIZED HEALTH CARE EDUCATION/TRAINING PROGRAM

## 2025-02-21 PROCEDURE — 87086 URINE CULTURE/COLONY COUNT: CPT | Performed by: STUDENT IN AN ORGANIZED HEALTH CARE EDUCATION/TRAINING PROGRAM

## 2025-02-21 PROCEDURE — 25000003 PHARM REV CODE 250: Performed by: STUDENT IN AN ORGANIZED HEALTH CARE EDUCATION/TRAINING PROGRAM

## 2025-02-21 PROCEDURE — 80053 COMPREHEN METABOLIC PANEL: CPT | Performed by: STUDENT IN AN ORGANIZED HEALTH CARE EDUCATION/TRAINING PROGRAM

## 2025-02-21 PROCEDURE — 96374 THER/PROPH/DIAG INJ IV PUSH: CPT

## 2025-02-21 RX ORDER — KETOROLAC TROMETHAMINE 30 MG/ML
15 INJECTION, SOLUTION INTRAMUSCULAR; INTRAVENOUS
Status: COMPLETED | OUTPATIENT
Start: 2025-02-21 | End: 2025-02-21

## 2025-02-21 RX ORDER — DIPHENHYDRAMINE HYDROCHLORIDE 50 MG/ML
12.5 INJECTION INTRAMUSCULAR; INTRAVENOUS
Status: COMPLETED | OUTPATIENT
Start: 2025-02-21 | End: 2025-02-21

## 2025-02-21 RX ORDER — METOCLOPRAMIDE HYDROCHLORIDE 5 MG/ML
10 INJECTION INTRAMUSCULAR; INTRAVENOUS
Status: COMPLETED | OUTPATIENT
Start: 2025-02-21 | End: 2025-02-21

## 2025-02-21 RX ORDER — GADOBUTROL 604.72 MG/ML
8 INJECTION INTRAVENOUS
Status: COMPLETED | OUTPATIENT
Start: 2025-02-21 | End: 2025-02-21

## 2025-02-21 RX ORDER — ACETAMINOPHEN 500 MG
1000 TABLET ORAL
Status: COMPLETED | OUTPATIENT
Start: 2025-02-21 | End: 2025-02-21

## 2025-02-21 RX ORDER — CEPHALEXIN 500 MG/1
500 CAPSULE ORAL
Status: COMPLETED | OUTPATIENT
Start: 2025-02-21 | End: 2025-02-21

## 2025-02-21 RX ADMIN — ACETAMINOPHEN 1000 MG: 500 TABLET ORAL at 10:02

## 2025-02-21 RX ADMIN — CEPHALEXIN 500 MG: 500 CAPSULE ORAL at 12:02

## 2025-02-21 RX ADMIN — GADOBUTROL 8 ML: 604.72 INJECTION INTRAVENOUS at 01:02

## 2025-02-21 RX ADMIN — KETOROLAC TROMETHAMINE 15 MG: 30 INJECTION, SOLUTION INTRAMUSCULAR; INTRAVENOUS at 12:02

## 2025-02-21 RX ADMIN — DIPHENHYDRAMINE HYDROCHLORIDE 12.5 MG: 50 INJECTION, SOLUTION INTRAMUSCULAR; INTRAVENOUS at 12:02

## 2025-02-21 RX ADMIN — METOCLOPRAMIDE 10 MG: 5 INJECTION, SOLUTION INTRAMUSCULAR; INTRAVENOUS at 12:02

## 2025-02-21 NOTE — ED PROVIDER NOTES
Encounter Date: 2/21/2025       History     Chief Complaint   Patient presents with    Fall     Left eye flashing, had a fall a few days ago, no loc, did not hit her head. AAOx3     HPI    69-year-old female significant medical history hypertension, GERD, migraine, type 2 diabetes, overactive bladder, presenting for fall.    Patient reports for concern of left eye flashing.  She woke up at 5:30 a.m. this morning and notes bright white flashes in her left eye.  This lasted for 45 minutes and completely resolved.  On her daughter visited her this morning, her daughter was worried for left-sided mild facial droop.  Known well was yesterday around 6:00 p.m. when her daughter had last seen her.  Patient denies having left-sided facial droop and says this is her baseline.    Two days ago she had a syncopal event.  She was at the [place] [employer], lost consciousness and woke up on the ground with a bruise on her right wrist and pain in her right wrist.  She denies new weakness or numbness in her arms or legs, but notes that she does follow with Neurology for tremor.  She had an MRI of the brain a year ago.  She also reports baseline mild left-sided facial paresthesia at baseline.      In the ED she denies facial weakness, chest pain, shortness of breath, nausea, vomiting, fever, chills, dysuria, diarrhea.    Review of patient's allergies indicates:   Allergen Reactions    Iodine Hives     IVP Contrast    Tizanidine Swelling     Tongue swelling     Codeine Hives     Other reaction(s): Itching  Other reaction(s): Hives    Iodinated contrast media Hives     Other reaction(s): Hives    Sulfamethoxazole-trimethoprim Itching     Other reaction(s): Itching  Other reaction(s): Hives    Epinephrine Anxiety and Other (See Comments)     Almost passed out.     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 Dr Correia, long term PPI use    History of colonic polyps 1/24/2020    Hyperlipidemia     Hypertension     Osteopenia 9/25/2014    Screening for colorectal cancer 9/25/2014    Normal 2009    Screening for colorectal cancer     Thyroid disease     Vitamin D deficiency disease 9/25/2014     Past Surgical History:   Procedure Laterality Date    HYSTERECTOMY  1997    complete for prolapse, Abdominal    OOPHORECTOMY      right shoulder      SINUS SURGERY  2012     Family History   Problem Relation Name Age of Onset    Breast cancer Cousin maternal 40    Miscarriages / Stillbirths Maternal Grandmother      Miscarriages / Stillbirths Mother      Diabetes Mother      Hypertension Mother      Cancer Mother          cervical    Heart disease Father      Hypertension Father      Ovarian cancer Neg Hx       Social History[1]  Review of Systems  See HPI for pertinent ROS.   Physical Exam     Initial Vitals [02/21/25 0746]   BP Pulse Resp Temp SpO2   (!) 208/97 64 18 98.4 °F (36.9 °C) 96 %      MAP       --         Physical Exam    Constitutional: She appears well-developed and well-nourished.   HENT:   Head: Normocephalic and atraumatic.   Eyes: Conjunctivae are normal. No scleral icterus.   Neck: No JVD present.   Cardiovascular:  Normal rate, regular rhythm and normal heart sounds.     Exam reveals no gallop and no friction rub.       No murmur heard.  Pulmonary/Chest: Breath sounds normal. No stridor. She has no wheezes. She has no rhonchi. She has no rales.   Abdominal: Abdomen is soft. There is no abdominal tenderness. There is no rebound and no guarding.   Musculoskeletal:         General: No tenderness or edema.      Comments: Contusions on the hypothenar and thenar eminences on the R, but no snuffbox tenderness, wrist with full range of motion, no tenderness to palpation of  the forearm, elbow, shoulder.     Face grossly atraumatic, no midline tenderness, stepoff, or deformity of the C, T, L-spine.  LUE and bilateral LE without gross injury, nontender, with full range of motion.  Chest atraumatic, abdomen soft and nontender, and pelvis stable.        Neurological: She is alert.   At rest, mild flattening of the left nasolabial fold, on smile, face is completely symmetric.  She reports mild paresthesia of the left side of V1 through V3 compared to the right, she reports this is baseline for her.    Cranial nerves 2-12 are grossly intact at baseline, strength 5/5 upper extremities and lower extremities bilaterally, sensation also intact in all extremities.  No ataxia, normal finger-to-nose and heel-to-shin testing.     Skin: Skin is warm. Capillary refill takes less than 2 seconds.   Psychiatric: She has a normal mood and affect.         ED Course   Procedures  Labs Reviewed   CULTURE, URINE - Abnormal       Result Value    Urine Culture, Routine   (*)     Value: PROTEUS MIRABILIS  10,000 - 49,999 cfu/ml      Narrative:     Specimen Source->Urine   CULTURE, URINE - Abnormal    Urine Culture, Routine   (*)     Value: PROTEUS MIRABILIS  10,000 - 49,999 cfu/ml  No other significant isolate      Narrative:     Specimen Source->Urine   CBC W/ AUTO DIFFERENTIAL - Abnormal    WBC 6.61      RBC 4.72      Hemoglobin 13.2      Hematocrit 41.9      MCV 89      MCH 28.0      MCHC 31.5 (*)     RDW 13.1      Platelets 374      MPV 9.2      Immature Granulocytes 0.3      Gran # (ANC) 4.1      Immature Grans (Abs) 0.02      Lymph # 1.7      Mono # 0.5      Eos # 0.2      Baso # 0.06      nRBC 0      Gran % 61.9      Lymph % 26.0      Mono % 7.7      Eosinophil % 3.2      Basophil % 0.9      Differential Method Automated      Narrative:     Release to patient->Immediate   COMPREHENSIVE METABOLIC PANEL - Abnormal    Sodium 138      Potassium 4.1      Chloride 104      CO2 22 (*)     Glucose 111 (*)     BUN  15      Creatinine 0.9      Calcium 9.2      Total Protein 7.4      Albumin 3.8      Total Bilirubin 0.5      Alkaline Phosphatase 111      AST 24      ALT 14      eGFR >60.0      Anion Gap 12      Narrative:     Release to patient->Immediate   URINALYSIS, REFLEX TO URINE CULTURE - Abnormal    Specimen UA Urine, Clean Catch      Color, UA Yellow      Appearance, UA Hazy (*)     pH, UA 5.0      Specific Gravity, UA 1.010      Protein, UA Negative      Glucose, UA Negative      Ketones, UA Negative      Bilirubin (UA) Negative      Occult Blood UA Negative      Nitrite, UA Negative      Leukocytes, UA 3+ (*)     Narrative:     Specimen Source->Urine   URINALYSIS MICROSCOPIC - Abnormal    RBC, UA 2      WBC, UA 30 (*)     Bacteria Many (*)     Squam Epithel, UA 10      Microscopic Comment SEE COMMENT      Narrative:     Specimen Source->Urine   URINALYSIS, REFLEX TO URINE CULTURE - Abnormal    Specimen UA Urine, Clean Catch      Color, UA Yellow      Appearance, UA Hazy (*)     pH, UA 5.0      Specific Gravity, UA 1.015      Protein, UA Negative      Glucose, UA Negative      Ketones, UA Negative      Bilirubin (UA) Negative      Occult Blood UA Negative      Nitrite, UA Negative      Leukocytes, UA 3+ (*)     Narrative:     Specimen Source->Urine   URINALYSIS MICROSCOPIC - Abnormal    RBC, UA 2      WBC, UA 34 (*)     Bacteria Many (*)     Squam Epithel, UA 12      Hyaline Casts, UA 2 (*)     Microscopic Comment SEE COMMENT      Narrative:     Specimen Source->Urine   HEPATITIS C ANTIBODY    Hepatitis C Ab Non-reactive      Narrative:     Release to patient->Immediate   HIV 1 / 2 ANTIBODY    HIV 1/2 Ag/Ab Non-reactive      Narrative:     Release to patient->Immediate   MAGNESIUM    Magnesium 1.8      Narrative:     Release to patient->Immediate   B-TYPE NATRIURETIC PEPTIDE    BNP 41      Narrative:     Release to patient->Immediate   TROPONIN I HIGH SENSITIVITY    Troponin I High Sensitivity <3      Narrative:      Release to patient->Immediate   TSH    TSH 2.465      Narrative:     Release to patient->Immediate   POCT GLUCOSE    POCT Glucose 105          ECG Results              EKG 12-lead (Final result)        Collection Time Result Time QRS Duration OHS QTC Calculation    02/21/25 08:44:25 02/21/25 08:57:17 80 450                     Final result by Interface, Lab In [facility] (02/21/25 08:57:23)                   Narrative:    Test Reason : R55,    Vent. Rate :  74 BPM     Atrial Rate :  74 BPM     P-R Int : 140 ms          QRS Dur :  80 ms      QT Int : 406 ms       P-R-T Axes :  13 -28 122 degrees    QTcB Int : 450 ms    Normal sinus rhythm  Low septal forces  Abnormal R wave progression in the precordial leads  Nonspecific ST and/or T wave abnormalities  Leftward axis  Abnormal ECG  When compared with ECG of 02-Oct-2024 06:59,  The axis Shifted left  Nonspecific T wave abnormality, worse in Inferior leads  Nonspecific T wave abnormality now evident in Lateral leads  Confirmed by Piyush Duncan ([id number]) on 2/21/2025 8:57:11 AM    Referred By:            Confirmed By: Piyush Duncan                                  Imaging Results              MRI Brain W WO Contrast (Final result)  Result time 02/21/25 13:28:54      Final result by Hank Rodriguez MD (02/21/25 13:28:54)                   Impression:      No evidence acute infarct or hemorrhage.    Moderate chronic small vessel ischemic change.    Ventriculomegaly out of proportion to the degree of sulcal enlargement, remains suspicious for normal pressure hydrocephalus. Clinical correlation required.    Chronic left maxillary sinusitis.      Electronically signed by: Hank Rodriguez MD  Date:    02/21/2025  Time:    13:28               Narrative:    EXAMINATION:  MRI BRAIN W WO CONTRAST    CLINICAL HISTORY:  Headache, chronic, no new features;and vision loss with balance changes;    TECHNIQUE:  Multiplanar multisequence MR imaging of the brain was performed before and  after the administration of 8 mL Gadavist intravenous contrast.    COMPARISON:  CT head 02/21/2025    FINDINGS:  Prominence of the ventricles.  Third ventricle diameter measuring up to 1.2 cm.  Ventricular enlargement out of proportion to the degree of sulcal enlargement, with some crowding of the sulci near the vertex.    No diffusion restriction to indicate an acute infarction.  Patchy and confluent regions T2/FLAIR hyperintensity in the supratentorial white matter, nonspecific but most likely reflecting chronic small vessel ischemic changes. No remote major vascular distribution infarct. No evidence of recent or remote hemorrhage.  No mass effect or midline shift.    No abnormal post-contrast parenchymal or leptomeningeal enhancement.    No extra-axial blood or fluid collections.    The major arterial T2 skull base flow voids are preserved.  Dural venous sinuses enhance appropriately.    Bone marrow signal intensity is unremarkable.    Lobular mucosal thickening throughout the left maxillary sinus with some osseous thickening sclerosis.                                       X-Ray Wrist Complete Right (Final result)  Result time 02/21/25 10:54:11      Final result by Javan Smith MD (02/21/25 10:54:11)                   Impression:      See above      Electronically signed by: Javan Smith MD  Date:    02/21/2025  Time:    10:54               Narrative:    EXAMINATION:  XR WRIST COMPLETE 3 VIEWS RIGHT    CLINICAL HISTORY:  Unspecified fall, initial encounter    TECHNIQUE:  PA, lateral, and oblique views of the right wrist were performed.    COMPARISON:  None    FINDINGS:  Mild DJD.  No fracture or dislocation.  No bone destruction identified                                       ED US Eye (Final result)  Result time 02/21/25 10:17:18      Final result by Derrick, Lab In [facility] (02/21/25 10:17:18)                   Narrative:    Exam : Kyra Pittman  POCUS_Ocular:    Exam Information:  Exam category:   Diagnostic  Consent obtained?:  Yes    Indication(s) for Exam:  Initial exam, Vision change  Left eye transverse, Left eye longitudinal, Left optic nerve sheath    Findings (Left Eye):  Normal  Left optic nerve sheath:  Normal  Left optic nerve sheath diameter (mm):  33.00 Millimeter    Impression:  Normal limited ocular ultrasound:  Left    Electronically signed by Kyra Pittman on Friday, February 21, 2025 at 10:09 AM  Electronically signed by Braeden Huynh on Friday, February 21, 2025 at 10:16 AM                                     CT Head Without Contrast (Final result)  Result time 02/21/25 09:34:34      Final result by Hank Rodriguez MD (02/21/25 09:34:34)                   Impression:      No evidence of acute intracranial hemorrhage.    Ventriculomegaly out of proportion to the degree of sulcal enlargement. While this could relate to a central predominant pattern of cerebral volume loss, the configuration is concerning for normal pressure hydrocephalus. Clinical correlation required.    Moderate chronic small vessel ischemic change.    Chronic left maxillary sinusitis.    Mild cervical spondylosis without evidence of an acute displaced fracture.      Electronically signed by: Hank Rodriguez MD  Date:    02/21/2025  Time:    09:34               Narrative:    EXAMINATION:  CT HEAD WITHOUT CONTRAST; CT CERVICAL SPINE WITHOUT CONTRAST    CLINICAL HISTORY:  Syncope, simple, normal neuro exam;; Polytrauma, blunt;    TECHNIQUE:  Low dose axial CT images obtained throughout the head and cervical spine without intravenous contrast.  Axial, sagittal and coronal reconstructions were performed.    COMPARISON:  MRI 02/04/2024.    FINDINGS:  Head:    Prominence of the ventricles.  Third ventricle diameter measuring up to 1.2 cm.  Ventricular enlargement out of proportion to the degree of sulcal enlargement, with some crowding of the sulci near the vertex.    Patchy and confluent hypoattenuation in the supratentorial  white matter, nonspecific but most likely reflecting chronic small vessel ischemic changes. No recent or remote major vascular distribution infarct. No acute hemorrhage.  No mass effect or midline shift.    No extra-axial blood or fluid collections.    No displaced calvarial fracture.    Chronic appearing inflammatory changes of the partially visualized left maxillary sinus.    Spine:    No evidence of an acute displaced fracture or focal osseous destructive lesion.    Straightening of normal cervical lordosis.  Subtle anterolisthesis at C3-4 and C4-5, which is likely related to facet hypertrophy.    Mild multilevel degenerative disc disease, most pronounced at C6-7 where there is some loss of disc height.  No evidence of bony spinal canal stenosis.  Scattered neural foraminal encroachment from uncovertebral and facet hypertrophy.    Limited evaluation of the intraspinal contents demonstrates no evidence of hematoma.    The paraspinal soft tissue structures exhibit no acute abnormalities.                                       CT Cervical Spine Without Contrast (Final result)  Result time 02/21/25 09:34:34      Final result by Hank Rodriguez MD (02/21/25 09:34:34)                   Impression:      No evidence of acute intracranial hemorrhage.    Ventriculomegaly out of proportion to the degree of sulcal enlargement. While this could relate to a central predominant pattern of cerebral volume loss, the configuration is concerning for normal pressure hydrocephalus. Clinical correlation required.    Moderate chronic small vessel ischemic change.    Chronic left maxillary sinusitis.    Mild cervical spondylosis without evidence of an acute displaced fracture.      Electronically signed by: Hank Rodriguez MD  Date:    02/21/2025  Time:    09:34               Narrative:    EXAMINATION:  CT HEAD WITHOUT CONTRAST; CT CERVICAL SPINE WITHOUT CONTRAST    CLINICAL HISTORY:  Syncope, simple, normal neuro exam;; Polytrauma,  blunt;    TECHNIQUE:  Low dose axial CT images obtained throughout the head and cervical spine without intravenous contrast.  Axial, sagittal and coronal reconstructions were performed.    COMPARISON:  MRI 02/04/2024.    FINDINGS:  Head:    Prominence of the ventricles.  Third ventricle diameter measuring up to 1.2 cm.  Ventricular enlargement out of proportion to the degree of sulcal enlargement, with some crowding of the sulci near the vertex.    Patchy and confluent hypoattenuation in the supratentorial white matter, nonspecific but most likely reflecting chronic small vessel ischemic changes. No recent or remote major vascular distribution infarct. No acute hemorrhage.  No mass effect or midline shift.    No extra-axial blood or fluid collections.    No displaced calvarial fracture.    Chronic appearing inflammatory changes of the partially visualized left maxillary sinus.    Spine:    No evidence of an acute displaced fracture or focal osseous destructive lesion.    Straightening of normal cervical lordosis.  Subtle anterolisthesis at C3-4 and C4-5, which is likely related to facet hypertrophy.    Mild multilevel degenerative disc disease, most pronounced at C6-7 where there is some loss of disc height.  No evidence of bony spinal canal stenosis.  Scattered neural foraminal encroachment from uncovertebral and facet hypertrophy.    Limited evaluation of the intraspinal contents demonstrates no evidence of hematoma.    The paraspinal soft tissue structures exhibit no acute abnormalities.                                       X-Ray Chest PA And Lateral (Final result)  Result time 02/21/25 09:07:04      Final result by Carlos Tafoya MD (02/21/25 09:07:04)                   Impression:      No acute abnormality.      Electronically signed by: Carlos Tafoya MD  Date:    02/21/2025  Time:    09:07               Narrative:    EXAMINATION:  XR CHEST PA AND LATERAL    CLINICAL HISTORY:  Syncope and  "collapse    TECHNIQUE:  PA and lateral views of the chest were performed.    COMPARISON:  Chest radiograph July 3, 2020    FINDINGS:  The trachea and cardiomediastinal silhouette remains stable.  There is no evidence of pleural effusions, pneumothoraces or consolidations.  Lungs are clear.  Osseous structures demonstrate no evidence for acute fractures or dislocations.                                       Medications   acetaminophen tablet 1,000 mg (1,000 mg Oral Given 2/21/25 1017)   cephALEXin capsule 500 mg (500 mg Oral Given 2/21/25 1214)   ketorolac injection 15 mg (15 mg Intravenous Given 2/21/25 1228)   metoclopramide injection 10 mg (10 mg Intravenous Given 2/21/25 1231)   diphenhydrAMINE injection 12.5 mg (12.5 mg Intravenous Given 2/21/25 1229)   gadobutroL (GADAVIST) injection 8 mL (8 mLs Intravenous Given 2/21/25 1313)     Medical Decision Making  Amount and/or Complexity of Data Reviewed  Labs: ordered. Decision-making details documented in ED Course.  Radiology: ordered. Decision-making details documented in ED Course.    Risk  OTC drugs.  Prescription drug management.              Attending Attestation:   Physician Attestation Statement for Resident:  As the supervising MD   Physician Attestation Statement: I have personally seen and examined this patient.   I agree with the above history.  -: 69-year-old woman with a history of HTN, dm presents with "flashes of light" and left eye that since resolved.  Given resolution, this is not an acute stroke code.  Bedside ultrasound was negative for posterior chamber pathology such as vitreous or retinal detachment, vitreous hemorrhage.  CT head with a ventriculomegaly out of proportion to degree of sulcal enlargement.  A subsequent MRI with and without contrast was obtained that was negative for any acute infarct, hemorrhage or malignancy.  Ambulatory referral to Neurology provided.   As the supervising MD I agree with the above PE.     As the supervising MD " I agree with the above treatment, course, plan, and disposition.                    ED Course as of 02/24/25 2320   Fri Feb 21, 2025   1334 MRI Brain W WO Contrast  No evidence acute infarct or hemorrhage.     Moderate chronic small vessel ischemic change.     Ventriculomegaly out of proportion to the degree of sulcal enlargement, remains suspicious for normal pressure hydrocephalus. Clinical correlation required.   [KB]   1400 MRI Brain W WO Contrast  Impression:     No evidence acute infarct or hemorrhage.     Moderate chronic small vessel ischemic change.     Ventriculomegaly out of proportion to the degree of sulcal enlargement, remains suspicious for normal pressure hydrocephalus. Clinical correlation required.     Chronic left maxillary sinusitis.      [KB]   1512 Comprehensive metabolic panel(!)  No ERINN, no significant electrolyte abnormality,  no evidence of acute hepatobiliary obstruction.   [KB]   1512 Brain natriuretic peptide  CHF less likely  [KB]   1512 CBC auto differential(!)  No evidence of leukocytosis, acute anemia requiring transfusion or thrombocytopenia.   [KB]   1512 Troponin I High Sensitivity  ACS less likely  [KB]      ED Course User Index  [KB] Kyra Pittman MD                       EKG, rate 74, normal sinus rhythm, normal axis deviation, no QTC prolongation or ST segment elevation.    See ED course for personal interpretation of workup/ differential.       69-year-old female described as above presenting for daughter's concern for mild left facial droop.  She also had subacute syncopal episode 2 days ago.  On initial evaluation, patient's blood pressure elevated at 2 8/97, otherwise vital signs are stable.  On physical exam, no appreciable facial droop when patient is smiling.  Her neurologic assessment is at baseline, acute stroke less likely.  Primary secondary trauma assessment with right hand contusions.  Fracture ruled out with x-ray.  Point of care ultrasound completed without  evidence of retinal detachment, vitreous hemorrhage, optic disc dilation.  UA contaminated x2, given initial dose of keflex but patient electing for outpatient follow up of culture with PCP.   CT head concerning for normal pressure hydrocephalus on my personal interpretation of the study.  MRI brain completed to further evaluate new ventricle enlargement, no evidence of acute new mass found.   She was discharged in stable condition with plan for ophthalmology and neurology follow up, urgent referral orders placed.  She was also given a IV migraine cocktail following failure of oral Tylenol for headache.  Strict return to ER precautions were discussed and she was discharged in stable condition      Sixty-seven    Clinical Impression:  Final diagnoses:  [R55] Syncope  [W19.XXXA] Fall  [H57.9] Visual complaint (Primary)  [G91.9] Hydrocephalus, unspecified type  [R51.9] Headache disorder          ED Disposition Condition    Discharge Stable          ED Prescriptions    None       Follow-up Information       Follow up With Specialties Details Why Contact Info Additional Information    [facility] - Neurology [facility] Neurology Schedule an appointment as soon as possible for a visit   [address] [facility] [id number] [phone] Neuroscience [facility], 7th Floor Please park in [facility] and take Clinic elevator    [address] Ophthalmology Schedule an appointment as soon as possible for a visit   [address] [place] [id number] [phone] Please arrive on the 10th floor for check-in.               Kyra Pittman MD  Resident  02/21/25 1512       Kyra Pittman MD  Resident  02/21/25 1513         [1]   Social History  Tobacco Use    Smoking status: Never    Smokeless tobacco: Never   Substance Use Topics    Alcohol use: No    Drug use: No        Braeden Huynh MD  02/24/25 [id number]

## 2025-02-21 NOTE — DISCHARGE INSTRUCTIONS
Follow up with neurology and with opthalmology for reevaluation outpatient.  If you have worsening symptoms, difficulty walking, weakness or numbness come back to the ER

## 2025-02-21 NOTE — ED NOTES
Patient identifiers verified and correct for  MS Napier  C/C:  Elevtaed b/p SEE NN  APPEARANCE: awake and alert in NAD. PAIN  5/10  SKIN: warm, dry and intact. No breakdown or bruising.  MUSCULOSKELETAL: Patient moving all extremities spontaneously, no obvious swelling or deformities noted. Ambulates independently.  RESPIRATORY: Denies shortness of breath.Respirations unlabored.   CARDIAC: Denies CP, 2+ distal pulses; no peripheral edema  ABDOMEN: S/ND/NT, Denies nausea  : voids spontaneously, denies difficulty  Neurologic: AAO x 4; follows commands equal strength in all extremities; denies numbness/tingling. Denies dizziness  Denies new weakness, BUE  equal, BLE ange equal, decr sensation to left face, negative drift

## 2025-02-21 NOTE — TELEPHONE ENCOUNTER
"Pt states woke up approx 1 hour ago, c/o "Flashes of white light in left eye, now blurry in left eye." Pt states /98, repeart 167/92. Pt states takes BP med at night. Per protocol, go to ED now. Pt verbalizes understanding and advised to call back for any further questions or concerns.   Reason for Disposition   [1] Systolic BP  >= 160 OR Diastolic >= 100 AND [2] cardiac (e.g., breathing difficulty, chest pain) or neurologic symptoms (e.g., new-onset blurred or double vision, unsteady gait)    Additional Information   Negative: Difficult to awaken or acting confused (e.g., disoriented, slurred speech)   Negative: SEVERE difficulty breathing (e.g., struggling for each breath, speaks in single words)   Negative: [1] Weakness of the face, arm or leg on one side of the body AND [2] new-onset   Negative: [1] Numbness (i.e., loss of sensation) of the face, arm or leg on one side of the body AND [2] new-onset   Negative: [1] Chest pain lasts > 5 minutes AND [2] history of heart disease (i.e., heart attack, bypass surgery, angina, angioplasty, CHF)   Negative: [1] Chest pain AND [2] took nitroglycerin AND [3] pain was not relieved   Negative: Sounds like a life-threatening emergency to the triager    Protocols used: Blood Pressure - High-A-AH    "

## 2025-02-21 NOTE — ED NOTES
"Patient states she woke up with " flashes" of light in left eye at 0530, lasting until 0615, decr sensation to left face, noted slight facial droop to left eye  per family. Reports she fell 2 days ago, unknown LOC   "

## 2025-02-22 ENCOUNTER — RESULTS FOLLOW-UP (OUTPATIENT)
Dept: EMERGENCY MEDICINE | Facility: HOSPITAL | Age: 70
End: 2025-02-22
Payer: COMMERCIAL

## 2025-02-24 ENCOUNTER — TELEPHONE (OUTPATIENT)
Facility: OTHER | Age: 70
End: 2025-02-24
Payer: COMMERCIAL

## 2025-02-24 ENCOUNTER — OCHSNER VIRTUAL EMERGENCY DEPARTMENT (OUTPATIENT)
Facility: CLINIC | Age: 70
End: 2025-02-24
Payer: COMMERCIAL

## 2025-02-24 DIAGNOSIS — N39.0 URINARY TRACT INFECTION WITHOUT HEMATURIA, SITE UNSPECIFIED: Primary | ICD-10-CM

## 2025-02-24 LAB
BACTERIA UR CULT: ABNORMAL
BACTERIA UR CULT: ABNORMAL

## 2025-02-24 RX ORDER — CEPHALEXIN 500 MG/1
500 CAPSULE ORAL EVERY 12 HOURS
Qty: 10 CAPSULE | Refills: 0 | Status: SHIPPED | OUTPATIENT
Start: 2025-02-24 | End: 2025-03-01

## 2025-02-24 NOTE — PLAN OF CARE-OVED
[facility] Emergency Department Plan of Care Note  Referral Source: Nurse On-Call                               Chief Complaint   Patient presents with    Abnormal Lab     Patient seen in the ED on 2/21/25 for eye complaint but had UA and urine culture done which grew proteus mirabilis and patient hoping for abx until she can see her pcp. She denies dysuria.       Recommendation: Treat in place                       Recommendation comment: sent prescription for Keflex to her pharmacy of choice    Encounter Diagnosis   Name Primary?    Urinary tract infection without hematuria, site unspecified Yes        Orders Placed This Encounter    cephALEXin (KEFLEX) 500 MG capsule     Sig: Take 1 capsule (500 mg total) by mouth every 12 (twelve) hours. for 5 days     Dispense:  10 capsule     Refill:  0       
No

## 2025-02-26 ENCOUNTER — TELEPHONE (OUTPATIENT)
Dept: OPHTHALMOLOGY | Facility: CLINIC | Age: 70
End: 2025-02-26
Payer: COMMERCIAL

## 2025-02-26 NOTE — TELEPHONE ENCOUNTER
----- Message from Ana sent at 2/26/2025 10:58 AM CST -----  Type:  Sooner Appointment RequestCaller is requesting a sooner appointment.  Caller declined first available appointment listed below.  Caller will not accept being placed on the waitlist and is requesting a message be sent to doctor.Name of Caller:pt When is the first available appointment?05/02Symptoms: H57.9 (ICD-10-CM) - Visual complaintWould the patient rather a call back or a response via [employer]? Call Best Call Back Number: [phone] Information:

## 2025-02-28 ENCOUNTER — OFFICE VISIT (OUTPATIENT)
Dept: OPHTHALMOLOGY | Facility: CLINIC | Age: 70
End: 2025-02-28
Payer: COMMERCIAL

## 2025-02-28 DIAGNOSIS — H52.7 REFRACTIVE ERROR: ICD-10-CM

## 2025-02-28 DIAGNOSIS — G43.009 MIGRAINE WITHOUT AURA AND WITHOUT STATUS MIGRAINOSUS, NOT INTRACTABLE: Primary | ICD-10-CM

## 2025-02-28 DIAGNOSIS — H25.13 NUCLEAR SCLEROSIS OF BOTH EYES: ICD-10-CM

## 2025-02-28 DIAGNOSIS — E11.9 DM TYPE 2 WITHOUT RETINOPATHY: ICD-10-CM

## 2025-02-28 PROCEDURE — 99999 PR PBB SHADOW E&M-EST. PATIENT-LVL III: CPT | Mod: PBBFAC,,, | Performed by: OPHTHALMOLOGY

## 2025-03-03 ENCOUNTER — HOSPITAL ENCOUNTER (OUTPATIENT)
Dept: RADIOLOGY | Facility: HOSPITAL | Age: 70
Discharge: HOME | End: 2025-03-03
Attending: INTERNAL MEDICINE
Payer: COMMERCIAL

## 2025-03-03 ENCOUNTER — OFFICE VISIT (OUTPATIENT)
Dept: FAMILY MEDICINE | Facility: CLINIC | Age: 70
End: 2025-03-03
Payer: COMMERCIAL

## 2025-03-03 VITALS
HEIGHT: 65 IN | TEMPERATURE: 98 F | WEIGHT: 164.88 LBS | HEART RATE: 62 BPM | DIASTOLIC BLOOD PRESSURE: 72 MMHG | SYSTOLIC BLOOD PRESSURE: 130 MMHG | OXYGEN SATURATION: 98 % | BODY MASS INDEX: 27.47 KG/M2

## 2025-03-03 DIAGNOSIS — H53.9 VISUAL DISTURBANCES: ICD-10-CM

## 2025-03-03 DIAGNOSIS — Z12.31 ENCOUNTER FOR SCREENING MAMMOGRAM FOR BREAST CANCER: ICD-10-CM

## 2025-03-03 DIAGNOSIS — I10 PRIMARY HYPERTENSION: ICD-10-CM

## 2025-03-03 DIAGNOSIS — K22.70 BARRETT'S ESOPHAGUS WITHOUT DYSPLASIA: ICD-10-CM

## 2025-03-03 DIAGNOSIS — E03.9 HYPOTHYROIDISM, UNSPECIFIED TYPE: ICD-10-CM

## 2025-03-03 DIAGNOSIS — D64.9 ANEMIA, UNSPECIFIED TYPE: ICD-10-CM

## 2025-03-03 DIAGNOSIS — E11.69 HYPERLIPIDEMIA ASSOCIATED WITH TYPE 2 DIABETES MELLITUS: ICD-10-CM

## 2025-03-03 DIAGNOSIS — Z00.00 ROUTINE MEDICAL EXAM: Primary | ICD-10-CM

## 2025-03-03 DIAGNOSIS — E78.5 HYPERLIPIDEMIA ASSOCIATED WITH TYPE 2 DIABETES MELLITUS: ICD-10-CM

## 2025-03-03 DIAGNOSIS — E55.9 VITAMIN D DEFICIENCY DISEASE: ICD-10-CM

## 2025-03-03 DIAGNOSIS — G91.2 NPH (NORMAL PRESSURE HYDROCEPHALUS): ICD-10-CM

## 2025-03-03 DIAGNOSIS — I27.20 PULMONARY HYPERTENSION: ICD-10-CM

## 2025-03-03 DIAGNOSIS — R26.89 BALANCE DISORDER: ICD-10-CM

## 2025-03-03 DIAGNOSIS — Z86.0100 HISTORY OF COLONIC POLYPS: ICD-10-CM

## 2025-03-03 DIAGNOSIS — E11.65 UNCONTROLLED TYPE 2 DIABETES MELLITUS WITH HYPERGLYCEMIA: ICD-10-CM

## 2025-03-03 DIAGNOSIS — K21.9 GASTROESOPHAGEAL REFLUX DISEASE, UNSPECIFIED WHETHER ESOPHAGITIS PRESENT: ICD-10-CM

## 2025-03-03 PROCEDURE — 77063 BREAST TOMOSYNTHESIS BI: CPT | Mod: 26,,, | Performed by: RADIOLOGY

## 2025-03-03 PROCEDURE — 77067 SCR MAMMO BI INCL CAD: CPT | Mod: 26,,, | Performed by: RADIOLOGY

## 2025-03-03 PROCEDURE — 77067 SCR MAMMO BI INCL CAD: CPT | Mod: TC

## 2025-03-03 PROCEDURE — 99999 PR PBB SHADOW E&M-EST. PATIENT-LVL IV: CPT | Mod: PBBFAC,,, | Performed by: INTERNAL MEDICINE

## 2025-03-03 RX ORDER — AZELASTINE 1 MG/ML
1 SPRAY, METERED NASAL 2 TIMES DAILY
Qty: 90 ML | Refills: 12 | Status: SHIPPED | OUTPATIENT
Start: 2025-03-03 | End: 2026-03-03

## 2025-03-03 RX ORDER — AZELASTINE 1 MG/ML
1 SPRAY, METERED NASAL 2 TIMES DAILY
Qty: 90 ML | Refills: 12 | Status: SHIPPED | OUTPATIENT
Start: 2025-03-03 | End: 2025-03-03 | Stop reason: SDUPTHER

## 2025-03-03 NOTE — PROGRESS NOTES
CHIEF COMPLAINT:  Physical and discuss issues                                                                                                                               HISTORY OF PRESENT ILLNESS:  A 69 year-old white female who is a 1st cousin of mine by marriage.  We reviewed all her labs.  Her A1c is 6.5, 6.6, 6.6, 7.0 Sept 2024 and it was this high about 10 years ago.  Lipids good.  Vitamin-D upper normal and she has reduced her supplement slightly.  Many years ago it was low on PPI. Ok 4/23      Mammo 2/24 and she has it set, gyn set this week     Reviewed interval fall.  No syncope.  She has been a little bit off balance but it seems to be very mild.  She occasionally trips.  She was found to have some suggestion of normal pressure hydrocephalus on MRI.  An MRI one year ago was not showing that.  She has no incontinence.  Memory issues appear to be simple recall issue, age-related issue.  Discussed normal pressure hydrocephalus and it symptoms.  She has an appointment with Neurology later this month and we can have that evaluated and followed.    She saw an outside optometrist who said she had cataracts she does have intermittent blurry vision and glare around lights and so forth but no vision loss.  She then saw an ophthalmologist in the same office who said her vision was fine and she does not need to have cataracts removed an insurance would not cover it.  She sought out an appointment with [facility] due to having persistent vision changes in his also seen Neurology lately for some new tremor weakness and so forth we wanted to make sure this was not part of some neurological deficit.  Apparently in the prior evaluations they did not mention to or anything about retina problems and so forth only had some flashing lights .  Recently had some flashing lights send again everything okay with Ophthalmology.    For several months she has developed a very slight tremor at times.  She does not  really have it today in the office.  Also for 6-8 months her legs feel generally weak.  Prior she was not needing to use her arms to assist her getting up off the floor but now she is having to do so.  He does not notice any particular new weakness in her hands or arms or any loss of coordination.  Neurology noted some weakness in the left triceps and maybe right hip flexors that a reflexes were plus three.  Patient does have brisk biceps triceps and Achilles and triceps reflexes they do not seem to be asymmetrical or exaggerated.  On testing for clonus I do believe she possibly has at least one beat of clonus reproducibly in both ankles.  Her gait is normal speech is normal.  She thinks she might have had a little bit of a Bell's palsy years ago and has a little bit of left ptosis in the corner of her left mouth according to others does look a little bit crooked but it does not sound like her onset of symptoms was anything abrupt and severe.  MRI of the brain did not show any old strokes.  She just had the expected chronic white matter changes which I explained to her.  Cervical spine had no spinal stenosis.  Apparently there some family members with MS with discussed overall symptoms would not necessarily be consistent with that in the MRI did not show any pattern suspicious for MS.  We will continue to follow and she does have follow-up with Neurology    Discussed blood pressure elevation today and she needs to start monitoring home.  She is only on low-dose valsartan and discussed high probability we will increase she will continue the metoprolol which is probably limited by her pulse    Seen Neuro NP 12/23  History of Present Illness (HPI): Anushka Napier is a R handed 68 y.o. female with a medical issues significant for migraines, hypothyroidism, DMII, HLD, HTN, GERD who presents for tremors. Tremors for a couple of months, both hands -- equal on both sides. Started in both hands at the same time. Notices  tremors with action, posture and at rest. No new medications started at that time. More stressed in the last 9 months, lost 4 family members including her parents. Tremors not associated with hypo or hyperglycemic events. History of hypothyroidism, TSH last checked in early 2023. No falls but does feel slightly off balance every once in a while. Has to walk slower to keep her balance. Denies shuffling.   Tingling in bilateral feet/toes, all digits. Feels like her legs are weak. Off balance whenever she closes her eyes in the shower and in the dark.   ASSESSMENT/PLAN:  Tremor   - high frequency, low amplitude tremor of bilateral hands with posture and action  - checking tsh   - hold off on med   2. Imbalance   - L tricep weakness, R hip flexor weakness, 3+ reflexes throughout without other UMN signs  - decreased vibratory sensation  - brain and cervical MRI  - labs as below   - PT      Orders Placed This Encounter    MRI Brain Without Contrast    MRI Cervical Spine Without Contrast    TSH    Vitamin B1    Vitamin B12    COPPER, SERUM    ZINC    RPR    Ambulatory referral/consult to Physical/Occupational Therapy            ER 2/25    Fall        Left eye flashing, had a fall a few days ago, no loc, did not hit her head. AAOx3   69-year-old female significant medical history hypertension, GERD, migraine, type 2 diabetes, overactive bladder, presenting for fall.   Patient reports for concern of left eye flashing.  She woke up at 5:30 a.m. this morning and notes bright white flashes in her left eye.  This lasted for 45 minutes and completely resolved.  On her daughter visited her this morning, her daughter was worried for left-sided mild facial droop.  Known well was yesterday around 6:00 p.m. when her daughter had last seen her.  Patient denies having left-sided facial droop and says this is her baseline.   Two days ago she had a syncopal event.  She was at the [place] [employer], lost consciousness and woke up on the  ground with a bruise on her right wrist and pain in her right wrist.  She denies new weakness or numbness in her arms or legs, but notes that she does follow with Neurology for tremor.  She had an MRI of the brain a year ago.  She also reports baseline mild left-sided facial paresthesia at baseline    MRI      Impression:        No evidence acute infarct or hemorrhage.     Moderate chronic small vessel ischemic change.     Ventriculomegaly out of proportion to the degree of sulcal enlargement, remains suspicious for normal pressure hydrocephalus. Clinical correlation required.     Chronic left maxillary sinusitis.             Seen NEuro 10/24  Patient is here for evaluation for peripheral neuropathy.  We will initiate workup to assess for vitamin deficiencies or conditions associated with neuropathy, however suspect multifactorial component with a history of her diabetes, B12 deficiency, could have idiopathic component as well.  Discussed she does have a positive Romberg, to use night lights to help with visualization and balance.        Back in 2012 She had an outside mammogram at [place] done by her GYN.        subsequent spot compression film was all normal as well.  Last mammo 2/24    Prior bone densities were done by prior GYN and had osteopenia, that being 2012.  She was taken off EVista 5/14 and continues on Premarin.    11/17 BMD  Impression       Normal mineralization of the lumbar spine and right hip.  Osteopenia of the left hip       2/2020  Osteopenia.      4/2023  Impression:   *Low bone mass (Osteopenia); FRAX calculations do not support treatment as osteoporosis.   RECOMMENDATIONS:  *Daily calcium intake 9793-8425 mg, dietary sources preferred; Vitamin D 0127-1432 IU daily.  *Weight bearing exercise and fall precautions.  *No additional pharmacologic therapy recommended at this time.  *Repeat BMD in 2 years.                                                                            Her  colonoscopy was normal 4/17/08. 1 polyp 3/18 -5 yrs  She had another upper endoscopy,   which was okay per outside GI.  She does have a prior history of what sounds like near Acrter's esophagus.                                                                                                                                       ROS:   CONST: weight stable. EYES: no vision change. ENT: no sore throat. CV: no chest pain w/ exertion. RESP: no shortness of breath. GI: no nausea, vomiting, diarrhea. No dysphagia. : no urinary issues. MUSCULOSKELETAL: no new myalgias or arthralgias. SKIN: no new changes. NEURO: no focal deficits. PSYCH: no new issues. ENDOCRINE: no polyuria. HEME: no lymph nodes. ALLERGY: no general pruritis.                                                                                                                    PAST MEDICAL HISTORY:                                                        1. Osteopenia, last bone density in 11/17, 4/2023                                2. DIABETES                          3. Hypothyroidism.                                                           4. Hypertension.                                                             5. Hysterectomy - total.                                                6. Hyperlipidemia.                                                           7. GERD. EGDs with possible Davian's- - Dr Yi Carr. EGD 11/22 w Isauro, next EGD November 2025                                                                   8. Anxiety and depression.   9. ENT septum surgery for sinusitis -Dr Sandoval 2012.  10. Colonoscopy 4/17/08 normal - 1 polyp 3/18 -5/23 -5 yrs   11. H/O abnormal Mammo                                                                                                                               FAMILY HISTORY: Father is living with hypertension, heart disease and        diabetes.  Mom is living with a history of cervical cancer,  hypertension     and diabetes.  One brother and one sister with no stated problems.                                                                                                                                                                                      ALLERGIES:  Penicillin, sulfa, codeine, iodine and shellfish.                                                                                             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  She does not smoke and never did.  She does not drink.                                                                                                            PHYSICAL EXAMINATION:                                                        VITAL SIGNS:  As above                             GENERAL:  Pleasant female.                                                   HEENT:  Conjunctivae clear.  Pupils equal and reactive.  Nose and mouth      clear and moist.  Teeth good.                                                NECK:  Supple.  Thyroid nonpalpable.                                         RESPIRATORY:  Effort is good.                                                LUNGS:  Clear.                                                               HEART:  Regular rate and rhythm without murmurs, gallops or rubs.  No        carotid bruits.  No edema.                                                   ABDOMEN:  Soft, nondistended, nontender.  No hepatosplenomegaly or masses.   SKIN:  No rashes.  Warm to touch.                                            EXTREMITIES:  Gait normal.  Digits without clubbing or cyanosis.     Neurologic, speech normal, slight ptosis and slight weakness at the corner of the left mouth as above.  Reflexes are all plus two but with perhaps one beat of clonus in the ankles but is slight.                                                                                       Labs and x-ray reports  reviewed                                                                                                                                     Diagnoses and all orders for this visit:    Routine medical exam, scheduled to have mammogram and gyn appointment.  Up-to-date on colonoscopy and EGD.  Recent labs reviewed and unremarkable and only thing she needs his to update her A1c and lipids.  -     Lipid Panel; Future  -     Hemoglobin A1C; Future    Encounter for screening mammogram for breast cancer    History of colonic polyps, up-to-date    Balance disorder, continue to monitor balance issue especially in light of the possible normal pressure hydrocephalus    NPH (normal pressure hydrocephalus), imaging suspicious, she has a neurology appointment    Visual disturbances, felt to be part of migraines    Hypothyroidism, unspecified type, euthyroid    Uncontrolled type 2 diabetes mellitus with hyperglycemia, A1c was seven about six months ago which is the highest it is been in awhile, reassess    Anemia, unspecified type, CBC improved    Carter's esophagus without dysplasia, repeat November 2025    Vitamin D deficiency disease, chronic and stable    Primary hypertension, chronic and stable    Gastroesophageal reflux disease, unspecified whether esophagitis present, chronic and stable on PPI    Hyperlipidemia associated with type 2 diabetes mellitus  -     Lipid Panel; Future    Pulmonary hypertension, noted and she has seen Cardiology but pulmonary artery pressure does not appear to be significantly high      -     azelastine (ASTELIN) 137 mcg (0.1 %) nasal spray; 1 spray (137 mcg total) by Nasal route 2 (two) times daily.       Answers submitted by the patient for this visit:  Review of Systems Questionnaire (Submitted on 2/27/2025)  activity change: No  unexpected weight change: No  neck pain: No  hearing loss: No  rhinorrhea: No  trouble swallowing: No  eye discharge: No  visual disturbance: No  chest tightness:  No  wheezing: No  chest pain: No  palpitations: No  blood in stool: No  constipation: No  vomiting: No  diarrhea: No  polydipsia: No  polyuria: No  difficulty urinating: No  hematuria: No  menstrual problem: No  dysuria: No  joint swelling: No  arthralgias: No  headaches: No  weakness: No  confusion: No  dysphoric mood: No

## 2025-03-06 DIAGNOSIS — Z12.31 VISIT FOR SCREENING MAMMOGRAM: Primary | ICD-10-CM

## 2025-03-07 ENCOUNTER — OFFICE VISIT (OUTPATIENT)
Dept: UROLOGY | Facility: CLINIC | Age: 70
End: 2025-03-07
Payer: COMMERCIAL

## 2025-03-07 VITALS — WEIGHT: 162.69 LBS | BODY MASS INDEX: 27.07 KG/M2

## 2025-03-07 DIAGNOSIS — N32.81 OAB (OVERACTIVE BLADDER): ICD-10-CM

## 2025-03-07 PROCEDURE — 99999 PR PBB SHADOW E&M-EST. PATIENT-LVL III: CPT | Mod: PBBFAC,,, | Performed by: STUDENT IN AN ORGANIZED HEALTH CARE EDUCATION/TRAINING PROGRAM

## 2025-03-07 RX ORDER — SOLIFENACIN SUCCINATE 10 MG/1
10 TABLET, FILM COATED ORAL DAILY
Qty: 90 TABLET | Refills: 3 | Status: SHIPPED | OUTPATIENT
Start: 2025-03-07 | End: 2026-03-07

## 2025-03-07 NOTE — PROGRESS NOTES
Patient ID: Anushka Napier is a 69 y.o. female.    Chief Complaint: FU  Referral: No referring provider defined for this encounter.     HPI  69 y.o. who presents to the Urology clinic for evaluation of OAB, doing well on vesicare. Patient was treated for asymptomatic bacteruria on 2/21/2025. Patient denies gross hematuria, nausea, vomiting, recurrent UTIs, etc. No hx of urinary retention.         Medically Necessary ROS documented in HPI    Past Medical History  Active Ambulatory Problems     Diagnosis Date Noted    Thyroid disease     Menopause 05/29/2014    Hypothyroidism 09/17/2014    Diabetes mellitus, type 2 09/17/2014    Hyperlipidemia associated with type 2 diabetes mellitus 09/25/2014    Hypertension 09/25/2014    GERD (gastroesophageal reflux disease) 09/25/2014    Screening for colorectal cancer 09/25/2014    Vitamin D deficiency disease 09/25/2014    Osteopenia 09/25/2014    Migraine without aura and without status migrainosus, not intractable     Left facial numbness 03/01/2018    Right lateral epicondylitis 08/01/2019    History of colonic polyps 01/24/2020    Chest pain 07/03/2020    Palpitations 07/03/2020    Closed nondisplaced fracture of proximal phalanx of left little finger with routine healing 04/01/2021    Decreased range of motion of finger of left hand 04/30/2021    OAB (overactive bladder) 04/26/2022    Tremor 04/12/2024    Pulmonary hypertension 10/02/2024     Resolved Ambulatory Problems     Diagnosis Date Noted    Severe sepsis 04/15/2020     Past Medical History:   Diagnosis Date    Allergy     Anxiety     Basal cell carcinoma     Depression     Diabetes mellitus     Diabetes mellitus type II, uncontrolled 9/17/2014    Diabetic retinopathy 01/14/2020    DM retinopathy     Hyperlipidemia          Past Surgical History  Past Surgical History:   Procedure Laterality Date    HYSTERECTOMY  1997    complete for prolapse, Abdominal    OOPHORECTOMY      right shoulder      SINUS SURGERY  2012        Social History       Medications  Current Medications[1]    Allergies  Review of patient's allergies indicates:   Allergen Reactions    Iodine Hives     IVP Contrast    Tizanidine Swelling     Tongue swelling     Codeine Hives     Other reaction(s): Itching  Other reaction(s): Hives    Iodinated contrast media Hives     Other reaction(s): Hives    Sulfamethoxazole-trimethoprim Itching     Other reaction(s): Itching  Other reaction(s): Hives    Epinephrine Anxiety and Other (See Comments)     Almost passed out.       Patient's PMH, FH, Social hx, Medications, allergies reviewed and updated as pertinent to today's visit    Objective:      Physical Exam  Constitutional:       Appearance: She is well-developed.   HENT:      Head: Normocephalic and atraumatic.   Eyes:      Conjunctiva/sclera: Conjunctivae normal.   Pulmonary:      Effort: Pulmonary effort is normal. No respiratory distress.   Abdominal:      General: Abdomen is flat. There is no distension.      Palpations: Abdomen is soft. There is no mass.      Tenderness: There is no abdominal tenderness. There is no right CVA tenderness, left CVA tenderness or guarding.   Skin:     General: Skin is warm.      Findings: No rash.   Neurological:      Mental Status: She is alert and oriented to person, place, and time.   Psychiatric:         Behavior: Behavior normal.             Assessment:       1. OAB (overactive bladder)        Plan:       Chronic condition well managed  Vesicare rx renewed x 1 year  RTC 1 year or sooner       [1]   Current Outpatient Medications:     aspirin (ECOTRIN) 81 MG EC tablet, 1 tablet Orally Once a day, Disp: , Rfl:     atorvastatin (LIPITOR) 40 MG tablet, TAKE 1 TABLET(40 MG) BY MOUTH EVERY DAY, Disp: 90 tablet, Rfl: 12    azelastine (ASTELIN) 137 mcg (0.1 %) nasal spray, 1 spray (137 mcg total) by Nasal route 2 (two) times daily., Disp: 90 mL, Rfl: 12    coQ10, ubiquinol, 100 mg Cap, as directed Orally, Disp: , Rfl:     estrogens,  conjugated, (PREMARIN) 0.625 MG tablet, TAKE 1 TABLET(0.625 MG) BY MOUTH EVERY DAY, Disp: 90 tablet, Rfl: 3    ferrous gluconate (FERGON) 240 (27 FE) MG tablet, 1 tablet Orally Three times a Week, Disp: , Rfl:     FLUoxetine 20 MG capsule, TAKE 1 CAPSULE(20 MG) BY MOUTH EVERY DAY, Disp: 90 capsule, Rfl: 1    levothyroxine (SYNTHROID, LEVOTHROID) 175 MCG tablet, TAKE 1 TABLET(175 MCG) BY MOUTH BEFORE BREAKFAST, Disp: 90 tablet, Rfl: 12    metFORMIN (GLUCOPHAGE-XR) 500 MG ER 24hr tablet, TAKE 1 TABLET(500 MG) BY MOUTH EVERY DAY, Disp: 90 tablet, Rfl: 12    metoprolol tartrate (LOPRESSOR) 50 MG tablet, TAKE 1 TABLET BY MOUTH DAILY, Disp: 90 tablet, Rfl: 1    mometasone 0.1% (ELOCON) 0.1 % cream, Use daily, Disp: 50 g, Rfl: 3    omega-3 fatty acids-vitamin E 1,000 mg Cap, Take 1 capsule by mouth Twice daily. 1 Capsule Oral Twice a day , Disp: , Rfl:     progesterone (PROMETRIUM) 100 MG capsule, Take 1 capsule (100 mg total) by mouth once daily., Disp: 90 capsule, Rfl: 3    RABEprazole (ACIPHEX) 20 mg tablet, TAKE 1 TABLET BY MOUTH TWICE DAILY, Disp: 180 tablet, Rfl: 12    valsartan (DIOVAN) 160 MG tablet, Take 1 tablet (160 mg total) by mouth once daily., Disp: 90 tablet, Rfl: 3    vitamin D (VITAMIN D3) 1000 units Tab, Take 1,000 Units by mouth once daily. 5 tabs daily, Disp: , Rfl:     amitriptyline (ELAVIL) 10 MG tablet, Take 10 mg by mouth nightly., Disp: , Rfl:     solifenacin (VESICARE) 10 MG tablet, Take 1 tablet (10 mg total) by mouth once daily., Disp: 90 tablet, Rfl: 3

## 2025-03-08 ENCOUNTER — LAB VISIT (OUTPATIENT)
Dept: LAB | Facility: HOSPITAL | Age: 70
End: 2025-03-08
Attending: INTERNAL MEDICINE
Payer: COMMERCIAL

## 2025-03-08 DIAGNOSIS — E11.69 HYPERLIPIDEMIA ASSOCIATED WITH TYPE 2 DIABETES MELLITUS: ICD-10-CM

## 2025-03-08 DIAGNOSIS — Z00.00 ROUTINE MEDICAL EXAM: ICD-10-CM

## 2025-03-08 DIAGNOSIS — E78.5 HYPERLIPIDEMIA ASSOCIATED WITH TYPE 2 DIABETES MELLITUS: ICD-10-CM

## 2025-03-08 LAB
CHOLEST SERPL-MCNC: 193 MG/DL (ref 120–199)
CHOLEST/HDLC SERPL: 3.3 {RATIO} (ref 2–5)
ESTIMATED AVG GLUCOSE: 157 MG/DL (ref 68–131)
HBA1C MFR BLD: 7.1 % (ref 4–5.6)
HDLC SERPL-MCNC: 59 MG/DL (ref 40–75)
HDLC SERPL: 30.6 % (ref 20–50)
LDLC SERPL CALC-MCNC: 104.4 MG/DL (ref 63–159)
NONHDLC SERPL-MCNC: 134 MG/DL
TRIGL SERPL-MCNC: 148 MG/DL (ref 30–150)

## 2025-03-08 PROCEDURE — 83036 HEMOGLOBIN GLYCOSYLATED A1C: CPT | Performed by: INTERNAL MEDICINE

## 2025-03-08 PROCEDURE — 36415 COLL VENOUS BLD VENIPUNCTURE: CPT | Mod: PO | Performed by: INTERNAL MEDICINE

## 2025-03-08 PROCEDURE — 80061 LIPID PANEL: CPT | Performed by: INTERNAL MEDICINE

## 2025-03-09 ENCOUNTER — RESULTS FOLLOW-UP (OUTPATIENT)
Dept: FAMILY MEDICINE | Facility: CLINIC | Age: 70
End: 2025-03-09

## 2025-03-10 ENCOUNTER — OCHSNER VIRTUAL EMERGENCY DEPARTMENT (OUTPATIENT)
Facility: CLINIC | Age: 70
End: 2025-03-10
Payer: COMMERCIAL

## 2025-03-10 ENCOUNTER — NURSE TRIAGE (OUTPATIENT)
Dept: ADMINISTRATIVE | Facility: CLINIC | Age: 70
End: 2025-03-10
Payer: COMMERCIAL

## 2025-03-10 ENCOUNTER — PATIENT OUTREACH (OUTPATIENT)
Facility: OTHER | Age: 70
End: 2025-03-10
Payer: COMMERCIAL

## 2025-03-10 DIAGNOSIS — R42 DIZZY SPELLS: Primary | ICD-10-CM

## 2025-03-10 DIAGNOSIS — R42 VERTIGO: ICD-10-CM

## 2025-03-10 DIAGNOSIS — G91.2 NORMAL PRESSURE HYDROCEPHALUS: ICD-10-CM

## 2025-03-10 NOTE — PLAN OF CARE-OVED
[facility] Plan of Care Note  Referral Source: Nurse On-Call                               Chief Complaint   Patient presents with    Dizziness       Recommendation: Specialist Referral         Specialty: Neurology             Recommendation comment: Expedited neurology follow up    Encounter Diagnoses   Name Primary?    Dizzy spells Yes    Vertigo     Normal pressure hydrocephalus          Patient recently diagnosed with normal pressure hydrocephalus and has neurologic follow up but this morning felt dizzy with a problem walking.  She is looking for closer neurology follow up.  She describes the dizziness as room spinning as opposed to she felt like she was going to pass out and she feels better now.  I advised her to be evaluated today for any new or significant symptoms however she feels improved at this time and we have secured closer neurology follow up.  She is very comfortable with the treatment plan at this time.

## 2025-03-10 NOTE — PROGRESS NOTES
"Patient spoke with OOC RN on 3/10/2025 with complaint of "Feb 21 she woke with hb and headaches. She was seen at [facility] nd was told ventricles in her brain were enlarged and she had fluid. She has an appt with neurology in 2 weeks. She woke this am very dizzy and had to hold to the wall to get to the bathroom. She wants to know if she needs to be seen sooner. Bp 156/85 Bs 140. When she stand she needs support to walk."    OOC RN consulted with [facility] provider, Dr. Whyte, and disposition recommended was specialty/neurology appointment to be scheduled.  Patient's neurology appointment scheduled for 3/13/2025 at 8 am.  Will follow up with patient on 3/12/2025 to remind about appointment and assess if have any additional needs or concerns.    "

## 2025-03-10 NOTE — TELEPHONE ENCOUNTER
"Feb 21 she woke with hb and headaches. She was seen at [facility] nd was told ventricles in her brain were enlarged and she had fluid. She has an appt with neurology in 2 weeks. She woke this am very dizzy and had to hold to the wall to get to the bathroom. She wants to know if she needs to be seen sooner. Bp 156/85 Bs 140. When she stand she needs support to walk. Care advice recommend pt go to Er or office with Md approval. [facility]ir provider Dr Whyte notified and stated ok for pt to be seen at next available neurology appt which is Thursday 3/13/25. Dr Whyte stated "She has a possibility of normal pressure hydrocephalus and probably needs to be evaluated today if she has new or significant symptoms.  If she is still having difficulties walking?  If it is significant I would advise her to be seen/and evaluated today.  If it is now better, I feel your new scheduled appointment would be 100% appropriate.  I would also be interested if her "dizzy" was like she might pass out or if it was more like the room was spinning/vertigo." Pt stated the room was spinning and she never felt like she was going to pass out. She also stated dizziness is better as long as she dont move to quickly, pt got up to she how she feels. Dr Whyte stated "If it is better/resolved it was probably peripheral vertigo and appropriate to follow up as an outpatient". Pt notified and verbalized understanding. Dr Whyte stated "I could call some in for her but, to be honest, if she had increased symptoms and probably want her evaluated to be sure we were not missing anything." Pt agrees and stated she does not need any medications. Pt was instructed to go to Er if symptoms worsen or come back and or if she had any new or very significant symptoms she should be evaluated. Pt verbalized understanding.       Reason for Disposition   SEVERE dizziness (e.g., unable to stand, requires support to walk, feels like passing out now)    Additional Information   " Negative: SEVERE difficulty breathing (e.g., struggling for each breath, speaks in single words)   Negative: Shock suspected (e.g., cold/pale/clammy skin, too weak to stand, low BP, rapid pulse)   Negative: Difficult to awaken or acting confused (e.g., disoriented, slurred speech)   Negative: Fainted, and still feels dizzy afterwards   Negative: Overdose (accidental or intentional) of medications   Negative: New neurologic deficit that is present now: * Weakness of the face, arm, or leg on one side of the body * Numbness of the face, arm, or leg on one side of the body * Loss of speech or garbled speech   Negative: Heart beating < 50 beats per minute OR > 140 beats per minute   Negative: Sounds like a life-threatening emergency to the triager   Negative: Chest pain    Protocols used: Dizziness-A-OH

## 2025-03-10 NOTE — TELEPHONE ENCOUNTER
Please advise,    whatever you think I need Im good with.  zetia sounds good to try.  I can go up to taking two metformin a day if thats what you recommend.

## 2025-03-11 ENCOUNTER — TELEPHONE (OUTPATIENT)
Dept: FAMILY MEDICINE | Facility: CLINIC | Age: 70
End: 2025-03-11
Payer: COMMERCIAL

## 2025-03-11 RX ORDER — EZETIMIBE 10 MG/1
10 TABLET ORAL DAILY
Qty: 90 TABLET | Refills: 3 | Status: SHIPPED | OUTPATIENT
Start: 2025-03-11 | End: 2026-03-11

## 2025-03-11 RX ORDER — FLUOXETINE HYDROCHLORIDE 20 MG/1
20 CAPSULE ORAL
Qty: 90 CAPSULE | Refills: 3 | Status: SHIPPED | OUTPATIENT
Start: 2025-03-11

## 2025-03-11 RX ORDER — METFORMIN HYDROCHLORIDE 500 MG/1
1000 TABLET, EXTENDED RELEASE ORAL DAILY
Qty: 180 TABLET | Refills: 12 | Status: SHIPPED | OUTPATIENT
Start: 2025-03-11

## 2025-03-11 NOTE — TELEPHONE ENCOUNTER
No care due was identified.  [facility] Embedded Care Due Messages. Reference number: [id number].   3/11/2025 5:41:16 AM CDT

## 2025-03-11 NOTE — TELEPHONE ENCOUNTER
----- Message from Jordan Ruano LPN sent at 3/10/2025  2:13 PM CDT -----      ----- Message -----  From: [facility]  Sent: 3/8/2025   6:25 PM CDT  To: Gerardo Dobbs MD

## 2025-03-11 NOTE — TELEPHONE ENCOUNTER
Refill Decision Note   Anushka Napier  is requesting a refill authorization.  Brief Assessment and Rationale for Refill:  Approve     Medication Therapy Plan:         Comments:     Note composed:11:57 AM 03/11/2025

## 2025-03-12 ENCOUNTER — PATIENT OUTREACH (OUTPATIENT)
Facility: OTHER | Age: 70
End: 2025-03-12
Payer: COMMERCIAL

## 2025-03-12 NOTE — PROGRESS NOTES
Spoke with patient to remind about appointment scheduled for /13/2025 at 8 am with Rohan Velasco MD at [facility], Neurology.  Patient confirmed appointment and denied no other needs at this time.     Will follow up with patient on 3/16/2025 to assess for any additional concerns or questions.

## 2025-03-13 ENCOUNTER — OFFICE VISIT (OUTPATIENT)
Dept: NEUROLOGY | Facility: CLINIC | Age: 70
End: 2025-03-13
Payer: COMMERCIAL

## 2025-03-13 VITALS
HEART RATE: 63 BPM | BODY MASS INDEX: 27.1 KG/M2 | OXYGEN SATURATION: 93 % | WEIGHT: 162.69 LBS | DIASTOLIC BLOOD PRESSURE: 70 MMHG | SYSTOLIC BLOOD PRESSURE: 154 MMHG | HEIGHT: 65 IN | TEMPERATURE: 98 F

## 2025-03-13 DIAGNOSIS — G43.009 MIGRAINE WITHOUT AURA AND WITHOUT STATUS MIGRAINOSUS, NOT INTRACTABLE: ICD-10-CM

## 2025-03-13 DIAGNOSIS — E11.9 TYPE 2 DIABETES MELLITUS WITHOUT COMPLICATION, UNSPECIFIED WHETHER LONG TERM INSULIN USE: ICD-10-CM

## 2025-03-13 DIAGNOSIS — E03.9 HYPOTHYROIDISM, UNSPECIFIED TYPE: ICD-10-CM

## 2025-03-13 DIAGNOSIS — R26.81 GAIT INSTABILITY: Primary | ICD-10-CM

## 2025-03-13 DIAGNOSIS — R25.1 TREMOR: ICD-10-CM

## 2025-03-13 DIAGNOSIS — R42 VERTIGO: ICD-10-CM

## 2025-03-13 PROCEDURE — 3008F BODY MASS INDEX DOCD: CPT | Mod: CPTII,S$GLB,, | Performed by: STUDENT IN AN ORGANIZED HEALTH CARE EDUCATION/TRAINING PROGRAM

## 2025-03-13 PROCEDURE — 3078F DIAST BP <80 MM HG: CPT | Mod: CPTII,S$GLB,, | Performed by: STUDENT IN AN ORGANIZED HEALTH CARE EDUCATION/TRAINING PROGRAM

## 2025-03-13 PROCEDURE — 1159F MED LIST DOCD IN RCRD: CPT | Mod: CPTII,S$GLB,, | Performed by: STUDENT IN AN ORGANIZED HEALTH CARE EDUCATION/TRAINING PROGRAM

## 2025-03-13 PROCEDURE — 3288F FALL RISK ASSESSMENT DOCD: CPT | Mod: CPTII,S$GLB,, | Performed by: STUDENT IN AN ORGANIZED HEALTH CARE EDUCATION/TRAINING PROGRAM

## 2025-03-13 PROCEDURE — 3077F SYST BP >= 140 MM HG: CPT | Mod: CPTII,S$GLB,, | Performed by: STUDENT IN AN ORGANIZED HEALTH CARE EDUCATION/TRAINING PROGRAM

## 2025-03-13 PROCEDURE — 1126F AMNT PAIN NOTED NONE PRSNT: CPT | Mod: CPTII,S$GLB,, | Performed by: STUDENT IN AN ORGANIZED HEALTH CARE EDUCATION/TRAINING PROGRAM

## 2025-03-13 PROCEDURE — 1160F RVW MEDS BY RX/DR IN RCRD: CPT | Mod: CPTII,S$GLB,, | Performed by: STUDENT IN AN ORGANIZED HEALTH CARE EDUCATION/TRAINING PROGRAM

## 2025-03-13 PROCEDURE — 99215 OFFICE O/P EST HI 40 MIN: CPT | Mod: S$GLB,,, | Performed by: STUDENT IN AN ORGANIZED HEALTH CARE EDUCATION/TRAINING PROGRAM

## 2025-03-13 PROCEDURE — 1101F PT FALLS ASSESS-DOCD LE1/YR: CPT | Mod: CPTII,S$GLB,, | Performed by: STUDENT IN AN ORGANIZED HEALTH CARE EDUCATION/TRAINING PROGRAM

## 2025-03-13 PROCEDURE — 3051F HG A1C>EQUAL 7.0%<8.0%: CPT | Mod: CPTII,S$GLB,, | Performed by: STUDENT IN AN ORGANIZED HEALTH CARE EDUCATION/TRAINING PROGRAM

## 2025-03-13 PROCEDURE — 4010F ACE/ARB THERAPY RXD/TAKEN: CPT | Mod: CPTII,S$GLB,, | Performed by: STUDENT IN AN ORGANIZED HEALTH CARE EDUCATION/TRAINING PROGRAM

## 2025-03-13 PROCEDURE — 99999 PR PBB SHADOW E&M-EST. PATIENT-LVL V: CPT | Mod: PBBFAC,,, | Performed by: STUDENT IN AN ORGANIZED HEALTH CARE EDUCATION/TRAINING PROGRAM

## 2025-03-13 NOTE — ASSESSMENT & PLAN NOTE
69 y.o. female with medical history of HLD, hypothyroidism presenting to the neurology clinic for establishment of care with myself for further evaluation and management of gait instability.     History from patient / medical record review. Reports of symptoms of gait instability - off balance at times over the last year / predominance over the morning - while postural change from sitting to standing vs head turns - however no obvious evidence for orthostatic triggers. Some spectrum of vertigo + with remote history of peripheral vertigo.     . Slow in ambulation, with no clear spectrum of any short stride length / reduced arm swing / wide based / high stepping pattern or associated gait freezing. Associated fall wo LOC. Denied any triggers - on cardiac standpoint. No syncope or presyncope events. Denied any active peripheral neuropathy - sensory deficits. No history of stroke or seizures. Denied any focal or asymmetric weakness. No report of weakness in thighs / hip flexion. Negative for radicular symptoms. No resting tremors or signs of parkinsonism. Denied any spasticity in extremities. Denied any incontinence. Denied any background of dementia - intact visual spatial skills / memory and executive functioning. No major background of arthritis - joint stiffness in LE/UE.      Exam: No patterns of short stride length / reduced arm swing - No wide based / high stepping pattern or associated gait freezing. No other signs of PD / cerebellar ataxia. Intact sensory. Hypereflexia +.      Recs:   Gait instability - Suspect multifactorial - any peripheral vertigo triggers with undiagnosed confounders of cervical myelopathy or +/- sensory neuropathy. Negative findings for any cerebellar ataxia / higher level gait disorder -  parkinsonism gait / myopathic gait or spastic gait. No clear antalgic triggers. Low suspicion for subclinical NPH confounders / no spectrum of NPH reported.      MRI cervical spine wo contrast F/u    Counseled on the DDx / management plan.   PT/ENT referral     F/u post MRI imagings

## 2025-03-13 NOTE — PROGRESS NOTES
Patient Name: Anushka Napier  MRN: [id number]    CC: Gait instability     HPI: Anushka Napier is a 69 y.o. female with medical history of HLD, hypothyroidism presenting to the neurology clinic for establishment of care with myself for further evaluation and management of gait instability.     History from patient / medical record review. Reports of symptoms of gait instability - off balance at times over the last year / predominance over the morning - while postural change from sitting to standing vs head turns - however no obvious evidence for orthostatic triggers. Some spectrum of vertigo + with remote history of peripheral vertigo.     . Slow in ambulation, with no clear spectrum of any short stride length / reduced arm swing / wide based / high stepping pattern or associated gait freezing. Associated fall wo LOC. Denied any triggers - on cardiac standpoint. No syncope or presyncope events. Denied any active peripheral neuropathy - sensory deficits. No history of stroke or seizures. Denied any focal or asymmetric weakness. No report of weakness in thighs / hip flexion. Negative for radicular symptoms. No resting tremors or signs of parkinsonism. Denied any spasticity in extremities. Denied any incontinence. Denied any background of dementia - intact visual spatial skills / memory and executive functioning. No major background of arthritis - joint stiffness in LE/UE.     ROS:   Review of Systems   Constitutional: Negative for malaise/fatigue. Negative for weight loss.   Eyes: Negative for blurred vision and double vision.   Respiratory: Cardiovascular: Negative / no active reportable concerns.   Gastrointestinal: Genitourinary: Negative / no active reportable concerns.   Musculoskeletal: Negative for joint pain. Negative for myalgias and falls.   Neurological: Negative for dizziness and tremors. Negative for focal weakness and seizures.   Psychiatric/Behavioral: Negative for memory loss. Negative for depression and  "hallucinations. The patient is not nervous/anxious.    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 Dr Correia, long term PPI use    History of colonic polyps 1/24/2020    Hyperlipidemia     Hypertension     Osteopenia 9/25/2014    Screening for colorectal cancer 9/25/2014    Normal 2009    Screening for colorectal cancer     Thyroid disease     Vitamin D deficiency disease 9/25/2014       Medications  Current Medications[1]    Allergies  Review of patient's allergies indicates:   Allergen Reactions    Iodine Hives     IVP Contrast    Tizanidine Swelling     Tongue swelling     Codeine Hives     Other reaction(s): Itching  Other reaction(s): Hives    Iodinated contrast media Hives     Other reaction(s): Hives    Sulfamethoxazole-trimethoprim Itching     Other reaction(s): Itching  Other reaction(s): Hives    Epinephrine Anxiety and Other (See Comments)     Almost passed out.       Social History  Social History[2]    Family History  Family History   Problem Relation Name Age of Onset    Breast cancer Cousin maternal 40    Miscarriages / Stillbirths Maternal Grandmother      Miscarriages / Stillbirths Mother      Diabetes Mother      Hypertension Mother      Cancer Mother          cervical    Heart disease Father      Hypertension Father      Ovarian cancer Neg Hx         Physical Exam  BP (!) 154/70 (BP Location: Left arm, Patient Position: Standing) Comment: orthostatic  Pulse 63   Temp 97.9 °F (36.6 °C) (Oral)   Ht 5' 5" (1.651 m)   Wt 73.8 kg (162 lb 11.2 oz)   SpO2 (!) 93%   BMI 27.07 kg/m²     General appearance: Well-developed, well-groomed.     Neurologic Exam: The patient is awake, alert and oriented. " Language is fluent. Attention span and concentration are normal.      Cranial nerves:Ocular motility is full in all cardinal positions of gaze. Facial activation is symmetric. Hearing is normal bilaterally. Shoulder elevation is symmetric and full strength bilaterally. 4+  Motor examination of all extremities demonstrates normal bulk and tone in all four limbs. There are no atrophy or fasciculations. Strength is 5/5 in the upper and lower extremities bilaterally without pronator drift.     Sensory examination is normal to touch and proprioception in the upper and lower extremities bilaterally.      Deep tendon reflexes are 2-3+ and symmetric in the upper and lower extremities bilaterally.      Gait: Normal casual gait.    Coordination: Finger to nose is normal in both upper extremities. No resting tremor or dyskinesia.     General exam  Cardiovascular:  Orthostatic BP: N/A    Lab and Test Results    WBC   Date Value Ref Range Status   02/21/2025 6.61 3.90 - 12.70 K/uL Final   12/01/2023 8.01 3.90 - 12.70 K/uL Final   04/22/2023 6.89 3.90 - 12.70 K/uL Final     Hemoglobin   Date Value Ref Range Status   02/21/2025 13.2 12.0 - 16.0 g/dL Final   12/01/2023 12.9 12.0 - 16.0 g/dL Final   04/22/2023 12.0 12.0 - 16.0 g/dL Final     Hematocrit   Date Value Ref Range Status   02/21/2025 41.9 37.0 - 48.5 % Final   12/01/2023 40.0 37.0 - 48.5 % Final   04/22/2023 38.4 37.0 - 48.5 % Final     Platelets   Date Value Ref Range Status   02/21/2025 374 150 - 450 K/uL Final   12/01/2023 345 150 - 450 K/uL Final   04/22/2023 307 150 - 450 K/uL Final     Glucose   Date Value Ref Range Status   02/21/2025 111 (H) 70 - 110 mg/dL Final   12/01/2023 136 (H) 70 - 110 mg/dL Final   04/22/2023 101 70 - 110 mg/dL Final     Sodium   Date Value Ref Range Status   02/21/2025 138 136 - 145 mmol/L Final   12/01/2023 139 136 - 145 mmol/L Final   04/22/2023 139 136 - 145 mmol/L Final     Potassium   Date Value Ref Range Status   02/21/2025 4.1 3.5 -  5.1 mmol/L Final   12/01/2023 4.3 3.5 - 5.1 mmol/L Final     Comment:     Specimen slightly hemolyzed   04/22/2023 4.2 3.5 - 5.1 mmol/L Final     Chloride   Date Value Ref Range Status   02/21/2025 104 95 - 110 mmol/L Final   12/01/2023 107 95 - 110 mmol/L Final   04/22/2023 104 95 - 110 mmol/L Final     CO2   Date Value Ref Range Status   02/21/2025 22 (L) 23 - 29 mmol/L Final   12/01/2023 21 (L) 23 - 29 mmol/L Final   04/22/2023 21 (L) 23 - 29 mmol/L Final     BUN   Date Value Ref Range Status   02/21/2025 15 8 - 23 mg/dL Final   12/01/2023 12 8 - 23 mg/dL Final   04/22/2023 18 8 - 23 mg/dL Final     Creatinine   Date Value Ref Range Status   02/21/2025 0.9 0.5 - 1.4 mg/dL Final   12/01/2023 0.8 0.5 - 1.4 mg/dL Final   04/22/2023 0.9 0.5 - 1.4 mg/dL Final     Calcium   Date Value Ref Range Status   02/21/2025 9.2 8.7 - 10.5 mg/dL Final   12/01/2023 9.3 8.7 - 10.5 mg/dL Final   04/22/2023 8.9 8.7 - 10.5 mg/dL Final     Magnesium   Date Value Ref Range Status   02/21/2025 1.8 1.6 - 2.6 mg/dL Final   06/03/2016 2.1 1.6 - 2.6 mg/dL Final   04/14/2012 2.1 1.6 - 2.6 mg/dl Final     Alkaline Phosphatase   Date Value Ref Range Status   02/21/2025 111 40 - 150 U/L Final   12/01/2023 126 55 - 135 U/L Final   04/22/2023 99 55 - 135 U/L Final     ALT   Date Value Ref Range Status   02/21/2025 14 10 - 44 U/L Final   12/01/2023 14 10 - 44 U/L Final   04/22/2023 12 10 - 44 U/L Final     AST   Date Value Ref Range Status   02/21/2025 24 10 - 40 U/L Final   12/01/2023 19 10 - 40 U/L Final   04/22/2023 16 10 - 40 U/L Final       Images: Independently reviewed - Yes   Other Tests: Independently reviewed - Yes     Assessment and Plan    1. Gait instability  Assessment & Plan:   69 y.o. female with medical history of HLD, hypothyroidism presenting to the neurology clinic for establishment of care with myself for further evaluation and management of gait instability.     History from patient / medical record review. Reports of symptoms  of gait instability - off balance at times over the last year / predominance over the morning - while postural change from sitting to standing vs head turns - however no obvious evidence for orthostatic triggers. Some spectrum of vertigo + with remote history of peripheral vertigo.     . Slow in ambulation, with no clear spectrum of any short stride length / reduced arm swing / wide based / high stepping pattern or associated gait freezing. Associated fall wo LOC. Denied any triggers - on cardiac standpoint. No syncope or presyncope events. Denied any active peripheral neuropathy - sensory deficits. No history of stroke or seizures. Denied any focal or asymmetric weakness. No report of weakness in thighs / hip flexion. Negative for radicular symptoms. No resting tremors or signs of parkinsonism. Denied any spasticity in extremities. Denied any incontinence. Denied any background of dementia - intact visual spatial skills / memory and executive functioning. No major background of arthritis - joint stiffness in LE/UE.      Exam: No patterns of short stride length / reduced arm swing - No wide based / high stepping pattern or associated gait freezing. No other signs of PD / cerebellar ataxia. Intact sensory. Hypereflexia +.      Recs:   Gait instability - Suspect multifactorial - any peripheral vertigo triggers with undiagnosed confounders of cervical myelopathy or +/- sensory neuropathy. Negative findings for any cerebellar ataxia / higher level gait disorder -  parkinsonism gait / myopathic gait or spastic gait. No clear antalgic triggers. Low suspicion for subclinical NPH confounders / no spectrum of NPH reported.      MRI cervical spine wo contrast F/u   Counseled on the DDx / management plan.   PT/ENT referral     F/u post MRI imagings       Orders:  -     MRI Cervical Spine Without Contrast; Future; Expected date: 03/13/2025  -     Ambulatory referral/consult to Physical/Occupational Therapy; Future; Expected  date: 03/20/2025  -     Ambulatory referral/consult to ENT; Future; Expected date: 03/20/2025    2. Vertigo  Assessment & Plan:  - ENT evaluation for any secondary etiologies / any referral for vestibular therapy      Orders:  -     Ambulatory referral/consult to ENT; Future; Expected date: 03/20/2025    3. Tremor  Assessment & Plan:  - no active concerns   - no clear confounder for gait instability       4. Hypothyroidism, unspecified type  Assessment & Plan:  - continue home medications       5. Migraine without aura and without status migrainosus, not intractable  Assessment & Plan:  - no active confounders       6. Type 2 diabetes mellitus without complication, unspecified whether long term insulin use  Assessment & Plan:  - No sensory ataxia patterns                  Rohan Velasco MD    General Neurology, [facility],   [address] [facility] [address], [address], [place] [address]    Vascular Neurology, Endovascular Neurosurgery,   [facility], [address] [place] [id number]         [1]   Current Outpatient Medications:     aspirin (ECOTRIN) 81 MG EC tablet, 1 tablet Orally Once a day, Disp: , Rfl:     atorvastatin (LIPITOR) 40 MG tablet, TAKE 1 TABLET(40 MG) BY MOUTH EVERY DAY, Disp: 90 tablet, Rfl: 12    azelastine (ASTELIN) 137 mcg (0.1 %) nasal spray, 1 spray (137 mcg total) by Nasal route 2 (two) times daily., Disp: 90 mL, Rfl: 12    coQ10, ubiquinol, 100 mg Cap, as directed Orally, Disp: , Rfl:     estrogens, conjugated, (PREMARIN) 0.625 MG tablet, TAKE 1 TABLET(0.625 MG) BY MOUTH EVERY DAY, Disp: 90 tablet, Rfl: 3    ezetimibe (ZETIA) 10 mg tablet, Take 1 tablet (10 mg total) by mouth once daily., Disp: 90 tablet, Rfl: 3    ferrous gluconate (FERGON) 240 (27 FE) MG tablet, 1 tablet Orally Three times a Week, Disp: , Rfl:     FLUoxetine 20 MG capsule, TAKE 1 CAPSULE(20 MG) BY MOUTH EVERY DAY, Disp: 90 capsule, Rfl: 3    levothyroxine (SYNTHROID, LEVOTHROID) 175 MCG tablet, TAKE 1  TABLET(175 MCG) BY MOUTH BEFORE BREAKFAST, Disp: 90 tablet, Rfl: 12    metFORMIN (GLUCOPHAGE-XR) 500 MG ER 24hr tablet, Take 2 tablets (1,000 mg total) by mouth once daily., Disp: 180 tablet, Rfl: 12    metoprolol tartrate (LOPRESSOR) 50 MG tablet, TAKE 1 TABLET BY MOUTH DAILY, Disp: 90 tablet, Rfl: 1    mometasone 0.1% (ELOCON) 0.1 % cream, Use daily, Disp: 50 g, Rfl: 3    omega-3 fatty acids-vitamin E 1,000 mg Cap, Take 1 capsule by mouth Twice daily. 1 Capsule Oral Twice a day , Disp: , Rfl:     progesterone (PROMETRIUM) 100 MG capsule, Take 1 capsule (100 mg total) by mouth once daily., Disp: 90 capsule, Rfl: 3    RABEprazole (ACIPHEX) 20 mg tablet, TAKE 1 TABLET BY MOUTH TWICE DAILY, Disp: 180 tablet, Rfl: 12    solifenacin (VESICARE) 10 MG tablet, Take 1 tablet (10 mg total) by mouth once daily., Disp: 90 tablet, Rfl: 3    valsartan (DIOVAN) 160 MG tablet, Take 1 tablet (160 mg total) by mouth once daily., Disp: 90 tablet, Rfl: 3    vitamin D (VITAMIN D3) 1000 units Tab, Take 1,000 Units by mouth once daily. 5 tabs daily, Disp: , Rfl:   [2]   Social History  Socioeconomic History    Marital status:    Tobacco Use    Smoking status: Never    Smokeless tobacco: Never   Substance and Sexual Activity    Alcohol use: No    Drug use: No    Sexual activity: Yes     Partners: Male     Birth control/protection: Surgical   Social History Narrative     since 1986    Previous  for 8 years prior    He is a     She is a homemaker     Social Drivers of Health     Financial Resource Strain: Low Risk  (3/10/2025)    Overall Financial Resource Strain (CARDIA)     Difficulty of Paying Living Expenses: Not very hard   Food Insecurity: No Food Insecurity (3/10/2025)    Hunger Vital Sign     Worried About Running Out of Food in the Last Year: Never true     Ran Out of Food in the Last Year: Never true   Transportation Needs: No Transportation Needs (3/10/2025)    PRAPARE - Transportation     Lack of  Transportation (Medical): No     Lack of Transportation (Non-Medical): No   Physical Activity: Insufficiently Active (3/10/2025)    Exercise Vital Sign     Days of Exercise per Week: 2 days     Minutes of Exercise per Session: 60 min   Stress: Stress Concern Present (3/10/2025)    [employer] - Occupational Stress Questionnaire     Feeling of Stress : To some extent   Housing Stability: Low Risk  (3/10/2025)    Housing Stability Vital Sign     Unable to Pay for Housing in the Last Year: No     Number of Times Moved in the Last Year: 0     Homeless in the Last Year: No

## 2025-03-16 ENCOUNTER — PATIENT OUTREACH (OUTPATIENT)
Facility: OTHER | Age: 70
End: 2025-03-16
Payer: COMMERCIAL

## 2025-03-16 NOTE — PROGRESS NOTES
Patient's call escalated from post ED text tracker on 2/24/2025 for Rx for UTI.  [facility] provider, Dr. Martino, consulted and RX called in for UTI.    Spoke with patient to assess if she has any concerns or questions to be addressed and patient denied no new needs at this time.

## 2025-03-19 ENCOUNTER — TELEPHONE (OUTPATIENT)
Dept: FAMILY MEDICINE | Facility: CLINIC | Age: 70
End: 2025-03-19

## 2025-03-19 ENCOUNTER — OFFICE VISIT (OUTPATIENT)
Dept: FAMILY MEDICINE | Facility: CLINIC | Age: 70
End: 2025-03-19
Payer: COMMERCIAL

## 2025-03-19 VITALS — SYSTOLIC BLOOD PRESSURE: 126 MMHG | DIASTOLIC BLOOD PRESSURE: 78 MMHG

## 2025-03-19 DIAGNOSIS — I10 PRIMARY HYPERTENSION: ICD-10-CM

## 2025-03-19 DIAGNOSIS — R42 VERTIGO: Primary | ICD-10-CM

## 2025-03-19 DIAGNOSIS — K59.00 CONSTIPATION, UNSPECIFIED CONSTIPATION TYPE: ICD-10-CM

## 2025-03-19 DIAGNOSIS — K22.70 BARRETT'S ESOPHAGUS WITHOUT DYSPLASIA: ICD-10-CM

## 2025-03-19 DIAGNOSIS — R13.10 DYSPHAGIA, UNSPECIFIED TYPE: ICD-10-CM

## 2025-03-19 DIAGNOSIS — R26.9 GAIT DISORDER: ICD-10-CM

## 2025-03-19 DIAGNOSIS — R55 VAGAL REACTION: ICD-10-CM

## 2025-03-19 DIAGNOSIS — N32.81 OVERACTIVE BLADDER: ICD-10-CM

## 2025-03-19 PROCEDURE — 99999 PR PBB SHADOW E&M-EST. PATIENT-LVL I: CPT | Mod: PBBFAC,,, | Performed by: INTERNAL MEDICINE

## 2025-03-19 PROCEDURE — 99214 OFFICE O/P EST MOD 30 MIN: CPT | Mod: S$GLB,,, | Performed by: INTERNAL MEDICINE

## 2025-03-19 PROCEDURE — 4010F ACE/ARB THERAPY RXD/TAKEN: CPT | Mod: CPTII,S$GLB,, | Performed by: INTERNAL MEDICINE

## 2025-03-19 PROCEDURE — 3074F SYST BP LT 130 MM HG: CPT | Mod: CPTII,S$GLB,, | Performed by: INTERNAL MEDICINE

## 2025-03-19 PROCEDURE — G2211 COMPLEX E/M VISIT ADD ON: HCPCS | Mod: S$GLB,,, | Performed by: INTERNAL MEDICINE

## 2025-03-19 PROCEDURE — 3078F DIAST BP <80 MM HG: CPT | Mod: CPTII,S$GLB,, | Performed by: INTERNAL MEDICINE

## 2025-03-19 PROCEDURE — 3051F HG A1C>EQUAL 7.0%<8.0%: CPT | Mod: CPTII,S$GLB,, | Performed by: INTERNAL MEDICINE

## 2025-03-19 RX ORDER — MECLIZINE HYDROCHLORIDE 25 MG/1
25 TABLET ORAL 3 TIMES DAILY PRN
Qty: 60 TABLET | Refills: 12 | Status: SHIPPED | OUTPATIENT
Start: 2025-03-19

## 2025-03-19 RX ORDER — DIAZEPAM 2 MG/1
2 TABLET ORAL EVERY 8 HOURS PRN
Qty: 90 TABLET | Refills: 5 | Status: SHIPPED | OUTPATIENT
Start: 2025-03-19 | End: 2025-04-18

## 2025-03-19 NOTE — TELEPHONE ENCOUNTER
owen    ----- Message from Tech Teressa sent at 3/19/2025 11:12 AM CDT -----  Regarding: Requesting advice  .Type:  Needs Medical Advice/Symptom-based CallWho Called: self Symptoms (please be specific): weak, sweating- BP reading- 158/83-asking if she should go to the ED How long has patient had these symptoms:  1 day Would the patient rather a call back or a response via [facility]? Call Best Call Back Number: [phone] Information: declined to speak with a nurse  on call Symptom: Excessive SweatingOutcome: Schedule an urgent appointment (within 4 hours) or talk to a nurse or provider within 30 minutes.Reason: Started within the past 24 hours

## 2025-03-19 NOTE — PROGRESS NOTES
CHIEF COMPLAINT:  Feeling weak      Physical  3/25                                                                                                                             HISTORY OF PRESENT ILLNESS:  A 69 year-old white female who is a 1st cousin of mine by marriage.  Patient called and left a message today and was seen today.  She was feeling weak although the listed blood pressure was actually a little on the high side.      Looks like patient has started VESIcare a week or so ago from Urology.  She never had constipation before but last night she was constipated and I had to strain.  While on the toilet she got sweaty and clammy and lightheaded.  She had a call in her  for assistance.  It passed.  Her vertigo is also been active.  In the middle the night she woke up with vertigo but was able to go back to sleep.  She had some vertigo after going to the bathroom last night.  She awoke this morning and still had the urge to have a bowel movement and went to the bathroom and had a bowel movement.  Again sitting on the toilet she got vagal dizzy clammy and sweaty.  She continues to have vertigo when she puts her head back.  She had her remotely and saw ENT and had vestibular testing and told nothing particular was wrong.  It went away.  Currently she has no medication to suppress vertigo.  In the office today after sitting or down and putting her head back she had subjective vertigo and I sat her up and she got similar clammy sweaty symptoms.    Also having more reflux and she actually has an appointment today at noon to see GI.  She is due later this year for her scope for her Barretts but she has had some dysphagia lately.  The AcipHex is not working and she is taking more Tums we discussed she is having reflux but it might just be food not going down her esophagus that is coming up.  Also getting hiccups in the evening which could indicate some GE junction irritation and so forth and she clearly  does need an EGD.  She is feeling foods sitting in her chest and has been ongoing for a couple of months.  She drank a protein shake this morning and it seems to be sitting.        Reviewed interval fall.  No syncope.  She has been a little bit off balance but it seems to be very mild.  She occasionally trips.  She was found to have some suggestion of normal pressure hydrocephalus on MRI.  An MRI one year ago was not showing that.  She has no incontinence.  Memory issues appear to be simple recall issue, age-related issue.  Discussed normal pressure hydrocephalus and it symptoms.  She has an appointment with Neurology later this month and we can have that evaluated and followed.    For several months she has developed a very slight tremor at times.  She does not really have it today in the office.  Also for 6-8 months her legs feel generally weak.  Prior she was not needing to use her arms to assist her getting up off the floor but now she is having to do so.  He does not notice any particular new weakness in her hands or arms or any loss of coordination.  Neurology noted some weakness in the left triceps and maybe right hip flexors that a reflexes were plus three.  Patient does have brisk biceps triceps and Achilles and triceps reflexes they do not seem to be asymmetrical or exaggerated.  On testing for clonus I do believe she possibly has at least one beat of clonus reproducibly in both ankles.  Her gait is normal speech is normal.  She thinks she might have had a little bit of a Bell's palsy years ago and has a little bit of left ptosis in the corner of her left mouth according to others does look a little bit crooked but it does not sound like her onset of symptoms was anything abrupt and severe.  MRI of the brain did not show any old strokes.  She just had the expected chronic white matter changes which I explained to her.  Cervical spine had no spinal stenosis.  Apparently there some family members with MS  with discussed overall symptoms would not necessarily be consistent with that in the MRI did not show any pattern suspicious for MS.  We will continue to follow and she does have follow-up with Neurology    Seen Neuro NP 12/23/24  History of Present Illness (HPI): Anushka Napier is a R handed 68 y.o. female with a medical issues significant for migraines, hypothyroidism, DMII, HLD, HTN, GERD who presents for tremors. Tremors for a couple of months, both hands -- equal on both sides. Started in both hands at the same time. Notices tremors with action, posture and at rest. No new medications started at that time. More stressed in the last 9 months, lost 4 family members including her parents. Tremors not associated with hypo or hyperglycemic events. History of hypothyroidism, TSH last checked in early 2023. No falls but does feel slightly off balance every once in a while. Has to walk slower to keep her balance. Denies shuffling.   Tingling in bilateral feet/toes, all digits. Feels like her legs are weak. Off balance whenever she closes her eyes in the shower and in the dark.   ASSESSMENT/PLAN:  Tremor   - high frequency, low amplitude tremor of bilateral hands with posture and action  - checking tsh   - hold off on med   2. Imbalance   - L tricep weakness, R hip flexor weakness, 3+ reflexes throughout without other UMN signs  - decreased vibratory sensation  - brain and cervical MRI  - labs as below   - PT      Orders Placed This Encounter    MRI Brain Without Contrast    MRI Cervical Spine Without Contrast    TSH    Vitamin B1    Vitamin B12    COPPER, SERUM    ZINC    RPR    Ambulatory referral/consult to Physical/Occupational Therapy          Seen neuro 03/13/2025  Recs:   Gait instability - Suspect multifactorial - any peripheral vertigo triggers with undiagnosed confounders of cervical myelopathy or +/- sensory neuropathy. Negative findings for any cerebellar ataxia / higher level gait disorder -  parkinsonism  gait / myopathic gait or spastic gait. No clear antalgic triggers. Low suspicion for subclinical NPH confounders / no spectrum of NPH reported.      MRI cervical spine wo contrast F/u   Counseled on the DDx / management plan.   PT/ENT referral      F/u post MRI imagings      ER 2/25    Fall        Left eye flashing, had a fall a few days ago, no loc, did not hit her head. AAOx3   69-year-old female significant medical history hypertension, GERD, migraine, type 2 diabetes, overactive bladder, presenting for fall.   Patient reports for concern of left eye flashing.  She woke up at 5:30 a.m. this morning and notes bright white flashes in her left eye.  This lasted for 45 minutes and completely resolved.  On her daughter visited her this morning, her daughter was worried for left-sided mild facial droop.  Known well was yesterday around 6:00 p.m. when her daughter had last seen her.  Patient denies having left-sided facial droop and says this is her baseline.   Two days ago she had a syncopal event.  She was [place] the [place] [employer], lost consciousness and woke up on the ground with a bruise on her right wrist and pain in her right wrist.  She denies new weakness or numbness in her arms or legs, but notes that she does follow with Neurology for tremor.  She had an MRI of the brain a year ago.  She also reports baseline mild left-sided facial paresthesia at baseline    MRI      Impression:        No evidence acute infarct or hemorrhage.     Moderate chronic small vessel ischemic change.     Ventriculomegaly out of proportion to the degree of sulcal enlargement, remains suspicious for normal pressure hydrocephalus. Clinical correlation required.     Chronic left maxillary sinusitis.             Seen NEuro 10/24  Patient is here for evaluation for peripheral neuropathy.  We will initiate workup to assess for vitamin deficiencies or conditions associated with neuropathy, however suspect multifactorial component with a history  of her diabetes, B12 deficiency, could have idiopathic component as well.  Discussed she does have a positive Romberg, to use night lights to help with visualization and balance.                                                                                Her colonoscopy was normal 4/17/08. 1 polyp 3/18 -5 yrs  She had another upper endoscopy,   which was okay per outside GI.  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                                                                                                                                       ROS:   CONST: weight stable. EYES: no vision change. ENT: no sore throat. CV: no chest pain w/ exertion. RESP: no shortness of breath. GI: no nausea, vomiting, diarrhea. No dysphagia. : no urinary issues. MUSCULOSKELETAL: no new myalgias or arthralgias. SKIN: no new changes. NEURO: no focal deficits. PSYCH: no new issues. ENDOCRINE: no polyuria. HEME: no lymph nodes. ALLERGY: no general pruritis.                                                                                                                    PAST MEDICAL HISTORY:                                                        1. Osteopenia, last bone density in 11/17, 4/2023                                2. DIABETES                          3. Hypothyroidism.                                                           4. Hypertension.                                                             5. Hysterectomy - total.                                                6. Hyperlipidemia.                                                           7. GERD. EGDs with possible Davian's- - Dr Yi Carr. EGD 11/22 w Isauro, next EGD November 2025                                                                   8. Anxiety and depression.   9. ENT septum surgery for sinusitis -Dr Sandoval 2012.  10. Colonoscopy 4/17/08 normal - 1 polyp 3/18 -5/23 -5 yrs   11. H/O abnormal Mammo                                                                                                                                FAMILY HISTORY: Father is living with hypertension, heart disease and        diabetes.  Mom is living with a history of cervical cancer, hypertension     and diabetes.  One brother and one sister with no stated problems.                                                                                                                                                                                      ALLERGIES:  Penicillin, sulfa, codeine, iodine and shellfish.                                                                                             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  She does not smoke and never did.  She does not drink.                                                                                                            PHYSICAL EXAMINATION:                                                        VITAL SIGNS:  As above                             GENERAL:  Pleasant female.                                                   HEENT:  Conjunctivae clear.  Pupils equal and reactive.  Nose and mouth      clear and moist.  Teeth good.                                                NECK:  Supple.  Thyroid nonpalpable.                                         RESPIRATORY:  Effort is good.                                                LUNGS:  Clear.                                                               HEART:  Regular rate and rhythm without murmurs, gallops or rubs.  No        carotid bruits.  No edema.                                                   ABDOMEN:  Soft, nondistended, nontender.  No hepatosplenomegaly or masses.   SKIN:  No rashes.  Warm to touch.                                            EXTREMITIES:  Gait normal.  Digits without clubbing or cyanosis.     Neurologic, speech normal, no ataxia                                                                  Labs and x-ray reports reviewed                                                            Diagnoses and all orders for this visit:    Vertigo, acute on chronic vertigo and looks like she is having another exacerbation of vertigo which I reassured can happen and can be  even by years.  Will suppress with meclizine at night and then also use Valium during the day to suppress.  She has an ENT appointment.  She has had recent MRI of the brain which is reassuring for any space-occupying lesion.  No perceived hearing loss or tinnitus.  -     diazePAM (VALIUM) 2 MG tablet; Take 1 tablet (2 mg total) by mouth every 8 (eight) hours as needed (Vertigo).    Vagal reaction, vertigo clearly causing some vagal reaction but also the recent new constipation and straining cause two episodes of vagal symptoms after bowel movement.  Reassured.  No associated chest pain.    Dysphagia, unspecified type, clearly needs EGD given history of Barretts and current ongoing symptoms for months.    Constipation, unspecified constipation type, new problem, discuss MiraLax and stool softeners especially if overactive bladder medication is continued.  Meclizine may add to constipation    Overactive bladder, needs to decide if she needs to continue taking the new bladder medication if constipation not controllable    Gait disorder, reviewed neurology notes and looks like there was plans for cervical spine MRI as she does have some pre-existing cervical spine issue.  Obviously when vertigo is active it is contributing to gait issue    Carter's esophagus without dysplasia, due for scope    Primary hypertension, chronic and stable elevated today due to anxiety related to symptoms but here in the office I took her blood pressure and it was 126/78    Other orders  -     meclizine (ANTIVERT) 25 mg tablet; Take 1 tablet (25 mg total) by mouth 3 (three) times daily as needed for Dizziness (or at  least one HS for 1 week when vertigo active).

## 2025-03-26 ENCOUNTER — HOSPITAL ENCOUNTER (OUTPATIENT)
Dept: RADIOLOGY | Facility: HOSPITAL | Age: 70
Discharge: HOME | End: 2025-03-26
Attending: STUDENT IN AN ORGANIZED HEALTH CARE EDUCATION/TRAINING PROGRAM
Payer: COMMERCIAL

## 2025-03-26 ENCOUNTER — CLINICAL SUPPORT (OUTPATIENT)
Dept: AUDIOLOGY | Facility: CLINIC | Age: 70
End: 2025-03-26
Payer: COMMERCIAL

## 2025-03-26 ENCOUNTER — OFFICE VISIT (OUTPATIENT)
Dept: OTOLARYNGOLOGY | Facility: CLINIC | Age: 70
End: 2025-03-26
Payer: COMMERCIAL

## 2025-03-26 ENCOUNTER — OFFICE VISIT (OUTPATIENT)
Dept: CARDIOLOGY | Facility: CLINIC | Age: 70
End: 2025-03-26
Payer: COMMERCIAL

## 2025-03-26 VITALS
SYSTOLIC BLOOD PRESSURE: 134 MMHG | DIASTOLIC BLOOD PRESSURE: 74 MMHG | OXYGEN SATURATION: 99 % | HEIGHT: 65 IN | RESPIRATION RATE: 16 BRPM | BODY MASS INDEX: 27.88 KG/M2 | WEIGHT: 167.31 LBS | HEART RATE: 75 BPM

## 2025-03-26 VITALS
HEIGHT: 65 IN | HEART RATE: 76 BPM | DIASTOLIC BLOOD PRESSURE: 75 MMHG | BODY MASS INDEX: 27.85 KG/M2 | SYSTOLIC BLOOD PRESSURE: 135 MMHG

## 2025-03-26 DIAGNOSIS — E11.69 HYPERLIPIDEMIA ASSOCIATED WITH TYPE 2 DIABETES MELLITUS: ICD-10-CM

## 2025-03-26 DIAGNOSIS — R26.81 GAIT INSTABILITY: ICD-10-CM

## 2025-03-26 DIAGNOSIS — R90.89 ABNORMAL FINDING ON MRI OF BRAIN: ICD-10-CM

## 2025-03-26 DIAGNOSIS — I27.20 PULMONARY HYPERTENSION: ICD-10-CM

## 2025-03-26 DIAGNOSIS — Z91.89 AT HIGH RISK FOR CORONARY ARTERY DISEASE: ICD-10-CM

## 2025-03-26 DIAGNOSIS — H90.3 SENSORINEURAL HEARING LOSS (SNHL) OF BOTH EARS: Primary | ICD-10-CM

## 2025-03-26 DIAGNOSIS — R42 VERTIGO: ICD-10-CM

## 2025-03-26 DIAGNOSIS — R00.2 PALPITATIONS: Primary | ICD-10-CM

## 2025-03-26 DIAGNOSIS — I10 PRIMARY HYPERTENSION: ICD-10-CM

## 2025-03-26 DIAGNOSIS — E78.5 HYPERLIPIDEMIA ASSOCIATED WITH TYPE 2 DIABETES MELLITUS: ICD-10-CM

## 2025-03-26 PROCEDURE — 3008F BODY MASS INDEX DOCD: CPT | Mod: CPTII,S$GLB,, | Performed by: INTERNAL MEDICINE

## 2025-03-26 PROCEDURE — 1160F RVW MEDS BY RX/DR IN RCRD: CPT | Mod: CPTII,S$GLB,, | Performed by: INTERNAL MEDICINE

## 2025-03-26 PROCEDURE — 72141 MRI NECK SPINE W/O DYE: CPT | Mod: 26,,, | Performed by: RADIOLOGY

## 2025-03-26 PROCEDURE — 3008F BODY MASS INDEX DOCD: CPT | Mod: CPTII,S$GLB,, | Performed by: STUDENT IN AN ORGANIZED HEALTH CARE EDUCATION/TRAINING PROGRAM

## 2025-03-26 PROCEDURE — 1159F MED LIST DOCD IN RCRD: CPT | Mod: CPTII,S$GLB,, | Performed by: STUDENT IN AN ORGANIZED HEALTH CARE EDUCATION/TRAINING PROGRAM

## 2025-03-26 PROCEDURE — 99999 PR PBB SHADOW E&M-EST. PATIENT-LVL V: CPT | Mod: PBBFAC,,, | Performed by: INTERNAL MEDICINE

## 2025-03-26 PROCEDURE — 1101F PT FALLS ASSESS-DOCD LE1/YR: CPT | Mod: CPTII,S$GLB,, | Performed by: INTERNAL MEDICINE

## 2025-03-26 PROCEDURE — 1101F PT FALLS ASSESS-DOCD LE1/YR: CPT | Mod: CPTII,S$GLB,, | Performed by: STUDENT IN AN ORGANIZED HEALTH CARE EDUCATION/TRAINING PROGRAM

## 2025-03-26 PROCEDURE — 4010F ACE/ARB THERAPY RXD/TAKEN: CPT | Mod: CPTII,S$GLB,, | Performed by: STUDENT IN AN ORGANIZED HEALTH CARE EDUCATION/TRAINING PROGRAM

## 2025-03-26 PROCEDURE — 99204 OFFICE O/P NEW MOD 45 MIN: CPT | Mod: S$GLB,,, | Performed by: STUDENT IN AN ORGANIZED HEALTH CARE EDUCATION/TRAINING PROGRAM

## 2025-03-26 PROCEDURE — 3288F FALL RISK ASSESSMENT DOCD: CPT | Mod: CPTII,S$GLB,, | Performed by: STUDENT IN AN ORGANIZED HEALTH CARE EDUCATION/TRAINING PROGRAM

## 2025-03-26 PROCEDURE — 1126F AMNT PAIN NOTED NONE PRSNT: CPT | Mod: CPTII,S$GLB,, | Performed by: INTERNAL MEDICINE

## 2025-03-26 PROCEDURE — 3051F HG A1C>EQUAL 7.0%<8.0%: CPT | Mod: CPTII,S$GLB,, | Performed by: INTERNAL MEDICINE

## 2025-03-26 PROCEDURE — 1159F MED LIST DOCD IN RCRD: CPT | Mod: CPTII,S$GLB,, | Performed by: INTERNAL MEDICINE

## 2025-03-26 PROCEDURE — 4010F ACE/ARB THERAPY RXD/TAKEN: CPT | Mod: CPTII,S$GLB,, | Performed by: INTERNAL MEDICINE

## 2025-03-26 PROCEDURE — 3078F DIAST BP <80 MM HG: CPT | Mod: CPTII,S$GLB,, | Performed by: STUDENT IN AN ORGANIZED HEALTH CARE EDUCATION/TRAINING PROGRAM

## 2025-03-26 PROCEDURE — 1126F AMNT PAIN NOTED NONE PRSNT: CPT | Mod: CPTII,S$GLB,, | Performed by: STUDENT IN AN ORGANIZED HEALTH CARE EDUCATION/TRAINING PROGRAM

## 2025-03-26 PROCEDURE — 3288F FALL RISK ASSESSMENT DOCD: CPT | Mod: CPTII,S$GLB,, | Performed by: INTERNAL MEDICINE

## 2025-03-26 PROCEDURE — 3075F SYST BP GE 130 - 139MM HG: CPT | Mod: CPTII,S$GLB,, | Performed by: INTERNAL MEDICINE

## 2025-03-26 PROCEDURE — 3075F SYST BP GE 130 - 139MM HG: CPT | Mod: CPTII,S$GLB,, | Performed by: STUDENT IN AN ORGANIZED HEALTH CARE EDUCATION/TRAINING PROGRAM

## 2025-03-26 PROCEDURE — 99214 OFFICE O/P EST MOD 30 MIN: CPT | Mod: S$GLB,,, | Performed by: INTERNAL MEDICINE

## 2025-03-26 PROCEDURE — 72141 MRI NECK SPINE W/O DYE: CPT | Mod: TC

## 2025-03-26 PROCEDURE — 95992 CANALITH REPOSITIONING PROC: CPT | Mod: S$GLB,,, | Performed by: STUDENT IN AN ORGANIZED HEALTH CARE EDUCATION/TRAINING PROGRAM

## 2025-03-26 PROCEDURE — 3051F HG A1C>EQUAL 7.0%<8.0%: CPT | Mod: CPTII,S$GLB,, | Performed by: STUDENT IN AN ORGANIZED HEALTH CARE EDUCATION/TRAINING PROGRAM

## 2025-03-26 PROCEDURE — 3078F DIAST BP <80 MM HG: CPT | Mod: CPTII,S$GLB,, | Performed by: INTERNAL MEDICINE

## 2025-03-26 RX ORDER — METOPROLOL SUCCINATE 50 MG/1
50 TABLET, EXTENDED RELEASE ORAL DAILY
Qty: 90 TABLET | Refills: 3 | Status: SHIPPED | OUTPATIENT
Start: 2025-03-26 | End: 2026-03-26

## 2025-03-26 RX ORDER — ATORVASTATIN CALCIUM 80 MG/1
80 TABLET, FILM COATED ORAL DAILY
Qty: 90 TABLET | Refills: 3 | Status: SHIPPED | OUTPATIENT
Start: 2025-03-26 | End: 2026-03-26

## 2025-03-26 NOTE — PROGRESS NOTES
CARDIOLOGY CLINIC VISIT      HISTORY OF PRESENT ILLNESS:     07/06/2020: Anushka Napier presents for evaluation of palpitations.  Last week she had noted some double vision in her right eye.  Vision was blurry.  Spontaneous resolution.  A day or two after she noted palpitations.  She states that she has had a history what she calls her heart fluttering.  However in this episode was more intense. Lasted a few hours.  Afterwards she had some chest discomfort, describes it as if she had worked out. Pressure sensation across her chest.  No history of coronary artery disease. CV RF HTN, HLP, DM. Nl tsh from 1/20.  EKG from 7/3/20 - NSR.     09/04/2024: She has recently noticed recurrence of palpitations. Daily. Describes as fluttering sensation. Similar but more frequent when to compared to episodes a few years ago. She also notices lower extremity weakness. Feels that she has difficulty walking. Feels her gait is off. Numbness and tingling in her face. One episode of jaw pain. Not associated with palpitations.    10/02/2024:  Echocardiogram shows ejection fraction of 55-60%. Mild pulmonary hypertension. Holter showed normal sinus rhythm.  Average heart rate 66 beats per minute.  Very rare PVCs and PACs.  She did have some palpitations while wearing the monitor.  Has not seen Neurology yet.  Still with some difficulty ambulating.  Notes numbness/tingling.  Orthostatics today negative.    03/26/2025: Trip to ED on 2/21/2025. Vision changes. Concern for facial droop. LOC/syncopal event a few days prior to presentation. She states it was a fall. Blood pressure was 208/97. P 64. Also complains of dizziness. Vertigo like symptoms. EKG showed normal sinus rhythm. Delayed r wave progression. Troponin, bnp wnl. Last A1c 7.1. MRI brain showed moderate chronic small vessel ischemic changes    CARDIOVASCULAR HISTORY:     Pulmonary hypertension    PAST MEDICAL HISTORY:     Past Medical History:   Diagnosis Date    Allergy     Anxiety      Basal cell carcinoma     to face    Depression     Diabetes mellitus     Diabetes mellitus type II, uncontrolled 9/17/2014    Diabetic retinopathy 01/14/2020    DM retinopathy     GERD (gastroesophageal reflux disease) 9/25/2014    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 Dr Correia, long term PPI use    History of colonic polyps 1/24/2020    Hyperlipidemia     Hypertension     Osteopenia 9/25/2014    Screening for colorectal cancer 9/25/2014    Normal 2009    Screening for colorectal cancer     Thyroid disease     Vitamin D deficiency disease 9/25/2014       PAST SURGICAL HISTORY:     Past Surgical History:   Procedure Laterality Date    HYSTERECTOMY  1997    complete for prolapse, Abdominal    OOPHORECTOMY      right shoulder      SINUS SURGERY  2012       ALLERGIES AND MEDICATION:     Review of patient's allergies indicates:   Allergen Reactions    Iodine Hives     IVP Contrast    Tizanidine Swelling     Tongue swelling     Codeine Hives     Other reaction(s): Itching  Other reaction(s): Hives    Iodinated contrast media Hives     Other reaction(s): Hives    Sulfamethoxazole-trimethoprim Itching     Other reaction(s): Itching  Other reaction(s): Hives    Epinephrine Anxiety and Other (See Comments)     Almost passed out.        Medication List            Accurate as of March 26, 2025 10:51 AM. If you have any questions, ask your nurse or doctor.                START taking these medications      metoprolol succinate 50 MG 24 hr tablet  Commonly known as: TOPROL-XL  Take 1 tablet (50 mg total) by mouth once daily.  Started by: Sreedhar Richmond MD            CHANGE how you take these medications      atorvastatin 80 MG tablet  Commonly known as: LIPITOR  Take 1 tablet (80 mg total) by mouth once daily.  What changed:   medication strength  how much to take  when to take this  Changed by: Sreedhar Richmond MD            CONTINUE taking these medications      aspirin 81 MG EC tablet  Commonly known as: ECOTRIN     azelastine  137 mcg (0.1 %) nasal spray  Commonly known as: ASTELIN  1 spray (137 mcg total) by Nasal route 2 (two) times daily.     coQ10 (ubiquinol) 100 mg Cap     diazePAM 2 MG tablet  Commonly known as: VALIUM  Take 1 tablet (2 mg total) by mouth every 8 (eight) hours as needed (Vertigo).     estrogens (conjugated) 0.625 MG tablet  Commonly known as: PREMARIN  TAKE 1 TABLET(0.625 MG) BY MOUTH EVERY DAY     ezetimibe 10 mg tablet  Commonly known as: ZETIA  Take 1 tablet (10 mg total) by mouth once daily.     FERGON 240 (27 FE) MG tablet  Generic drug: ferrous gluconate     FLUoxetine 20 MG capsule  TAKE 1 CAPSULE(20 MG) BY MOUTH EVERY DAY     levothyroxine 175 MCG tablet  Commonly known as: SYNTHROID, LEVOTHROID  TAKE 1 TABLET(175 MCG) BY MOUTH BEFORE BREAKFAST     meclizine 25 mg tablet  Commonly known as: ANTIVERT  Take 1 tablet (25 mg total) by mouth 3 (three) times daily as needed for Dizziness (or at least one HS for 1 week when vertigo active).     metFORMIN 500 MG ER 24hr tablet  Commonly known as: GLUCOPHAGE-XR  Take 2 tablets (1,000 mg total) by mouth once daily.     mometasone 0.1% 0.1 % cream  Commonly known as: ELOCON  Use daily     omega-3 fatty acids-vitamin E 1,000 mg Cap     progesterone 100 MG capsule  Commonly known as: PROMETRIUM  Take 1 capsule (100 mg total) by mouth once daily.     RABEprazole 20 mg tablet  Commonly known as: ACIPHEX  TAKE 1 TABLET BY MOUTH TWICE DAILY     solifenacin 10 MG tablet  Commonly known as: VESICARE  Take 1 tablet (10 mg total) by mouth once daily.     valsartan 160 MG tablet  Commonly known as: DIOVAN  Take 1 tablet (160 mg total) by mouth once daily.     vitamin D 1000 units Tab  Commonly known as: VITAMIN D3            STOP taking these medications      metoprolol tartrate 50 MG tablet  Commonly known as: LOPRESSOR  Stopped by: Sreedhar Richmond MD               Where to Get Your Medications        These medications were sent to [id number] - [place]  [address] [facility]  [address], [place] [address]      Phone: [phone]   atorvastatin 80 MG tablet  metoprolol succinate 50 MG 24 hr tablet         SOCIAL HISTORY:     Social History     Socioeconomic History    Marital status:    Tobacco Use    Smoking status: Never    Smokeless tobacco: Never   Substance and Sexual Activity    Alcohol use: No    Drug use: No    Sexual activity: Yes     Partners: Male     Birth control/protection: Surgical   Social History Narrative     since 1986    Previous  for 8 years prior    He is a     She is a homemaker     Social Drivers of Health     Financial Resource Strain: Low Risk  (3/10/2025)    Overall Financial Resource Strain (CARDIA)     Difficulty of Paying Living Expenses: Not very hard   Food Insecurity: No Food Insecurity (3/10/2025)    Hunger Vital Sign     Worried About Running Out of Food in the Last Year: Never true     Ran Out of Food in the Last Year: Never true   Transportation Needs: No Transportation Needs (3/10/2025)    PRAPARE - Transportation     Lack of Transportation (Medical): No     Lack of Transportation (Non-Medical): No   Physical Activity: Insufficiently Active (3/10/2025)    Exercise Vital Sign     Days of Exercise per Week: 2 days     Minutes of Exercise per Session: 60 min   Stress: Stress Concern Present (3/10/2025)    [employer] [place] [employer] - Occupational Stress Questionnaire     Feeling of Stress : To some extent   Housing Stability: Low Risk  (3/10/2025)    Housing Stability Vital Sign     Unable to Pay for Housing in the Last Year: No     Number of Times Moved in the Last Year: 0     Homeless in the Last Year: No       FAMILY HISTORY:     Family History   Problem Relation Name Age of Onset    Breast cancer Cousin maternal 40    Miscarriages / Stillbirths Maternal Grandmother      Miscarriages / Stillbirths Mother      Diabetes Mother      Hypertension Mother       "Cancer Mother          cervical    Heart disease Father      Hypertension Father      Ovarian cancer Neg Hx         REVIEW OF SYSTEMS:   Review of Systems   Constitutional:  Negative for chills, diaphoresis, fever, malaise/fatigue and weight loss.   HENT:  Negative for congestion, hearing loss, sinus pain, sore throat and tinnitus.    Eyes:  Negative for blurred vision, double vision, photophobia, pain and discharge.   Respiratory:  Negative for cough, hemoptysis, sputum production, shortness of breath, wheezing and stridor.    Cardiovascular:  Positive for palpitations. Negative for chest pain, orthopnea, claudication, leg swelling and PND.   Gastrointestinal:  Negative for abdominal pain, blood in stool, heartburn, melena, nausea and vomiting.   Musculoskeletal:  Positive for back pain and neck pain. Negative for falls, joint pain and myalgias.   Neurological:  Positive for tingling. Negative for dizziness, tremors, sensory change, speech change, focal weakness, seizures, loss of consciousness, weakness and headaches.   Endo/Heme/Allergies:  Does not bruise/bleed easily.   Psychiatric/Behavioral:  Negative for depression, memory loss and substance abuse. The patient is not nervous/anxious.        PHYSICAL EXAM:     Vitals:    03/26/25 1027   BP: 134/74   Pulse: 75   Resp: 16        Body mass index is 27.85 kg/m².  Weight: 75.9 kg (167 lb 5.3 oz)   Height: 5' 5" (165.1 cm)     Physical Exam  Vitals reviewed.   Constitutional:       General: She is not in acute distress.     Appearance: She is well-developed. She is not diaphoretic.   HENT:      Head: Normocephalic.   Neck:      Vascular: No carotid bruit or JVD.   Cardiovascular:      Rate and Rhythm: Normal rate and regular rhythm.      Pulses: Normal pulses.      Heart sounds: Murmur heard.      Systolic murmur is present with a grade of 3/6.   Pulmonary:      Effort: Pulmonary effort is normal.      Breath sounds: Normal breath sounds.   Abdominal:      General: " Bowel sounds are normal.      Palpations: Abdomen is soft.      Tenderness: There is no abdominal tenderness.   Skin:     General: Skin is warm and dry.   Neurological:      Mental Status: She is alert and oriented to person, place, and time.   Psychiatric:         Speech: Speech normal.         Behavior: Behavior normal.         Thought Content: Thought content normal.         DATA:   EKG: (personally reviewed tracing)  02/21/2025- normal sinus rhythm, delayed r wave progression  7/3/20 - NSR  Laboratory:  CBC:  Recent Labs   Lab 04/22/23  0838 12/01/23  0750 02/21/25  0834   WBC 6.89 8.01 6.61   Hemoglobin 12.0 12.9 13.2   Hematocrit 38.4 40.0 41.9   Platelets 307 345 374       CHEMISTRIES:  Recent Labs   Lab 04/22/23  0838 12/01/23  0750 02/21/25  0834   Glucose 101 136 H 111 H   Sodium 139 139 138   Potassium 4.2 4.3 4.1   BUN 18 12 15   Creatinine 0.9 0.8 0.9   Calcium 8.9 9.3 9.2   Magnesium  --   --  1.8       CARDIAC BIOMARKERS:          COAGS:          LIPIDS/LFTS:  Recent Labs   Lab 04/22/23  0838 12/01/23  0750 01/05/24  1040 02/21/25  0834 03/08/25  1030   Cholesterol 202 H  --  188  --  193   Triglycerides 163 H  --  116  --  148   HDL 62  --  56  --  59   LDL Cholesterol 107.4  --  108.8  --  104.4   Non-HDL Cholesterol 140  --  132  --  134   AST 16 19  --  24  --    ALT 12 14  --  14  --        The 10-year ASCVD risk score (Ranulfo DK, et al., 2019) is: 19.8%    Values used to calculate the score:      Age: 70 years      Sex: Female      Is Non- : No      Diabetic: Yes      Tobacco smoker: No      Systolic Blood Pressure: 134 mmHg      Is BP treated: Yes      HDL Cholesterol: 59 mg/dL      Total Cholesterol: 193 mg/dL      Cardiovascular Testing:    Echocardiogram 09/18/2024:      Left Ventricle: The left ventricle is normal in size. Normal wall thickness. There is normal systolic function with a visually estimated ejection fraction of 55 - 60%. Grade I diastolic dysfunction.     Right Ventricle: Normal right ventricular cavity size. Systolic function is normal.    Pulmonary Artery: The estimated pulmonary artery systolic pressure is 30 mmHg.    Holter 09/18/2024:      NSR. Heart rates varied between 48 and 96 BPM with an average of 66 BPM.    There were very rare PVCs totalling 2 and averaging 0.02 per hour.    There were very rare PACs totalling 122 and averaging 1.27 per hour.    ASSESSMENT:     Palpitations  At risk for coronary artery disease  Hypertension  Hyperlipidemia:  atorvastatin 40  Pulmonary hypertension  Abnormal MRI brain    PLAN:     Palpitations: Change to Toprol 50 qd from tartrate  Artery disease: Nuclear stress  Hypertension:  Monitor.  Hyperlipidemia:  Increase atorvastatin to 80mg  Abnormal MRI brain: Carotid ultrasound.  Return to clinic 1 month.

## 2025-03-26 NOTE — PROGRESS NOTES
Ear, Nose, & Throat  Otolaryngology - Head & Neck Surgery      Subjective:     Chief Complaint:   Chief Complaint   Patient presents with    Dizziness       Anushka Napier is a 70 y.o. female who was referred to me by Dr. Rohan Velasco in consultation for dizziness.    She describes brief, positional, and episodic room-spinning vertigo which is not associated with fluctuating aural symptoms.     Presents with a 20+ year history of dysequillibrium and imbalance. Previous VNG decades ago negative for peripheral involvement; however, she responded to epley maneuver. Around 1 month ago she was seen in  for episode which started while rolling over during her sleep. Experienced a flash of light in the right eye, acute room spinning vertigo and right sided facial droop. MRI only show chronic ischemic changes. /97. Since then she has had daily episodes of positional vertigo with interim dysequilibrium and gait disturbance. Often associates these with bifrontal headaches, photophobia and phonophobia. Saw Dr Velasco in neurology for her ongoing gait disturbance. Suspected multifactorial etiology. Referred to ENT to r/o peripheral involvement. Prescribed valium and meclizine from her PCP.     First episode, rolled in sleep. Room spinning vertigo. Minutes  Minimum Duration: seconds  Maximum Duration: minutes  Triggers:  Rolling over left, sitting to lying    Otologic history: None        Past Medical History  Active Ambulatory Problems     Diagnosis Date Noted    Thyroid disease     Menopause 05/29/2014    Hypothyroidism 09/17/2014    Diabetes mellitus, type 2 09/17/2014    Hyperlipidemia associated with type 2 diabetes mellitus 09/25/2014    Hypertension 09/25/2014    GERD (gastroesophageal reflux disease) 09/25/2014    Screening for colorectal cancer 09/25/2014    Vitamin D deficiency disease 09/25/2014    Osteopenia 09/25/2014    Migraine without aura and without status migrainosus, not intractable      Left facial numbness 03/01/2018    Right lateral epicondylitis 08/01/2019    History of colonic polyps 01/24/2020    Chest pain 07/03/2020    Palpitations 07/03/2020    Closed nondisplaced fracture of proximal phalanx of left little finger with routine healing 04/01/2021    Decreased range of motion of finger of left hand 04/30/2021    OAB (overactive bladder) 04/26/2022    Tremor 04/12/2024    Pulmonary hypertension 10/02/2024    Gait instability 03/13/2025    Vertigo 03/13/2025    At high risk for coronary artery disease 03/26/2025    Abnormal finding on MRI of brain 03/26/2025     Resolved Ambulatory Problems     Diagnosis Date Noted    Severe sepsis 04/15/2020     Past Medical History:   Diagnosis Date    Allergy     Anxiety     Basal cell carcinoma     Depression     Diabetes mellitus     Diabetes mellitus type II, uncontrolled 9/17/2014    Diabetic retinopathy 01/14/2020    DM retinopathy     Hyperlipidemia        Past Surgical History  She has a past surgical history that includes right shoulder; Sinus surgery (2012); Hysterectomy (1997); and Oophorectomy.    Past Surgical History:   Procedure Laterality Date    HYSTERECTOMY  1997    complete for prolapse, Abdominal    OOPHORECTOMY      right shoulder      SINUS SURGERY  2012        Family History  Her family history includes Breast cancer (age of onset: 40) in her cousin; Cancer in her mother; Diabetes in her mother; Heart disease in her father; Hypertension in her father and mother; Miscarriages / Stillbirths in her maternal grandmother and mother.    Social History  She reports that she has never smoked. She has never used smokeless tobacco. She reports that she does not drink alcohol and does not use drugs.    Allergies  She is allergic to iodine, tizanidine, codeine, iodinated contrast media, sulfamethoxazole-trimethoprim, and epinephrine.    Medications  She has a current medication list which includes the following prescription(s): aspirin,  "atorvastatin, azelastine, coq10 (ubiquinol), diazepam, estrogens (conjugated), ezetimibe, ferrous gluconate, fluoxetine, levothyroxine, meclizine, metformin, metoprolol succinate, mometasone 0.1%, omega-3 fatty acids-vitamin e, progesterone, rabeprazole, solifenacin, valsartan, and vitamin d.    ROS:  Pertinent positive and negative review of systems as noted in HPI.     Objective:     /75 (BP Location: Left arm, Patient Position: Sitting)   Pulse 76   Ht 5' 5" (1.651 m)   BMI 27.85 kg/m²      ORTHOSTATICS  not performed    Constitutional: Well appearing, communicating. No acute distress  Eyes:    Pupils: Equal and reactive  EOM: Intact bilaterally  Head/Face: House Brackmann I Bilaterally.  Right Ear:    Auricle normally developed   EAC: normal   Tympanic membrane: intact   Middle Ear: No effusion present and [place] in normal position  Left Ear:    Auricle normally developed   EAC: normal   Tympanic membrane: intact   Middle Ear: No effusion present and [place] in normal position  Vestibular:   Spontaneous Nystagmus: none   Smooth Pursuit: grossly unremarkable   Saccades: grossly unremarkable     Gaze-Evoked Nystagmus: None   Head-Impulse: DNT   Fixation Suppression: DNT  Gait: Normal    [place]-Hallpike: Right turn, geotropic nystagmus    Passive Head Shake: DNT    Test of Skew: DNT   [place] Step Test: DNT  Neuro/Psychiatric:     Affect: Normal   Cognition: Appropriate  Cerebellar   Alternating Finger to Nose: DNT   Rapid Alternating Movements: DNT   Scanning Speech: Not Present  Proprioception   Romberg: DNT  Respiratory: No increased WOB, no stridor     Data Review:   LABS    I have independently reviewed the lab results shown above. Findings significant for the conditions being treated include: none    IMAGING    I have personally reviewed all pertinent for the patient which includes MRI Brain dated 2/21/2025. My personal findings include No evidence of temporal bone or retrocochlear " pathology    AUDIO    I have independently reviewed the following audiogram and reviewed the report. Findings as follows:     Pure Tone Audiometry: Symmetric  Right - Mild (26-40 dB) to Mild (26-40 dB) mixed hearing loss with 10 dB air-bone gap  Left - Mild (26-40 dB) to Mild (26-40 dB) mixed hearing loss with 10 dB air-bone gap    Tympanometry  Right: Type A  Left: Type A    Word Discrimination Score  Right: 100%  Left 100%      Procedures:     Procedure: Canalith Repositioning Maneuver [id number]     Pre procedure Diagnosis:  Benign paroxysmal positional vertigo, right     Post procedure Diagnosis: same     Response: Resolution of Vertigo      Procedure: After verbal consent was obtained, the patient's head was turned to the right and laid supine.  This position and each subsequent position was held for 30 seconds.  The patient's head was then turned to the contralateral side.  The patient was then rolled onto the contralateral side maintaining the position of their head over the shoulder.  Lastly the patient was placed in a sitting position while maintaining their head positioning.  Lastly there head was turned to a neutral position.     The patient tolerated the procedure well and without complaint.      Assessment:     1. Gait instability    2. Vertigo      Plan:     I had a long discussion with the patient regarding her condition and the further workup and management options.  Seemed to respond well to epley maneuver x2 today. Certainly her history makes vestibular migraine a consideration as well. Will try home epleys. Refer to VT if not responding. Discussed appropriate use of meclizine. Will discontinue valium in this chronically dizzy patient. She will reach out to me via the patient portal to regarding her response.             Voice recognition software was used in the creation of this note/communication and any sound-alike errors which may have occurred from its use should be taken in context when  interpreting. If such errors prevent a clear understanding of the note/communication, please contact the office for clarification.     No orders of the defined types were placed in this encounter.

## 2025-03-26 NOTE — PROGRESS NOTES
Please click on link to view Audiogram:  Document on 3/26/2025 1:16 PM by Kim Dean AU.D: Audiogram    Ms. Anushka Napier, a 70 y.o. female, was seen in the clinic today for an audiological evaluation. Ms. Napier's main complaint was dizziness.    Otoscopy clear bilaterally. Tympanometry testing revealed a Type A tympanogram for the right ear and a Type A tympanogram for the left ear. Ipsilateral acoustic reflexes were present at all tested frequencies for the right ear and were present at 500-1000 Hz for the left ear.    Audiological testing revealed a mild sensorineural hearing loss (SNHL) bilaterally. A speech reception threshold was obtained at 35 dBHL for the right ear and at 35 dBHL for the left ear. Speech discrimination was 100% for the right ear and 100% for the left ear.      Recommendations:  1. Otologic evaluation  2. Annual audiological evaluation, sooner if medically necessary or if hearing changes  3. Hearing protection when in noise   4. Hearing aid consultation following medical clearance if Ms. Napier feels hearing loss negatively impacts quality of life

## 2025-03-29 ENCOUNTER — PATIENT MESSAGE (OUTPATIENT)
Dept: OTOLARYNGOLOGY | Facility: CLINIC | Age: 70
End: 2025-03-29
Payer: COMMERCIAL

## 2025-04-01 DIAGNOSIS — R42 VERTIGO: Primary | ICD-10-CM

## 2025-04-04 ENCOUNTER — PATIENT OUTREACH (OUTPATIENT)
Dept: ADMINISTRATIVE | Facility: HOSPITAL | Age: 70
End: 2025-04-04
Payer: COMMERCIAL

## 2025-04-07 ENCOUNTER — PATIENT OUTREACH (OUTPATIENT)
Dept: ADMINISTRATIVE | Facility: HOSPITAL | Age: 70
End: 2025-04-07
Payer: COMMERCIAL

## 2025-04-07 DIAGNOSIS — N95.1 SYMPTOMATIC MENOPAUSAL OR FEMALE CLIMACTERIC STATES: ICD-10-CM

## 2025-04-07 NOTE — TELEPHONE ENCOUNTER
Refill Decision Note   Anushkajono Napier  is requesting a refill authorization.  Brief Assessment and Rationale for Refill:  Approve     Medication Therapy Plan:  FOV in 3 weeks      Comments:     Note composed:11:13 AM 04/07/2025

## 2025-04-15 ENCOUNTER — HOSPITAL ENCOUNTER (EMERGENCY)
Facility: HOSPITAL | Age: 70
Discharge: HOME | End: 2025-04-15
Attending: EMERGENCY MEDICINE
Payer: COMMERCIAL

## 2025-04-15 VITALS
HEIGHT: 65 IN | WEIGHT: 160 LBS | SYSTOLIC BLOOD PRESSURE: 143 MMHG | BODY MASS INDEX: 26.66 KG/M2 | TEMPERATURE: 99 F | RESPIRATION RATE: 17 BRPM | HEART RATE: 63 BPM | OXYGEN SATURATION: 96 % | DIASTOLIC BLOOD PRESSURE: 81 MMHG

## 2025-04-15 DIAGNOSIS — H00.014 HORDEOLUM EXTERNUM OF LEFT UPPER EYELID: Primary | ICD-10-CM

## 2025-04-15 PROCEDURE — 25000003 PHARM REV CODE 250: Mod: ER | Performed by: EMERGENCY MEDICINE

## 2025-04-15 PROCEDURE — 99284 EMERGENCY DEPT VISIT MOD MDM: CPT | Mod: ER

## 2025-04-15 RX ORDER — ERYTHROMYCIN 5 MG/G
OINTMENT OPHTHALMIC
Qty: 3.5 G | Refills: 0 | Status: SHIPPED | OUTPATIENT
Start: 2025-04-15

## 2025-04-15 RX ORDER — ERYTHROMYCIN 5 MG/G
OINTMENT OPHTHALMIC
Status: COMPLETED | OUTPATIENT
Start: 2025-04-15 | End: 2025-04-15

## 2025-04-15 RX ORDER — ERYTHROMYCIN 5 MG/G
OINTMENT OPHTHALMIC
Qty: 3.5 G | Refills: 0 | Status: SHIPPED | OUTPATIENT
Start: 2025-04-15 | End: 2025-04-15 | Stop reason: CLARIF

## 2025-04-15 RX ADMIN — ERYTHROMYCIN: 5 OINTMENT OPHTHALMIC at 07:04

## 2025-04-15 NOTE — ED PROVIDER NOTES
"Encounter Date: 4/15/2025    SCRIBE #1 NOTE: I, Thelma Michel, am scribing for, and in the presence of,  Sheridan Lewis MD. I have scribed the following portions of the note - Other sections scribed: HPI,ROS,PE,MDM.       History     Chief Complaint   Patient presents with    Eye Problem     REPORTS STYE DEVELOPED OVER THE WEEKEND TO LEFT EYE. NOW ENTIRE LEFT UPPER EYE LID IS SWOLLEN AND RED. HAS BEEN USING AN OTC LUBRICANT DROP WITH NO RELIEF.      Anushka Napier is a 70 y.o. female, with a PMHx of GERD,T2DM,HLD,HTN, who presents to the ED with complaint of stye to left upper eyelid that began 2 days ago. Patient reports slight "blurred" vision secondary to eye lid swelling. Reports attempted treatment with OTC eye drops and lubricates. No other exacerbating or alleviating factors. Patient denies any other complaints at this time. She reports known drug allergies to sulfa, epinephrine, iodine, and codeine.        The history is provided by the patient. No  was used.     Review of patient's allergies indicates:   Allergen Reactions    Iodine Hives     IVP Contrast    Tizanidine Swelling     Tongue swelling     Codeine Hives     Other reaction(s): Itching  Other reaction(s): Hives    Iodinated contrast media Hives     Other reaction(s): Hives    Sulfamethoxazole-trimethoprim Itching     Other reaction(s): Itching  Other reaction(s): Hives    Epinephrine Anxiety and Other (See Comments)     Almost passed out.     Past Medical History:   Diagnosis Date    Allergy     Anxiety     Basal cell carcinoma     to face    Depression     Diabetes mellitus     Diabetes mellitus type II, uncontrolled 9/17/2014    Diabetic retinopathy 01/14/2020    DM retinopathy     GERD (gastroesophageal reflux disease) 9/25/2014    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 Dr Correia, long term PPI use    History of colonic polyps 1/24/2020    Hyperlipidemia     Hypertension     Osteopenia 9/25/2014    Screening for colorectal cancer " 9/25/2014    Normal 2009    Screening for colorectal cancer     Thyroid disease     Vitamin D deficiency disease 9/25/2014     Past Surgical History:   Procedure Laterality Date    HYSTERECTOMY  1997    complete for prolapse, Abdominal    OOPHORECTOMY      right shoulder      SINUS SURGERY  2012     Family History   Problem Relation Name Age of Onset    Breast cancer Cousin maternal 40    Miscarriages / Stillbirths Maternal Grandmother      Miscarriages / Stillbirths Mother      Diabetes Mother      Hypertension Mother      Cancer Mother          cervical    Heart disease Father      Hypertension Father      Ovarian cancer Neg Hx       Social History[1]  Review of Systems   Constitutional:  Negative for activity change, appetite change, chills and fever.   HENT:  Negative for congestion, rhinorrhea, sneezing and sore throat.    Eyes:  Positive for visual disturbance (slight; 2/2 eye lid swelling).        (+) Stye to left upper eyelid      Respiratory:  Negative for cough, choking, shortness of breath and wheezing.    Cardiovascular:  Negative for chest pain and palpitations.   Gastrointestinal:  Negative for abdominal pain, diarrhea, nausea and vomiting.   Neurological:  Negative for dizziness, syncope, light-headedness and headaches.   All other systems reviewed and are negative.      Physical Exam     Initial Vitals [04/15/25 0623]   BP Pulse Resp Temp SpO2   (!) 143/81 63 17 98.6 °F (37 °C) 96 %      MAP       --         Physical Exam    Nursing note and vitals reviewed.  Constitutional: She appears well-developed and well-nourished. No distress.   HENT:   Head: Normocephalic and atraumatic.   Eyes: Conjunctivae and EOM are normal. Pupils are equal, round, and reactive to light.   Stye present to middle left upper eyelid with upper eyelid erythema/edema. Extraocular motions intact.    Neck:   Normal range of motion.  Cardiovascular:  Normal rate, regular rhythm and normal heart sounds.           No murmur  heard.  Pulmonary/Chest: Breath sounds normal. No respiratory distress.   Abdominal: Bowel sounds are normal. She exhibits no distension.   Musculoskeletal:         General: Normal range of motion.      Cervical back: Normal range of motion.     Neurological: She is alert and oriented to person, place, and time.   Skin: Skin is warm and dry.   Psychiatric: She has a normal mood and affect. Her behavior is normal.         ED Course   Procedures  Labs Reviewed - No data to display       Imaging Results    None          Medications   erythromycin 5 mg/gram (0.5 %) ophthalmic ointment ( Left Eye Given 4/15/25 0716)     Medical Decision Making  70F with a PMHx of GERD,T2DM,HLD,HTN, presents with complaint of stye to left upper eyelid that began 2 days ago. On exam, no fever, heart and lung sounds normal, there is a  stye to the middle left upper eyelid with left upper eyelid edema, extraocular motions intact. In shared decision making with the patient I will prescribed erythromycin ophthalmic ointment.       Risk  Prescription drug management.            Scribe Attestation:   Scribe #1: I performed the above scribed service and the documentation accurately describes the services I performed. I attest to the accuracy of the note.                             I, Dr. Sheridan Lewis, personally performed the services described in this documentation.   All medical record entries made by the scribe were at my direction and in my presence.   I have reviewed the chart and agree that the record is accurate and complete.   Sheridan Lewis MD.  7:07 AM 04/15/2025       Clinical Impression:  Final diagnoses:  [H00.014] Hordeolum externum of left upper eyelid (Primary)          ED Disposition Condition    Discharge Stable          ED Prescriptions       Medication Sig Dispense Start Date End Date Auth. Provider    erythromycin (ROMYCIN) ophthalmic ointment  (Status: Discontinued) Place a 1/2 inch ribbon of ointment into the left lower eyelid at  bedtime. 3.5 g 4/15/2025 4/15/2025 Sheridan Lewis MD    erythromycin (ROMYCIN) ophthalmic ointment Place a 1/2 inch ribbon of ointment into the inner left lower eyelid at bedtime 3.5 g 4/15/2025 -- Sheridan Lewis MD          Follow-up Information    None              [1]   Social History  Tobacco Use    Smoking status: Never    Smokeless tobacco: Never   Substance Use Topics    Alcohol use: No    Drug use: No        Sheridan Lewis MD  04/15/25 0828

## 2025-04-15 NOTE — DISCHARGE INSTRUCTIONS
Apply ointment to upper lid 2-3 times daily. Apply warm compresses. Apply ointment to inner lower lid at bedtime as well.

## 2025-04-16 ENCOUNTER — HOSPITAL ENCOUNTER (OUTPATIENT)
Dept: RADIOLOGY | Facility: HOSPITAL | Age: 70
Discharge: HOME | End: 2025-04-16
Attending: INTERNAL MEDICINE
Payer: COMMERCIAL

## 2025-04-16 ENCOUNTER — HOSPITAL ENCOUNTER (OUTPATIENT)
Dept: CARDIOLOGY | Facility: HOSPITAL | Age: 70
Discharge: HOME | End: 2025-04-16
Attending: INTERNAL MEDICINE
Payer: COMMERCIAL

## 2025-04-16 ENCOUNTER — RESULTS FOLLOW-UP (OUTPATIENT)
Dept: CARDIOLOGY | Facility: CLINIC | Age: 70
End: 2025-04-16

## 2025-04-16 DIAGNOSIS — I10 PRIMARY HYPERTENSION: ICD-10-CM

## 2025-04-16 DIAGNOSIS — E78.5 HYPERLIPIDEMIA ASSOCIATED WITH TYPE 2 DIABETES MELLITUS: ICD-10-CM

## 2025-04-16 DIAGNOSIS — Z91.89 AT HIGH RISK FOR CORONARY ARTERY DISEASE: ICD-10-CM

## 2025-04-16 DIAGNOSIS — I27.20 PULMONARY HYPERTENSION: ICD-10-CM

## 2025-04-16 DIAGNOSIS — E11.69 HYPERLIPIDEMIA ASSOCIATED WITH TYPE 2 DIABETES MELLITUS: ICD-10-CM

## 2025-04-16 DIAGNOSIS — R90.89 ABNORMAL FINDING ON MRI OF BRAIN: ICD-10-CM

## 2025-04-16 LAB
CV STRESS BASE HR: 62 BPM
DIASTOLIC BLOOD PRESSURE: 92 MMHG
LEFT CBA DIAS: 14 CM/S
LEFT CBA SYS: 67 CM/S
LEFT CCA DIST DIAS: 15 CM/S
LEFT CCA DIST SYS: 81 CM/S
LEFT CCA MID DIAS: 12 CM/S
LEFT CCA MID SYS: 90 CM/S
LEFT CCA PROX DIAS: 11 CM/S
LEFT CCA PROX SYS: 83 CM/S
LEFT ECA DIAS: 6 CM/S
LEFT ECA SYS: 79 CM/S
LEFT ICA DIST DIAS: 25 CM/S
LEFT ICA DIST SYS: 78 CM/S
LEFT ICA MID DIAS: 16 CM/S
LEFT ICA MID SYS: 49 CM/S
LEFT ICA PROX DIAS: 14 CM/S
LEFT ICA PROX SYS: 75 CM/S
LEFT VERTEBRAL DIAS: 9 CM/S
LEFT VERTEBRAL SYS: 56 CM/S
NUC STRESS EJECTION FRACTION: 88 %
OHS CV CAROTID RIGHT ICA EDV HIGHEST: 30
OHS CV CAROTID ULTRASOUND LEFT ICA/CCA RATIO: 0.96
OHS CV CAROTID ULTRASOUND RIGHT ICA/CCA RATIO: 1.61
OHS CV CPX 85 PERCENT MAX PREDICTED HEART RATE MALE: 129
OHS CV CPX MAX PREDICTED HEART RATE: 152
OHS CV CPX PATIENT IS FEMALE: 1
OHS CV CPX PATIENT IS MALE: 0
OHS CV CPX PEAK DIASTOLIC BLOOD PRESSURE: 94 MMHG
OHS CV CPX PEAK HEAR RATE: 94 BPM
OHS CV CPX PEAK RATE PRESSURE PRODUCT: NORMAL
OHS CV CPX PEAK SYSTOLIC BLOOD PRESSURE: 177 MMHG
OHS CV CPX PERCENT MAX PREDICTED HEART RATE ACHIEVED: 64
OHS CV CPX RATE PRESSURE PRODUCT PRESENTING: NORMAL
OHS CV INITIAL DOSE: 10.4 MCG/KG/MIN
OHS CV PEAK DOSE: 30.7 MCG/KG/MIN
OHS CV PV CAROTID LEFT HIGHEST CCA: 90
OHS CV PV CAROTID LEFT HIGHEST ICA: 78
OHS CV PV CAROTID RIGHT HIGHEST CCA: 100
OHS CV PV CAROTID RIGHT HIGHEST ICA: 108
OHS CV US CAROTID LEFT HIGHEST EDV: 25
RIGHT CBA DIAS: 11 CM/S
RIGHT CBA SYS: 56 CM/S
RIGHT CCA DIST DIAS: 11 CM/S
RIGHT CCA DIST SYS: 67 CM/S
RIGHT CCA MID DIAS: 11 CM/S
RIGHT CCA MID SYS: 63 CM/S
RIGHT CCA PROX DIAS: 9 CM/S
RIGHT CCA PROX SYS: 100 CM/S
RIGHT ECA DIAS: 5 CM/S
RIGHT ECA SYS: 119 CM/S
RIGHT ICA DIST DIAS: 30 CM/S
RIGHT ICA DIST SYS: 108 CM/S
RIGHT ICA MID DIAS: 19 CM/S
RIGHT ICA MID SYS: 74 CM/S
RIGHT ICA PROX DIAS: 9 CM/S
RIGHT ICA PROX SYS: 60 CM/S
RIGHT VERTEBRAL DIAS: 11 CM/S
RIGHT VERTEBRAL SYS: 52 CM/S
SYSTOLIC BLOOD PRESSURE: 197 MMHG

## 2025-04-16 PROCEDURE — 78452 HT MUSCLE IMAGE SPECT MULT: CPT | Mod: 26,,, | Performed by: INTERNAL MEDICINE

## 2025-04-16 PROCEDURE — 93018 CV STRESS TEST I&R ONLY: CPT | Mod: ,,, | Performed by: INTERNAL MEDICINE

## 2025-04-16 PROCEDURE — 63600175 PHARM REV CODE 636 W HCPCS: Performed by: INTERNAL MEDICINE

## 2025-04-16 PROCEDURE — 93880 EXTRACRANIAL BILAT STUDY: CPT

## 2025-04-16 PROCEDURE — A9502 TC99M TETROFOSMIN: HCPCS | Performed by: INTERNAL MEDICINE

## 2025-04-16 PROCEDURE — 93017 CV STRESS TEST TRACING ONLY: CPT

## 2025-04-16 PROCEDURE — 93880 EXTRACRANIAL BILAT STUDY: CPT | Mod: 26,,, | Performed by: INTERNAL MEDICINE

## 2025-04-16 PROCEDURE — 78452 HT MUSCLE IMAGE SPECT MULT: CPT

## 2025-04-16 PROCEDURE — 93016 CV STRESS TEST SUPVJ ONLY: CPT | Mod: ,,, | Performed by: INTERNAL MEDICINE

## 2025-04-16 RX ORDER — REGADENOSON 0.08 MG/ML
0.4 INJECTION, SOLUTION INTRAVENOUS ONCE
Status: COMPLETED | OUTPATIENT
Start: 2025-04-16 | End: 2025-04-16

## 2025-04-16 RX ADMIN — TETROFOSMIN 10.4 MILLICURIE: 1.38 INJECTION, POWDER, LYOPHILIZED, FOR SOLUTION INTRAVENOUS at 07:04

## 2025-04-16 RX ADMIN — REGADENOSON 0.4 MG: 0.08 INJECTION, SOLUTION INTRAVENOUS at 08:04

## 2025-04-16 RX ADMIN — TETROFOSMIN 30.7 MILLICURIE: 1.38 INJECTION, POWDER, LYOPHILIZED, FOR SOLUTION INTRAVENOUS at 08:04

## 2025-04-23 ENCOUNTER — PATIENT MESSAGE (OUTPATIENT)
Dept: FAMILY MEDICINE | Facility: CLINIC | Age: 70
End: 2025-04-23
Payer: COMMERCIAL

## 2025-04-23 ENCOUNTER — TELEPHONE (OUTPATIENT)
Dept: PHARMACY | Facility: CLINIC | Age: 70
End: 2025-04-23
Payer: COMMERCIAL

## 2025-04-23 NOTE — TELEPHONE ENCOUNTER
[facility]/Population Health Chart Review & Patient Outreach Details For Medication Adherence Project    Reason for Outreach Encounter: 3rd Party payor non-compliance report ([employer], [employer], C, etc)  2.  Patient Outreach Method: Reviewed Patient Chart  3.   Medication in question: atorvastatin   LAST FILLED: 3/26/25 for 90 day supply  Hyperlipidemia Medications              atorvastatin (LIPITOR) 80 MG tablet Take 1 tablet (80 mg total) by mouth once daily.    ezetimibe (ZETIA) 10 mg tablet Take 1 tablet (10 mg total) by mouth once daily.    omega-3 fatty acids-vitamin E 1,000 mg Cap Take 1 capsule by mouth Twice daily. 1 Capsule Oral Twice a day                4.  Reviewed and or Updates Made To: Patient Chart  5. Outreach Outcomes and/or actions taken: Patient filled medication and is on track to be adherent

## 2025-04-29 ENCOUNTER — OFFICE VISIT (OUTPATIENT)
Dept: CARDIOLOGY | Facility: CLINIC | Age: 70
End: 2025-04-29
Payer: COMMERCIAL

## 2025-04-29 ENCOUNTER — OFFICE VISIT (OUTPATIENT)
Dept: OBSTETRICS AND GYNECOLOGY | Facility: CLINIC | Age: 70
End: 2025-04-29
Payer: COMMERCIAL

## 2025-04-29 VITALS
BODY MASS INDEX: 27.92 KG/M2 | DIASTOLIC BLOOD PRESSURE: 72 MMHG | HEIGHT: 65 IN | WEIGHT: 167.56 LBS | SYSTOLIC BLOOD PRESSURE: 140 MMHG

## 2025-04-29 VITALS
WEIGHT: 166 LBS | DIASTOLIC BLOOD PRESSURE: 61 MMHG | OXYGEN SATURATION: 96 % | SYSTOLIC BLOOD PRESSURE: 132 MMHG | HEIGHT: 65 IN | BODY MASS INDEX: 27.66 KG/M2 | HEART RATE: 60 BPM | RESPIRATION RATE: 16 BRPM

## 2025-04-29 DIAGNOSIS — R90.89 ABNORMAL FINDING ON MRI OF BRAIN: ICD-10-CM

## 2025-04-29 DIAGNOSIS — E11.69 HYPERLIPIDEMIA ASSOCIATED WITH TYPE 2 DIABETES MELLITUS: ICD-10-CM

## 2025-04-29 DIAGNOSIS — E11.9 TYPE 2 DIABETES MELLITUS WITHOUT COMPLICATION, UNSPECIFIED WHETHER LONG TERM INSULIN USE: ICD-10-CM

## 2025-04-29 DIAGNOSIS — Z51.81 ENCOUNTER FOR MONITORING POSTMENOPAUSAL ESTROGEN REPLACEMENT THERAPY: ICD-10-CM

## 2025-04-29 DIAGNOSIS — N95.1 SYMPTOMATIC MENOPAUSAL OR FEMALE CLIMACTERIC STATES: ICD-10-CM

## 2025-04-29 DIAGNOSIS — R00.2 PALPITATIONS: Primary | ICD-10-CM

## 2025-04-29 DIAGNOSIS — I10 PRIMARY HYPERTENSION: ICD-10-CM

## 2025-04-29 DIAGNOSIS — Z01.419 WELL WOMAN EXAM WITH ROUTINE GYNECOLOGICAL EXAM: Primary | ICD-10-CM

## 2025-04-29 DIAGNOSIS — Z79.890 ENCOUNTER FOR MONITORING POSTMENOPAUSAL ESTROGEN REPLACEMENT THERAPY: ICD-10-CM

## 2025-04-29 DIAGNOSIS — Z78.0 MENOPAUSE: ICD-10-CM

## 2025-04-29 DIAGNOSIS — E78.5 HYPERLIPIDEMIA ASSOCIATED WITH TYPE 2 DIABETES MELLITUS: ICD-10-CM

## 2025-04-29 DIAGNOSIS — I27.20 PULMONARY HYPERTENSION: ICD-10-CM

## 2025-04-29 PROCEDURE — 3051F HG A1C>EQUAL 7.0%<8.0%: CPT | Mod: CPTII,S$GLB,, | Performed by: OBSTETRICS & GYNECOLOGY

## 2025-04-29 PROCEDURE — 4010F ACE/ARB THERAPY RXD/TAKEN: CPT | Mod: CPTII,S$GLB,, | Performed by: OBSTETRICS & GYNECOLOGY

## 2025-04-29 PROCEDURE — 1101F PT FALLS ASSESS-DOCD LE1/YR: CPT | Mod: CPTII,S$GLB,, | Performed by: INTERNAL MEDICINE

## 2025-04-29 PROCEDURE — 3077F SYST BP >= 140 MM HG: CPT | Mod: CPTII,S$GLB,, | Performed by: OBSTETRICS & GYNECOLOGY

## 2025-04-29 PROCEDURE — 3288F FALL RISK ASSESSMENT DOCD: CPT | Mod: CPTII,S$GLB,, | Performed by: INTERNAL MEDICINE

## 2025-04-29 PROCEDURE — 3008F BODY MASS INDEX DOCD: CPT | Mod: CPTII,S$GLB,, | Performed by: OBSTETRICS & GYNECOLOGY

## 2025-04-29 PROCEDURE — 3075F SYST BP GE 130 - 139MM HG: CPT | Mod: CPTII,S$GLB,, | Performed by: INTERNAL MEDICINE

## 2025-04-29 PROCEDURE — 3008F BODY MASS INDEX DOCD: CPT | Mod: CPTII,S$GLB,, | Performed by: INTERNAL MEDICINE

## 2025-04-29 PROCEDURE — 3051F HG A1C>EQUAL 7.0%<8.0%: CPT | Mod: CPTII,S$GLB,, | Performed by: INTERNAL MEDICINE

## 2025-04-29 PROCEDURE — 99214 OFFICE O/P EST MOD 30 MIN: CPT | Mod: S$GLB,,, | Performed by: INTERNAL MEDICINE

## 2025-04-29 PROCEDURE — 99999 PR PBB SHADOW E&M-EST. PATIENT-LVL IV: CPT | Mod: PBBFAC,,, | Performed by: OBSTETRICS & GYNECOLOGY

## 2025-04-29 PROCEDURE — 3288F FALL RISK ASSESSMENT DOCD: CPT | Mod: CPTII,S$GLB,, | Performed by: OBSTETRICS & GYNECOLOGY

## 2025-04-29 PROCEDURE — 1101F PT FALLS ASSESS-DOCD LE1/YR: CPT | Mod: CPTII,S$GLB,, | Performed by: OBSTETRICS & GYNECOLOGY

## 2025-04-29 PROCEDURE — 1159F MED LIST DOCD IN RCRD: CPT | Mod: CPTII,S$GLB,, | Performed by: INTERNAL MEDICINE

## 2025-04-29 PROCEDURE — 1126F AMNT PAIN NOTED NONE PRSNT: CPT | Mod: CPTII,S$GLB,, | Performed by: OBSTETRICS & GYNECOLOGY

## 2025-04-29 PROCEDURE — 1159F MED LIST DOCD IN RCRD: CPT | Mod: CPTII,S$GLB,, | Performed by: OBSTETRICS & GYNECOLOGY

## 2025-04-29 PROCEDURE — 1126F AMNT PAIN NOTED NONE PRSNT: CPT | Mod: CPTII,S$GLB,, | Performed by: INTERNAL MEDICINE

## 2025-04-29 PROCEDURE — 4010F ACE/ARB THERAPY RXD/TAKEN: CPT | Mod: CPTII,S$GLB,, | Performed by: INTERNAL MEDICINE

## 2025-04-29 PROCEDURE — 3078F DIAST BP <80 MM HG: CPT | Mod: CPTII,S$GLB,, | Performed by: OBSTETRICS & GYNECOLOGY

## 2025-04-29 PROCEDURE — 1160F RVW MEDS BY RX/DR IN RCRD: CPT | Mod: CPTII,S$GLB,, | Performed by: OBSTETRICS & GYNECOLOGY

## 2025-04-29 PROCEDURE — 3078F DIAST BP <80 MM HG: CPT | Mod: CPTII,S$GLB,, | Performed by: INTERNAL MEDICINE

## 2025-04-29 PROCEDURE — 99397 PER PM REEVAL EST PAT 65+ YR: CPT | Mod: S$GLB,,, | Performed by: OBSTETRICS & GYNECOLOGY

## 2025-04-29 PROCEDURE — 99999 PR PBB SHADOW E&M-EST. PATIENT-LVL V: CPT | Mod: PBBFAC,,, | Performed by: INTERNAL MEDICINE

## 2025-04-29 NOTE — PROGRESS NOTES
CARDIOLOGY CLINIC VISIT      HISTORY OF PRESENT ILLNESS:     07/06/2020: Anushka Napier presents for evaluation of palpitations.  Last week she had noted some double vision in her right eye.  Vision was blurry.  Spontaneous resolution.  A day or two after she noted palpitations.  She states that she has had a history what she calls her heart fluttering.  However in this episode was more intense. Lasted a few hours.  Afterwards she had some chest discomfort, describes it as if she had worked out. Pressure sensation across her chest.  No history of coronary artery disease. CV RF HTN, HLP, DM. Nl tsh from 1/20.  EKG from 7/3/20 - NSR.     09/04/2024: She has recently noticed recurrence of palpitations. Daily. Describes as fluttering sensation. Similar but more frequent when to compared to episodes a few years ago. She also notices lower extremity weakness. Feels that she has difficulty walking. Feels her gait is off. Numbness and tingling in her face. One episode of jaw pain. Not associated with palpitations.    10/02/2024:  Echocardiogram shows ejection fraction of 55-60%. Mild pulmonary hypertension. Holter showed normal sinus rhythm.  Average heart rate 66 beats per minute.  Very rare PVCs and PACs.  She did have some palpitations while wearing the monitor.  Has not seen Neurology yet.  Still with some difficulty ambulating.  Notes numbness/tingling.  Orthostatics today negative.    03/26/2025: Trip to ED on 2/21/2025. Vision changes. Concern for facial droop. LOC/syncopal event a few days prior to presentation. She states it was a fall. Blood pressure was 208/97. P 64. Also complains of dizziness. Vertigo like symptoms. EKG showed normal sinus rhythm. Delayed r wave progression. Troponin, bnp wnl. Last A1c 7.1. MRI brain showed moderate chronic small vessel ischemic changes.    04/29/2025:  Nuclear stress was negative for ischemia or infarction.  Carotid ultrasound showed 20-39% right internal carotid stenosis.   0-19% left internal carotid stenosis.  Bilateral antegrade vertebral arteries. Has to have surgery for hiatal hernia. Palpitations have lessened.     CARDIOVASCULAR HISTORY:     Pulmonary hypertension    PAST MEDICAL HISTORY:     Past Medical History:   Diagnosis Date    Allergy     Anxiety     Basal cell carcinoma     to face    Depression     Diabetes mellitus     Diabetes mellitus type II, uncontrolled 9/17/2014    Diabetic retinopathy 01/14/2020    DM retinopathy     GERD (gastroesophageal reflux disease) 9/25/2014    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 Dr Correia, long term PPI use    History of colonic polyps 1/24/2020    Hyperlipidemia     Hypertension     Osteopenia 9/25/2014    Screening for colorectal cancer 9/25/2014    Normal 2009    Screening for colorectal cancer     Thyroid disease     Vitamin D deficiency disease 9/25/2014       PAST SURGICAL HISTORY:     Past Surgical History:   Procedure Laterality Date    HYSTERECTOMY  1997    complete for prolapse, Abdominal    OOPHORECTOMY      right shoulder      SINUS SURGERY  2012       ALLERGIES AND MEDICATION:     Review of patient's allergies indicates:   Allergen Reactions    Iodine Hives     IVP Contrast    Tizanidine Swelling     Tongue swelling     Codeine Hives     Other reaction(s): Itching  Other reaction(s): Hives    Iodinated contrast media Hives     Other reaction(s): Hives    Sulfamethoxazole-trimethoprim Itching     Other reaction(s): Itching  Other reaction(s): Hives    Epinephrine Anxiety and Other (See Comments)     Almost passed out.        Medication List            Accurate as of April 29, 2025 11:03 AM. If you have any questions, ask your nurse or doctor.                CONTINUE taking these medications      aspirin 81 MG EC tablet  Commonly known as: ECOTRIN     atorvastatin 80 MG tablet  Commonly known as: LIPITOR  Take 1 tablet (80 mg total) by mouth once daily.     azelastine 137 mcg (0.1 %) nasal spray  Commonly known as: ASTELIN  1 spray  (137 mcg total) by Nasal route 2 (two) times daily.     coQ10 (ubiquinol) 100 mg Cap     diazePAM 2 MG tablet  Commonly known as: VALIUM  Take 1 tablet (2 mg total) by mouth every 8 (eight) hours as needed (Vertigo).     erythromycin ophthalmic ointment  Commonly known as: ROMYCIN  Place a 1/2 inch ribbon of ointment into the inner left lower eyelid at bedtime     estrogens (conjugated) 0.625 MG tablet  Commonly known as: PREMARIN  TAKE 1 TABLET(0.625 MG) BY MOUTH EVERY DAY     ezetimibe 10 mg tablet  Commonly known as: ZETIA  Take 1 tablet (10 mg total) by mouth once daily.     FERGON 240 (27 FE) MG tablet  Generic drug: ferrous gluconate     FLUoxetine 20 MG capsule  TAKE 1 CAPSULE(20 MG) BY MOUTH EVERY DAY     levothyroxine 175 MCG tablet  Commonly known as: SYNTHROID, LEVOTHROID  TAKE 1 TABLET(175 MCG) BY MOUTH BEFORE BREAKFAST     meclizine 25 mg tablet  Commonly known as: ANTIVERT  Take 1 tablet (25 mg total) by mouth 3 (three) times daily as needed for Dizziness (or at least one HS for 1 week when vertigo active).     metFORMIN 500 MG ER 24hr tablet  Commonly known as: GLUCOPHAGE-XR  Take 2 tablets (1,000 mg total) by mouth once daily.     metoprolol succinate 50 MG 24 hr tablet  Commonly known as: TOPROL-XL  Take 1 tablet (50 mg total) by mouth once daily.     mometasone 0.1% 0.1 % cream  Commonly known as: ELOCON  Use daily     omega-3 fatty acids-vitamin E 1,000 mg Cap     progesterone 100 MG capsule  Commonly known as: PROMETRIUM  Take 1 capsule (100 mg total) by mouth once daily.     RABEprazole 20 mg tablet  Commonly known as: ACIPHEX  TAKE 1 TABLET BY MOUTH TWICE DAILY     solifenacin 10 MG tablet  Commonly known as: VESICARE  Take 1 tablet (10 mg total) by mouth once daily.     valsartan 160 MG tablet  Commonly known as: DIOVAN  Take 1 tablet (160 mg total) by mouth once daily.     vitamin D 1000 units Tab  Commonly known as: VITAMIN D3               Where to Get Your Medications        These  medications were sent to [id number] - [place], [place] - [address] [place] [id number] [facility] [place] [address]      Phone: [phone]   estrogens (conjugated) 0.625 MG tablet         SOCIAL HISTORY:     Social History     Socioeconomic History    Marital status:    Tobacco Use    Smoking status: Never    Smokeless tobacco: Never   Substance and Sexual Activity    Alcohol use: No    Drug use: No    Sexual activity: Yes     Partners: Male     Birth control/protection: Surgical   Social History Narrative     since 1986    Previous  for 8 years prior    He is a     She is a homemaker     Social Drivers of Health     Financial Resource Strain: Low Risk  (3/10/2025)    Overall Financial Resource Strain (CARDIA)     Difficulty of Paying Living Expenses: Not very hard   Food Insecurity: No Food Insecurity (3/10/2025)    Hunger Vital Sign     Worried About Running Out of Food in the Last Year: Never true     Ran Out of Food in the Last Year: Never true   Transportation Needs: No Transportation Needs (3/10/2025)    PRAPARE - Transportation     Lack of Transportation (Medical): No     Lack of Transportation (Non-Medical): No   Physical Activity: Insufficiently Active (3/10/2025)    Exercise Vital Sign     Days of Exercise per Week: 2 days     Minutes of Exercise per Session: 60 min   Stress: Stress Concern Present (3/10/2025)    [employer] - Occupational Stress Questionnaire     Feeling of Stress : To some extent   Housing Stability: Low Risk  (3/10/2025)    Housing Stability Vital Sign     Unable to Pay for Housing in the Last Year: No     Number of Times Moved in the Last Year: 0     Homeless in the Last Year: No       FAMILY HISTORY:     Family History   Problem Relation Name Age of Onset    Breast cancer Cousin maternal 40    Miscarriages / Stillbirths Maternal Grandmother      Miscarriages / Stillbirths Mother       "Diabetes Mother      Hypertension Mother      Cancer Mother          cervical    Heart disease Father      Hypertension Father      Ovarian cancer Neg Hx         REVIEW OF SYSTEMS:   Review of Systems   Constitutional:  Negative for chills, diaphoresis, fever, malaise/fatigue and weight loss.   HENT:  Negative for congestion, hearing loss, sinus pain, sore throat and tinnitus.    Eyes:  Negative for blurred vision, double vision, photophobia, pain and discharge.   Respiratory:  Negative for cough, hemoptysis, sputum production, shortness of breath, wheezing and stridor.    Cardiovascular:  Positive for palpitations. Negative for chest pain, orthopnea, claudication, leg swelling and PND.   Gastrointestinal:  Negative for abdominal pain, blood in stool, heartburn, melena, nausea and vomiting.   Musculoskeletal:  Positive for back pain and neck pain. Negative for falls, joint pain and myalgias.   Neurological:  Positive for tingling. Negative for dizziness, tremors, sensory change, speech change, focal weakness, seizures, loss of consciousness, weakness and headaches.   Endo/Heme/Allergies:  Does not bruise/bleed easily.   Psychiatric/Behavioral:  Negative for depression, memory loss and substance abuse. The patient is not nervous/anxious.        PHYSICAL EXAM:     Vitals:    04/29/25 1046   BP: 132/61   Pulse: 60   Resp: 16          Body mass index is 27.62 kg/m².  Weight: 75.3 kg (166 lb 0.1 oz)   Height: 5' 5" (165.1 cm)     Physical Exam  Vitals reviewed.   Constitutional:       General: She is not in acute distress.     Appearance: She is well-developed. She is not diaphoretic.   HENT:      Head: Normocephalic.   Neck:      Vascular: No carotid bruit or JVD.   Cardiovascular:      Rate and Rhythm: Normal rate and regular rhythm.      Pulses: Normal pulses.      Heart sounds: Murmur heard.      Systolic murmur is present with a grade of 3/6.   Pulmonary:      Effort: Pulmonary effort is normal.      Breath sounds: " Normal breath sounds.   Abdominal:      General: Bowel sounds are normal.      Palpations: Abdomen is soft.      Tenderness: There is no abdominal tenderness.   Skin:     General: Skin is warm and dry.   Neurological:      Mental Status: She is alert and oriented to person, place, and time.   Psychiatric:         Speech: Speech normal.         Behavior: Behavior normal.         Thought Content: Thought content normal.         DATA:   EKG: (personally reviewed tracing)  02/21/2025- normal sinus rhythm, delayed r wave progression  7/3/20 - NSR  Laboratory:  CBC:  Recent Labs   Lab 04/22/23  0838 12/01/23  0750 02/21/25  0834   WBC 6.89 8.01 6.61   Hemoglobin 12.0 12.9 13.2   Hematocrit 38.4 40.0 41.9   Platelets 307 345 374       CHEMISTRIES:  Recent Labs   Lab 04/22/23  0838 12/01/23  0750 02/21/25  0834   Glucose 101 136 H 111 H   Sodium 139 139 138   Potassium 4.2 4.3 4.1   BUN 18 12 15   Creatinine 0.9 0.8 0.9   Calcium 8.9 9.3 9.2   Magnesium  --   --  1.8       CARDIAC BIOMARKERS:          COAGS:          LIPIDS/LFTS:  Recent Labs   Lab 04/22/23  0838 12/01/23  0750 01/05/24  1040 02/21/25  0834 03/08/25  1030   Cholesterol 202 H  --  188  --  193   Triglycerides 163 H  --  116  --  148   HDL 62  --  56  --  59   LDL Cholesterol 107.4  --  108.8  --  104.4   Non-HDL Cholesterol 140  --  132  --  134   AST 16 19  --  24  --    ALT 12 14  --  14  --      Hemoglobin A1C   Date Value Ref Range Status   03/08/2025 7.1 (H) 4.0 - 5.6 % Final     Comment:     ADA Screening Guidelines:  5.7-6.4%  Consistent with prediabetes  >or=6.5%  Consistent with diabetes    High levels of fetal hemoglobin interfere with the HbA1C  assay. Heterozygous hemoglobin variants (HbS, HgC, etc)do  not significantly interfere with this assay.   However, presence of multiple variants may affect accuracy.     09/18/2024 7.0 (H) 4.0 - 5.6 % Final     Comment:     ADA Screening Guidelines:  5.7-6.4%  Consistent with prediabetes  >or=6.5%   Consistent with diabetes    High levels of fetal hemoglobin interfere with the HbA1C  assay. Heterozygous hemoglobin variants (HbS, HgC, etc)do  not significantly interfere with this assay.   However, presence of multiple variants may affect accuracy.     01/05/2024 6.6 (H) 4.0 - 5.6 % Final     Comment:     ADA Screening Guidelines:  5.7-6.4%  Consistent with prediabetes  >or=6.5%  Consistent with diabetes    High levels of fetal hemoglobin interfere with the HbA1C  assay. Heterozygous hemoglobin variants (HbS, HgC, etc)do  not significantly interfere with this assay.   However, presence of multiple variants may affect accuracy.     01/05/2024 6.6 (H) 4.0 - 5.6 % Final     Comment:     ADA Screening Guidelines:  5.7-6.4%  Consistent with prediabetes  >or=6.5%  Consistent with diabetes    High levels of fetal hemoglobin interfere with the HbA1C  assay. Heterozygous hemoglobin variants (HbS, HgC, etc)do  not significantly interfere with this assay.   However, presence of multiple variants may affect accuracy.         The 10-year ASCVD risk score (Ranuflo DK, et al., 2019) is: 19.3%    Values used to calculate the score:      Age: 70 years      Sex: Female      Is Non- : No      Diabetic: Yes      Tobacco smoker: No      Systolic Blood Pressure: 132 mmHg      Is BP treated: Yes      HDL Cholesterol: 59 mg/dL      Total Cholesterol: 193 mg/dL      Cardiovascular Testing:    Nuclear stress 04/16/2025:      Normal myocardial perfusion scan. There is no evidence of myocardial ischemia or infarction.    The gated perfusion images showed an ejection fraction of 88% post stress.    The ECG portion of the study is negative for ischemia.    The patient reported no chest pain during the stress test.    There were no arrhythmias during stress.    Carotid ultrasound 04/16/2025:      There is 20-39% right Internal Carotid Stenosis.    There is 0-19% left Internal Carotid Stenosis.    Bilateral antegrade  vertebral arteries.    Echocardiogram 09/18/2024:      Left Ventricle: The left ventricle is normal in size. Normal wall thickness. There is normal systolic function with a visually estimated ejection fraction of 55 - 60%. Grade I diastolic dysfunction.    Right Ventricle: Normal right ventricular cavity size. Systolic function is normal.    Pulmonary Artery: The estimated pulmonary artery systolic pressure is 30 mmHg.    Holter 09/18/2024:      NSR. Heart rates varied between 48 and 96 BPM with an average of 66 BPM.    There were very rare PVCs totalling 2 and averaging 0.02 per hour.    There were very rare PACs totalling 122 and averaging 1.27 per hour.    ASSESSMENT:     Palpitations  At risk for coronary artery disease  Hypertension  Hyperlipidemia:  atorvastatin 80 ezetimibe 10  Diabetes: A1c 7.7  Pulmonary hypertension  Abnormal MRI brain    PLAN:     Palpitations: Continue current management.  At risk for coronary Artery disease: Nuclear stress negative for ischemia.  Hypertension:  Monitor.  Hyperlipidemia:  Continue current management. Increased her Atorvastatin to 80mg recently.  Abnormal MRI brain: Carotid ultrasound showed no significant abnormalities.  Return to clinic 6 months.

## 2025-04-29 NOTE — PROGRESS NOTES
Subjective:       Patient ID: Anushka Napier is a 70 y.o. female.    Chief Complaint:  Well Woman (Last normal mammogram was 2025. Pt with Hyst.)      History of Present Illness  HPI  Annual Exam-Postmenopausal  Patient presents for annual exam. The patient has no complaints today. The patient is sexually active. GYN screening history: Last normal pap with Negative HPV was 2019 and last normal mammogram was 3/3/2025.   Last colonoscopy was 3/14/2023.  Last DXA on 2/3/20 with osteopenia.  The patient is currently taking estrogen replacement therapy. Patient denies post-menopausal vaginal bleeding. The patient wears seatbelts: yes. The patient participates in regular exercise: yes. Has the patient ever been transfused or tattooed?: no/no. The patient reports that there is not domestic violence in her life.     Status post  Total abdominal hysterectomy for prolapse in .  Doing well on Premarin.  Would like to continue understanding risks and benefits.    No longer on Testosterone    GYN & OB History  No LMP recorded. Patient has had a hysterectomy.   Date of Last Pap: 2014    OB History    Para Term  AB Living   7 4 3 1 3 3   SAB IAB Ectopic Multiple Live Births   3    3      # Outcome Date GA Lbr Amos/2nd Weight Sex Type Anes PTL Lv   7 Term 90 40w0d  4.309 kg (9 lb 8 oz) F Vag-Spont EPI N ALEX   6 Term 89 38w0d  3.657 kg (8 lb 1 oz) M Vag-Spont EPI N ALEX   5  10/08/82 36w0d  2.58 kg (5 lb 11 oz) M Vag-Spont EPI N ALEX   4 Term            3 SAB            2 SAB            1 SAB              Past Medical History:   Diagnosis Date    Allergy     Anxiety     Basal cell carcinoma     to face    Depression     Diabetes mellitus     Diabetes mellitus type II, uncontrolled 2014    Diabetic retinopathy 2020    DM retinopathy     GERD (gastroesophageal reflux disease) 2014    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 Dr Correia, long term PPI use    History of colonic  polyps 1/24/2020    Hyperlipidemia     Hypertension     Osteopenia 9/25/2014    Screening for colorectal cancer 9/25/2014    Normal 2009    Screening for colorectal cancer     Thyroid disease     Vitamin D deficiency disease 9/25/2014       Past Surgical History:   Procedure Laterality Date    HYSTERECTOMY  1997    complete for prolapse, Abdominal    OOPHORECTOMY      right shoulder      SINUS SURGERY  2012       Family History   Problem Relation Name Age of Onset    Breast cancer Cousin maternal 40    Miscarriages / Stillbirths Maternal Grandmother      Miscarriages / Stillbirths Mother      Diabetes Mother      Hypertension Mother      Cancer Mother          cervical    Heart disease Father      Hypertension Father      Ovarian cancer Neg Hx         Social History     Socioeconomic History    Marital status:    Tobacco Use    Smoking status: Never    Smokeless tobacco: Never   Substance and Sexual Activity    Alcohol use: No    Drug use: No    Sexual activity: Yes     Partners: Male     Birth control/protection: Surgical   Social History Narrative     since 1986    Previous  for 8 years prior    He is a     She is a homemaker     Social Drivers of Health     Financial Resource Strain: Low Risk  (3/10/2025)    Overall Financial Resource Strain (CARDIA)     Difficulty of Paying Living Expenses: Not very hard   Food Insecurity: No Food Insecurity (3/10/2025)    Hunger Vital Sign     Worried About Running Out of Food in the Last Year: Never true     Ran Out of Food in the Last Year: Never true   Transportation Needs: No Transportation Needs (3/10/2025)    PRAPARE - Transportation     Lack of Transportation (Medical): No     Lack of Transportation (Non-Medical): No   Physical Activity: Insufficiently Active (3/10/2025)    Exercise Vital Sign     Days of Exercise per Week: 2 days     Minutes of Exercise per Session: 60 min   Stress: Stress Concern Present (3/10/2025)    [facility]  [facility] - Occupational Stress Questionnaire     Feeling of Stress : To some extent   Housing Stability: Low Risk  (3/10/2025)    Housing Stability Vital Sign     Unable to Pay for Housing in the Last Year: No     Number of Times Moved in the Last Year: 0     Homeless in the Last Year: No       Current Outpatient Medications   Medication Sig Dispense Refill    aspirin (ECOTRIN) 81 MG EC tablet 1 tablet Orally Once a day      atorvastatin (LIPITOR) 80 MG tablet Take 1 tablet (80 mg total) by mouth once daily. 90 tablet 3    azelastine (ASTELIN) 137 mcg (0.1 %) nasal spray 1 spray (137 mcg total) by Nasal route 2 (two) times daily. 90 mL 12    coQ10, ubiquinol, 100 mg Cap as directed Orally      erythromycin (ROMYCIN) ophthalmic ointment Place a 1/2 inch ribbon of ointment into the inner left lower eyelid at bedtime 3.5 g 0    ezetimibe (ZETIA) 10 mg tablet Take 1 tablet (10 mg total) by mouth once daily. 90 tablet 3    ferrous gluconate (FERGON) 240 (27 FE) MG tablet 1 tablet Orally Three times a Week      FLUoxetine 20 MG capsule TAKE 1 CAPSULE(20 MG) BY MOUTH EVERY DAY 90 capsule 3    levothyroxine (SYNTHROID, LEVOTHROID) 175 MCG tablet TAKE 1 TABLET(175 MCG) BY MOUTH BEFORE BREAKFAST 90 tablet 12    meclizine (ANTIVERT) 25 mg tablet Take 1 tablet (25 mg total) by mouth 3 (three) times daily as needed for Dizziness (or at least one HS for 1 week when vertigo active). 60 tablet 12    metFORMIN (GLUCOPHAGE-XR) 500 MG ER 24hr tablet Take 2 tablets (1,000 mg total) by mouth once daily. 180 tablet 12    metoprolol succinate (TOPROL-XL) 50 MG 24 hr tablet Take 1 tablet (50 mg total) by mouth once daily. 90 tablet 3    mometasone 0.1% (ELOCON) 0.1 % cream Use daily 50 g 3    omega-3 fatty acids-vitamin E 1,000 mg Cap Take 1 capsule by mouth Twice daily. 1 Capsule Oral Twice a day       progesterone (PROMETRIUM) 100 MG capsule Take 1 capsule (100 mg total) by mouth once daily. 90 capsule 3    RABEprazole  (ACIPHEX) 20 mg tablet TAKE 1 TABLET BY MOUTH TWICE DAILY 180 tablet 12    solifenacin (VESICARE) 10 MG tablet Take 1 tablet (10 mg total) by mouth once daily. 90 tablet 3    valsartan (DIOVAN) 160 MG tablet Take 1 tablet (160 mg total) by mouth once daily. 90 tablet 3    vitamin D (VITAMIN D3) 1000 units Tab Take 1,000 Units by mouth once daily. 5 tabs daily      diazePAM (VALIUM) 2 MG tablet Take 1 tablet (2 mg total) by mouth every 8 (eight) hours as needed (Vertigo). 90 tablet 5    estrogens, conjugated, (PREMARIN) 0.625 MG tablet TAKE 1 TABLET(0.625 MG) BY MOUTH EVERY DAY 90 tablet 3     No current facility-administered medications for this visit.       Review of patient's allergies indicates:   Allergen Reactions    Iodine Hives     IVP Contrast    Tizanidine Swelling     Tongue swelling     Codeine Hives     Other reaction(s): Itching  Other reaction(s): Hives    Iodinated contrast media Hives     Other reaction(s): Hives    Sulfamethoxazole-trimethoprim Itching     Other reaction(s): Itching  Other reaction(s): Hives    Epinephrine Anxiety and Other (See Comments)     Almost passed out.         Review of Systems  Review of Systems   Constitutional:  Negative for activity change, appetite change, chills, fatigue, fever and unexpected weight change.   HENT:  Negative for mouth sores.    Respiratory:  Negative for cough, shortness of breath and wheezing.    Cardiovascular:  Negative for chest pain and palpitations.   Gastrointestinal:  Negative for abdominal pain, bloating, blood in stool, constipation, nausea and vomiting.   Endocrine: Negative for diabetes and hot flashes.   Genitourinary:  Negative for dysmenorrhea, dyspareunia, dysuria, frequency, hematuria, menorrhagia, menstrual problem, pelvic pain, urgency, vaginal bleeding, vaginal discharge, vaginal pain, urinary incontinence, postcoital bleeding and vaginal odor.   Musculoskeletal:  Negative for back pain and myalgias.   Integumentary:  Negative for  rash, breast mass and nipple discharge.   Neurological:  Negative for seizures and headaches.   Psychiatric/Behavioral:  Negative for depression and sleep disturbance. The patient is not nervous/anxious.    Breast: Negative for mass, mastodynia and nipple discharge          Objective:    Physical Exam:   Constitutional: She appears well-developed and well-nourished. No distress.   BMI of 27.88    HENT:   Head: Normocephalic and atraumatic.    Eyes: EOM are normal.      Pulmonary/Chest: Effort normal. No respiratory distress.   Breasts: Non-tender, no engorgement, no masses, no retraction, no discharge. Negative for lymphadenopathy.         Abdominal: Soft. She exhibits no distension. There is no abdominal tenderness. There is no rebound and no guarding.   Pfannenstiel scar      Genitourinary:    Vagina normal.   No vaginal discharge in the vagina.    Genitourinary Comments: Severe atrophy.  Vulva without any obvious lesions.  Urethral meatus normal size and location without any lesion.  Urethra is non-tender without stricture or discharge.  Bladder is non-tender.  Vaginal vault with good support.  Minimal white discharge noted.  No obvious lesion.  Normal rugation.  Cervix and Uterus are surgically absent.  Adnexa is without any masses or tenderness.  Perineum without obvious lesion.               Musculoskeletal: Normal range of motion.       Neurological: She is alert.    Skin: Skin is warm and dry.    Psychiatric: She has a normal mood and affect.          Assessment:        1. Well woman exam with routine gynecological exam    2. Menopause    3. Symptomatic menopausal or female climacteric states    4. Type 2 diabetes mellitus without complication, unspecified whether long term insulin use    5. Primary hypertension    6. Encounter for monitoring postmenopausal estrogen replacement therapy              Plan:          I have discussed with the patient her condition.  Monthly breast examination was instructed,  discussed, and encouraged.  Patient was encouraged to consume a low-calorie, low fat diet, and to increase of physical activity.  Healthy habits encouraged.  A Pap smear was not performed; she no longer would need Pap according to the USPSTF recommendations.  Mammogram was not ordered because it was done recently.  Gonorrhea and Chlamydia testing not performed;  HIV test not offered, again according to guidelines.  Colonoscopy discussed according to ACS guideline.    Care Gaps addressed  Discussed HRT.    She would like to continue with Premarin, understanding risks and benefits  Patient is to continue her medications as prescribed.  Refills for Premarin 0.625 mg given.  She will come back to see me in one year for her annual visit.  She can come back to see me sooner as necessary.  All of her questions were answered appropriately to her satisfaction.          ** A female chaperone, Donna Noble, was present for the pelvic exam

## 2025-05-14 ENCOUNTER — OFFICE VISIT (OUTPATIENT)
Dept: SURGERY | Facility: CLINIC | Age: 70
End: 2025-05-14
Payer: COMMERCIAL

## 2025-05-14 VITALS
BODY MASS INDEX: 28.02 KG/M2 | DIASTOLIC BLOOD PRESSURE: 74 MMHG | HEART RATE: 59 BPM | WEIGHT: 168.19 LBS | SYSTOLIC BLOOD PRESSURE: 157 MMHG | HEIGHT: 65 IN

## 2025-05-14 DIAGNOSIS — K21.00 GASTROESOPHAGEAL REFLUX DISEASE WITH ESOPHAGITIS WITHOUT HEMORRHAGE: ICD-10-CM

## 2025-05-14 DIAGNOSIS — K22.70 BARRETT'S ESOPHAGUS WITHOUT DYSPLASIA: ICD-10-CM

## 2025-05-14 DIAGNOSIS — K44.9 HIATAL HERNIA: Primary | ICD-10-CM

## 2025-05-14 PROCEDURE — 3077F SYST BP >= 140 MM HG: CPT | Mod: CPTII,S$GLB,, | Performed by: SURGERY

## 2025-05-14 PROCEDURE — 1101F PT FALLS ASSESS-DOCD LE1/YR: CPT | Mod: CPTII,S$GLB,, | Performed by: SURGERY

## 2025-05-14 PROCEDURE — 99204 OFFICE O/P NEW MOD 45 MIN: CPT | Mod: S$GLB,,, | Performed by: SURGERY

## 2025-05-14 PROCEDURE — 3288F FALL RISK ASSESSMENT DOCD: CPT | Mod: CPTII,S$GLB,, | Performed by: SURGERY

## 2025-05-14 PROCEDURE — 3008F BODY MASS INDEX DOCD: CPT | Mod: CPTII,S$GLB,, | Performed by: SURGERY

## 2025-05-14 PROCEDURE — 1160F RVW MEDS BY RX/DR IN RCRD: CPT | Mod: CPTII,S$GLB,, | Performed by: SURGERY

## 2025-05-14 PROCEDURE — 99999 PR PBB SHADOW E&M-EST. PATIENT-LVL IV: CPT | Mod: PBBFAC,,, | Performed by: SURGERY

## 2025-05-14 PROCEDURE — 4010F ACE/ARB THERAPY RXD/TAKEN: CPT | Mod: CPTII,S$GLB,, | Performed by: SURGERY

## 2025-05-14 PROCEDURE — 3051F HG A1C>EQUAL 7.0%<8.0%: CPT | Mod: CPTII,S$GLB,, | Performed by: SURGERY

## 2025-05-14 PROCEDURE — 1159F MED LIST DOCD IN RCRD: CPT | Mod: CPTII,S$GLB,, | Performed by: SURGERY

## 2025-05-14 PROCEDURE — 3078F DIAST BP <80 MM HG: CPT | Mod: CPTII,S$GLB,, | Performed by: SURGERY

## 2025-05-14 PROCEDURE — 1125F AMNT PAIN NOTED PAIN PRSNT: CPT | Mod: CPTII,S$GLB,, | Performed by: SURGERY

## 2025-05-14 NOTE — PROGRESS NOTES
History & Physical    SUBJECTIVE:     History of Present Illness:  Patient is a 70 y.o. female presents with a hiatal hernia that is symptomatic.  The patient states that she was told she had a small hiatal hernia proximally 2 years ago and Carter's esophagus.  She was started on PPIs that time.  Her symptoms got significantly worse including more heartburn with breakthrough, regurgitation at night with coughing, and dysphagia.  This has worsened significantly in a recent EGD showed a moderate to large hiatal hernia that was measured on the EGD at approximately 10 cm in length.  She also had an esophagram which showed a moderate hiatal hernia however we do not have the images for this.  She presents here today for discussion of surgical treatment of her her hiatal hernia.      Review of patient's allergies indicates:   Allergen Reactions    Iodine Hives     IVP Contrast    Codeine Hives and Itching     Other reaction(s): Itching    Other reaction(s): Hives    Tizanidine Swelling     Tongue swelling     Iodinated contrast media Hives     Other reaction(s): Hives    Sulfamethoxazole-trimethoprim Itching     Other reaction(s): Itching  Other reaction(s): Hives    Epinephrine Anxiety and Other (See Comments)     Almost passed out.       Current Medications[1]    Past Medical History:   Diagnosis Date    Allergy     Anxiety     Basal cell carcinoma     to face    Depression     Diabetes mellitus     Diabetes mellitus type II, uncontrolled 9/17/2014    Diabetic retinopathy 01/14/2020    DM retinopathy     GERD (gastroesophageal reflux disease) 9/25/2014    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 Dr Correia, long term PPI use    History of colonic polyps 1/24/2020    Hyperlipidemia     Hypertension     Osteopenia 9/25/2014    Screening for colorectal cancer 9/25/2014    Normal 2009    Screening for colorectal cancer     Thyroid disease     Vitamin D deficiency disease 9/25/2014     Past Surgical History:   Procedure Laterality Date  "   HYSTERECTOMY  1997    complete for prolapse, Abdominal    OOPHORECTOMY      right shoulder      SINUS SURGERY  2012     Family History   Problem Relation Name Age of Onset    Breast cancer Cousin maternal 40    Miscarriages / Stillbirths Maternal Grandmother      Miscarriages / Stillbirths Mother      Diabetes Mother      Hypertension Mother      Cancer Mother          cervical    Heart disease Father      Hypertension Father      Ovarian cancer Neg Hx       Social History[2]     Review of Systems:  Review of Systems   Constitutional:  Negative for activity change, appetite change, chills, diaphoresis, fatigue and fever.   HENT:  Negative for congestion, sinus pressure, sneezing and sore throat.    Eyes:  Negative for pain, discharge, redness, itching and visual disturbance.   Respiratory:  Negative for apnea, cough, choking, chest tightness and shortness of breath.    Cardiovascular:  Negative for chest pain, palpitations and leg swelling.   Gastrointestinal:  Positive for abdominal distention. Negative for abdominal pain, constipation, diarrhea, nausea and vomiting.   Musculoskeletal:  Negative for arthralgias, back pain and gait problem.   Skin:  Negative for color change, pallor and rash.   Neurological:  Negative for dizziness, facial asymmetry, light-headedness and headaches.   Psychiatric/Behavioral:  Negative for agitation, behavioral problems and confusion.        OBJECTIVE:     Vital Signs (Most Recent)  Pulse: (!) 59 (05/14/25 0906)  BP: (!) 157/74 (05/14/25 0906)  5' 5" (1.651 m)  76.3 kg (168 lb 3.4 oz)     Physical Exam:  Physical Exam  Constitutional:       General: She is not in acute distress.     Appearance: Normal appearance. She is not diaphoretic.   HENT:      Head: Normocephalic and atraumatic.      Right Ear: External ear normal.      Left Ear: External ear normal.   Eyes:      General: No scleral icterus.        Right eye: No discharge.         Left eye: No discharge.   Cardiovascular:     "  Rate and Rhythm: Normal rate and regular rhythm.   Pulmonary:      Effort: Pulmonary effort is normal. No respiratory distress.   Musculoskeletal:         General: Normal range of motion.   Skin:     General: Skin is warm and dry.      Coloration: Skin is not pale.      Findings: No erythema or rash.   Neurological:      Mental Status: She is alert and oriented to person, place, and time.   Psychiatric:         Mood and Affect: Mood normal.         Behavior: Behavior normal.         Thought Content: Thought content normal.         Judgment: Judgment normal.         Diagnostic Results:  US: Reviewed  Fatty Liver    ASSESSMENT/PLAN:     HH with GERD and barretts per patient report    PLAN:Plan     Need Biopsy Results to eval Barretts.  If none taken on last visit may need repeat to ensure no dysplasia.    Obtian esophagram images  Obtain EGD images  Order Motility  FU after to plan surgery.   Will need cards and neurology clearance    Arvin Miranda MD           [1]   Current Outpatient Medications   Medication Sig Dispense Refill    aspirin (ECOTRIN) 81 MG EC tablet 1 tablet Orally Once a day      atorvastatin (LIPITOR) 80 MG tablet Take 1 tablet (80 mg total) by mouth once daily. 90 tablet 3    azelastine (ASTELIN) 137 mcg (0.1 %) nasal spray 1 spray (137 mcg total) by Nasal route 2 (two) times daily. 90 mL 12    coQ10, ubiquinol, 100 mg Cap as directed Orally      erythromycin (ROMYCIN) ophthalmic ointment Place a 1/2 inch ribbon of ointment into the inner left lower eyelid at bedtime 3.5 g 0    estrogens, conjugated, (PREMARIN) 0.625 MG tablet TAKE 1 TABLET(0.625 MG) BY MOUTH EVERY DAY 90 tablet 3    ezetimibe (ZETIA) 10 mg tablet Take 1 tablet (10 mg total) by mouth once daily. 90 tablet 3    ferrous gluconate (FERGON) 240 (27 FE) MG tablet 1 tablet Orally Three times a Week      FLUoxetine 20 MG capsule TAKE 1 CAPSULE(20 MG) BY MOUTH EVERY DAY 90 capsule 3    levothyroxine (SYNTHROID, LEVOTHROID) 175 MCG  tablet TAKE 1 TABLET(175 MCG) BY MOUTH BEFORE BREAKFAST 90 tablet 12    meclizine (ANTIVERT) 25 mg tablet Take 1 tablet (25 mg total) by mouth 3 (three) times daily as needed for Dizziness (or at least one HS for 1 week when vertigo active). 60 tablet 12    metFORMIN (GLUCOPHAGE-XR) 500 MG ER 24hr tablet Take 2 tablets (1,000 mg total) by mouth once daily. 180 tablet 12    metoprolol succinate (TOPROL-XL) 50 MG 24 hr tablet Take 1 tablet (50 mg total) by mouth once daily. 90 tablet 3    mometasone 0.1% (ELOCON) 0.1 % cream Use daily 50 g 3    omega-3 fatty acids-vitamin E 1,000 mg Cap Take 1 capsule by mouth Twice daily. 1 Capsule Oral Twice a day       progesterone (PROMETRIUM) 100 MG capsule Take 1 capsule (100 mg total) by mouth once daily. 90 capsule 3    RABEprazole (ACIPHEX) 20 mg tablet TAKE 1 TABLET BY MOUTH TWICE DAILY 180 tablet 12    solifenacin (VESICARE) 10 MG tablet Take 1 tablet (10 mg total) by mouth once daily. 90 tablet 3    valsartan (DIOVAN) 160 MG tablet Take 1 tablet (160 mg total) by mouth once daily. 90 tablet 3    vitamin D (VITAMIN D3) 1000 units Tab Take 1,000 Units by mouth once daily. 5 tabs daily      diazePAM (VALIUM) 2 MG tablet Take 1 tablet (2 mg total) by mouth every 8 (eight) hours as needed (Vertigo). 90 tablet 5     No current facility-administered medications for this visit.   [2]   Social History  Tobacco Use    Smoking status: Never    Smokeless tobacco: Never   Substance Use Topics    Alcohol use: No    Drug use: No

## 2025-05-15 ENCOUNTER — TELEPHONE (OUTPATIENT)
Dept: ENDOSCOPY | Facility: HOSPITAL | Age: 70
End: 2025-05-15
Payer: COMMERCIAL

## 2025-05-15 VITALS — WEIGHT: 168.19 LBS | HEIGHT: 65 IN | BODY MASS INDEX: 28.02 KG/M2

## 2025-05-15 DIAGNOSIS — K21.9 GASTROESOPHAGEAL REFLUX DISEASE, UNSPECIFIED WHETHER ESOPHAGITIS PRESENT: Primary | ICD-10-CM

## 2025-05-15 DIAGNOSIS — K44.9 HIATAL HERNIA: ICD-10-CM

## 2025-05-15 NOTE — PROGRESS NOTES
Patient discharged to home via wheelchair in stable condition.  Instructions given, patient verbalized understanding.   4

## 2025-05-15 NOTE — TELEPHONE ENCOUNTER
"----- Message from JAGIDSH Gannon sent at 5/15/2025  1:25 PM CDT -----  Procedure: ManometryDiagnosis: Hiatal hernia Procedure Timing: Within 4 weeks (Urgent)#If within 4 weeks selected, please deni as high priority##If greater than 12 weeks, please select "5-12 weeks" and delay sending until 3 months prior to requested date# Additional Scheduling Information: Is the patient taking a GLP-1 Agonist and/or blood thinner :noHave you attached a patient to this message: yes  "

## 2025-05-15 NOTE — TELEPHONE ENCOUNTER
Patient is scheduled for a Esophageal Manometry on 5/26/25 with Dr. JAMIR Yadav  Referral for procedure from [facility]

## 2025-05-19 ENCOUNTER — TELEPHONE (OUTPATIENT)
Dept: SURGERY | Facility: CLINIC | Age: 70
End: 2025-05-19
Payer: COMMERCIAL

## 2025-05-19 ENCOUNTER — PATIENT MESSAGE (OUTPATIENT)
Dept: SURGERY | Facility: CLINIC | Age: 70
End: 2025-05-19
Payer: COMMERCIAL

## 2025-05-19 NOTE — TELEPHONE ENCOUNTER
Returned pt phone call this morning and we discussed scheduling a f/u appointment with Dr. Miranda after her scheduled manometry on 5/26.  Also, she wanted to pick a tentative surgery date.  Scheduled pt for surgery tentatively on 6/19.  Pt ok with all dates and if any changes need to be made then she would get in touch with our office.

## 2025-05-19 NOTE — TELEPHONE ENCOUNTER
----- Message from Robertoa sent at 5/19/2025  8:22 AM CDT -----  Regarding: Advice  Contact: [phone]  Who call ? Anushka Napier What is the request Details : Pt calling to speak with someone in provider office regards scheduling and discussing procedure.  Please call pt back.   Can clinic  use patient portal  : No What number to call back : [phone]

## 2025-05-26 ENCOUNTER — TELEPHONE (OUTPATIENT)
Dept: ENDOSCOPY | Facility: HOSPITAL | Age: 70
End: 2025-05-26
Payer: COMMERCIAL

## 2025-05-26 ENCOUNTER — HOSPITAL ENCOUNTER (OUTPATIENT)
Facility: HOSPITAL | Age: 70
Discharge: HOME | End: 2025-05-26
Attending: INTERNAL MEDICINE | Admitting: SURGERY
Payer: COMMERCIAL

## 2025-05-26 VITALS — WEIGHT: 160 LBS | BODY MASS INDEX: 26.66 KG/M2 | HEIGHT: 65 IN

## 2025-05-26 VITALS
HEIGHT: 65 IN | OXYGEN SATURATION: 98 % | SYSTOLIC BLOOD PRESSURE: 171 MMHG | TEMPERATURE: 98 F | HEART RATE: 58 BPM | WEIGHT: 166.88 LBS | RESPIRATION RATE: 16 BRPM | BODY MASS INDEX: 27.81 KG/M2 | DIASTOLIC BLOOD PRESSURE: 91 MMHG

## 2025-05-26 DIAGNOSIS — K44.9 HIATAL HERNIA WITH GERD AND ESOPHAGITIS: ICD-10-CM

## 2025-05-26 DIAGNOSIS — K44.9 HIATAL HERNIA WITH GERD AND ESOPHAGITIS: Primary | ICD-10-CM

## 2025-05-26 DIAGNOSIS — K21.00 HIATAL HERNIA WITH GERD AND ESOPHAGITIS: Primary | ICD-10-CM

## 2025-05-26 DIAGNOSIS — K21.00 GASTROESOPHAGEAL REFLUX DISEASE WITH ESOPHAGITIS WITHOUT HEMORRHAGE: ICD-10-CM

## 2025-05-26 DIAGNOSIS — K44.9 HIATAL HERNIA: Primary | ICD-10-CM

## 2025-05-26 DIAGNOSIS — K21.00 HIATAL HERNIA WITH GERD AND ESOPHAGITIS: ICD-10-CM

## 2025-05-26 PROCEDURE — 25000003 PHARM REV CODE 250: Performed by: INTERNAL MEDICINE

## 2025-05-26 RX ORDER — SODIUM CHLORIDE 9 MG/ML
INJECTION, SOLUTION INTRAVENOUS CONTINUOUS
Status: DISCONTINUED | OUTPATIENT
Start: 2025-05-26 | End: 2025-05-26 | Stop reason: HOSPADM

## 2025-05-26 RX ORDER — LIDOCAINE HYDROCHLORIDE 20 MG/ML
JELLY TOPICAL ONCE
Status: COMPLETED | OUTPATIENT
Start: 2025-05-26 | End: 2025-05-26

## 2025-05-26 RX ADMIN — LIDOCAINE HYDROCHLORIDE 10 ML: 20 JELLY TOPICAL at 07:05

## 2025-05-26 NOTE — TELEPHONE ENCOUNTER
"----- Message from JAGDISH Gannon sent at 5/15/2025  1:25 PM CDT -----  Procedure: ManometryDiagnosis: Hiatal hernia Procedure Timing: Within 4 weeks (Urgent)#If within 4 weeks selected, please deni as high priority##If greater than 12 weeks, please select "5-12 weeks" and delay sending until 3 months prior to requested date# Additional Scheduling Information: Is the patient taking a GLP-1 Agonist and/or blood thinner :noHave you attached a patient to this message: yes  "

## 2025-05-26 NOTE — TELEPHONE ENCOUNTER
Patient is scheduled for a Upper Endoscopy (EGD) on 6/11/25 with Dr. JAMIR Yadav  Referral for procedure from Direct access Pt

## 2025-06-10 NOTE — H&P
Short Stay Endoscopy History and Physical    PCP - Gerardo Dobbs MD     Procedure - EGD with Endoflip and manometry catheter placement  ASA - per anesthesia  Mallampati - per anesthesia  History of Anesthesia problems - no  Family history Anesthesia problems -  no   Plan of anesthesia - General    HPI:  This is a 70 y.o. female here for evaluation of :  Carter's esophagus, hiatal hernia with planned repair          ROS:  Constitutional: No fevers, chills, No weight loss  CV: No chest pain  Pulm: No cough, No shortness of breath  Ophtho: No vision changes  GI: see HPI  Derm: No rash    Medical History:  has a past medical history of Allergy, Anxiety, Basal cell carcinoma, Depression, Diabetes mellitus, Diabetes mellitus type II, uncontrolled (9/17/2014), Diabetic retinopathy (01/14/2020), DM retinopathy, GERD (gastroesophageal reflux disease) (9/25/2014), History of colonic polyps (1/24/2020), Hyperlipidemia, Hypertension, Osteopenia (9/25/2014), Screening for colorectal cancer (9/25/2014), Screening for colorectal cancer, Thyroid disease, and Vitamin D deficiency disease (9/25/2014).    Surgical History:  has a past surgical history that includes right shoulder; Sinus surgery (2012); Hysterectomy (1997); Oophorectomy; and Esophageal manometry with measurement of impedance (N/A, 5/26/2025).    Family History: family history includes Breast cancer (age of onset: 40) in her cousin; Cancer in her mother; Diabetes in her mother; Heart disease in her father; Hypertension in her father and mother; Miscarriages / Stillbirths in her maternal grandmother and mother.. Otherwise no colon cancer, inflammatory bowel disease, or GI malignancies.    Social History:  reports that she has never smoked. She has never used smokeless tobacco. She reports that she does not drink alcohol and does not use drugs.    Review of patient's allergies indicates:   Allergen Reactions    Iodine Hives     IVP Contrast    Codeine Hives  and Itching     Other reaction(s): Itching    Other reaction(s): Hives    Tizanidine Swelling     Tongue swelling     Iodinated contrast media Hives     Other reaction(s): Hives    Sulfamethoxazole-trimethoprim Itching     Other reaction(s): Itching  Other reaction(s): Hives    Epinephrine Anxiety and Other (See Comments)     Almost passed out.       Medications:   Prescriptions Prior to Admission[1]    Physical Exam:    Vital Signs: There were no vitals filed for this visit.    Gen: NAD, lying comfortably  HENT: NCAT, oropharynx clear  Eyes: anicteric sclerae, EOMI grossly  Neck: supple, no visible masses/goiter  Cardiac: RRR  Lungs: non-labored breathing  Abd: soft, NT/ND, normoactive BS  Ext: no LE edema, warm, well perfused  Skin: skin intact on exposed body parts, no visible rashes, lesions  Neuro: A&Ox4, neuro exam grossly intact, moves all extremities  Psych: appropriate mood, affect      Labs:  Lab Results   Component Value Date    WBC 6.61 02/21/2025    HGB 13.2 02/21/2025    HCT 41.9 02/21/2025     02/21/2025    CHOL 193 03/08/2025    TRIG 148 03/08/2025    HDL 59 03/08/2025    ALT 14 02/21/2025    AST 24 02/21/2025     02/21/2025    K 4.1 02/21/2025     02/21/2025    CREATININE 0.9 02/21/2025    BUN 15 02/21/2025    CO2 22 (L) 02/21/2025    TSH 2.465 02/21/2025    INR 1.0 07/03/2020    HGBA1C 7.1 (H) 03/08/2025       Plan:  EGD with Endoflip and manometry for Carter's esophagus, hiatal hernia with planned repair    I have explained the risks and benefits of endoscopy procedures to the patient including but not limited to bleeding, perforation, infection, and death.  The patient was asked if they understand and allowed to ask any further questions to their satisfaction.      Jaycee Yadav MD         [1]   No medications prior to admission.

## 2025-06-11 ENCOUNTER — HOSPITAL ENCOUNTER (OUTPATIENT)
Facility: HOSPITAL | Age: 70
Discharge: HOME | End: 2025-06-11
Attending: INTERNAL MEDICINE | Admitting: INTERNAL MEDICINE
Payer: COMMERCIAL

## 2025-06-11 ENCOUNTER — ANESTHESIA (OUTPATIENT)
Dept: ENDOSCOPY | Facility: HOSPITAL | Age: 70
End: 2025-06-11
Payer: COMMERCIAL

## 2025-06-11 ENCOUNTER — ANESTHESIA EVENT (OUTPATIENT)
Dept: ENDOSCOPY | Facility: HOSPITAL | Age: 70
End: 2025-06-11
Payer: COMMERCIAL

## 2025-06-11 VITALS
SYSTOLIC BLOOD PRESSURE: 146 MMHG | DIASTOLIC BLOOD PRESSURE: 69 MMHG | OXYGEN SATURATION: 100 % | HEART RATE: 59 BPM | RESPIRATION RATE: 17 BRPM | TEMPERATURE: 98 F

## 2025-06-11 DIAGNOSIS — K44.9 HIATAL HERNIA: ICD-10-CM

## 2025-06-11 DIAGNOSIS — K44.9 HIATAL HERNIA WITH GERD AND ESOPHAGITIS: ICD-10-CM

## 2025-06-11 DIAGNOSIS — K21.9 GASTROESOPHAGEAL REFLUX DISEASE, UNSPECIFIED WHETHER ESOPHAGITIS PRESENT: Primary | ICD-10-CM

## 2025-06-11 DIAGNOSIS — K21.00 HIATAL HERNIA WITH GERD AND ESOPHAGITIS: ICD-10-CM

## 2025-06-11 LAB — POCT GLUCOSE: 123 MG/DL (ref 70–110)

## 2025-06-11 PROCEDURE — 27201012 HC FORCEPS, HOT/COLD, DISP: Performed by: INTERNAL MEDICINE

## 2025-06-11 PROCEDURE — 25000003 PHARM REV CODE 250: Performed by: INTERNAL MEDICINE

## 2025-06-11 PROCEDURE — C1726 CATH, BAL DIL, NON-VASCULAR: HCPCS | Performed by: INTERNAL MEDICINE

## 2025-06-11 PROCEDURE — 91040 ESOPH BALLOON DISTENSION TST: CPT | Mod: TC | Performed by: INTERNAL MEDICINE

## 2025-06-11 PROCEDURE — 43239 EGD BIOPSY SINGLE/MULTIPLE: CPT | Mod: ,,, | Performed by: INTERNAL MEDICINE

## 2025-06-11 PROCEDURE — 43239 EGD BIOPSY SINGLE/MULTIPLE: CPT | Performed by: INTERNAL MEDICINE

## 2025-06-11 PROCEDURE — 91040 ESOPH BALLOON DISTENSION TST: CPT | Mod: 26,,, | Performed by: INTERNAL MEDICINE

## 2025-06-11 PROCEDURE — 88305 TISSUE EXAM BY PATHOLOGIST: CPT | Mod: TC | Performed by: INTERNAL MEDICINE

## 2025-06-11 PROCEDURE — 63600175 PHARM REV CODE 636 W HCPCS: Performed by: NURSE ANESTHETIST, CERTIFIED REGISTERED

## 2025-06-11 PROCEDURE — 37000009 HC ANESTHESIA EA ADD 15 MINS: Performed by: INTERNAL MEDICINE

## 2025-06-11 PROCEDURE — 63600175 PHARM REV CODE 636 W HCPCS: Performed by: STUDENT IN AN ORGANIZED HEALTH CARE EDUCATION/TRAINING PROGRAM

## 2025-06-11 PROCEDURE — 37000008 HC ANESTHESIA 1ST 15 MINUTES: Performed by: INTERNAL MEDICINE

## 2025-06-11 PROCEDURE — 82962 GLUCOSE BLOOD TEST: CPT | Performed by: INTERNAL MEDICINE

## 2025-06-11 RX ORDER — PROPOFOL 10 MG/ML
VIAL (ML) INTRAVENOUS
Status: DISCONTINUED | OUTPATIENT
Start: 2025-06-11 | End: 2025-06-11

## 2025-06-11 RX ORDER — ONDANSETRON HYDROCHLORIDE 2 MG/ML
4 INJECTION, SOLUTION INTRAVENOUS DAILY PRN
Status: DISCONTINUED | OUTPATIENT
Start: 2025-06-11 | End: 2025-06-11 | Stop reason: HOSPADM

## 2025-06-11 RX ORDER — LIDOCAINE HYDROCHLORIDE 20 MG/ML
JELLY TOPICAL
Status: DISCONTINUED | OUTPATIENT
Start: 2025-06-11 | End: 2025-06-11 | Stop reason: HOSPADM

## 2025-06-11 RX ORDER — HALOPERIDOL LACTATE 5 MG/ML
0.5 INJECTION, SOLUTION INTRAMUSCULAR EVERY 10 MIN PRN
Status: DISCONTINUED | OUTPATIENT
Start: 2025-06-11 | End: 2025-06-11 | Stop reason: HOSPADM

## 2025-06-11 RX ORDER — SODIUM CHLORIDE 9 MG/ML
INJECTION, SOLUTION INTRAVENOUS CONTINUOUS
Status: DISCONTINUED | OUTPATIENT
Start: 2025-06-11 | End: 2025-06-11 | Stop reason: HOSPADM

## 2025-06-11 RX ORDER — LIDOCAINE HYDROCHLORIDE 20 MG/ML
JELLY TOPICAL ONCE
Status: DISCONTINUED | OUTPATIENT
Start: 2025-06-11 | End: 2025-06-11 | Stop reason: HOSPADM

## 2025-06-11 RX ORDER — GLUCAGON 1 MG
1 KIT INJECTION
Status: DISCONTINUED | OUTPATIENT
Start: 2025-06-11 | End: 2025-06-11 | Stop reason: HOSPADM

## 2025-06-11 RX ADMIN — PROPOFOL 80 MG: 10 INJECTION, EMULSION INTRAVENOUS at 08:06

## 2025-06-11 RX ADMIN — PROPOFOL 10 MG: 10 INJECTION, EMULSION INTRAVENOUS at 08:06

## 2025-06-11 RX ADMIN — SODIUM CHLORIDE: 9 INJECTION, SOLUTION INTRAVENOUS at 09:06

## 2025-06-11 RX ADMIN — ONDANSETRON 4 MG: 2 INJECTION INTRAMUSCULAR; INTRAVENOUS at 09:06

## 2025-06-11 RX ADMIN — SODIUM CHLORIDE: 9 INJECTION, SOLUTION INTRAVENOUS at 08:06

## 2025-06-11 RX ADMIN — PROPOFOL 200 MCG/KG/MIN: 10 INJECTION, EMULSION INTRAVENOUS at 08:06

## 2025-06-11 NOTE — NURSING
Patient tolerated esophageal manometry well. Motility catheter passed easily to right naris and endoscopic guidance used under anesthesia. Full protocol completed.

## 2025-06-11 NOTE — PROVATION PATIENT INSTRUCTIONS
Discharge Summary/Instructions after an Endoscopic Procedure  Patient Name: Anushka Napier  Patient MRN: [id number]  Patient YOB: 1955  Wednesday, June 11, 2025  Jaycee Yadav MD  Dear patient,  As a result of recent federal legislation (The Federal Cures Act), you may   receive lab or pathology results from your procedure in your [facility]   account before your physician is able to contact you. Your physician or   their representative will relay the results to you with their   recommendations at their soonest availability.  Thank you,  RESTRICTIONS:  During your procedure today, you received medications for sedation.  These   medications may affect your judgment, balance and coordination.  Therefore,   for 24 hours, you have the following restrictions:   - DO NOT drive a car, operate machinery, make legal/financial decisions,   sign important papers or drink alcohol.    ACTIVITY:  Today: no heavy lifting, straining or running due to procedural   sedation/anesthesia.  The following day: return to full activity including work.  DIET:  Eat and drink normally unless instructed otherwise.     TREATMENT FOR COMMON SIDE EFFECTS:  - Mild abdominal pain, nausea, belching, bloating or excessive gas:  rest,   eat lightly and use a heating pad.  - Sore Throat: treat with throat lozenges and/or gargle with warm salt   water.  - Because air was used during the procedure, expelling large amounts of air   from your rectum or belching is normal.  - If a bowel prep was taken, you may not have a bowel movement for 1-3 days.    This is normal.  SYMPTOMS TO WATCH FOR AND REPORT TO YOUR PHYSICIAN:  1. Abdominal pain or bloating, other than gas cramps.  2. Chest pain.  3. Back pain.  4. Signs of infection such as: chills or fever occurring within 24 hours   after the procedure.  5. Rectal bleeding, which would show as bright red, maroon, or black stools.   (A tablespoon of blood from the rectum is not serious, especially if    hemorrhoids are present.)  6. Vomiting.  7. Weakness or dizziness.  GO DIRECTLY TO THE NEAREST EMERGENCY ROOM IF YOU HAVE ANY OF THE FOLLOWING:      Difficulty breathing              Chills and/or fever over 101 F   Persistent vomiting and/or vomiting blood   Severe abdominal pain   Severe chest pain   Black, tarry stools   Bleeding- more than one tablespoon   Any other symptom or condition that you feel may need urgent attention  Your doctor recommends these additional instructions:  If any biopsies were taken, your doctors clinic will contact you in 1 to 2   weeks with any results.  - Discharge patient to home.   - Resume previous diet.   - Continue present medications.   - Await pathology results.  For questions, problems or results please call your physician - Jaycee Yadav MD at Work:  [phone].  [facility], EMERGENCY ROOM PHONE NUMBER: [phone]  IF A COMPLICATION OR EMERGENCY SITUATION ARISES AND YOU ARE UNABLE TO REACH   YOUR PHYSICIAN - GO DIRECTLY TO THE EMERGENCY ROOM.  Jaycee Yadav MD  6/11/2025 9:34:44 AM  This report has been verified and signed electronically.  Dear patient,  As a result of recent federal legislation (The Federal Cures Act), you may   receive lab or pathology results from your procedure in your [facility]   account before your physician is able to contact you. Your physician or   their representative will relay the results to you with their   recommendations at their soonest availability.  Thank you,  PROVATION

## 2025-06-11 NOTE — ANESTHESIA POSTPROCEDURE EVALUATION
Anesthesia Post Evaluation    Patient: Anushka Napier    Procedure(s) Performed: Procedure(s) (LRB):  EGD (ESOPHAGOGASTRODUODENOSCOPY) (N/A)  MANOMETRY, ESOPHAGUS, WITH IMPEDANCE MEASUREMENT (N/A)    Final Anesthesia Type: general      Patient location during evaluation: PACU  Patient participation: Yes- Able to Participate  Level of consciousness: awake and alert  Post-procedure vital signs: reviewed and stable  Pain management: adequate  Airway patency: patent  JASS mitigation strategies: Multimodal analgesia  PONV status at discharge: No PONV  Anesthetic complications: no      Cardiovascular status: blood pressure returned to baseline and hemodynamically stable  Respiratory status: unassisted  Hydration status: euvolemic  Follow-up not needed.              Vitals Value Taken Time   /53 06/11/25 09:31   Temp 36.7 °C (98.1 °F) 06/11/25 09:20   Pulse 57 06/11/25 09:41   Resp 15 06/11/25 09:41   SpO2 99 % 06/11/25 09:41   Vitals shown include unfiled device data.      No case tracking events are documented in the log.      Pain/Eliza Score: Eliza Score: 4 (6/11/2025  9:30 AM)

## 2025-06-11 NOTE — TRANSFER OF CARE
Anesthesia Transfer of Care Note    Patient: Anushka Napier    Procedure(s) Performed: Procedure(s) (LRB):  EGD (ESOPHAGOGASTRODUODENOSCOPY) (N/A)  MANOMETRY, ESOPHAGUS, WITH IMPEDANCE MEASUREMENT (N/A)    Patient location: PACU    Anesthesia Type: general    Transport from OR: Transported from OR on 2-3 L/min O2 by NC with adequate spontaneous ventilation    Post pain: adequate analgesia    Post assessment: no apparent anesthetic complications and tolerated procedure well    Post vital signs: stable    Level of consciousness: sedated    Nausea/Vomiting: no nausea/vomiting    Complications: none    Transfer of care protocol was followed      Last vitals: Visit Vitals  BP (!) 177/77 (BP Location: Left arm, Patient Position: Lying)   Pulse (!) 54   Temp 36.7 °C (98.1 °F) (Oral)   Resp 16   SpO2 97%   Breastfeeding No

## 2025-06-11 NOTE — PLAN OF CARE
Patient and patient's spouse received discharge instructions and no prescriptions. Patient and patient's spouse verbalized understanding of all instructions given and all questions were addressed prior to patient's discharge. Patient's vital signs are stable and within patient's baseline. Patient tolerated clear liquids PO. Patient states throat pain is tolerable. Patient denies nausea and vomiting at this time. Patient meets all criteria for discharge to the 4th Floor for Manometry at this time. Called JAMIR Yang RN and gave report. JAMIR Yang RN to come  patient from [place] [id number].  
Patient arrived from ENDO.  Patient stable with manometry tube in place.  Report received at this time from ENDO nurse and CRNA.  Assumed care of patient at this time.  
Plan of care reviewed with patient  
Satisfactory

## 2025-06-11 NOTE — ANESTHESIA PREPROCEDURE EVALUATION
"                                                                                                             2025  Pre-operative evaluation for Procedure(s) (LRB):  EGD (ESOPHAGOGASTRODUODENOSCOPY) (N/A)  MANOMETRY, ESOPHAGUS, WITH IMPEDANCE MEASUREMENT (N/A)    Anushka Napier is a 70 y.o. female w/ moderate hiatal hernia    Problem List[1]    Review of patient's allergies indicates:   Allergen Reactions    Iodine Hives     IVP Contrast    Codeine Hives and Itching     Other reaction(s): Itching    Other reaction(s): Hives    Tizanidine Swelling     Tongue swelling     Iodinated contrast media Hives     Other reaction(s): Hives    Sulfamethoxazole-trimethoprim Itching     Other reaction(s): Itching  Other reaction(s): Hives    Epinephrine Anxiety and Other (See Comments)     Almost passed out.       Medications Ordered Prior to Encounter[2]    Past Surgical History:   Procedure Laterality Date    ESOPHAGEAL MANOMETRY WITH MEASUREMENT OF IMPEDANCE N/A 2025    Procedure: MANOMETRY, ESOPHAGUS, WITH IMPEDANCE MEASUREMENT;  Surgeon: Jaycee Yadav MD;  Location: [address]);  Service: Endoscopy;  Laterality: N/A;  jasper/srikanth/michael/   precall complete/can take a valium prior/checked with Priscilla L. /mleone    HYSTERECTOMY      complete for prolapse, Abdominal    OOPHORECTOMY      right shoulder      SINUS SURGERY         Social History[3]      CBC: No results for input(s): "WBC", "RBC", "HGB", "HCT", "PLT", "MCV", "MCH", "MCHC" in the last 72 hours.    CMP: No results for input(s): "NA", "K", "CL", "CO2", "BUN", "CREATININE", "GLU", "MG", "PHOS", "CALCIUM", "ALBUMIN", "PROT", "ALKPHOS", "ALT", "AST", "BILITOT" in the last 72 hours.    INR  No results for input(s): "PT", "INR", "PROTIME", "APTT" in the last 72 hours.        Diagnostic Studies:      EKD Echo:  No results found for this or any previous visit.       Pre-op Assessment    I have reviewed the Patient Summary Reports.  "    I have reviewed the Nursing Notes. I have reviewed the NPO Status.   I have reviewed the Medications.     Review of Systems  Cardiovascular:     Hypertension                                          Hepatic/GI:     GERD                Neurological:      Headaches                                 Endocrine:  Diabetes Hypothyroidism              Physical Exam  General: Well nourished and Cooperative    Airway:  Mallampati: II   Mouth Opening: Normal  TM Distance: Normal  Tongue: Normal  Neck ROM: Normal ROM    Chest/Lungs:  Clear to auscultation, Normal Respiratory Rate    Heart:  Rate: Normal  Rhythm: Regular Rhythm  Sounds: Normal        Anesthesia Plan  Type of Anesthesia, risks & benefits discussed:    Anesthesia Type: Gen Natural Airway  Intra-op Monitoring Plan: Standard ASA Monitors  Post Op Pain Control Plan: multimodal analgesia and IV/PO Opioids PRN  Induction:  IV  Airway Plan: Direct and Video, Post-Induction  Informed Consent: Informed consent signed with the Patient and all parties understand the risks and agree with anesthesia plan.  All questions answered.   ASA Score: 2    Ready For Surgery From Anesthesia Perspective.     .           [1]   Patient Active Problem List  Diagnosis    Thyroid disease    Menopause    Hypothyroidism    Diabetes mellitus, type 2    Hyperlipidemia associated with type 2 diabetes mellitus    Hypertension    GERD (gastroesophageal reflux disease)    Screening for colorectal cancer    Vitamin D deficiency disease    Osteopenia    Migraine without aura and without status migrainosus, not intractable    Left facial numbness    Right lateral epicondylitis    History of colonic polyps    Chest pain    Palpitations    Closed nondisplaced fracture of proximal phalanx of left little finger with routine healing    Decreased range of motion of finger of left hand    OAB (overactive bladder)    Tremor    Pulmonary hypertension    Gait instability    Vertigo    At high risk for coronary  artery disease    Abnormal finding on MRI of brain   [2]   No current facility-administered medications on file prior to encounter.     Current Outpatient Medications on File Prior to Encounter   Medication Sig Dispense Refill    aspirin (ECOTRIN) 81 MG EC tablet 1 tablet Orally Once a day      atorvastatin (LIPITOR) 80 MG tablet Take 1 tablet (80 mg total) by mouth once daily. 90 tablet 3    azelastine (ASTELIN) 137 mcg (0.1 %) nasal spray 1 spray (137 mcg total) by Nasal route 2 (two) times daily. 90 mL 12    coQ10, ubiquinol, 100 mg Cap as directed Orally      diazePAM (VALIUM) 2 MG tablet Take 1 tablet (2 mg total) by mouth every 8 (eight) hours as needed (Vertigo). 90 tablet 5    erythromycin (ROMYCIN) ophthalmic ointment Place a 1/2 inch ribbon of ointment into the inner left lower eyelid at bedtime 3.5 g 0    estrogens, conjugated, (PREMARIN) 0.625 MG tablet TAKE 1 TABLET(0.625 MG) BY MOUTH EVERY DAY 90 tablet 3    ezetimibe (ZETIA) 10 mg tablet Take 1 tablet (10 mg total) by mouth once daily. 90 tablet 3    ferrous gluconate (FERGON) 240 (27 FE) MG tablet 1 tablet Orally Three times a Week      FLUoxetine 20 MG capsule TAKE 1 CAPSULE(20 MG) BY MOUTH EVERY DAY 90 capsule 3    levothyroxine (SYNTHROID, LEVOTHROID) 175 MCG tablet TAKE 1 TABLET(175 MCG) BY MOUTH BEFORE BREAKFAST 90 tablet 12    meclizine (ANTIVERT) 25 mg tablet Take 1 tablet (25 mg total) by mouth 3 (three) times daily as needed for Dizziness (or at least one HS for 1 week when vertigo active). 60 tablet 12    metFORMIN (GLUCOPHAGE-XR) 500 MG ER 24hr tablet Take 2 tablets (1,000 mg total) by mouth once daily. 180 tablet 12    metoprolol succinate (TOPROL-XL) 50 MG 24 hr tablet Take 1 tablet (50 mg total) by mouth once daily. 90 tablet 3    mometasone 0.1% (ELOCON) 0.1 % cream Use daily 50 g 3    omega-3 fatty acids-vitamin E 1,000 mg Cap Take 1 capsule by mouth Twice daily. 1 Capsule Oral Twice a day       progesterone (PROMETRIUM) 100 MG  capsule Take 1 capsule (100 mg total) by mouth once daily. 90 capsule 3    RABEprazole (ACIPHEX) 20 mg tablet TAKE 1 TABLET BY MOUTH TWICE DAILY 180 tablet 12    solifenacin (VESICARE) 10 MG tablet Take 1 tablet (10 mg total) by mouth once daily. 90 tablet 3    valsartan (DIOVAN) 160 MG tablet Take 1 tablet (160 mg total) by mouth once daily. 90 tablet 3    vitamin D (VITAMIN D3) 1000 units Tab Take 1,000 Units by mouth once daily. 5 tabs daily     [3]   Social History  Socioeconomic History    Marital status:    Tobacco Use    Smoking status: Never    Smokeless tobacco: Never   Substance and Sexual Activity    Alcohol use: No    Drug use: No    Sexual activity: Yes     Partners: Male     Birth control/protection: Surgical   Social History Narrative     since 1986    Previous  for 8 years prior    He is a     She is a homemaker     Social Drivers of Health     Financial Resource Strain: Low Risk  (3/10/2025)    Overall Financial Resource Strain (CARDIA)     Difficulty of Paying Living Expenses: Not very hard   Food Insecurity: No Food Insecurity (3/10/2025)    Hunger Vital Sign     Worried About Running Out of Food in the Last Year: Never true     Ran Out of Food in the Last Year: Never true   Transportation Needs: No Transportation Needs (3/10/2025)    PRAPARE - Transportation     Lack of Transportation (Medical): No     Lack of Transportation (Non-Medical): No   Physical Activity: Insufficiently Active (3/10/2025)    Exercise Vital Sign     Days of Exercise per Week: 2 days     Minutes of Exercise per Session: 60 min   Stress: Stress Concern Present (3/10/2025)    [employer] - Occupational Stress Questionnaire     Feeling of Stress : To some extent   Housing Stability: Low Risk  (3/10/2025)    Housing Stability Vital Sign     Unable to Pay for Housing in the Last Year: No     Number of Times Moved in the Last Year: 0     Homeless in the Last Year: No

## 2025-06-12 ENCOUNTER — PATIENT MESSAGE (OUTPATIENT)
Dept: GASTROENTEROLOGY | Facility: CLINIC | Age: 70
End: 2025-06-12
Payer: COMMERCIAL

## 2025-06-12 PROCEDURE — 91010 ESOPHAGUS MOTILITY STUDY: CPT | Mod: 26,,, | Performed by: INTERNAL MEDICINE

## 2025-06-12 NOTE — PROVATION PATIENT INSTRUCTIONS
Discharge Summary/Instructions after an Endoscopic Procedure  Patient Name: Anushka Napier  Patient MRN: [id number]  Patient YOB: 1955  Thursday, June 12, 2025  Jaycee Yadav MD  Dear patient,  As a result of recent federal legislation (The Federal Cures Act), you may   receive lab or pathology results from your procedure in your [facility]   account before your physician is able to contact you. Your physician or   their representative will relay the results to you with their   recommendations at their soonest availability.  Thank you,  RESTRICTIONS:  During your procedure today, you received medications for sedation.  These   medications may affect your judgment, balance and coordination.  Therefore,   for 24 hours, you have the following restrictions:   - DO NOT drive a car, operate machinery, make legal/financial decisions,   sign important papers or drink alcohol.    ACTIVITY:  Today: no heavy lifting, straining or running due to procedural   sedation/anesthesia.  The following day: return to full activity including work.  DIET:  Eat and drink normally unless instructed otherwise.     TREATMENT FOR COMMON SIDE EFFECTS:  - Mild abdominal pain, nausea, belching, bloating or excessive gas:  rest,   eat lightly and use a heating pad.  - Sore Throat: treat with throat lozenges and/or gargle with warm salt   water.  - Because air was used during the procedure, expelling large amounts of air   from your rectum or belching is normal.  - If a bowel prep was taken, you may not have a bowel movement for 1-3 days.    This is normal.  SYMPTOMS TO WATCH FOR AND REPORT TO YOUR PHYSICIAN:  1. Abdominal pain or bloating, other than gas cramps.  2. Chest pain.  3. Back pain.  4. Signs of infection such as: chills or fever occurring within 24 hours   after the procedure.  5. Rectal bleeding, which would show as bright red, maroon, or black stools.   (A tablespoon of blood from the rectum is not serious, especially if    hemorrhoids are present.)  6. Vomiting.  7. Weakness or dizziness.  GO DIRECTLY TO THE NEAREST EMERGENCY ROOM IF YOU HAVE ANY OF THE FOLLOWING:      Difficulty breathing              Chills and/or fever over 101 F   Persistent vomiting and/or vomiting blood   Severe abdominal pain   Severe chest pain   Black, tarry stools   Bleeding- more than one tablespoon   Any other symptom or condition that you feel may need urgent attention  Your doctor recommends these additional instructions:  If any biopsies were taken, your doctors clinic will contact you in 1 to 2   weeks with any results.  Follow up with your referring provider.  For questions, problems or results please call your physician - Jaycee Yadav MD at Work:  [phone].  [facility], EMERGENCY ROOM PHONE NUMBER: [phone]  IF A COMPLICATION OR EMERGENCY SITUATION ARISES AND YOU ARE UNABLE TO REACH   YOUR PHYSICIAN - GO DIRECTLY TO THE EMERGENCY ROOM.  Jaycee Yadav MD  6/12/2025 4:58:11 PM  This report has been verified and signed electronically.  Dear patient,  As a result of recent federal legislation (The Federal Cures Act), you may   receive lab or pathology results from your procedure in your [facility]   account before your physician is able to contact you. Your physician or   their representative will relay the results to you with their   recommendations at their soonest availability.  Thank you,  PROVATION

## 2025-06-13 ENCOUNTER — LAB VISIT (OUTPATIENT)
Dept: LAB | Facility: HOSPITAL | Age: 70
End: 2025-06-13
Attending: SURGERY
Payer: COMMERCIAL

## 2025-06-13 ENCOUNTER — OFFICE VISIT (OUTPATIENT)
Dept: SURGERY | Facility: CLINIC | Age: 70
End: 2025-06-13
Payer: COMMERCIAL

## 2025-06-13 ENCOUNTER — TELEPHONE (OUTPATIENT)
Dept: SURGERY | Facility: CLINIC | Age: 70
End: 2025-06-13
Payer: COMMERCIAL

## 2025-06-13 VITALS
HEART RATE: 66 BPM | DIASTOLIC BLOOD PRESSURE: 79 MMHG | SYSTOLIC BLOOD PRESSURE: 155 MMHG | WEIGHT: 160.06 LBS | BODY MASS INDEX: 26.67 KG/M2 | HEIGHT: 65 IN

## 2025-06-13 DIAGNOSIS — K44.9 HIATAL HERNIA WITH GERD AND ESOPHAGITIS: ICD-10-CM

## 2025-06-13 DIAGNOSIS — K44.9 HIATAL HERNIA WITH GERD AND ESOPHAGITIS: Primary | ICD-10-CM

## 2025-06-13 DIAGNOSIS — K21.00 HIATAL HERNIA WITH GERD AND ESOPHAGITIS: ICD-10-CM

## 2025-06-13 DIAGNOSIS — K21.00 HIATAL HERNIA WITH GERD AND ESOPHAGITIS: Primary | ICD-10-CM

## 2025-06-13 LAB
ABSOLUTE EOSINOPHIL (OHS): 0.21 K/UL
ABSOLUTE MONOCYTE (OHS): 0.71 K/UL (ref 0.3–1)
ABSOLUTE NEUTROPHIL COUNT (OHS): 4.49 K/UL (ref 1.8–7.7)
ANION GAP (OHS): 9 MMOL/L (ref 8–16)
BASOPHILS # BLD AUTO: 0.05 K/UL
BASOPHILS NFR BLD AUTO: 0.7 %
BUN SERPL-MCNC: 19 MG/DL (ref 8–23)
CALCIUM SERPL-MCNC: 9.5 MG/DL (ref 8.7–10.5)
CHLORIDE SERPL-SCNC: 105 MMOL/L (ref 95–110)
CO2 SERPL-SCNC: 24 MMOL/L (ref 23–29)
CREAT SERPL-MCNC: 0.9 MG/DL (ref 0.5–1.4)
ERYTHROCYTE [DISTWIDTH] IN BLOOD BY AUTOMATED COUNT: 13.1 % (ref 11.5–14.5)
GFR SERPLBLD CREATININE-BSD FMLA CKD-EPI: >60 ML/MIN/1.73/M2
GLUCOSE SERPL-MCNC: 118 MG/DL (ref 70–110)
HCT VFR BLD AUTO: 37.5 % (ref 37–48.5)
HGB BLD-MCNC: 12.2 GM/DL (ref 12–16)
IMM GRANULOCYTES # BLD AUTO: 0.03 K/UL (ref 0–0.04)
IMM GRANULOCYTES NFR BLD AUTO: 0.4 % (ref 0–0.5)
LYMPHOCYTES # BLD AUTO: 2.19 K/UL (ref 1–4.8)
MCH RBC QN AUTO: 28.6 PG (ref 27–31)
MCHC RBC AUTO-ENTMCNC: 32.5 G/DL (ref 32–36)
MCV RBC AUTO: 88 FL (ref 82–98)
NUCLEATED RBC (/100WBC) (OHS): 0 /100 WBC
PLATELET # BLD AUTO: 258 K/UL (ref 150–450)
PMV BLD AUTO: 9.5 FL (ref 9.2–12.9)
POTASSIUM SERPL-SCNC: 4.4 MMOL/L (ref 3.5–5.1)
RBC # BLD AUTO: 4.26 M/UL (ref 4–5.4)
RELATIVE EOSINOPHIL (OHS): 2.7 %
RELATIVE LYMPHOCYTE (OHS): 28.5 % (ref 18–48)
RELATIVE MONOCYTE (OHS): 9.2 % (ref 4–15)
RELATIVE NEUTROPHIL (OHS): 58.5 % (ref 38–73)
SODIUM SERPL-SCNC: 138 MMOL/L (ref 136–145)
WBC # BLD AUTO: 7.68 K/UL (ref 3.9–12.7)

## 2025-06-13 PROCEDURE — 80048 BASIC METABOLIC PNL TOTAL CA: CPT

## 2025-06-13 PROCEDURE — 99999 PR PBB SHADOW E&M-EST. PATIENT-LVL IV: CPT | Mod: PBBFAC,,, | Performed by: SURGERY

## 2025-06-13 PROCEDURE — 85025 COMPLETE CBC W/AUTO DIFF WBC: CPT

## 2025-06-13 PROCEDURE — 36415 COLL VENOUS BLD VENIPUNCTURE: CPT

## 2025-06-13 RX ORDER — SODIUM CHLORIDE, SODIUM LACTATE, POTASSIUM CHLORIDE, CALCIUM CHLORIDE 600; 310; 30; 20 MG/100ML; MG/100ML; MG/100ML; MG/100ML
INJECTION, SOLUTION INTRAVENOUS CONTINUOUS
OUTPATIENT
Start: 2025-06-13

## 2025-06-13 RX ORDER — PROCHLORPERAZINE EDISYLATE 5 MG/ML
5 INJECTION INTRAMUSCULAR; INTRAVENOUS EVERY 6 HOURS PRN
OUTPATIENT
Start: 2025-06-13

## 2025-06-13 RX ORDER — SODIUM CHLORIDE 9 MG/ML
INJECTION, SOLUTION INTRAVENOUS CONTINUOUS
OUTPATIENT
Start: 2025-06-13

## 2025-06-13 RX ORDER — ONDANSETRON HYDROCHLORIDE 2 MG/ML
4 INJECTION, SOLUTION INTRAVENOUS EVERY 6 HOURS PRN
OUTPATIENT
Start: 2025-06-13

## 2025-06-13 RX ORDER — SODIUM CHLORIDE 0.9 % (FLUSH) 0.9 %
10 SYRINGE (ML) INJECTION
OUTPATIENT
Start: 2025-06-13

## 2025-06-13 NOTE — TELEPHONE ENCOUNTER
----- Message from Med Assistant Ambriz sent at 6/2/2025  8:58 AM CDT -----    ----- Message -----  From: Rohan Velasco MD  Sent: 5/30/2025  12:14 PM CDT  To: Pb Richardson MA    No active limitations from neurology standpoint.  ----- Message -----  From: Pb Richardson MA  Sent: 5/29/2025   1:35 PM CDT  To: Rohan Velasco MD      ----- Message -----  From: Teressa Hemphill RN  Sent: 5/29/2025  12:14 PM CDT  To: Krista Madrigal Staff    Hi-    Mrs. Napier is scheduled for a robotic hiatal hernia repair under general anesthesia with Dr. Miranda on 6/19/2025.  Prior to proceeding, he would like to make sure pt is ok to proceed with surgery from a neurology standpoint. Please advise.    Teressa Anglin

## 2025-06-13 NOTE — PROGRESS NOTES
History & Physical    SUBJECTIVE:     History of Present Illness:  Patient is a 70 y.o. female presents with a hiatal hernia that is symptomatic.  The patient states that she was told she had a small hiatal hernia proximally 2 years ago and Carter's esophagus.  She was started on PPIs that time.  Her symptoms got significantly worse including more heartburn with breakthrough, regurgitation at night with coughing, and dysphagia.  This has worsened significantly in a recent EGD showed a moderate to large hiatal hernia that was measured on the EGD at approximately 10 cm in length.  She also had an esophagram which showed a moderate hiatal hernia however we do not have the images for this.  She presents here today for discussion of surgical treatment of her her hiatal hernia.    Interval History 6/13/25:   Patient returns to clinic for follow up of her hiatal hernia. In the interim, she has underwent esophageal manometry, which suggests low LES pressure and aperistalsis. EGD performed on 6/11 does not show any evidence of Carter's. Biopsies pending.     Review of patient's allergies indicates:   Allergen Reactions    Iodine Hives     IVP Contrast    Codeine Hives and Itching     Other reaction(s): Itching    Other reaction(s): Hives    Tizanidine Swelling     Tongue swelling     Iodinated contrast media Hives     Other reaction(s): Hives    Sulfamethoxazole-trimethoprim Itching     Other reaction(s): Itching  Other reaction(s): Hives    Epinephrine Anxiety and Other (See Comments)     Almost passed out.       Current Medications[1]    Past Medical History:   Diagnosis Date    Allergy     Anxiety     Basal cell carcinoma     to face    Depression     Diabetes mellitus     Diabetes mellitus type II, uncontrolled 9/17/2014    Diabetic retinopathy 01/14/2020    DM retinopathy     GERD (gastroesophageal reflux disease) 9/25/2014    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 Dr Correia, long term PPI use    History of colonic polyps  1/24/2020    Hyperlipidemia     Hypertension     Osteopenia 9/25/2014    Screening for colorectal cancer 9/25/2014    Normal 2009    Screening for colorectal cancer     Thyroid disease     Vitamin D deficiency disease 9/25/2014     Past Surgical History:   Procedure Laterality Date    ESOPHAGEAL MANOMETRY WITH MEASUREMENT OF IMPEDANCE N/A 5/26/2025    Procedure: MANOMETRY, ESOPHAGUS, WITH IMPEDANCE MEASUREMENT;  Surgeon: Jaycee Yadav MD;  Location: [facility] (4TH FLR);  Service: Endoscopy;  Laterality: N/A;  jasper/srikanth/michael/  5/16 precall complete/can take a valium prior/checked with Priscilla L. /mleone    ESOPHAGEAL MANOMETRY WITH MEASUREMENT OF IMPEDANCE N/A 6/11/2025    Procedure: MANOMETRY, ESOPHAGUS, WITH IMPEDANCE MEASUREMENT;  Surgeon: Jaycee Yadav MD;  Location: [facility] (2ND FLR);  Service: Endoscopy;  Laterality: N/A;    ESOPHAGOGASTRODUODENOSCOPY N/A 6/11/2025    Procedure: EGD (ESOPHAGOGASTRODUODENOSCOPY);  Surgeon: Jaycee Yadav MD;  Location: [facility] (2ND FLR);  Service: Endoscopy;  Laterality: N/A;  jm/portl/probe placement /pt unable to get probe has huge hh/esophageal biopsies- Need Biopsy Results to joan Hudson per Dr. Miranda  6/3 precall attempted/lvm/mleone  6/6-lvm-jw  6/9-precall attempted/LVM/LS  6/10 pt returned call to confirm/mle    HYSTERECTOMY  1997    complete for prolapse, Abdominal    OOPHORECTOMY      right shoulder      SINUS SURGERY  2012     Family History   Problem Relation Name Age of Onset    Breast cancer Cousin maternal 40    Miscarriages / Stillbirths Maternal Grandmother      Miscarriages / Stillbirths Mother      Diabetes Mother      Hypertension Mother      Cancer Mother          cervical    Heart disease Father      Hypertension Father      Ovarian cancer Neg Hx       Social History[2]     Review of Systems:  Review of Systems   Constitutional:  Negative for activity change, appetite change, chills, diaphoresis, fatigue and fever.   HENT:   "Negative for congestion, sinus pressure, sneezing and sore throat.    Eyes:  Negative for pain, discharge, redness, itching and visual disturbance.   Respiratory:  Negative for apnea, cough, choking, chest tightness and shortness of breath.    Cardiovascular:  Negative for chest pain, palpitations and leg swelling.   Gastrointestinal:  Negative for abdominal distention, abdominal pain, constipation, diarrhea, nausea and vomiting.   Musculoskeletal:  Negative for arthralgias, back pain and gait problem.   Skin:  Negative for color change, pallor and rash.   Neurological:  Negative for dizziness, facial asymmetry, light-headedness and headaches.   Psychiatric/Behavioral:  Negative for agitation, behavioral problems and confusion.        OBJECTIVE:     Vital Signs (Most Recent)  Pulse: 66 (06/13/25 0920)  BP: (!) 155/79 (06/13/25 0920)  5' 5" (1.651 m)  72.6 kg (160 lb 1.2 oz)     Physical Exam:  Physical Exam  Constitutional:       General: She is not in acute distress.     Appearance: Normal appearance. She is not diaphoretic.   HENT:      Head: Normocephalic and atraumatic.      Right Ear: External ear normal.      Left Ear: External ear normal.   Eyes:      General: No scleral icterus.        Right eye: No discharge.         Left eye: No discharge.   Cardiovascular:      Rate and Rhythm: Normal rate and regular rhythm.   Pulmonary:      Effort: Pulmonary effort is normal. No respiratory distress.   Musculoskeletal:         General: Normal range of motion.   Skin:     General: Skin is warm and dry.      Coloration: Skin is not pale.      Findings: No erythema or rash.   Neurological:      Mental Status: She is alert and oriented to person, place, and time.   Psychiatric:         Mood and Affect: Mood normal.         Behavior: Behavior normal.         Thought Content: Thought content normal.         Judgment: Judgment normal.       Diagnostic Results:  EGD, manometry reviewed     ASSESSMENT/PLAN:     HH with GERD and " possible Carter's. Plan for robotic hiatal hernia repair, plus or minus fundoplication depending on Carter's biopsies. Manometry consistent with scleroderma esophagus, though patient does not have any other manifestations of scleroderma.     PLAN:Plan     Will reach out to Dr. Yadav regarding esophageal biopsies   Plan for possible fundoplication at the time of HH repair, discussed with patient that this may make her dysphagia worse   Plan for rheumatology workup after surgery   Consent obtained          [1]   Current Outpatient Medications   Medication Sig Dispense Refill    aspirin (ECOTRIN) 81 MG EC tablet 1 tablet Orally Once a day      atorvastatin (LIPITOR) 80 MG tablet Take 1 tablet (80 mg total) by mouth once daily. 90 tablet 3    azelastine (ASTELIN) 137 mcg (0.1 %) nasal spray 1 spray (137 mcg total) by Nasal route 2 (two) times daily. 90 mL 12    coQ10, ubiquinol, 100 mg Cap as directed Orally      erythromycin (ROMYCIN) ophthalmic ointment Place a 1/2 inch ribbon of ointment into the inner left lower eyelid at bedtime 3.5 g 0    estrogens, conjugated, (PREMARIN) 0.625 MG tablet TAKE 1 TABLET(0.625 MG) BY MOUTH EVERY DAY 90 tablet 3    ezetimibe (ZETIA) 10 mg tablet Take 1 tablet (10 mg total) by mouth once daily. 90 tablet 3    ferrous gluconate (FERGON) 240 (27 FE) MG tablet 1 tablet Orally Three times a Week      FLUoxetine 20 MG capsule TAKE 1 CAPSULE(20 MG) BY MOUTH EVERY DAY 90 capsule 3    levothyroxine (SYNTHROID, LEVOTHROID) 175 MCG tablet TAKE 1 TABLET(175 MCG) BY MOUTH BEFORE BREAKFAST 90 tablet 12    meclizine (ANTIVERT) 25 mg tablet Take 1 tablet (25 mg total) by mouth 3 (three) times daily as needed for Dizziness (or at least one HS for 1 week when vertigo active). 60 tablet 12    metFORMIN (GLUCOPHAGE-XR) 500 MG ER 24hr tablet Take 2 tablets (1,000 mg total) by mouth once daily. 180 tablet 12    metoprolol succinate (TOPROL-XL) 50 MG 24 hr tablet Take 1 tablet (50 mg total) by mouth once  daily. 90 tablet 3    mometasone 0.1% (ELOCON) 0.1 % cream Use daily 50 g 3    omega-3 fatty acids-vitamin E 1,000 mg Cap Take 1 capsule by mouth Twice daily. 1 Capsule Oral Twice a day       progesterone (PROMETRIUM) 100 MG capsule Take 1 capsule (100 mg total) by mouth once daily. 90 capsule 3    RABEprazole (ACIPHEX) 20 mg tablet TAKE 1 TABLET BY MOUTH TWICE DAILY 180 tablet 12    solifenacin (VESICARE) 10 MG tablet Take 1 tablet (10 mg total) by mouth once daily. 90 tablet 3    valsartan (DIOVAN) 160 MG tablet Take 1 tablet (160 mg total) by mouth once daily. 90 tablet 3    vitamin D (VITAMIN D3) 1000 units Tab Take 1,000 Units by mouth once daily. 5 tabs daily      diazePAM (VALIUM) 2 MG tablet Take 1 tablet (2 mg total) by mouth every 8 (eight) hours as needed (Vertigo). 90 tablet 5     No current facility-administered medications for this visit.   [2]   Social History  Tobacco Use    Smoking status: Never    Smokeless tobacco: Never   Vaping Use    Vaping status: Never Used   Substance Use Topics    Alcohol use: No    Drug use: No

## 2025-06-13 NOTE — H&P (VIEW-ONLY)
History & Physical    SUBJECTIVE:     History of Present Illness:  Patient is a 70 y.o. female presents with a hiatal hernia that is symptomatic.  The patient states that she was told she had a small hiatal hernia proximally 2 years ago and Carter's esophagus.  She was started on PPIs that time.  Her symptoms got significantly worse including more heartburn with breakthrough, regurgitation at night with coughing, and dysphagia.  This has worsened significantly in a recent EGD showed a moderate to large hiatal hernia that was measured on the EGD at approximately 10 cm in length.  She also had an esophagram which showed a moderate hiatal hernia however we do not have the images for this.  She presents here today for discussion of surgical treatment of her her hiatal hernia.    Interval History 6/13/25:   Patient returns to clinic for follow up of her hiatal hernia. In the interim, she has underwent esophageal manometry, which suggests low LES pressure and aperistalsis. EGD performed on 6/11 does not show any evidence of Carter's. Biopsies pending.     Review of patient's allergies indicates:   Allergen Reactions    Iodine Hives     IVP Contrast    Codeine Hives and Itching     Other reaction(s): Itching    Other reaction(s): Hives    Tizanidine Swelling     Tongue swelling     Iodinated contrast media Hives     Other reaction(s): Hives    Sulfamethoxazole-trimethoprim Itching     Other reaction(s): Itching  Other reaction(s): Hives    Epinephrine Anxiety and Other (See Comments)     Almost passed out.       Current Medications[1]    Past Medical History:   Diagnosis Date    Allergy     Anxiety     Basal cell carcinoma     to face    Depression     Diabetes mellitus     Diabetes mellitus type II, uncontrolled 9/17/2014    Diabetic retinopathy 01/14/2020    DM retinopathy     GERD (gastroesophageal reflux disease) 9/25/2014    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 Dr Correia, long term PPI use    History of colonic polyps  1/24/2020    Hyperlipidemia     Hypertension     Osteopenia 9/25/2014    Screening for colorectal cancer 9/25/2014    Normal 2009    Screening for colorectal cancer     Thyroid disease     Vitamin D deficiency disease 9/25/2014     Past Surgical History:   Procedure Laterality Date    ESOPHAGEAL MANOMETRY WITH MEASUREMENT OF IMPEDANCE N/A 5/26/2025    Procedure: MANOMETRY, ESOPHAGUS, WITH IMPEDANCE MEASUREMENT;  Surgeon: Jaycee Yadav MD;  Location: [facility] END[facility] (4TH FLR);  Service: Endoscopy;  Laterality: N/A;  jasper/srikanth/michael/  5/16 precall complete/can take a valium prior/checked with Priscilla L. /mleone    ESOPHAGEAL MANOMETRY WITH MEASUREMENT OF IMPEDANCE N/A 6/11/2025    Procedure: MANOMETRY, ESOPHAGUS, WITH IMPEDANCE MEASUREMENT;  Surgeon: Jaycee Yadav MD;  Location: [facility] END[facility] (2ND FLR);  Service: Endoscopy;  Laterality: N/A;    ESOPHAGOGASTRODUODENOSCOPY N/A 6/11/2025    Procedure: EGD (ESOPHAGOGASTRODUODENOSCOPY);  Surgeon: Jaycee Yadav MD;  Location: [facility] ENDO (2ND FLR);  Service: Endoscopy;  Laterality: N/A;  jm/portl/probe placement /pt unable to get probe has huge hh/esophageal biopsies- Need Biopsy Results to joan Hudson per Dr. Miranda  6/3 precall attempted/lvm/mleone  6/6-lvm-jw  6/9-precall attempted/LVM/LS  6/10 pt returned call to confirm/mle    HYSTERECTOMY  1997    complete for prolapse, Abdominal    OOPHORECTOMY      right shoulder      SINUS SURGERY  2012     Family History   Problem Relation Name Age of Onset    Breast cancer Cousin maternal 40    Miscarriages / Stillbirths Maternal Grandmother      Miscarriages / Stillbirths Mother      Diabetes Mother      Hypertension Mother      Cancer Mother          cervical    Heart disease Father      Hypertension Father      Ovarian cancer Neg Hx       Social History[2]     Review of Systems:  Review of Systems   Constitutional:  Negative for activity change, appetite change, chills, diaphoresis, fatigue and fever.   HENT:   "Negative for congestion, sinus pressure, sneezing and sore throat.    Eyes:  Negative for pain, discharge, redness, itching and visual disturbance.   Respiratory:  Negative for apnea, cough, choking, chest tightness and shortness of breath.    Cardiovascular:  Negative for chest pain, palpitations and leg swelling.   Gastrointestinal:  Negative for abdominal distention, abdominal pain, constipation, diarrhea, nausea and vomiting.   Musculoskeletal:  Negative for arthralgias, back pain and gait problem.   Skin:  Negative for color change, pallor and rash.   Neurological:  Negative for dizziness, facial asymmetry, light-headedness and headaches.   Psychiatric/Behavioral:  Negative for agitation, behavioral problems and confusion.        OBJECTIVE:     Vital Signs (Most Recent)  Pulse: 66 (06/13/25 0920)  BP: (!) 155/79 (06/13/25 0920)  5' 5" (1.651 m)  72.6 kg (160 lb 1.2 oz)     Physical Exam:  Physical Exam  Constitutional:       General: She is not in acute distress.     Appearance: Normal appearance. She is not diaphoretic.   HENT:      Head: Normocephalic and atraumatic.      Right Ear: External ear normal.      Left Ear: External ear normal.   Eyes:      General: No scleral icterus.        Right eye: No discharge.         Left eye: No discharge.   Cardiovascular:      Rate and Rhythm: Normal rate and regular rhythm.   Pulmonary:      Effort: Pulmonary effort is normal. No respiratory distress.   Musculoskeletal:         General: Normal range of motion.   Skin:     General: Skin is warm and dry.      Coloration: Skin is not pale.      Findings: No erythema or rash.   Neurological:      Mental Status: She is alert and oriented to person, place, and time.   Psychiatric:         Mood and Affect: Mood normal.         Behavior: Behavior normal.         Thought Content: Thought content normal.         Judgment: Judgment normal.       Diagnostic Results:  EGD, manometry reviewed     ASSESSMENT/PLAN:     HH with GERD and " possible Carter's. Plan for robotic hiatal hernia repair, plus or minus fundoplication depending on Carter's biopsies. Manometry consistent with scleroderma esophagus, though patient does not have any other manifestations of scleroderma.     PLAN:Plan     Will reach out to Dr. Yadav regarding esophageal biopsies   Plan for possible fundoplication at the time of HH repair, discussed with patient that this may make her dysphagia worse   Plan for rheumatology workup after surgery   Consent obtained          [1]   Current Outpatient Medications   Medication Sig Dispense Refill    aspirin (ECOTRIN) 81 MG EC tablet 1 tablet Orally Once a day      atorvastatin (LIPITOR) 80 MG tablet Take 1 tablet (80 mg total) by mouth once daily. 90 tablet 3    azelastine (ASTELIN) 137 mcg (0.1 %) nasal spray 1 spray (137 mcg total) by Nasal route 2 (two) times daily. 90 mL 12    coQ10, ubiquinol, 100 mg Cap as directed Orally      erythromycin (ROMYCIN) ophthalmic ointment Place a 1/2 inch ribbon of ointment into the inner left lower eyelid at bedtime 3.5 g 0    estrogens, conjugated, (PREMARIN) 0.625 MG tablet TAKE 1 TABLET(0.625 MG) BY MOUTH EVERY DAY 90 tablet 3    ezetimibe (ZETIA) 10 mg tablet Take 1 tablet (10 mg total) by mouth once daily. 90 tablet 3    ferrous gluconate (FERGON) 240 (27 FE) MG tablet 1 tablet Orally Three times a Week      FLUoxetine 20 MG capsule TAKE 1 CAPSULE(20 MG) BY MOUTH EVERY DAY 90 capsule 3    levothyroxine (SYNTHROID, LEVOTHROID) 175 MCG tablet TAKE 1 TABLET(175 MCG) BY MOUTH BEFORE BREAKFAST 90 tablet 12    meclizine (ANTIVERT) 25 mg tablet Take 1 tablet (25 mg total) by mouth 3 (three) times daily as needed for Dizziness (or at least one HS for 1 week when vertigo active). 60 tablet 12    metFORMIN (GLUCOPHAGE-XR) 500 MG ER 24hr tablet Take 2 tablets (1,000 mg total) by mouth once daily. 180 tablet 12    metoprolol succinate (TOPROL-XL) 50 MG 24 hr tablet Take 1 tablet (50 mg total) by mouth once  daily. 90 tablet 3    mometasone 0.1% (ELOCON) 0.1 % cream Use daily 50 g 3    omega-3 fatty acids-vitamin E 1,000 mg Cap Take 1 capsule by mouth Twice daily. 1 Capsule Oral Twice a day       progesterone (PROMETRIUM) 100 MG capsule Take 1 capsule (100 mg total) by mouth once daily. 90 capsule 3    RABEprazole (ACIPHEX) 20 mg tablet TAKE 1 TABLET BY MOUTH TWICE DAILY 180 tablet 12    solifenacin (VESICARE) 10 MG tablet Take 1 tablet (10 mg total) by mouth once daily. 90 tablet 3    valsartan (DIOVAN) 160 MG tablet Take 1 tablet (160 mg total) by mouth once daily. 90 tablet 3    vitamin D (VITAMIN D3) 1000 units Tab Take 1,000 Units by mouth once daily. 5 tabs daily      diazePAM (VALIUM) 2 MG tablet Take 1 tablet (2 mg total) by mouth every 8 (eight) hours as needed (Vertigo). 90 tablet 5     No current facility-administered medications for this visit.   [2]   Social History  Tobacco Use    Smoking status: Never    Smokeless tobacco: Never   Vaping Use    Vaping status: Never Used   Substance Use Topics    Alcohol use: No    Drug use: No

## 2025-06-16 ENCOUNTER — PATIENT MESSAGE (OUTPATIENT)
Dept: ADMINISTRATIVE | Facility: HOSPITAL | Age: 70
End: 2025-06-16
Payer: COMMERCIAL

## 2025-06-16 ENCOUNTER — RESULTS FOLLOW-UP (OUTPATIENT)
Dept: GASTROENTEROLOGY | Facility: CLINIC | Age: 70
End: 2025-06-16

## 2025-06-17 ENCOUNTER — PATIENT OUTREACH (OUTPATIENT)
Dept: ADMINISTRATIVE | Facility: HOSPITAL | Age: 70
End: 2025-06-17
Payer: COMMERCIAL

## 2025-06-17 DIAGNOSIS — M81.0 OSTEOPOROSIS, UNSPECIFIED OSTEOPOROSIS TYPE, UNSPECIFIED PATHOLOGICAL FRACTURE PRESENCE: Primary | ICD-10-CM

## 2025-06-17 NOTE — PRE-PROCEDURE INSTRUCTIONS
Anesthesia Pre-Op Instructions given:     -- YOUR SURGEON'S OFFICE WILL PROVIDE YOUR ARRIVAL TIME  -- Medication information (what to hold and what to take)   -- NPO guidelines as follows: (or as per your Surgeon)  Stop ALL solid food, gum, candy 8 hours before arrival time.  Stop all CLOUDY liquids: coffee with creamer, cloudy juices, 8 hours prior to arrival time.  The patient should be ENCOURAGED to drink carbohydrate-rich clear liquids (sports drinks, clear juices) until 2 hours prior to arrival time.  Stop clear liquids 2 hours prior to arrival time.  CLEAR liquids include water, black coffee NO creamer, clear oral rehydration drinks, clear sports drinks and clear fruit juices (no orange juice, no pulpy juices, no apple cider).   IF IN DOUBT, drink water instead.   NOTHING TO DRINK 2 hours before surgery/procedure time. If you are told to take medication on the morning of surgery, it may be taken with a sip of water.   -- *Arrival place and directions given*.  Time to be given the day before procedure by the Surgeon's Office   -- Bathe with antibacterial soap (dial or Hibiclens as instructed)  -- Don't wear any jewelry or valuables and not metals on skin or hair AM of surgery   -- No makeup or moisturizer to face   -- No perfume/cologne, powder, lotions, aftershave or deodorant     Pt verbalized understanding.            *If going to , see below:      Directions and Instructions for [facility]   At [facility], we have an outstanding team of physicians, anesthesiologists, CRNAs, Registered Nurses, Surgical Technologists, and other ancillary team members all focused on your surgical and procedural care.   Before Your Procedure:   The physician's office will call you with a specific arrival time and directions a day or two before your scheduled procedure. You may also receive these instructions through your [facility] portal.   Day of Procedure:   Please be sure to arrive at  the arrival time given or you may risk your surgery being delayed or canceled. The arrival time is earlier than your scheduled surgery or procedure time. In the winter months please dress warm and bring blankets for you or your child as the waiting room may be cold. If you have difficulty locating the facility, please give us a call at [phone].   Directions:   The [facility] is located on the 1st floor of the hospital building near the [place] entrance.   Parking:   You will park in the [facility] (note location on map). [facility] opens at 5:00 a.m. and has a drop off area by the entrance.  parking is available starting at 7:00 a.m. Please see below for further  parking instructions.   Directions from the parking garage elevators   [facility] Elevators: From the parking garage, take the blue [address] elevators (located in the center of the parking garage) to the 1st floor of the garage. You will then take a right once off the elevators then another right to the outside of the parking garage. You will be across from the [facility]. You will walk down the sidewalk, pass the  curve at the [place] entrance and continue to follow the sidewalk. You will pass the radiation oncology entrance on your right. Continue to follow the sidewalk to the [facility] glass door entrance.   Hospital Entrance (Inside Route): If a mostly inside route is preferred: Take the inside elevator bank (located at the far north end of the garage) from the parking garage to the 1st floor. On the 1st floor walk past [employer]. Keep walking down the center of the hallway towards the hospital elevators. Once you reach the red brick cierra, take a left and go past the hospital elevators. Take another left and follow the blue and white [address] signs around the hallway to the end. Go outside of the door. You will see the [facility]  Center entrance to your right.   Drop Off:   There is a drop off area at the doors of the [facility] for your convenience. If utilized for pediatric patients, an adult must accompany the patient into the surgery center while another adult johns the vehicle.    (at 7:00 a.m.):   Upon check-in, please let the  know that you are utilizing [employer] parking which is free. The . will then call [employer] for your car to be picked up. Your keys and phone number will be collected and given to [employer] services. You will then be given a ticket. Upon discharge, [employer] will be notified to bring your vehicle back when you are ready.           Directions to [facility] [place]:  [phone]     From 1st floor garage elevators: go past [facility] [employer] shop. Look for Black piano on side of coffee shop. Take Atrium (gold) elevator by piano up to 2nd floor. When you exit elevator follow long hallway (you should see a sign hanging from the ceiling that says Day of Surgery Family Waiting Room. When hallway ends you will be entering the day of surgery waiting area. Check in at the desk for your procedure.      From [facility] entrance: Make a right after you enter the door to the hospital. On your Left, take [facility] elevator to 2nd floor. Check in at desk      From Lab desk on 2nd floor: Exit lab area toward hospital atrium. You should see a sign that says Day of Surgery Family Waiting Area. Make a Left and follow long hallway (you should see a sign hanging from the ceiling that says Day of Surgery Family Waiting Room. When hallway ends you will be entering the day of surgery waiting area. Check in at the desk for your procedure.

## 2025-06-18 ENCOUNTER — ANESTHESIA EVENT (OUTPATIENT)
Dept: SURGERY | Facility: HOSPITAL | Age: 70
End: 2025-06-18
Payer: COMMERCIAL

## 2025-06-18 ENCOUNTER — TELEPHONE (OUTPATIENT)
Dept: SURGERY | Facility: CLINIC | Age: 70
End: 2025-06-18
Payer: COMMERCIAL

## 2025-06-18 NOTE — ANESTHESIA PREPROCEDURE EVALUATION
[facility]  Anesthesia Pre-Operative Evaluation        Patient Name: Anushka Napier  YOB: 1955  MRN: [id number]    SUBJECTIVE:     Pre-operative Evaluation for Procedure(s) (LRB):  XI ROBOTIC REPAIR, HIATAL HERNIA, W/ TOUPET FUNDOPLICATION possible gastropexy (N/A)     06/18/2025    Anushka Napier is a 70 y.o. female with a PMHx significant for HLD, DMII, HTN anxiety, migraine, hypothyroid, GERD, headaches presents for robotic hiatal hernia repair.     She now presents for the above procedure(s) with General Surgery - Dr. Miranda.    Previous Airway : None documented.    Problem List[1]    Review of patient's allergies indicates:   Allergen Reactions    Iodine Hives     IVP Contrast    Codeine Hives and Itching     Other reaction(s): Itching    Other reaction(s): Hives    Tizanidine Swelling     Tongue swelling     Iodinated contrast media Hives     Other reaction(s): Hives    Sulfamethoxazole-trimethoprim Itching     Other reaction(s): Itching  Other reaction(s): Hives    Epinephrine Anxiety and Other (See Comments)     Almost passed out.       Current Outpatient Medications   Medication Instructions    aspirin (ECOTRIN) 81 MG EC tablet 1 tablet Orally Once a day    atorvastatin (LIPITOR) 80 mg, Oral, Daily    azelastine (ASTELIN) 137 mcg, Nasal, 2 times daily    coQ10, ubiquinol, 100 mg Cap as directed Orally    diazePAM (VALIUM) 2 mg, Oral, Every 8 hours PRN    erythromycin (ROMYCIN) ophthalmic ointment Place a 1/2 inch ribbon of ointment into the inner left lower eyelid at bedtime    estrogens, conjugated, (PREMARIN) 0.625 MG tablet TAKE 1 TABLET(0.625 MG) BY MOUTH EVERY DAY    ezetimibe (ZETIA) 10 mg, Oral, Daily    ferrous gluconate (FERGON) 240 (27 FE) MG tablet 1 tablet Orally Three times a Week    FLUoxetine 20 mg, Oral    levothyroxine (SYNTHROID, LEVOTHROID) 175 MCG tablet TAKE 1 TABLET(175 MCG) BY MOUTH BEFORE BREAKFAST    meclizine (ANTIVERT) 25 mg, Oral, 3 times  daily PRN    metFORMIN (GLUCOPHAGE-XR) 1,000 mg, Oral, Daily    metoprolol succinate (TOPROL-XL) 50 mg, Oral, Daily    mometasone 0.1% (ELOCON) 0.1 % cream Use daily    omega-3 fatty acids-vitamin E 1,000 mg Cap 1 capsule, 2 times daily    progesterone (PROMETRIUM) 100 mg, Oral, Daily    RABEprazole (ACIPHEX) 20 mg tablet TAKE 1 TABLET BY MOUTH TWICE DAILY    solifenacin (VESICARE) 10 mg, Oral, Daily    valsartan (DIOVAN) 160 mg, Oral, Daily    vitamin D (VITAMIN D3) 1,000 Units, Daily       Past Surgical History:   Procedure Laterality Date    ESOPHAGEAL MANOMETRY WITH MEASUREMENT OF IMPEDANCE N/A 5/26/2025    Procedure: MANOMETRY, ESOPHAGUS, WITH IMPEDANCE MEASUREMENT;  Surgeon: Jaycee Yadav MD;  Location: [facility] (4TH FLR);  Service: Endoscopy;  Laterality: N/A;  jasper/srikanth/michael/  5/16 precall complete/can take a valium prior/checked with Priscilla L. /mleone    ESOPHAGEAL MANOMETRY WITH MEASUREMENT OF IMPEDANCE N/A 6/11/2025    Procedure: MANOMETRY, ESOPHAGUS, WITH IMPEDANCE MEASUREMENT;  Surgeon: Jaycee Yadav MD;  Location: [facility] (2ND FLR);  Service: Endoscopy;  Laterality: N/A;    ESOPHAGOGASTRODUODENOSCOPY N/A 6/11/2025    Procedure: EGD (ESOPHAGOGASTRODUODENOSCOPY);  Surgeon: Jaycee Yadav MD;  Location: [facility] (2ND FLR);  Service: Endoscopy;  Laterality: N/A;  jm/portl/probe placement /pt unable to get probe has huge hh/esophageal biopsies- Need Biopsy Results to eval Barretts per Dr. Miranda  6/3 precall attempted/lvm/mleone  6/6-lvm-jw  6/9-precall attempted/LVM/LS  6/10 pt returned call to confirm/mle    HYSTERECTOMY  1997    complete for prolapse, Abdominal    OOPHORECTOMY      right shoulder      SINUS SURGERY  2012       Social History     Substance and Sexual Activity   Drug Use No     Alcohol Use: Not At Risk (3/10/2025)    AUDIT-C     Frequency of Alcohol Consumption: Never     Average Number of Drinks: Patient does not drink     Frequency of Binge Drinking: Never      Tobacco Use: Low Risk  (6/13/2025)    Patient History     Smoking Tobacco Use: Never     Smokeless Tobacco Use: Never     Passive Exposure: Not on file       OBJECTIVE:     Vital Signs Range (Last 24H):         Significant Labs    Heme Profile  Lab Results   Component Value Date    WBC 7.68 06/13/2025    HGB 12.2 06/13/2025    HCT 37.5 06/13/2025     06/13/2025       Coagulation Studies  Lab Results   Component Value Date    LABPROT 10.4 01/15/2018    INR 1.0 07/03/2020    APTT 24.7 01/15/2018       BMP  Lab Results   Component Value Date     06/13/2025    K 4.4 06/13/2025     06/13/2025    CO2 24 06/13/2025    BUN 19 06/13/2025    CREATININE 0.9 06/13/2025    MG 1.8 02/21/2025       Liver Function Tests  Lab Results   Component Value Date    AST 24 02/21/2025    ALT 14 02/21/2025    ALKPHOS 111 02/21/2025    BILITOT 0.5 02/21/2025    PROT 7.4 02/21/2025    ALBUMIN 3.8 02/21/2025       Lipid Profile  Lab Results   Component Value Date    CHOL 193 03/08/2025    HDL 59 03/08/2025    TRIG 148 03/08/2025       Endocrine Profile  Lab Results   Component Value Date    HGBA1C 7.1 (H) 03/08/2025    TSH 2.465 02/21/2025       Diagnostic Studies      Cardiac Studies    EKG:   Results for orders placed or performed during the hospital encounter of 02/21/25   EKG 12-lead    Collection Time: 02/21/25  8:44 AM   Result Value Ref Range    QRS Duration 80 ms    [facility] QTC Calculation 450 ms    Narrative    Test Reason : R55,    Vent. Rate :  74 BPM     Atrial Rate :  74 BPM     P-R Int : 140 ms          QRS Dur :  80 ms      QT Int : 406 ms       P-R-T Axes :  13 -28 122 degrees    QTcB Int : 450 ms    Normal sinus rhythm  Low septal forces  Abnormal R wave progression in the precordial leads  Nonspecific ST and/or T wave abnormalities  Leftward axis  Abnormal ECG  When compared with ECG of 02-Oct-2024 06:59,  The axis Shifted left  Nonspecific T wave abnormality, worse in Inferior leads  Nonspecific T wave  abnormality now evident in Lateral leads  Confirmed by Piyush Duncan ([id number]) on 2/21/2025 8:57:11 AM    Referred By:            Confirmed By: Piyush Duncan         Results for orders placed during the hospital encounter of 09/18/24    Echo    Interpretation Summary    Left Ventricle: The left ventricle is normal in size. Normal wall thickness. There is normal systolic function with a visually estimated ejection fraction of 55 - 60%. Grade I diastolic dysfunction.    Right Ventricle: Normal right ventricular cavity size. Systolic function is normal.    Pulmonary Artery: The estimated pulmonary artery systolic pressure is 30 mmHg.      Results for orders placed during the hospital encounter of 07/13/20    Stress Echo Which stress agent will be used? Treadmill Exercise; Color Flow Doppler? No    Interpretation Summary  · Normal left ventricular systolic function. The estimated ejection fraction is 55%.  · Normal LV diastolic function.  · Normal right ventricular systolic function.  · The estimated PA systolic pressure is 25 mmHg.  · The ECG portion of this study is negative for myocardial ischemia.  · The stress echo portion of this study is negative for myocardial ischemia.      Results for orders placed during the hospital encounter of 04/16/25    Nuclear Stress - Cardiology Interpreted    Interpretation Summary    Normal myocardial perfusion scan. There is no evidence of myocardial ischemia or infarction.    The gated perfusion images showed an ejection fraction of 88% post stress.    The ECG portion of the study is negative for ischemia.    The patient reported no chest pain during the stress test.    There were no arrhythmias during stress.          ASSESSMENT/PLAN:         Pre-op Assessment    I have reviewed the Patient Summary Reports.     I have reviewed the Nursing Notes. I have reviewed the NPO Status.   I have reviewed the Medications.     Review of Systems  Anesthesia Hx:  No problems with previous  Anesthesia   Neg history of prior surgery.            Denies Personal Hx of Anesthesia complications.                    Hematology/Oncology:    Oncology Normal    -- Denies Anemia:                                  Cardiovascular:     Hypertension              ECG has been reviewed.  Patient on beta blockers                          Hepatic/GI:    Hiatal Hernia, GERD                Neurological:      Headaches                                 Endocrine:  Diabetes, type 2 Hypothyroidism              Physical Exam  General: Well nourished and Cooperative    Airway:  Mallampati: II   Mouth Opening: Normal  TM Distance: Normal    Chest/Lungs:  Normal Respiratory Rate        Anesthesia Plan  Type of Anesthesia, risks & benefits discussed:    Anesthesia Type: Gen ETT  Intra-op Monitoring Plan: Standard ASA Monitors  Post Op Pain Control Plan: multimodal analgesia  Induction:  IV  Airway Plan: Direct, Post-Induction  ASA Score: 3  Day of Surgery Review of History & Physical: H&P Update referred to the surgeon/provider.    Ready For Surgery From Anesthesia Perspective.     .             [1]   Patient Active Problem List  Diagnosis    Thyroid disease    Menopause    Hypothyroidism    Diabetes mellitus, type 2    Hyperlipidemia associated with type 2 diabetes mellitus    Hypertension    GERD (gastroesophageal reflux disease)    Screening for colorectal cancer    Vitamin D deficiency disease    Osteopenia    Migraine without aura and without status migrainosus, not intractable    Left facial numbness    Right lateral epicondylitis    History of colonic polyps    Chest pain    Palpitations    Closed nondisplaced fracture of proximal phalanx of left little finger with routine healing    Decreased range of motion of finger of left hand    OAB (overactive bladder)    Tremor    Pulmonary hypertension    Gait instability    Vertigo    At high risk for coronary artery disease    Abnormal finding on MRI of brain

## 2025-06-19 ENCOUNTER — TELEPHONE (OUTPATIENT)
Dept: GASTROENTEROLOGY | Facility: CLINIC | Age: 70
End: 2025-06-19
Payer: COMMERCIAL

## 2025-06-19 ENCOUNTER — HOSPITAL ENCOUNTER (OUTPATIENT)
Facility: HOSPITAL | Age: 70
LOS: 1 days | Discharge: HOME | End: 2025-06-20
Attending: SURGERY | Admitting: SURGERY
Payer: COMMERCIAL

## 2025-06-19 ENCOUNTER — ANESTHESIA (OUTPATIENT)
Dept: SURGERY | Facility: HOSPITAL | Age: 70
End: 2025-06-19
Payer: COMMERCIAL

## 2025-06-19 DIAGNOSIS — K21.00 HIATAL HERNIA WITH GERD AND ESOPHAGITIS: Primary | ICD-10-CM

## 2025-06-19 DIAGNOSIS — K44.9 HIATAL HERNIA WITH GERD AND ESOPHAGITIS: Primary | ICD-10-CM

## 2025-06-19 LAB
ESTRIOL SERPL-MCNC: NORMAL NG/ML
ESTROGEN SERPL-MCNC: NORMAL PG/ML
INSULIN SERPL-ACNC: NORMAL U[IU]/ML
LAB AP CLINICAL INFORMATION: NORMAL
LAB AP GROSS DESCRIPTION: NORMAL
LAB AP PERFORMING LOCATION(S): NORMAL
LAB AP REPORT FOOTNOTES: NORMAL
POCT GLUCOSE: 127 MG/DL (ref 70–110)
POCT GLUCOSE: 140 MG/DL (ref 70–110)
POCT GLUCOSE: 184 MG/DL (ref 70–110)

## 2025-06-19 PROCEDURE — 25000003 PHARM REV CODE 250

## 2025-06-19 PROCEDURE — 36000713 HC OR TIME LEV V EA ADD 15 MIN: Performed by: SURGERY

## 2025-06-19 PROCEDURE — 82962 GLUCOSE BLOOD TEST: CPT | Performed by: SURGERY

## 2025-06-19 PROCEDURE — 37000008 HC ANESTHESIA 1ST 15 MINUTES: Performed by: SURGERY

## 2025-06-19 PROCEDURE — 71000015 HC POSTOP RECOV 1ST HR: Performed by: SURGERY

## 2025-06-19 PROCEDURE — 71000016 HC POSTOP RECOV ADDL HR: Performed by: SURGERY

## 2025-06-19 PROCEDURE — 43282 LAP PARAESOPH HER RPR W/MESH: CPT | Mod: ,,, | Performed by: SURGERY

## 2025-06-19 PROCEDURE — 27000221 HC OXYGEN, UP TO 24 HOURS

## 2025-06-19 PROCEDURE — C1781 MESH (IMPLANTABLE): HCPCS | Performed by: SURGERY

## 2025-06-19 PROCEDURE — 94761 N-INVAS EAR/PLS OXIMETRY MLT: CPT

## 2025-06-19 PROCEDURE — 63600175 PHARM REV CODE 636 W HCPCS

## 2025-06-19 PROCEDURE — 63600175 PHARM REV CODE 636 W HCPCS: Performed by: SURGERY

## 2025-06-19 PROCEDURE — 37000009 HC ANESTHESIA EA ADD 15 MINS: Performed by: SURGERY

## 2025-06-19 PROCEDURE — 99900035 HC TECH TIME PER 15 MIN (STAT)

## 2025-06-19 PROCEDURE — 71000033 HC RECOVERY, INTIAL HOUR: Performed by: SURGERY

## 2025-06-19 PROCEDURE — 36000712 HC OR TIME LEV V 1ST 15 MIN: Performed by: SURGERY

## 2025-06-19 DEVICE — BIO-A TISSUE REINFORCEMENT 7CMX10CM
Type: IMPLANTABLE DEVICE | Site: ABDOMEN | Status: FUNCTIONAL
Brand: GORE BIO-A TISSUE REINFORCEMENT

## 2025-06-19 RX ORDER — LIDOCAINE HYDROCHLORIDE AND EPINEPHRINE 10; 10 UG/ML; MG/ML
INJECTION, SOLUTION INFILTRATION; PERINEURAL
Status: DISCONTINUED | OUTPATIENT
Start: 2025-06-19 | End: 2025-06-19 | Stop reason: HOSPADM

## 2025-06-19 RX ORDER — SODIUM CHLORIDE 0.9 % (FLUSH) 0.9 %
10 SYRINGE (ML) INJECTION
Status: DISCONTINUED | OUTPATIENT
Start: 2025-06-19 | End: 2025-06-19 | Stop reason: HOSPADM

## 2025-06-19 RX ORDER — FENTANYL CITRATE 50 UG/ML
INJECTION, SOLUTION INTRAMUSCULAR; INTRAVENOUS
Status: DISCONTINUED | OUTPATIENT
Start: 2025-06-19 | End: 2025-06-19

## 2025-06-19 RX ORDER — SODIUM CHLORIDE 9 MG/ML
INJECTION, SOLUTION INTRAVENOUS CONTINUOUS
Status: DISCONTINUED | OUTPATIENT
Start: 2025-06-19 | End: 2025-06-20

## 2025-06-19 RX ORDER — ROCURONIUM BROMIDE 10 MG/ML
INJECTION, SOLUTION INTRAVENOUS
Status: DISCONTINUED | OUTPATIENT
Start: 2025-06-19 | End: 2025-06-19

## 2025-06-19 RX ORDER — SCOPOLAMINE 1 MG/3D
1 PATCH, EXTENDED RELEASE TRANSDERMAL
Status: DISCONTINUED | OUTPATIENT
Start: 2025-06-19 | End: 2025-06-20 | Stop reason: HOSPADM

## 2025-06-19 RX ORDER — MUPIROCIN 20 MG/G
OINTMENT TOPICAL 2 TIMES DAILY
Status: DISCONTINUED | OUTPATIENT
Start: 2025-06-19 | End: 2025-06-20 | Stop reason: HOSPADM

## 2025-06-19 RX ORDER — GLUCAGON 1 MG
1 KIT INJECTION
Status: DISCONTINUED | OUTPATIENT
Start: 2025-06-19 | End: 2025-06-19 | Stop reason: HOSPADM

## 2025-06-19 RX ORDER — PHENYLEPHRINE HYDROCHLORIDE 10 MG/ML
INJECTION INTRAVENOUS
Status: DISCONTINUED | OUTPATIENT
Start: 2025-06-19 | End: 2025-06-19

## 2025-06-19 RX ORDER — PROCHLORPERAZINE EDISYLATE 5 MG/ML
5 INJECTION INTRAMUSCULAR; INTRAVENOUS EVERY 6 HOURS PRN
Status: DISCONTINUED | OUTPATIENT
Start: 2025-06-19 | End: 2025-06-20 | Stop reason: HOSPADM

## 2025-06-19 RX ORDER — CEFAZOLIN 2 G/1
2 INJECTION, POWDER, FOR SOLUTION INTRAMUSCULAR; INTRAVENOUS
Status: DISCONTINUED | OUTPATIENT
Start: 2025-06-19 | End: 2025-06-19 | Stop reason: HOSPADM

## 2025-06-19 RX ORDER — ACETAMINOPHEN 500 MG
1000 TABLET ORAL EVERY 8 HOURS
Status: DISCONTINUED | OUTPATIENT
Start: 2025-06-20 | End: 2025-06-19

## 2025-06-19 RX ORDER — LIDOCAINE HYDROCHLORIDE 20 MG/ML
INJECTION, SOLUTION EPIDURAL; INFILTRATION; INTRACAUDAL; PERINEURAL
Status: DISCONTINUED | OUTPATIENT
Start: 2025-06-19 | End: 2025-06-19

## 2025-06-19 RX ORDER — CEFAZOLIN SODIUM 1 G/3ML
INJECTION, POWDER, FOR SOLUTION INTRAMUSCULAR; INTRAVENOUS
Status: DISCONTINUED | OUTPATIENT
Start: 2025-06-19 | End: 2025-06-19

## 2025-06-19 RX ORDER — HYDRALAZINE HYDROCHLORIDE 20 MG/ML
10 INJECTION INTRAMUSCULAR; INTRAVENOUS EVERY 6 HOURS PRN
Status: DISCONTINUED | OUTPATIENT
Start: 2025-06-19 | End: 2025-06-20 | Stop reason: HOSPADM

## 2025-06-19 RX ORDER — PROPOFOL 10 MG/ML
VIAL (ML) INTRAVENOUS
Status: DISCONTINUED | OUTPATIENT
Start: 2025-06-19 | End: 2025-06-19

## 2025-06-19 RX ORDER — ACETAMINOPHEN 10 MG/ML
1000 INJECTION, SOLUTION INTRAVENOUS EVERY 8 HOURS
Status: COMPLETED | OUTPATIENT
Start: 2025-06-19 | End: 2025-06-20

## 2025-06-19 RX ORDER — ONDANSETRON HYDROCHLORIDE 2 MG/ML
4 INJECTION, SOLUTION INTRAVENOUS EVERY 6 HOURS PRN
Status: DISCONTINUED | OUTPATIENT
Start: 2025-06-19 | End: 2025-06-19

## 2025-06-19 RX ORDER — DEXAMETHASONE SODIUM PHOSPHATE 4 MG/ML
INJECTION, SOLUTION INTRA-ARTICULAR; INTRALESIONAL; INTRAMUSCULAR; INTRAVENOUS; SOFT TISSUE
Status: DISCONTINUED | OUTPATIENT
Start: 2025-06-19 | End: 2025-06-19

## 2025-06-19 RX ORDER — SODIUM CHLORIDE, SODIUM LACTATE, POTASSIUM CHLORIDE, CALCIUM CHLORIDE 600; 310; 30; 20 MG/100ML; MG/100ML; MG/100ML; MG/100ML
INJECTION, SOLUTION INTRAVENOUS CONTINUOUS
Status: DISCONTINUED | OUTPATIENT
Start: 2025-06-19 | End: 2025-06-20 | Stop reason: HOSPADM

## 2025-06-19 RX ORDER — DEXMEDETOMIDINE HYDROCHLORIDE 100 UG/ML
INJECTION, SOLUTION INTRAVENOUS
Status: DISCONTINUED | OUTPATIENT
Start: 2025-06-19 | End: 2025-06-19

## 2025-06-19 RX ORDER — HALOPERIDOL LACTATE 5 MG/ML
0.5 INJECTION, SOLUTION INTRAMUSCULAR EVERY 10 MIN PRN
Status: DISCONTINUED | OUTPATIENT
Start: 2025-06-19 | End: 2025-06-19 | Stop reason: HOSPADM

## 2025-06-19 RX ORDER — SUCCINYLCHOLINE CHLORIDE 20 MG/ML
INJECTION INTRAMUSCULAR; INTRAVENOUS
Status: DISCONTINUED | OUTPATIENT
Start: 2025-06-19 | End: 2025-06-19

## 2025-06-19 RX ORDER — SODIUM CHLORIDE, SODIUM LACTATE, POTASSIUM CHLORIDE, CALCIUM CHLORIDE 600; 310; 30; 20 MG/100ML; MG/100ML; MG/100ML; MG/100ML
INJECTION, SOLUTION INTRAVENOUS CONTINUOUS
Status: DISCONTINUED | OUTPATIENT
Start: 2025-06-19 | End: 2025-06-20

## 2025-06-19 RX ORDER — GLUCAGON 1 MG
1 KIT INJECTION
Status: DISCONTINUED | OUTPATIENT
Start: 2025-06-19 | End: 2025-06-20 | Stop reason: HOSPADM

## 2025-06-19 RX ORDER — INSULIN ASPART 100 [IU]/ML
0-10 INJECTION, SOLUTION INTRAVENOUS; SUBCUTANEOUS EVERY 6 HOURS PRN
Status: DISCONTINUED | OUTPATIENT
Start: 2025-06-19 | End: 2025-06-20 | Stop reason: HOSPADM

## 2025-06-19 RX ORDER — ACETAMINOPHEN 10 MG/ML
INJECTION, SOLUTION INTRAVENOUS
Status: DISCONTINUED | OUTPATIENT
Start: 2025-06-19 | End: 2025-06-19

## 2025-06-19 RX ORDER — FENTANYL CITRATE 50 UG/ML
25 INJECTION, SOLUTION INTRAMUSCULAR; INTRAVENOUS EVERY 5 MIN PRN
Status: DISCONTINUED | OUTPATIENT
Start: 2025-06-19 | End: 2025-06-19 | Stop reason: HOSPADM

## 2025-06-19 RX ORDER — KETAMINE HCL IN 0.9 % NACL 50 MG/5 ML
SYRINGE (ML) INTRAVENOUS
Status: DISCONTINUED | OUTPATIENT
Start: 2025-06-19 | End: 2025-06-19

## 2025-06-19 RX ORDER — ONDANSETRON HYDROCHLORIDE 2 MG/ML
INJECTION, SOLUTION INTRAVENOUS
Status: DISCONTINUED | OUTPATIENT
Start: 2025-06-19 | End: 2025-06-19

## 2025-06-19 RX ORDER — ONDANSETRON HYDROCHLORIDE 2 MG/ML
4 INJECTION, SOLUTION INTRAVENOUS EVERY 6 HOURS
Status: DISCONTINUED | OUTPATIENT
Start: 2025-06-19 | End: 2025-06-20 | Stop reason: HOSPADM

## 2025-06-19 RX ORDER — BUPIVACAINE HYDROCHLORIDE 2.5 MG/ML
INJECTION, SOLUTION EPIDURAL; INFILTRATION; INTRACAUDAL; PERINEURAL
Status: DISCONTINUED | OUTPATIENT
Start: 2025-06-19 | End: 2025-06-19 | Stop reason: HOSPADM

## 2025-06-19 RX ADMIN — PROPOFOL 50 MG: 10 INJECTION, EMULSION INTRAVENOUS at 09:06

## 2025-06-19 RX ADMIN — SUCCINYLCHOLINE 140 MG: 20 INJECTION, SOLUTION INTRAMUSCULAR; INTRAVENOUS at 09:06

## 2025-06-19 RX ADMIN — Medication 10 MG: at 10:06

## 2025-06-19 RX ADMIN — SODIUM CHLORIDE, POTASSIUM CHLORIDE, SODIUM LACTATE AND CALCIUM CHLORIDE: 600; 310; 30; 20 INJECTION, SOLUTION INTRAVENOUS at 12:06

## 2025-06-19 RX ADMIN — GLYCOPYRROLATE 0.2 MG: 0.2 INJECTION INTRAMUSCULAR; INTRAVENOUS at 09:06

## 2025-06-19 RX ADMIN — ROCURONIUM BROMIDE 50 MG: 10 INJECTION, SOLUTION INTRAVENOUS at 09:06

## 2025-06-19 RX ADMIN — CEFAZOLIN 2 G: 330 INJECTION, POWDER, FOR SOLUTION INTRAMUSCULAR; INTRAVENOUS at 09:06

## 2025-06-19 RX ADMIN — DEXAMETHASONE SODIUM PHOSPHATE 8 MG: 4 INJECTION, SOLUTION INTRAMUSCULAR; INTRAVENOUS at 09:06

## 2025-06-19 RX ADMIN — PHENYLEPHRINE HYDROCHLORIDE 100 MCG: 10 INJECTION INTRAVENOUS at 09:06

## 2025-06-19 RX ADMIN — Medication 20 MG: at 09:06

## 2025-06-19 RX ADMIN — ACETAMINOPHEN 1000 MG: 10 INJECTION, SOLUTION INTRAVENOUS at 08:06

## 2025-06-19 RX ADMIN — FENTANYL CITRATE 50 MCG: 50 INJECTION, SOLUTION INTRAMUSCULAR; INTRAVENOUS at 12:06

## 2025-06-19 RX ADMIN — PHENYLEPHRINE HYDROCHLORIDE 100 MCG: 10 INJECTION INTRAVENOUS at 11:06

## 2025-06-19 RX ADMIN — PHENYLEPHRINE HYDROCHLORIDE 50 MCG: 10 INJECTION INTRAVENOUS at 09:06

## 2025-06-19 RX ADMIN — FENTANYL CITRATE 25 MCG: 50 INJECTION INTRAMUSCULAR; INTRAVENOUS at 03:06

## 2025-06-19 RX ADMIN — LIDOCAINE HYDROCHLORIDE 60 MG: 20 INJECTION, SOLUTION EPIDURAL; INFILTRATION; INTRACAUDAL; PERINEURAL at 12:06

## 2025-06-19 RX ADMIN — FENTANYL CITRATE 25 MCG: 50 INJECTION INTRAMUSCULAR; INTRAVENOUS at 02:06

## 2025-06-19 RX ADMIN — ONDANSETRON 4 MG: 2 INJECTION INTRAMUSCULAR; INTRAVENOUS at 11:06

## 2025-06-19 RX ADMIN — DEXMEDETOMIDINE 4 MCG: 100 INJECTION, SOLUTION, CONCENTRATE INTRAVENOUS at 10:06

## 2025-06-19 RX ADMIN — ACETAMINOPHEN 1000 MG: 10 INJECTION INTRAVENOUS at 11:06

## 2025-06-19 RX ADMIN — PROPOFOL 180 MG: 10 INJECTION, EMULSION INTRAVENOUS at 09:06

## 2025-06-19 RX ADMIN — MUPIROCIN: 20 OINTMENT TOPICAL at 10:06

## 2025-06-19 RX ADMIN — FENTANYL CITRATE 100 MCG: 50 INJECTION, SOLUTION INTRAMUSCULAR; INTRAVENOUS at 09:06

## 2025-06-19 RX ADMIN — SODIUM CHLORIDE, SODIUM ACETATE ANHYDROUS, SODIUM GLUCONATE, POTASSIUM CHLORIDE, AND MAGNESIUM CHLORIDE: 526; 222; 502; 37; 30 INJECTION, SOLUTION INTRAVENOUS at 09:06

## 2025-06-19 RX ADMIN — ROCURONIUM BROMIDE 10 MG: 10 INJECTION, SOLUTION INTRAVENOUS at 10:06

## 2025-06-19 RX ADMIN — PROPOFOL 20 MG: 10 INJECTION, EMULSION INTRAVENOUS at 11:06

## 2025-06-19 RX ADMIN — SODIUM CHLORIDE: 0.9 INJECTION, SOLUTION INTRAVENOUS at 08:06

## 2025-06-19 RX ADMIN — SUGAMMADEX 400 MG: 100 INJECTION, SOLUTION INTRAVENOUS at 12:06

## 2025-06-19 RX ADMIN — ROCURONIUM BROMIDE 20 MG: 10 INJECTION, SOLUTION INTRAVENOUS at 11:06

## 2025-06-19 RX ADMIN — DEXMEDETOMIDINE 4 MCG: 100 INJECTION, SOLUTION, CONCENTRATE INTRAVENOUS at 09:06

## 2025-06-19 NOTE — OP NOTE
DATE OF PROCEDURE: 06/19/2025   SERVICE: Bariatric Surgery.   Surgeons and Role:     * Arvin Miranda Jr., MD - Primary     * Josefina Avery MD - Resident - Assisting  PREOPERATIVE DIAGNOSES: Type III Hiatal Hernia and significant   gastroesophageal reflux disease.  POSTOPERATIVE DIAGNOSES: Type III Hiatal Hernia and significant   gastroesophageal reflux disease.   PROCEDURE: Robotic repair of type III hiatal hernia with mesh and  Toupet fundoplication and EGD.  ANESTHESIA: General endotracheal and local.   DESCRIPTION OF PROCEDURE: The patient was taken to the Operating Room, placed under general anesthesia, prepped and draped in sterile fashion. At this time,   incision was made approximately 15 cm from xiphoid and 2 cm to the left of   midline after infiltrating with local anesthetic. Using an 8mm Optiview trocar,   intraabdominal access was obtained under direct visualization without difficulty   and pneumoperitoneum was obtained. Further ports were then placed including   Left anterior axillary and midclavicular 8mm robot ports in line with the first port.  A 5 mm port on the right anterior axillary subcostal position was placed.  An 8mm robot port was placed on the right, midclavicular in line with the original ports.  These were all placed after infiltrating with local   anesthetic and under direct visualization. Once the ports were placed, the   patient was placed in steep reverse Trendelenburg. A liver retractor was   placed. The robot was docked.  The hernia was very large with over half of the stomach in the chest.   We began at the pars flaccida and opened the to the right brianna.    We dissected the right brianna and continued anterior dissection including the phrenoesophageal ligament with the SyncroSeal and dissected anteriorly around the brianna and laterally as well on the left side.  In order to get the left side down and for formation of our wrap we proceeded to take down the attatchments on  the greater curve of the stomach including the short gastric vessels for some distance down the curve.  We dissected the hiatal opening posteriorly again with the SyncroSeal.  We proceeded to carefully dissect this until the stomach was completely reduced. We completed circumferential dissection.  This was a significant amount of work secondary to the size of the hernia.   This completed reduction of the hernia.  We noted that there was significant   intraabdominal length of the esophagus with no tension pulling this into the   mediastinum and approximately 3 to 4 cm of   intra-abdominal esophagus. Once we noted that we had good intraabdominal   esophagus, we proceeded with the closure of the hiatal hernia. This was done   with 2-0 permanent V-jerry suture with Bio-A pledgets.  At this time, a 56-[place] bougie   was placed and it was noted to be closed around the bougie without difficulty.   There was enough room for a grasper but it was not noted to be tight. Once this was closed, we proceeded with placement of the mesh. A piece of BIO-A   hiatal hernia mesh was placed retro-esophageally after being cut to size and was   tacked in place using 2-0 silk suture x 3 on the brianna on either side and   posteriorly to cover the hiatal closure. This completed closure of the   hiatus and placement of mesh.   Once this was closed we proceeded with a Toupet fundoplication. At this time, the fundus was brought back posteriorly retro-esophageally and around the 56-[place] bougie, we wrapped the stomach. At this time, we took fundus on the left side to brianna to esophagus at the 2 o'clock position beginning the toupet. We then completed two further sutures approximately 1cm apart at the 2 o'clock position. We then proceeded to complete this on the right with the retroesophageal portion of stomach taking a bite of  fundus on the right side to brianna to esophagus at the 10 o'clock position. We then completed two further sutures  approximately 1cm apart at the 10 o'clock position. This completed a 3cm 270 degree posterior wrap around the 56-[place]anese bougie.  Once completed the wrap appeared to be in very good condition and we proceeded   with an EGD. The scope was placed in the oropharynx, got down into the   esophagus into the stomach through the wrap. We noted that the GE   junction was able to be easily traversed without significant tightness around   the scope. A retroflexion view showed a good wrap in good condition. Once this   was done, we suctioned the air from the stomach and reinspected   robotically. The wrap was in good condition from both the EGD and   Robotic view and we proceeded with closure. At this time, the liver   retractor was removed.  The ports were removed and the skin of all five port sites were closed with 4-0 Monocryl suture in a subcuticular fashion. Mastisol and Steri-Strips were   placed. The patient was allowed to awake from general anesthesia and   transferred to bed for transfer to Recovery.   COMPLICATIONS: None.   SPONGE COUNT: Correct.   BLOOD LOSS: 15 mL.   FLUIDS: Per Anesthesia.   BLOOD GIVEN: None.   DRAINS: None.   SPECIMENS: None  CONDITION OF PATIENT: Good.   I was present for the entire procedure.

## 2025-06-19 NOTE — TRANSFER OF CARE
"Anesthesia Transfer of Care Note    Patient: Anushka Napier    Procedure(s) Performed: Procedure(s) (LRB):  XI ROBOTIC REPAIR, HIATAL HERNIA, W/ TOUPET FUNDOPLICATION (N/A)  EGD (ESOPHAGOGASTRODUODENOSCOPY) (N/A)    Patient location: PACU    Anesthesia Type: general    Transport from OR: Transported from OR on 6-10 L/min O2 by face mask with adequate spontaneous ventilation    Post pain: adequate analgesia    Post assessment: no apparent anesthetic complications    Post vital signs: stable    Level of consciousness: awake and alert    Nausea/Vomiting: no nausea/vomiting    Complications: none    Transfer of care protocol was followed      Last vitals: Visit Vitals  BP (!) 183/79 (BP Location: Right arm, Patient Position: Lying)   Pulse (!) 54   Temp 36.6 °C (97.8 °F) (Skin)   Resp 18   Ht 5' 5" (1.651 m)   Wt 72.6 kg (160 lb 0.9 oz)   SpO2 99%   Breastfeeding No   BMI 26.63 kg/m²     "

## 2025-06-19 NOTE — TELEPHONE ENCOUNTER
----- Message from Jaycee Yadav MD sent at 6/19/2025  4:32 PM CDT -----  Can you place a recall letter for this patient to repeat her EGD in 3 years for Carter's esophagus surveillance?  ----- Message -----  From: Lab, Background User  Sent: 6/13/2025   1:19 PM CDT  To: Jaycee Yadav MD

## 2025-06-19 NOTE — NURSING TRANSFER
Nursing Transfer Note      6/19/2025   4:46 PM    Nurse giving handoff: JAGDISH Head PACU  Nurse receiving handoff: JAGDISH Ca POSS    Reason patient is being transferred: post anesthesia    Transfer From: PACU to 508    Transfer via stretcher    Transported by PCT    Additional Lines: Oxygen, 2 L NC    Medicines sent: LR gtt infusing    Any special needs or follow-up needed: none    Patient belongings transferred with patient: No, all belongings with pt's     Chart send with patient: Yes    Notified: spouse    Patient reassessed at: [id number]

## 2025-06-19 NOTE — ANESTHESIA PROCEDURE NOTES
Intubation    Date/Time: 6/19/2025 9:10 AM    Performed by: Valerio Patel MD  Authorized by: Raj Douglas MD    Intubation:     Induction:  Rapid sequence induction    Intubated:  Postinduction    Mask Ventilation:  Easy mask    Attempts:  1    Attempted By:  Resident anesthesiologist    Method of Intubation:  Video laryngoscopy    Blade:  Peña 3    Laryngeal View Grade: Grade I - full view of cords      Difficult Airway Encountered?: No      Complications:  None    Airway Device:  Oral endotracheal tube    Airway Device Size:  7.0    Style/Cuff Inflation:  Cuffed (inflated to minimal occlusive pressure)    Tube secured:  22    Placement Verified By:  Capnometry    Complicating Factors:  None    Findings Post-Intubation:  BS equal bilateral and atraumatic/condition of teeth unchanged      
Cellulitis of right lower extremity

## 2025-06-20 VITALS
BODY MASS INDEX: 26.67 KG/M2 | TEMPERATURE: 98 F | RESPIRATION RATE: 19 BRPM | WEIGHT: 160.06 LBS | SYSTOLIC BLOOD PRESSURE: 176 MMHG | DIASTOLIC BLOOD PRESSURE: 76 MMHG | HEIGHT: 65 IN | OXYGEN SATURATION: 91 % | HEART RATE: 62 BPM

## 2025-06-20 LAB
ABSOLUTE EOSINOPHIL (OHS): 0.01 K/UL
ABSOLUTE MONOCYTE (OHS): 1.15 K/UL (ref 0.3–1)
ABSOLUTE NEUTROPHIL COUNT (OHS): 9.17 K/UL (ref 1.8–7.7)
ANION GAP (OHS): 11 MMOL/L (ref 8–16)
BASOPHILS # BLD AUTO: 0.02 K/UL
BASOPHILS NFR BLD AUTO: 0.2 %
BUN SERPL-MCNC: 12 MG/DL (ref 8–23)
CALCIUM SERPL-MCNC: 8.4 MG/DL (ref 8.7–10.5)
CHLORIDE SERPL-SCNC: 107 MMOL/L (ref 95–110)
CO2 SERPL-SCNC: 22 MMOL/L (ref 23–29)
CREAT SERPL-MCNC: 0.8 MG/DL (ref 0.5–1.4)
ERYTHROCYTE [DISTWIDTH] IN BLOOD BY AUTOMATED COUNT: 13 % (ref 11.5–14.5)
GFR SERPLBLD CREATININE-BSD FMLA CKD-EPI: >60 ML/MIN/1.73/M2
GLUCOSE SERPL-MCNC: 115 MG/DL (ref 70–110)
HCT VFR BLD AUTO: 36.4 % (ref 37–48.5)
HGB BLD-MCNC: 12.1 GM/DL (ref 12–16)
IMM GRANULOCYTES # BLD AUTO: 0.04 K/UL (ref 0–0.04)
IMM GRANULOCYTES NFR BLD AUTO: 0.3 % (ref 0–0.5)
LYMPHOCYTES # BLD AUTO: 1.38 K/UL (ref 1–4.8)
MAGNESIUM SERPL-MCNC: 1.8 MG/DL (ref 1.6–2.6)
MCH RBC QN AUTO: 29.1 PG (ref 27–31)
MCHC RBC AUTO-ENTMCNC: 33.2 G/DL (ref 32–36)
MCV RBC AUTO: 88 FL (ref 82–98)
NUCLEATED RBC (/100WBC) (OHS): 0 /100 WBC
PHOSPHATE SERPL-MCNC: 3 MG/DL (ref 2.7–4.5)
PLATELET # BLD AUTO: 270 K/UL (ref 150–450)
PMV BLD AUTO: 9.3 FL (ref 9.2–12.9)
POCT GLUCOSE: 126 MG/DL (ref 70–110)
POCT GLUCOSE: 164 MG/DL (ref 70–110)
POTASSIUM SERPL-SCNC: 4.1 MMOL/L (ref 3.5–5.1)
RBC # BLD AUTO: 4.16 M/UL (ref 4–5.4)
RELATIVE EOSINOPHIL (OHS): 0.1 %
RELATIVE LYMPHOCYTE (OHS): 11.7 % (ref 18–48)
RELATIVE MONOCYTE (OHS): 9.8 % (ref 4–15)
RELATIVE NEUTROPHIL (OHS): 77.9 % (ref 38–73)
SODIUM SERPL-SCNC: 140 MMOL/L (ref 136–145)
WBC # BLD AUTO: 11.77 K/UL (ref 3.9–12.7)

## 2025-06-20 PROCEDURE — 63600175 PHARM REV CODE 636 W HCPCS

## 2025-06-20 PROCEDURE — 36415 COLL VENOUS BLD VENIPUNCTURE: CPT | Performed by: SURGERY

## 2025-06-20 PROCEDURE — 80048 BASIC METABOLIC PNL TOTAL CA: CPT | Performed by: SURGERY

## 2025-06-20 PROCEDURE — 63600175 PHARM REV CODE 636 W HCPCS: Performed by: COMMUNITY HEALTH WORKER

## 2025-06-20 PROCEDURE — 83735 ASSAY OF MAGNESIUM: CPT | Performed by: SURGERY

## 2025-06-20 PROCEDURE — 85025 COMPLETE CBC W/AUTO DIFF WBC: CPT | Performed by: SURGERY

## 2025-06-20 PROCEDURE — 84100 ASSAY OF PHOSPHORUS: CPT | Performed by: SURGERY

## 2025-06-20 RX ORDER — PANTOPRAZOLE SODIUM 40 MG/10ML
40 INJECTION, POWDER, LYOPHILIZED, FOR SOLUTION INTRAVENOUS DAILY
Status: DISCONTINUED | OUTPATIENT
Start: 2025-06-20 | End: 2025-06-20 | Stop reason: HOSPADM

## 2025-06-20 RX ORDER — OXYCODONE HCL 5 MG/5 ML
5 SOLUTION, ORAL ORAL EVERY 6 HOURS PRN
Refills: 0 | Status: DISCONTINUED | OUTPATIENT
Start: 2025-06-20 | End: 2025-06-20 | Stop reason: HOSPADM

## 2025-06-20 RX ORDER — OXYCODONE HCL 5 MG/5 ML
5 SOLUTION, ORAL ORAL EVERY 6 HOURS PRN
Qty: 25 ML | Refills: 0 | Status: SHIPPED | OUTPATIENT
Start: 2025-06-20

## 2025-06-20 RX ORDER — HYDROMORPHONE HYDROCHLORIDE 1 MG/ML
0.2 INJECTION, SOLUTION INTRAMUSCULAR; INTRAVENOUS; SUBCUTANEOUS ONCE
Refills: 0 | Status: COMPLETED | OUTPATIENT
Start: 2025-06-20 | End: 2025-06-20

## 2025-06-20 RX ORDER — SCOPOLAMINE 1 MG/3D
1 PATCH, EXTENDED RELEASE TRANSDERMAL
Qty: 3 PATCH | Refills: 0 | Status: SHIPPED | OUTPATIENT
Start: 2025-06-20

## 2025-06-20 RX ORDER — METFORMIN HYDROCHLORIDE 500 MG/1
500 TABLET ORAL 2 TIMES DAILY WITH MEALS
Qty: 28 TABLET | Refills: 0 | Status: SHIPPED | OUTPATIENT
Start: 2025-06-20 | End: 2025-07-04

## 2025-06-20 RX ORDER — PROCHLORPERAZINE MALEATE 5 MG
10 TABLET ORAL EVERY 6 HOURS PRN
Qty: 15 TABLET | Refills: 0 | Status: SHIPPED | OUTPATIENT
Start: 2025-06-20

## 2025-06-20 RX ORDER — ONDANSETRON 4 MG/1
4 TABLET, ORALLY DISINTEGRATING ORAL EVERY 8 HOURS PRN
Qty: 15 TABLET | Refills: 0 | Status: SHIPPED | OUTPATIENT
Start: 2025-06-20

## 2025-06-20 RX ADMIN — ACETAMINOPHEN 1000 MG: 10 INJECTION, SOLUTION INTRAVENOUS at 03:06

## 2025-06-20 RX ADMIN — MUPIROCIN: 20 OINTMENT TOPICAL at 09:06

## 2025-06-20 RX ADMIN — PANTOPRAZOLE SODIUM 40 MG: 40 INJECTION, POWDER, LYOPHILIZED, FOR SOLUTION INTRAVENOUS at 08:06

## 2025-06-20 RX ADMIN — ACETAMINOPHEN 1000 MG: 10 INJECTION, SOLUTION INTRAVENOUS at 06:06

## 2025-06-20 RX ADMIN — HYDROMORPHONE HYDROCHLORIDE 0.2 MG: 1 INJECTION, SOLUTION INTRAMUSCULAR; INTRAVENOUS; SUBCUTANEOUS at 04:06

## 2025-06-20 RX ADMIN — ONDANSETRON 4 MG: 2 INJECTION INTRAMUSCULAR; INTRAVENOUS at 12:06

## 2025-06-20 NOTE — DISCHARGE SUMMARY
[facility]  General Surgery  Discharge Summary      Patient Name: Anushka Napier  MRN: [id number]  Admission Date: 6/19/2025  Hospital Length of Stay: 1 days  Discharge Date and Time: 06/20/2025 4:18 PM  Attending Physician: Tammi Huff Jr.*   Discharging Provider: Kim Rmoero MD  Primary Care Provider: Gerardo Dobbs MD    HPI:   No notes on file    Procedure(s) (LRB):  XI ROBOTIC REPAIR, HIATAL HERNIA, W/ TOUPET FUNDOPLICATION (N/A)  EGD (ESOPHAGOGASTRODUODENOSCOPY) (N/A)      Indwelling Lines/Drains at time of discharge:   Lines/Drains/Airways       None                 Hospital Course: For details of hospital stay, please refer to daily progress notes. Briefly, this is a 70 y.o. female  presented on 6/19/2025 for planned surgical intervention for a hiatal hernia. Patient was taken to OR and underwent Robotic hiatal hernia repair w/ Toupet fundoplication. Post-operatively patient was admitted to the floor and had an uncomplicated hospital recovery.     On the day of discharge, the patient was ambulating without difficulty, voiding spontaneously, was tolerating a diet without nausea or vomiting, and pain was well controlled on PO pain medications. Discharge instructions were explained to the patient and appropriate follow-up was arranged.      Goals of Care Treatment Preferences:  Code Status: Full Code      Consults:   Consults (From admission, onward)          Status Ordering Provider     Inpatient consult to Registered Dietitian/Nutritionist  Once        Provider:  (Not yet assigned)    Completed TAMMI HUFF JR            Significant Diagnostic Studies: Labs: BMP:   Recent Labs   Lab 06/20/25  0555   *      K 4.1      CO2 22*   BUN 12   CREATININE 0.8   CALCIUM 8.4*   MG 1.8    and CBC   Recent Labs   Lab 06/20/25  0555   WBC 11.77   HGB 12.1   HCT 36.4*          Pending Diagnostic Studies:       Procedure Component Value Units Date/Time    Hemoglobin  A1c if not done in past 3 months [[id number]]     Order Status: Sent Lab Status: No result     Specimen: Blood           There are no hospital problems to display for this patient.     Discharged Condition: good    Disposition: Home or Self Care    Follow Up:   Follow-up Information       Arvin Miranda Jr., MD Follow up on 7/9/2025.    Specialties: General Surgery, Bariatrics  Contact information:  [address]  [facility] [address]  [phone]                           Patient Instructions:      Diet full liquid     Diet Dysphagia Soft     Lifting restrictions     Notify your health care provider if you experience any of the following:  temperature >100.4     Notify your health care provider if you experience any of the following:  persistent nausea and vomiting or diarrhea     Notify your health care provider if you experience any of the following:  severe uncontrolled pain     Notify your health care provider if you experience any of the following:  redness, tenderness, or signs of infection (pain, swelling, redness, odor or green/yellow discharge around incision site)     Notify your health care provider if you experience any of the following:  difficulty breathing or increased cough     Notify your health care provider if you experience any of the following:  severe persistent headache     Notify your health care provider if you experience any of the following:  worsening rash     Notify your health care provider if you experience any of the following:  persistent dizziness, light-headedness, or visual disturbances     Notify your health care provider if you experience any of the following:  increased confusion or weakness     No dressing needed     Medications:  Reconciled Home Medications:      Medication List        START taking these medications      acetaminophen 500 mg powder  Commonly known as: Tylenol  Take 2 packets (1,000 mg total) by mouth 3 (three) times daily. Tear each 500 mg packet  and pour powder directly on tongue. for 14 days     ondansetron 4 MG Tbdl  Commonly known as: ZOFRAN-ODT  Dissolve 1 tablet (4 mg total) by mouth every 8 (eight) hours as needed.     oxyCODONE 5 mg/5 mL Soln  Commonly known as: ROXICODONE  Take 5 mLs (5 mg total) by mouth every 6 (six) hours as needed.     prochlorperazine 5 MG tablet  Commonly known as: COMPAZINE  Take 2 tablets (10 mg total) by mouth every 6 (six) hours as needed for Nausea.     scopolamine 1.3-1.5 mg (1 mg over 3 days)  Commonly known as: TRANSDERM-SCOP  Place 1 patch onto the skin Every 3 (three) days.            CHANGE how you take these medications      * metFORMIN 500 MG ER 24hr tablet  Commonly known as: GLUCOPHAGE-XR  Take 2 tablets (1,000 mg total) by mouth once daily.  What changed: Another medication with the same name was added. Make sure you understand how and when to take each.     * metFORMIN 500 MG tablet  Commonly known as: GLUCOPHAGE  Take 1 tablet (500 mg total) by mouth 2 (two) times daily with meals. for 14 days  What changed: You were already taking a medication with the same name, and this prescription was added. Make sure you understand how and when to take each.           * This list has 2 medication(s) that are the same as other medications prescribed for you. Read the directions carefully, and ask your doctor or other care provider to review them with you.                CONTINUE taking these medications      aspirin 81 MG EC tablet  Commonly known as: ECOTRIN  1 tablet Orally Once a day     atorvastatin 80 MG tablet  Commonly known as: LIPITOR  Take 1 tablet (80 mg total) by mouth once daily.     coQ10 (ubiquinol) 100 mg Cap  as directed Orally     diazePAM 2 MG tablet  Commonly known as: VALIUM  Take 1 tablet (2 mg total) by mouth every 8 (eight) hours as needed (Vertigo).     erythromycin ophthalmic ointment  Commonly known as: ROMYCIN  Place a 1/2 inch ribbon of ointment into the inner left lower eyelid at bedtime      estrogens (conjugated) 0.625 MG tablet  Commonly known as: PREMARIN  TAKE 1 TABLET(0.625 MG) BY MOUTH EVERY DAY     ezetimibe 10 mg tablet  Commonly known as: ZETIA  Take 1 tablet (10 mg total) by mouth once daily.     FERGON 240 (27 FE) MG tablet  Generic drug: ferrous gluconate  1 tablet Orally Three times a Week     FLUoxetine 20 MG capsule  TAKE 1 CAPSULE(20 MG) BY MOUTH EVERY DAY     levothyroxine 175 MCG tablet  Commonly known as: SYNTHROID, LEVOTHROID  TAKE 1 TABLET(175 MCG) BY MOUTH BEFORE BREAKFAST     metoprolol succinate 50 MG 24 hr tablet  Commonly known as: TOPROL-XL  Take 1 tablet (50 mg total) by mouth once daily.     mometasone 0.1% 0.1 % cream  Commonly known as: ELOCON  Use daily     omega-3 fatty acids-vitamin E 1,000 mg Cap  Take 1 capsule by mouth Twice daily. 1 Capsule Oral Twice a day     progesterone 100 MG capsule  Commonly known as: PROMETRIUM  Take 1 capsule (100 mg total) by mouth once daily.     RABEprazole 20 mg tablet  Commonly known as: ACIPHEX  TAKE 1 TABLET BY MOUTH TWICE DAILY     solifenacin 10 MG tablet  Commonly known as: VESICARE  Take 1 tablet (10 mg total) by mouth once daily.     valsartan 160 MG tablet  Commonly known as: DIOVAN  Take 1 tablet (160 mg total) by mouth once daily.     vitamin D 1000 units Tab  Commonly known as: VITAMIN D3  Take 1,000 Units by mouth once daily. 5 tabs daily            STOP taking these medications      azelastine 137 mcg (0.1 %) nasal spray  Commonly known as: ASTELIN     meclizine 25 mg tablet  Commonly known as: ANTIVERT            Time spent on the discharge of patient: 15 minutes    Kim Romero MD  General Surgery  [facility] - Surgery

## 2025-06-20 NOTE — DISCHARGE INSTRUCTIONS
WOUND CARE  You have steri-strips in place; they will fall off on their own.  -- Ok to shower; however, no baths or submerging in water (I.e. swimming, submerging in water) for at least two weeks.  -- Please keep the incision clean with soap and water, pat your incision dry, do not scrub hard over your incisions.    MEDICATIONS AND PAIN CONTROL  -- No swallowing whole pills greater than a pencil eraser until cleared by your surgeon. You can cut any tablets with a split line. You may crush the immediate release metformin, atorvastatin, and synthroid and mix in liquid. Open all capsules into liquid.  -- Most people find that over-the-counter pain medications (Tylenol) will be sufficient for most pain control.  -- If you're taking prescription narcotics, do not drive or operate heavy machinery. Do not drive if your pain is not controlled enough for you to react quickly safely.  -- Take a stool softener with narcotics medications to prevent constipation.    OTHER INSTRUCTIONS  -- Diet: Full liquids for 1 week, then soft foods for 1 week until you see us back in clinic.   -- Monitor for temp > 101 F, bleeding, redness, purulent drainage, or any sudden, new extreme pain. If any occur, please call our clinic or go to the emergency department if after normal business hours.  -- You may resume your regular diet as tolerated.   -- No heavy lifting (anything >10 lbs or = to a gallon of milk) or strenuous exercise until cleared by a physician.  -- Follow up with Dr. Miranda in 2 weeks in clinic for a post-op check. If no appointment is made within the week, please call the clinic to schedule.

## 2025-06-20 NOTE — HOSPITAL COURSE
For details of hospital stay, please refer to daily progress notes. Briefly, this is a 70 y.o. female  presented on 6/19/2025 for planned surgical intervention for a hiatal hernia. Patient was taken to OR and underwent Robotic hiatal hernia repair w/ Toupet fundoplication. Post-operatively patient was admitted to the floor and had an uncomplicated hospital recovery.     On the day of discharge, the patient was ambulating without difficulty, voiding spontaneously, was tolerating a diet without nausea or vomiting, and pain was well controlled on PO pain medications. Discharge instructions were explained to the patient and appropriate follow-up was arranged.

## 2025-06-20 NOTE — ASSESSMENT & PLAN NOTE
71 y/o F s/p robotic hiatal hernia repair with toupet fundoplication on 6/19. Progressing well.   - Advance to CLD   - Nutrition consult  - Can d/c IVF once tolerating clears   - All meds to be in elixer form, <5mm, or crushed  - Raghavendra tylenol, PRN oxycodone  - PRN anti-emetics  - OOB, IS, ambulate  - SSI    Dispo: Once tolerating diet and adequate pain control

## 2025-06-20 NOTE — CONSULTS
Food & Nutrition Education    Diet Education: Post-GI surgery (Toupet Fundoplication) diet  Time Spent: 15 minutes  Learners: patient    Nutrition Education provided with handouts: Diet After Nissen Fundoplication    Comments: Discussed post-GI surgery diet protocol of full liquid diet x 1 week followed by soft diet x 1 week. Reviewed foods included in the full liquid and soft diets. Discussed ways to prevent stomach stretching and excess gas to avoid possible complications. All questions and concerns answered. RD contact provided.      Follow-Up: Yes  Please Re-consult as needed.    Thanks!

## 2025-06-20 NOTE — ANESTHESIA POSTPROCEDURE EVALUATION
Anesthesia Post Evaluation    Patient: Anushka Napier    Procedure(s) Performed: Procedure(s) (LRB):  XI ROBOTIC REPAIR, HIATAL HERNIA, W/ TOUPET FUNDOPLICATION (N/A)  EGD (ESOPHAGOGASTRODUODENOSCOPY) (N/A)    Final Anesthesia Type: general      Patient location during evaluation: [facility]  Patient participation: Yes- Able to Participate  Level of consciousness: awake and alert and oriented  Post-procedure vital signs: reviewed and stable  Pain management: adequate  Airway patency: patent  JASS mitigation strategies: Multimodal analgesia, Extubation while patient is awake, Verification of full reversal of neuromuscular block and Extubation and recovery carried out in lateral, semiupright, or other nonsupine position  PONV status at discharge: No PONV  Anesthetic complications: no      Cardiovascular status: blood pressure returned to baseline and hemodynamically stable  Respiratory status: unassisted, spontaneous ventilation and room air  Hydration status: euvolemic  Follow-up not needed.              Vitals Value Taken Time   /76 06/20/25 11:41   Temp 36.7 °C (98 °F) 06/20/25 11:41   Pulse 62 06/20/25 11:41   Resp 19 06/20/25 11:41   SpO2 91 % 06/20/25 11:41         Event Time   Out of Recovery 13:00:00         Pain/Eliza Score: Pain Rating Prior to Med Admin: 3 (6/20/2025  6:00 AM)  Pain Rating Post Med Admin: 0 (6/20/2025  6:30 AM)  Eliza Score: 9 (6/19/2025  1:00 PM)

## 2025-06-20 NOTE — PROGRESS NOTES
[facility]  General Surgery  Progress Note    Subjective:     History of Present Illness:  No notes on file    Post-Op Info:  Procedure(s) (LRB):  XI ROBOTIC REPAIR, HIATAL HERNIA, W/ TOUPET FUNDOPLICATION (N/A)  EGD (ESOPHAGOGASTRODUODENOSCOPY) (N/A)   1 Day Post-Op     Interval History: NAOE. Afebrile, HDS. Patient reports doing well this morning. Pain is well controlled. Denies n/v. OOB, ambulating.     Medications:  Continuous Infusions:   0.9% NaCl   Intravenous Continuous        lactated ringers   Intravenous Continuous        lactated ringers   Intravenous Continuous 75 mL/hr at 06/19/25 1235 New Bag at 06/19/25 1235     Scheduled Meds:   acetaminophen  1,000 mg Intravenous Q8H    mupirocin   Nasal BID    ondansetron  4 mg Intravenous Q6H    scopolamine  1 patch Transdermal Q3 Days     PRN Meds:  Current Facility-Administered Medications:     dextrose 50%, 12.5 g, Intravenous, PRN    glucagon (human recombinant), 1 mg, Intramuscular, PRN    hydrALAZINE, 10 mg, Intravenous, Q6H PRN    insulin aspart U-100, 0-10 Units, Subcutaneous, Q6H PRN    prochlorperazine, 5 mg, Intravenous, Q6H PRN     Review of patient's allergies indicates:   Allergen Reactions    Iodine Hives     IVP Contrast    Codeine Hives and Itching     Other reaction(s): Itching    Other reaction(s): Hives    Tizanidine Swelling     Tongue swelling     Iodinated contrast media Hives     Other reaction(s): Hives    Sulfamethoxazole-trimethoprim Itching     Other reaction(s): Itching  Other reaction(s): Hives    Epinephrine Anxiety and Other (See Comments)     Almost passed out.     Objective:     Vital Signs (Most Recent):  Temp: 98 °F (36.7 °C) (06/20/25 0535)  Pulse: 72 (06/20/25 0535)  Resp: 18 (06/20/25 0535)  BP: (!) 161/70 (06/20/25 0535)  SpO2: (!) 94 % (06/20/25 0535) Vital Signs (24h Range):  Temp:  [97.2 °F (36.2 °C)-98.6 °F (37 °C)] 98 °F (36.7 °C)  Pulse:  [54-83] 72  Resp:  [13-21] 18  SpO2:  [92 %-99 %] 94 %  BP:  (126-192)/(70-93) 161/70     Weight: 72.6 kg (160 lb 0.9 oz)  Body mass index is 26.63 kg/m².    Intake/Output - Last 3 Shifts         06/18 0700 06/19 0659 06/19 0700 06/20 0659 06/20 0700 06/21 0659    IV Piggyback  100     Total Intake(mL/kg)  100 (1.4)     Net  +100                     Physical Exam  Constitutional:       General: She is not in acute distress.  HENT:      Head: Normocephalic and atraumatic.   Eyes:      Conjunctiva/sclera: Conjunctivae normal.   Cardiovascular:      Rate and Rhythm: Normal rate and regular rhythm.   Pulmonary:      Effort: Pulmonary effort is normal. No respiratory distress.   Abdominal:      General: There is no distension.      Palpations: Abdomen is soft.      Comments: Appropriately TTP  Surgical incision c/d/I    Musculoskeletal:         General: No swelling.   Skin:     General: Skin is warm and dry.      Capillary Refill: Capillary refill takes less than 2 seconds.   Neurological:      General: No focal deficit present.      Mental Status: She is alert.          Significant Labs:  I have reviewed all pertinent lab results within the past 24 hours.  CBC:   Recent Labs   Lab 06/20/25  0555   WBC 11.77   RBC 4.16   HGB 12.1   HCT 36.4*      MCV 88   MCH 29.1   MCHC 33.2     CMP:   Recent Labs   Lab 06/20/25  0555   *   CALCIUM 8.4*      K 4.1   CO2 22*      BUN 12   CREATININE 0.8       Significant Diagnostics:  I have reviewed all pertinent imaging results/findings within the past 24 hours.  Assessment/Plan:     GERD (gastroesophageal reflux disease)  69 y/o F s/p robotic hiatal hernia repair with toupet fundoplication on 6/19. Progressing well.   - Advance to CLD   - Nutrition consult  - Can d/c IVF once tolerating clears   - All meds to be in elixer form, <5mm, or crushed  - Raghavendra tylenol, PRN oxycodone  - PRN anti-emetics  - OOB, IS, ambulate  - SSI    Dispo: Once tolerating diet and adequate pain control            Kim Romero MD  General  Surgery  [facility]

## 2025-06-20 NOTE — PLAN OF CARE
[facility]  Initial Discharge Assessment       Primary Care Provider: Gerardo Dobbs MD    Admission Diagnosis: Hiatal hernia [K44.9]  Gastroesophageal reflux disease with esophagitis without hemorrhage [K21.00]  Hiatal hernia with GERD and esophagitis [K44.9, K21.00]    Admission Date: 6/19/2025  Expected Discharge Date:     Transition of Care Barriers: None    Payor: BLUE CROSS BLUE SHIELD / Plan: [place]MO / Product Type: HMO /     Extended Emergency Contact Information  Primary Emergency Contact: Jose Napier III   [facility]  Home Phone: [phone]  Work Phone: [phone]  Mobile Phone: [phone]  Relation: Spouse  Secondary Emergency Contact: Dora Napier   [place]  Mobile Phone: [phone]  Relation: Daughter    Discharge Plan A: Home with family  Discharge Plan B: Home with family, Home Health      [id number] - [place], [place] - [facility] [address] [place] [address]  [place] [address]  Phone: [phone] Fax: [phone]      Initial Assessment (most recent)       Adult Discharge Assessment - 06/20/25 0843          Discharge Assessment    Assessment Type Discharge Planning Assessment     Confirmed/corrected address, phone number and insurance Yes     Confirmed Demographics Correct on Facesheet     Source of Information patient;family     Communicated ERIKA with patient/caregiver Yes     People in Home spouse     Name(s) of People in Home [facility] # [phone]     Do you expect to return to your current living situation? Yes     Do you have help at home or someone to help you manage your care at home? Yes     Who are your caregiver(s) and their phone number(s)? [facility] # [phone]     Prior to hospitilization cognitive status: Alert/Oriented     Current cognitive status: Alert/Oriented     Walking or Climbing Stairs Difficulty no     Dressing/Bathing Difficulty no     Home Accessibility  wheelchair accessible     Home Layout Able to live on 1st floor     Equipment Currently Used at Home none     Readmission within 30 days? No     Patient currently being followed by outpatient case management? No     Do you currently have service(s) that help you manage your care at home? No     Do you take prescription medications? Yes     Do you have prescription coverage? Yes     Do you have any problems affording any of your prescribed medications? No     Is the patient taking medications as prescribed? yes     Who is going to help you get home at discharge? [facility] # [phone]     How do you get to doctors appointments? car, drives self;family or friend will provide     Are you on dialysis? No     Do you take coumadin? No     Discharge Plan A Home with family     Discharge Plan B Home with family;Home Health     DME Needed Upon Discharge  none     Discharge Plan discussed with: Patient     Transition of Care Barriers None                       SW completed discharge planning assessment with the patient at bedside. SW verified demographic information listed on the pt.'s Face sheet. [facility] [phone] ), plans to help manage the pt's care and provide transport upon discharge. Pt lives with her spouse in a single story home. Patient doesn't report utilizing any equipment prior to this hospital stay, nor reports any DME needs upon discharge at this time.SW is following this Pt for DC planning needs. There are no identified needs at this time.    Discharge Plan A and Plan B have been determined by review of patient's clinical status, future medical and therapeutic needs, and coverage/benefits for post-acute care in coordination with multidisciplinary team members.      Ladi Sheridan LCSW  Case Management   [facility]   Ext. [phone]

## 2025-06-20 NOTE — SUBJECTIVE & OBJECTIVE
Interval History: NAOE. Afebrile, HDS. Patient reports doing well this morning. Pain is well controlled. Denies n/v. OOB, ambulating.     Medications:  Continuous Infusions:   0.9% NaCl   Intravenous Continuous        lactated ringers   Intravenous Continuous        lactated ringers   Intravenous Continuous 75 mL/hr at 06/19/25 1235 New Bag at 06/19/25 1235     Scheduled Meds:   acetaminophen  1,000 mg Intravenous Q8H    mupirocin   Nasal BID    ondansetron  4 mg Intravenous Q6H    scopolamine  1 patch Transdermal Q3 Days     PRN Meds:  Current Facility-Administered Medications:     dextrose 50%, 12.5 g, Intravenous, PRN    glucagon (human recombinant), 1 mg, Intramuscular, PRN    hydrALAZINE, 10 mg, Intravenous, Q6H PRN    insulin aspart U-100, 0-10 Units, Subcutaneous, Q6H PRN    prochlorperazine, 5 mg, Intravenous, Q6H PRN     Review of patient's allergies indicates:   Allergen Reactions    Iodine Hives     IVP Contrast    Codeine Hives and Itching     Other reaction(s): Itching    Other reaction(s): Hives    Tizanidine Swelling     Tongue swelling     Iodinated contrast media Hives     Other reaction(s): Hives    Sulfamethoxazole-trimethoprim Itching     Other reaction(s): Itching  Other reaction(s): Hives    Epinephrine Anxiety and Other (See Comments)     Almost passed out.     Objective:     Vital Signs (Most Recent):  Temp: 98 °F (36.7 °C) (06/20/25 0535)  Pulse: 72 (06/20/25 0535)  Resp: 18 (06/20/25 0535)  BP: (!) 161/70 (06/20/25 0535)  SpO2: (!) 94 % (06/20/25 0535) Vital Signs (24h Range):  Temp:  [97.2 °F (36.2 °C)-98.6 °F (37 °C)] 98 °F (36.7 °C)  Pulse:  [54-83] 72  Resp:  [13-21] 18  SpO2:  [92 %-99 %] 94 %  BP: (126-192)/(70-93) 161/70     Weight: 72.6 kg (160 lb 0.9 oz)  Body mass index is 26.63 kg/m².    Intake/Output - Last 3 Shifts         06/18 0700 06/19 0659 06/19 0700 06/20 0659 06/20 0700 06/21 0659    IV Piggyback  100     Total Intake(mL/kg)  100 (1.4)     Net  +100                      Physical Exam  Constitutional:       General: She is not in acute distress.  HENT:      Head: Normocephalic and atraumatic.   Eyes:      Conjunctiva/sclera: Conjunctivae normal.   Cardiovascular:      Rate and Rhythm: Normal rate and regular rhythm.   Pulmonary:      Effort: Pulmonary effort is normal. No respiratory distress.   Abdominal:      General: There is no distension.      Palpations: Abdomen is soft.      Comments: Appropriately TTP  Surgical incision c/d/I    Musculoskeletal:         General: No swelling.   Skin:     General: Skin is warm and dry.      Capillary Refill: Capillary refill takes less than 2 seconds.   Neurological:      General: No focal deficit present.      Mental Status: She is alert.          Significant Labs:  I have reviewed all pertinent lab results within the past 24 hours.  CBC:   Recent Labs   Lab 06/20/25  0555   WBC 11.77   RBC 4.16   HGB 12.1   HCT 36.4*      MCV 88   MCH 29.1   MCHC 33.2     CMP:   Recent Labs   Lab 06/20/25  0555   *   CALCIUM 8.4*      K 4.1   CO2 22*      BUN 12   CREATININE 0.8       Significant Diagnostics:  I have reviewed all pertinent imaging results/findings within the past 24 hours.

## 2025-06-26 ENCOUNTER — PATIENT MESSAGE (OUTPATIENT)
Dept: SURGERY | Facility: CLINIC | Age: 70
End: 2025-06-26
Payer: COMMERCIAL

## 2025-07-09 ENCOUNTER — OFFICE VISIT (OUTPATIENT)
Dept: SURGERY | Facility: CLINIC | Age: 70
End: 2025-07-09
Payer: COMMERCIAL

## 2025-07-09 VITALS
SYSTOLIC BLOOD PRESSURE: 129 MMHG | HEART RATE: 69 BPM | HEIGHT: 65 IN | DIASTOLIC BLOOD PRESSURE: 75 MMHG | BODY MASS INDEX: 27.25 KG/M2 | WEIGHT: 163.56 LBS

## 2025-07-09 DIAGNOSIS — Z09 POSTOP CHECK: Primary | ICD-10-CM

## 2025-07-09 PROCEDURE — 3051F HG A1C>EQUAL 7.0%<8.0%: CPT | Mod: CPTII,S$GLB,, | Performed by: SURGERY

## 2025-07-09 PROCEDURE — 3288F FALL RISK ASSESSMENT DOCD: CPT | Mod: CPTII,S$GLB,, | Performed by: SURGERY

## 2025-07-09 PROCEDURE — 1160F RVW MEDS BY RX/DR IN RCRD: CPT | Mod: CPTII,S$GLB,, | Performed by: SURGERY

## 2025-07-09 PROCEDURE — 99024 POSTOP FOLLOW-UP VISIT: CPT | Mod: S$GLB,,, | Performed by: SURGERY

## 2025-07-09 PROCEDURE — 1101F PT FALLS ASSESS-DOCD LE1/YR: CPT | Mod: CPTII,S$GLB,, | Performed by: SURGERY

## 2025-07-09 PROCEDURE — 3074F SYST BP LT 130 MM HG: CPT | Mod: CPTII,S$GLB,, | Performed by: SURGERY

## 2025-07-09 PROCEDURE — 3078F DIAST BP <80 MM HG: CPT | Mod: CPTII,S$GLB,, | Performed by: SURGERY

## 2025-07-09 PROCEDURE — 1159F MED LIST DOCD IN RCRD: CPT | Mod: CPTII,S$GLB,, | Performed by: SURGERY

## 2025-07-09 PROCEDURE — 1126F AMNT PAIN NOTED NONE PRSNT: CPT | Mod: CPTII,S$GLB,, | Performed by: SURGERY

## 2025-07-09 PROCEDURE — 99999 PR PBB SHADOW E&M-EST. PATIENT-LVL IV: CPT | Mod: PBBFAC,,, | Performed by: SURGERY

## 2025-07-09 PROCEDURE — 4010F ACE/ARB THERAPY RXD/TAKEN: CPT | Mod: CPTII,S$GLB,, | Performed by: SURGERY

## 2025-07-09 NOTE — PROGRESS NOTES
HPI:  The patient is status post-robotic repair of Hiatal Hernia with Toupet.  Pt with improved reflux and dysphagia but still with some dysphagia.  (Aperistalsis on preop manometry).  Overall better.  Long discussion on post op expectations.  Happy she is overall feeling better than prior with less dysphagia.    PHYSICAL EXAM:    In NAD  Incisions c/d/i    ASSESSMENT:    The patient is doing well after surgery.     PLAN:    Follow up in 4 weeks to eval improvement of dysphagia.  Activity: No heavy lifting for 4 weeks.    Call with questions or concerns.  Will plan for rheum appointment after next visit.    Arvin Miranda MD

## 2025-07-19 DIAGNOSIS — N95.1 SYMPTOMATIC MENOPAUSAL OR FEMALE CLIMACTERIC STATES: ICD-10-CM

## 2025-07-19 NOTE — TELEPHONE ENCOUNTER
Refill Routing Note   Medication(s) are not appropriate for processing by [facility] for the following reason(s):        Drug-disease interactionprogesterone and Migraine without aura and without status migrainosus, not intractable     [facility] action(s):  Defer               Appointments  past 12m or future 3m with PCP    Date Provider   Last Visit   4/29/2025 Pan Tillman MD   Next Visit   Visit date not found Pan Tilmlan MD   ED visits in past 90 days: 0        Note composed:12:57 PM 07/19/2025

## 2025-07-20 RX ORDER — PROGESTERONE 100 MG/1
CAPSULE ORAL
Qty: 90 CAPSULE | Refills: 3 | Status: SHIPPED | OUTPATIENT
Start: 2025-07-20

## 2025-07-22 ENCOUNTER — HOSPITAL ENCOUNTER (OUTPATIENT)
Dept: RADIOLOGY | Facility: CLINIC | Age: 70
Discharge: HOME | End: 2025-07-22
Attending: INTERNAL MEDICINE
Payer: COMMERCIAL

## 2025-07-22 DIAGNOSIS — M81.0 OSTEOPOROSIS, UNSPECIFIED OSTEOPOROSIS TYPE, UNSPECIFIED PATHOLOGICAL FRACTURE PRESENCE: ICD-10-CM

## 2025-07-22 PROCEDURE — 77080 DXA BONE DENSITY AXIAL: CPT | Mod: TC,PO

## 2025-07-30 ENCOUNTER — TELEPHONE (OUTPATIENT)
Dept: PHARMACY | Facility: CLINIC | Age: 70
End: 2025-07-30
Payer: COMMERCIAL

## 2025-07-30 NOTE — TELEPHONE ENCOUNTER
[facility]/Population Health Chart Review & Patient Outreach Details For Medication Adherence Project    Reason for Outreach Encounter: 3rd Party payor non-compliance report ([employer], [employer], C, etc)  2.  Patient Outreach Method: Reviewed Patient Chart  3.   Medication in question: valsartan   LAST FILLED: 5/5/25 for 90 day supply  Hypertension Medications              metoprolol succinate (TOPROL-XL) 50 MG 24 hr tablet Take 1 tablet (50 mg total) by mouth once daily.    valsartan (DIOVAN) 160 MG tablet Take 1 tablet (160 mg total) by mouth once daily.              4.  Reviewed and or Updates Made To: Patient Chart  5. Outreach Outcomes and/or actions taken: Patient filled medication and is on track to be adherent

## 2025-08-06 ENCOUNTER — OFFICE VISIT (OUTPATIENT)
Dept: SURGERY | Facility: CLINIC | Age: 70
End: 2025-08-06
Payer: COMMERCIAL

## 2025-08-06 ENCOUNTER — PATIENT MESSAGE (OUTPATIENT)
Dept: FAMILY MEDICINE | Facility: CLINIC | Age: 70
End: 2025-08-06
Payer: COMMERCIAL

## 2025-08-06 VITALS
SYSTOLIC BLOOD PRESSURE: 136 MMHG | HEART RATE: 55 BPM | DIASTOLIC BLOOD PRESSURE: 78 MMHG | BODY MASS INDEX: 27.15 KG/M2 | WEIGHT: 162.94 LBS | HEIGHT: 65 IN

## 2025-08-06 DIAGNOSIS — M34.9 SCLERODERMA: ICD-10-CM

## 2025-08-06 DIAGNOSIS — E11.9 TYPE 2 DIABETES MELLITUS WITHOUT COMPLICATION, UNSPECIFIED WHETHER LONG TERM INSULIN USE: Primary | ICD-10-CM

## 2025-08-06 DIAGNOSIS — Z09 POSTOP CHECK: Primary | ICD-10-CM

## 2025-08-06 PROCEDURE — 1159F MED LIST DOCD IN RCRD: CPT | Mod: CPTII,S$GLB,, | Performed by: SURGERY

## 2025-08-06 PROCEDURE — 1101F PT FALLS ASSESS-DOCD LE1/YR: CPT | Mod: CPTII,S$GLB,, | Performed by: SURGERY

## 2025-08-06 PROCEDURE — 3051F HG A1C>EQUAL 7.0%<8.0%: CPT | Mod: CPTII,S$GLB,, | Performed by: SURGERY

## 2025-08-06 PROCEDURE — 4010F ACE/ARB THERAPY RXD/TAKEN: CPT | Mod: CPTII,S$GLB,, | Performed by: SURGERY

## 2025-08-06 PROCEDURE — 3288F FALL RISK ASSESSMENT DOCD: CPT | Mod: CPTII,S$GLB,, | Performed by: SURGERY

## 2025-08-06 PROCEDURE — 3075F SYST BP GE 130 - 139MM HG: CPT | Mod: CPTII,S$GLB,, | Performed by: SURGERY

## 2025-08-06 PROCEDURE — 3078F DIAST BP <80 MM HG: CPT | Mod: CPTII,S$GLB,, | Performed by: SURGERY

## 2025-08-06 PROCEDURE — 99024 POSTOP FOLLOW-UP VISIT: CPT | Mod: S$GLB,,, | Performed by: SURGERY

## 2025-08-06 PROCEDURE — 1126F AMNT PAIN NOTED NONE PRSNT: CPT | Mod: CPTII,S$GLB,, | Performed by: SURGERY

## 2025-08-06 PROCEDURE — 1160F RVW MEDS BY RX/DR IN RCRD: CPT | Mod: CPTII,S$GLB,, | Performed by: SURGERY

## 2025-08-06 PROCEDURE — 99999 PR PBB SHADOW E&M-EST. PATIENT-LVL V: CPT | Mod: PBBFAC,,, | Performed by: SURGERY

## 2025-08-06 NOTE — PROGRESS NOTES
HPI:  The patient is status post-robotically repair of hiatal hernia with mesh with Toupet fundoplication.  On her 2 week visit she had continued dysphagia and follows up today for discussion.  Today she states that she has no further symptoms of dysphagia.  She states she is eating whatever she would like and continuing to take small bites and chew very well, but denying any dysphagia.  She is continuing to wean off of her PPIs but states that her preop symptoms have resolved.  She did have evidence of a peristalsis and we will send her to room for evaluation of any rheumatologic process.    PHYSICAL EXAM:    In NAD  Incisions c/d/i    ASSESSMENT:    The patient is doing well after surgery.     PLAN:    Follow up PRN.    Will schedule a rheumatology appointment.  Patient to call or return if any questions or concerns.    Arvin Miranda MD

## 2025-08-08 ENCOUNTER — PATIENT MESSAGE (OUTPATIENT)
Dept: RHEUMATOLOGY | Facility: CLINIC | Age: 70
End: 2025-08-08
Payer: COMMERCIAL

## 2025-08-15 ENCOUNTER — OFFICE VISIT (OUTPATIENT)
Dept: DERMATOLOGY | Facility: CLINIC | Age: 70
End: 2025-08-15
Payer: COMMERCIAL

## 2025-08-15 ENCOUNTER — OFFICE VISIT (OUTPATIENT)
Dept: NEUROLOGY | Facility: CLINIC | Age: 70
End: 2025-08-15
Payer: COMMERCIAL

## 2025-08-15 VITALS
HEART RATE: 70 BPM | OXYGEN SATURATION: 96 % | WEIGHT: 164.88 LBS | HEIGHT: 65 IN | SYSTOLIC BLOOD PRESSURE: 142 MMHG | BODY MASS INDEX: 27.47 KG/M2 | DIASTOLIC BLOOD PRESSURE: 79 MMHG

## 2025-08-15 DIAGNOSIS — L30.9 HAND ECZEMA: ICD-10-CM

## 2025-08-15 DIAGNOSIS — Z85.828 HISTORY OF SKIN CANCER: ICD-10-CM

## 2025-08-15 DIAGNOSIS — G91.9 HYDROCEPHALUS, UNSPECIFIED TYPE: ICD-10-CM

## 2025-08-15 DIAGNOSIS — L82.1 SEBORRHEIC KERATOSES: Primary | ICD-10-CM

## 2025-08-15 DIAGNOSIS — L81.4 LENTIGINES: ICD-10-CM

## 2025-08-15 DIAGNOSIS — L30.1 DYSHIDROTIC HAND DERMATITIS: ICD-10-CM

## 2025-08-15 PROCEDURE — 99999 PR PBB SHADOW E&M-EST. PATIENT-LVL III: CPT | Mod: PBBFAC,,, | Performed by: DERMATOLOGY

## 2025-08-15 PROCEDURE — 99999 PR PBB SHADOW E&M-EST. PATIENT-LVL V: CPT | Mod: PBBFAC,,, | Performed by: STUDENT IN AN ORGANIZED HEALTH CARE EDUCATION/TRAINING PROGRAM

## (undated) DEVICE — SUT MCRYL PLUS 4-0 PS2 27IN

## (undated) DEVICE — TROCAR ENDOPATH XCEL 5X100MM

## (undated) DEVICE — DRAPE ABDOMINAL TIBURON 14X11

## (undated) DEVICE — DRAPE COLUMN DAVINCI XI

## (undated) DEVICE — SOL ELECTROLUBE ANTI-STIC

## (undated) DEVICE — CLOSURE SKIN STERI STRIP 1/2X4

## (undated) DEVICE — ELECTRODE MEGADYNE RETURN DUAL

## (undated) DEVICE — SEAL UNIVERSAL 5MM-8MM XI

## (undated) DEVICE — KIT ANTIFOG W/SPONG & FLUID

## (undated) DEVICE — TRAY MINOR GEN SURG OMC

## (undated) DEVICE — NDL HYPO REG 25G X 1 1/2

## (undated) DEVICE — SUT 2/0 30IN SILK BLK BRAI

## (undated) DEVICE — DRAPE ARM DAVINCI XI

## (undated) DEVICE — ELECTRODE REM PLYHSV RETURN 9

## (undated) DEVICE — PENCIL ROCKER SWITCH 10FT CORD

## (undated) DEVICE — SYR 10CC LUER LOCK

## (undated) DEVICE — OBTURATOR BLADELESS 8MM XI CLR

## (undated) DEVICE — ADHESIVE MASTISOL VIAL 48/BX

## (undated) DEVICE — DRAPE SCOPE PILLOW WARMER

## (undated) DEVICE — DEVICE SYNCHROSEAL DA VINCI

## (undated) DEVICE — DRAPE STERI INSTRUMENT 1018